# Patient Record
Sex: FEMALE | Race: WHITE | Employment: UNEMPLOYED | RURAL
[De-identification: names, ages, dates, MRNs, and addresses within clinical notes are randomized per-mention and may not be internally consistent; named-entity substitution may affect disease eponyms.]

---

## 2017-03-29 ENCOUNTER — OFFICE VISIT (OUTPATIENT)
Dept: FAMILY MEDICINE CLINIC | Age: 62
End: 2017-03-29

## 2017-03-29 VITALS
HEIGHT: 66 IN | DIASTOLIC BLOOD PRESSURE: 58 MMHG | OXYGEN SATURATION: 98 % | TEMPERATURE: 97.3 F | SYSTOLIC BLOOD PRESSURE: 135 MMHG | RESPIRATION RATE: 18 BRPM | BODY MASS INDEX: 19.77 KG/M2 | WEIGHT: 123 LBS | HEART RATE: 92 BPM

## 2017-03-29 DIAGNOSIS — Z79.4 TYPE 2 DIABETES MELLITUS WITH OTHER CIRCULATORY COMPLICATION, WITH LONG-TERM CURRENT USE OF INSULIN (HCC): Primary | ICD-10-CM

## 2017-03-29 DIAGNOSIS — E78.2 MIXED DYSLIPIDEMIA: ICD-10-CM

## 2017-03-29 DIAGNOSIS — E11.59 TYPE 2 DIABETES MELLITUS WITH OTHER CIRCULATORY COMPLICATION, WITH LONG-TERM CURRENT USE OF INSULIN (HCC): Primary | ICD-10-CM

## 2017-03-29 DIAGNOSIS — I10 ESSENTIAL HYPERTENSION: ICD-10-CM

## 2017-03-29 NOTE — PROGRESS NOTES
Adina Dangelo is a 62 y.o. female who presents with the following:  Chief Complaint   Patient presents with    Diabetes     discuss meds, needs to have labs drawn, discuss defibrillator    Hypertension    Cholesterol Problem       Diabetes   The history is provided by the patient (The patient feels the diabetes is doing well but she has not had her laboratory done in quite some time and she has not been following her own fingersticks). Pertinent negatives include no chest pain, no abdominal pain, no headaches and no shortness of breath. Hypertension    The history is provided by the patient (The patient has been doing well taking her medication for her hypertension without any chest pain nor edema). Pertinent negatives include no chest pain, no orthopnea, no palpitations, no malaise/fatigue, no blurred vision, no headaches, no tinnitus, no dizziness and no shortness of breath. Cholesterol Problem   The history is provided by the patient (The patient has been taking her pravastatin with only minor cramping at night and no weakness). Pertinent negatives include no chest pain, no abdominal pain, no headaches and no shortness of breath. Allergies   Allergen Reactions    Pcn [Penicillins] Anaphylaxis    Sulfa (Sulfonamide Antibiotics) Nausea Only    Valium [Diazepam] Unknown (comments)       Current Outpatient Prescriptions   Medication Sig    carvedilol (COREG) 6.25 mg tablet Take  by mouth two (2) times daily (with meals).  docusate sodium (COLACE) 100 mg capsule Take 100 mg by mouth two (2) times a day.  pravastatin (PRAVACHOL) 20 mg tablet Take 20 mg by mouth nightly.  gabapentin (NEURONTIN) 100 mg capsule Take 1 Cap by mouth three (3) times daily.  cyclobenzaprine (FLEXERIL) 5 mg tablet Take 1 Tab by mouth three (3) times daily (with meals). Indications: MUSCLE SPASM    amiodarone (CORDARONE) 200 mg tablet Take 1 Tab by mouth two (2) times a day.  (Patient taking differently: Take 200 mg by mouth daily.)    insulin glargine (LANTUS) 100 unit/mL injection 13 Units by SubCUTAneous route daily. (Patient taking differently: 24 Units by SubCUTAneous route daily.)    pantoprazole (PROTONIX) 40 mg tablet Take 1 Tab by mouth daily.  rivaroxaban (XARELTO) 20 mg tab tablet Take 1 Tab by mouth daily (with breakfast).  valsartan (DIOVAN) 40 mg tablet Take 0.5 Tabs by mouth daily. (Patient taking differently: Take 40 mg by mouth two (2) times a day.)     No current facility-administered medications for this visit. Past Medical History:   Diagnosis Date    Back pain     Back pain     Hepatitis     History of seasonal allergies     Hypertension     Kidney infection     [facility] infarction) [facility]     MRSA (methicillin resistant Staphylococcus aureus)     Recurrent boils     [facility]     Type II or unspecified type diabetes mellitus without mention of complication, not stated as uncontrolled        Past Surgical History:   Procedure Laterality Date    HX BLADDER REPAIR      HX CHOLECYSTECTOMY      HX HYSTERECTOMY      2000    HX ORTHOPAEDIC      pin in left foot    HX OTHER SURGICAL      defiberilator left corotid    HX OTHER SURGICAL      chris abscess       Family History   Problem Relation Age of Onset    Arthritis-osteo Mother     Diabetes Father     Hypertension Father     Heart Attack Father     Heart Disease Father     Diabetes Paternal Grandmother        Social History     Social History    Marital status:      Spouse name: N/A    Number of children: N/A    Years of education: N/A     Social History Main Topics    Smoking status: Current Some Day Smoker    Smokeless tobacco: Never Used    Alcohol use No    Drug use: None    Sexual activity: Not Asked     Other Topics Concern    None     Social History Narrative       Review of Systems   Constitutional: Negative for chills, fever, malaise/fatigue and weight loss.    HENT: Negative for congestion, hearing loss, sore throat and tinnitus. Eyes: Negative for blurred vision, pain and discharge. Respiratory: Negative for cough, shortness of breath and wheezing. Cardiovascular: Negative for chest pain, palpitations, orthopnea, claudication and leg swelling. Gastrointestinal: Negative for abdominal pain, constipation and heartburn. Genitourinary: Negative for dysuria, frequency and urgency. Musculoskeletal: Negative for falls, joint pain and myalgias. Skin: Negative for itching and rash. Neurological: Negative for dizziness, tingling, tremors and headaches. Endo/Heme/Allergies: Negative for environmental allergies and polydipsia. Psychiatric/Behavioral: Negative for depression and substance abuse. The patient is not nervous/anxious. Visit Vitals    /58 (BP 1 Location: Left arm, BP Patient Position: Sitting)    Pulse 92    Temp 97.3 °F (36.3 °C) (Oral)    Resp 18    Ht 5' 6\" (1.676 m)    Wt 123 lb (55.8 kg)    SpO2 98%    BMI 19.85 kg/m2     Physical Exam   Constitutional: She is oriented to person, place, and time and well-developed, well-nourished, and in no distress. HENT:   Head: Normocephalic and atraumatic. Right Ear: External ear normal.   Left Ear: External ear normal.   Mouth/Throat: Oropharynx is clear and moist.   Eyes: Conjunctivae and EOM are normal. Pupils are equal, round, and reactive to light. Right eye exhibits no discharge. Left eye exhibits no discharge. Neck: Normal range of motion. Neck supple. No tracheal deviation present. No thyromegaly present. Cardiovascular: Normal rate, regular rhythm, normal heart sounds and intact distal pulses. Exam reveals no gallop and no friction rub. No murmur heard. Pulmonary/Chest: Effort normal and breath sounds normal. No respiratory distress. She has no wheezes. She exhibits no tenderness. Abdominal: Soft. Bowel sounds are normal. She exhibits no distension and no mass.  There is no tenderness. There is no rebound and no guarding. Musculoskeletal: She exhibits no edema or tenderness. Lymphadenopathy:     She has no cervical adenopathy. Neurological: She is alert and oriented to person, place, and time. She has normal reflexes. No cranial nerve deficit. She exhibits normal muscle tone. Gait normal. Coordination normal. GCS score is 15. Skin: Skin is warm and dry. No rash noted. No erythema. No pallor. Psychiatric: Mood, memory, affect and judgment normal.         ICD-10-CM ICD-9-CM    1. Type 2 diabetes mellitus with other circulatory complication, with long-term current use of insulin ([facility]) X32.73  METABOLIC PANEL, COMPREHENSIVE    Z79.4  LIPID PANEL      HEMOGLOBIN A1C WITH EAG      MICROALBUMIN, UR, RAND W/ MICROALBUMIN/CREA RATIO   2. Essential hypertension S96 174.3 METABOLIC PANEL, COMPREHENSIVE      LIPID PANEL   3.  Mixed dyslipidemia X65.9 831.7 METABOLIC PANEL, COMPREHENSIVE      LIPID PANEL       Orders Placed This Encounter    METABOLIC PANEL, COMPREHENSIVE     Standing Status:   Future     Standing Expiration Date:   9/29/2017    LIPID PANEL     Standing Status:   Future     Standing Expiration Date:   9/29/2017    HEMOGLOBIN A1C WITH EAG     Standing Status:   Future     Standing Expiration Date:   9/29/2017    MICROALBUMIN, UR, RAND W/ MICROALBUMIN/CREA RATIO     Standing Status:   Future     Standing Expiration Date:   9/29/2017       Follow-up Disposition: Not on Luis Staley MD

## 2017-03-29 NOTE — MR AVS SNAPSHOT
Visit Information Date & Time Provider Department Dept. Phone Encounter #  
 3/29/2017  2:20 PM Ashleigh Way MD [facility] Primary [facility] [phone] [id number] Upcoming Health Maintenance Date Due Hepatitis C Screening 1955 FOOT EXAM Q1 5/13/1966 MICROALBUMIN Q1 5/13/1966 EYE EXAM RETINAL OR DILATED Q1 5/13/1966 Pneumococcal 19-64 Medium Risk (1 of 1 - PPSV23) 5/13/1975 DTaP/Tdap/Td series (1 - Tdap) 5/13/1977 PAP AKA CERVICAL CYTOLOGY 5/13/1977 BREAST CANCER SCRN MAMMOGRAM 5/13/2006 FOBT Q 1 YEAR AGE 50-75 5/13/2006 ZOSTER VACCINE AGE 60> 5/13/2016 INFLUENZA AGE 9 TO ADULT 8/1/2016 HEMOGLOBIN A1C Q6M 9/1/2016 LIPID PANEL Q1 3/1/2017 Allergies as of 3/29/2017  Review Complete On: 3/29/2017 By: Brooke Danielle RN Severity Noted Reaction Type Reactions Pcn [Penicillins] High 09/27/2013    Anaphylaxis Sulfa (Sulfonamide Antibiotics)  09/27/2013    Nausea Only Valium [Diazepam]  12/20/2016    Unknown (comments) Current Immunizations  Never Reviewed No immunizations on file. Not reviewed this visit Vitals BP Pulse Temp Resp Height(growth percentile) Weight(growth percentile) 135/58 (BP 1 Location: Left arm, BP Patient Position: Sitting) 92 97.3 °F (36.3 °C) (Oral) 18 5' 6\" (1.676 m) 123 lb (55.8 kg) SpO2 BMI OB Status Smoking Status 98% 19.85 kg/m2 Hysterectomy Current Some Day Smoker Vitals History BMI and BSA Data Body Mass Index Body Surface Area  
 19.85 kg/m 2 1.61 m 2 Preferred Pharmacy Pharmacy Name Phone [facility]""[place] , [place] - [address] [phone] Your Updated Medication List  
  
   
This list is accurate as of: 3/29/17  3:49 PM.  Always use your most recent med list.  
  
  
  
  
 acetaminophen 325 mg tablet Commonly known as:  TYLENOL  
 Take 2 Tabs by mouth every four (4) hours as needed. Indications: FEVER, PAIN  
  
 amiodarone 200 mg tablet Commonly known as:  CORDARONE Take 1 Tab by mouth two (2) times a day. atorvastatin 20 mg tablet Commonly known as:  LIPITOR Take 1 Tab by mouth nightly. BACTRIM -800 mg per tablet Generic drug:  trimethoprim-sulfamethoxazole Take 1 Tab by mouth two (2) times a day. carvedilol 6.25 mg tablet Commonly known as:  Monda Lovings Take  by mouth two (2) times daily (with meals). cyclobenzaprine 5 mg tablet Commonly known as:  FLEXERIL Take 1 Tab by mouth three (3) times daily (with meals). Indications: MUSCLE SPASM  
  
 docusate sodium 100 mg capsule Commonly known as:  Hildegarde Berth Take 100 mg by mouth two (2) times a day.  
  
 gabapentin 100 mg capsule Commonly known as:  NEURONTIN Take 1 Cap by mouth three (3) times daily. glipiZIDE 10 mg tablet Commonly known as:  Sher Hanly Take 1.5 Tabs by mouth two (2) times a day. insulin glargine 100 unit/mL injection Commonly known as:  LANTUS  
13 Units by SubCUTAneous route daily. MUCINEX D MAXIMUM STRENGTH PO Take  by mouth.  
  
 pantoprazole 40 mg tablet Commonly known as:  PROTONIX Take 1 Tab by mouth daily. PERCOCET 5-325 mg per tablet Generic drug:  oxyCODONE-acetaminophen Take 1 Tab by mouth every four (4) hours as needed. polyethylene glycol 17 gram packet Commonly known as:  Prachi Liv Take 1 Packet by mouth daily as needed. PRAVACHOL 20 mg tablet Generic drug:  pravastatin Take 20 mg by mouth nightly. rivaroxaban 20 mg Tab tablet Commonly known as:  Marcine Rosario Take 1 Tab by mouth daily (with breakfast). SINGULAIR 10 mg tablet Generic drug:  montelukast  
Take 10 mg by mouth daily. spironolactone 25 mg tablet Commonly known as:  ALDACTONE Take 1 Tab by mouth daily. valsartan 40 mg tablet Commonly known as:  DIOVAN  
 Take 0.5 Tabs by mouth daily. Introducing [facility]! Dear Dara Hagan: 
Thank you for requesting a BragBet account. Our records indicate that you already have an active BragBet account. You can access your account anytime at [email or web address] Did you know that you can access your hospital and ER discharge instructions at any time in BragBet? You can also review all of your test results from your hospital stay or ER visit. Additional Information If you have questions, please visit the Frequently Asked Questions section of the BragBet website at [email or web address] Remember, BragBet is NOT to be used for urgent needs. For medical emergencies, dial 911. Now available from your iPhone and Android! Please provide this summary of care documentation to your next provider. Your primary care clinician is listed as Remington Marie. If you have any questions after today's visit, please call [phone].

## 2017-03-31 DIAGNOSIS — G89.29 CHRONIC RIGHT-SIDED LOW BACK PAIN WITHOUT SCIATICA: ICD-10-CM

## 2017-03-31 DIAGNOSIS — M54.50 CHRONIC RIGHT-SIDED LOW BACK PAIN WITHOUT SCIATICA: ICD-10-CM

## 2017-03-31 RX ORDER — INSULIN GLARGINE 100 [IU]/ML
24 INJECTION, SOLUTION SUBCUTANEOUS DAILY
Qty: 3 VIAL | Refills: 2 | Status: SHIPPED | OUTPATIENT
Start: 2017-03-31 | End: 2018-04-06 | Stop reason: SDUPTHER

## 2017-03-31 RX ORDER — CYCLOBENZAPRINE HCL 5 MG
5 TABLET ORAL
Qty: 90 TAB | Refills: 5 | Status: SHIPPED | OUTPATIENT
Start: 2017-03-31 | End: 2017-08-11 | Stop reason: SDUPTHER

## 2017-03-31 RX ORDER — PANTOPRAZOLE SODIUM 40 MG/1
40 TABLET, DELAYED RELEASE ORAL DAILY
Qty: 90 TAB | Refills: 1 | Status: SHIPPED | OUTPATIENT
Start: 2017-03-31 | End: 2017-10-30 | Stop reason: SDUPTHER

## 2017-03-31 RX ORDER — AMIODARONE HYDROCHLORIDE 200 MG/1
200 TABLET ORAL 2 TIMES DAILY
Qty: 180 TAB | Refills: 1 | Status: SHIPPED | OUTPATIENT
Start: 2017-03-31 | End: 2017-11-10 | Stop reason: SDUPTHER

## 2017-03-31 RX ORDER — VALSARTAN 40 MG/1
40 TABLET ORAL 2 TIMES DAILY
Qty: 180 TAB | Refills: 1 | Status: SHIPPED | OUTPATIENT
Start: 2017-03-31 | End: 2017-11-10 | Stop reason: SDUPTHER

## 2017-03-31 RX ORDER — GABAPENTIN 100 MG/1
100 CAPSULE ORAL 3 TIMES DAILY
Qty: 270 CAP | Refills: 1 | Status: SHIPPED | OUTPATIENT
Start: 2017-03-31 | End: 2017-11-10 | Stop reason: SDUPTHER

## 2017-04-05 ENCOUNTER — LAB ONLY (OUTPATIENT)
Dept: FAMILY MEDICINE CLINIC | Age: 62
End: 2017-04-05

## 2017-04-05 DIAGNOSIS — E11.59 TYPE 2 DIABETES MELLITUS WITH OTHER CIRCULATORY COMPLICATION, WITH LONG-TERM CURRENT USE OF INSULIN (HCC): ICD-10-CM

## 2017-04-05 DIAGNOSIS — I10 ESSENTIAL HYPERTENSION: ICD-10-CM

## 2017-04-05 DIAGNOSIS — E78.2 MIXED DYSLIPIDEMIA: ICD-10-CM

## 2017-04-05 DIAGNOSIS — Z79.4 TYPE 2 DIABETES MELLITUS WITH OTHER CIRCULATORY COMPLICATION, WITH LONG-TERM CURRENT USE OF INSULIN (HCC): ICD-10-CM

## 2017-04-07 LAB
ALBUMIN SERPL-MCNC: 4.3 G/DL (ref 3.6–4.8)
ALBUMIN/CREAT UR: 24.3 MG/G CREAT (ref 0–30)
ALBUMIN/GLOB SERPL: 1.2 {RATIO} (ref 1.2–2.2)
ALP SERPL-CCNC: 132 IU/L (ref 39–117)
ALT SERPL-CCNC: 14 IU/L (ref 0–32)
AST SERPL-CCNC: 14 IU/L (ref 0–40)
BILIRUB SERPL-MCNC: 0.3 MG/DL (ref 0–1.2)
BUN SERPL-MCNC: 14 MG/DL (ref 8–27)
BUN/CREAT SERPL: 19 (ref 12–28)
CALCIUM SERPL-MCNC: 9.8 MG/DL (ref 8.7–10.3)
CHLORIDE SERPL-SCNC: 101 MMOL/L (ref 96–106)
CHOLEST SERPL-MCNC: 157 MG/DL (ref 100–199)
CO2 SERPL-SCNC: 24 MMOL/L (ref 18–29)
CREAT SERPL-MCNC: 0.75 MG/DL (ref 0.57–1)
CREAT UR-MCNC: 18.5 MG/DL
EST. AVERAGE GLUCOSE BLD GHB EST-MCNC: 140 MG/DL
GLOBULIN SER CALC-MCNC: 3.5 G/DL (ref 1.5–4.5)
GLUCOSE SERPL-MCNC: 115 MG/DL (ref 65–99)
HBA1C MFR BLD: 6.5 % (ref 4.8–5.6)
HDLC SERPL-MCNC: 52 MG/DL
INTERPRETATION, 910389: NORMAL
LDLC SERPL CALC-MCNC: 78 MG/DL (ref 0–99)
MICROALBUMIN UR-MCNC: 4.5 UG/ML
POTASSIUM SERPL-SCNC: 4.7 MMOL/L (ref 3.5–5.2)
PROT SERPL-MCNC: 7.8 G/DL (ref 6–8.5)
SODIUM SERPL-SCNC: 142 MMOL/L (ref 134–144)
TRIGL SERPL-MCNC: 136 MG/DL (ref 0–149)
VLDLC SERPL CALC-MCNC: 27 MG/DL (ref 5–40)

## 2017-04-11 RX ORDER — CARVEDILOL 6.25 MG/1
25 TABLET ORAL 2 TIMES DAILY WITH MEALS
Qty: 360 TAB | Refills: 1 | Status: SHIPPED | OUTPATIENT
Start: 2017-04-11 | End: 2017-11-10 | Stop reason: SDUPTHER

## 2017-04-11 RX ORDER — PRAVASTATIN SODIUM 20 MG/1
20 TABLET ORAL
Qty: 90 TAB | Refills: 1 | Status: SHIPPED | OUTPATIENT
Start: 2017-04-11 | End: 2017-10-24 | Stop reason: SDUPTHER

## 2017-05-15 ENCOUNTER — TELEPHONE (OUTPATIENT)
Dept: FAMILY MEDICINE CLINIC | Age: 62
End: 2017-05-15

## 2017-05-15 NOTE — TELEPHONE ENCOUNTER
Received info with Xarelto. [address] [place] [address] and they confirmed that they have been able to fill the rx for the patient using a benefits card for xarelto and the patient has a zero $ copay.

## 2017-06-05 ENCOUNTER — TELEPHONE (OUTPATIENT)
Dept: FAMILY MEDICINE CLINIC | Age: 62
End: 2017-06-05

## 2017-06-05 NOTE — TELEPHONE ENCOUNTER
Tried to initiate a PA for xarelto and the info would not upload to covermymeds for the OV notes with stroke as HX. Called ins and had them add info to PA to see if they would cover the xarelto. Spoke with Nani Jose Ramon from [facility] and she is aware of this status. It is under review again but may not be covered due to the prophylactic use looks like it has to be to reduce risk of stroke from dx of afib which patient does not have. Waiting for ins decision at this time.

## 2017-06-06 NOTE — TELEPHONE ENCOUNTER
Xarelto denied for the second time. What would you like for patient to try? The will only cover for stroke prevention if patient has afib.

## 2017-06-06 NOTE — TELEPHONE ENCOUNTER
The patient needs to call her neurologist to her cardiologist to have her started her on Xarelto and find out what they would like her to use.   I have not had any success in using warfarin for this particular condition and the only other thing is aspirin and vasodilator called Aggrenox

## 2017-06-07 NOTE — TELEPHONE ENCOUNTER
Called and spoke with patient. She is going to call Dr. Lalita Lomax and have him try and prescribe something else. She will call back if she has issues getting new rx right away.

## 2017-08-11 DIAGNOSIS — M54.50 CHRONIC RIGHT-SIDED LOW BACK PAIN WITHOUT SCIATICA: ICD-10-CM

## 2017-08-11 DIAGNOSIS — G89.29 CHRONIC RIGHT-SIDED LOW BACK PAIN WITHOUT SCIATICA: ICD-10-CM

## 2017-08-11 RX ORDER — CYCLOBENZAPRINE HCL 5 MG
5 TABLET ORAL
Qty: 90 TAB | Refills: 1 | Status: SHIPPED | OUTPATIENT
Start: 2017-08-11 | End: 2017-10-18 | Stop reason: SDUPTHER

## 2017-10-24 RX ORDER — PRAVASTATIN SODIUM 20 MG/1
20 TABLET ORAL
Qty: 30 TAB | Refills: 0 | Status: SHIPPED | OUTPATIENT
Start: 2017-10-24 | End: 2017-11-10 | Stop reason: SDUPTHER

## 2017-10-24 RX ORDER — PANTOPRAZOLE SODIUM 40 MG/1
40 TABLET, DELAYED RELEASE ORAL DAILY
Qty: 90 TAB | Refills: 1 | OUTPATIENT
Start: 2017-10-24

## 2017-10-24 RX ORDER — PRAVASTATIN SODIUM 20 MG/1
20 TABLET ORAL
Qty: 90 TAB | Refills: 1 | OUTPATIENT
Start: 2017-10-24

## 2017-10-30 RX ORDER — PANTOPRAZOLE SODIUM 40 MG/1
40 TABLET, DELAYED RELEASE ORAL DAILY
Qty: 30 TAB | Refills: 0 | Status: SHIPPED | OUTPATIENT
Start: 2017-10-30 | End: 2017-11-10 | Stop reason: SDUPTHER

## 2017-11-10 ENCOUNTER — OFFICE VISIT (OUTPATIENT)
Dept: FAMILY MEDICINE CLINIC | Age: 62
End: 2017-11-10

## 2017-11-10 VITALS
BODY MASS INDEX: 24.91 KG/M2 | DIASTOLIC BLOOD PRESSURE: 73 MMHG | RESPIRATION RATE: 18 BRPM | OXYGEN SATURATION: 97 % | SYSTOLIC BLOOD PRESSURE: 156 MMHG | WEIGHT: 155 LBS | HEIGHT: 66 IN | HEART RATE: 76 BPM

## 2017-11-10 DIAGNOSIS — I10 ESSENTIAL HYPERTENSION: ICD-10-CM

## 2017-11-10 DIAGNOSIS — M54.50 CHRONIC RIGHT-SIDED LOW BACK PAIN WITHOUT SCIATICA: ICD-10-CM

## 2017-11-10 DIAGNOSIS — E11.59 TYPE 2 DIABETES MELLITUS WITH OTHER CIRCULATORY COMPLICATION, WITH LONG-TERM CURRENT USE OF INSULIN (HCC): Primary | ICD-10-CM

## 2017-11-10 DIAGNOSIS — F17.200 SMOKER: ICD-10-CM

## 2017-11-10 DIAGNOSIS — E78.2 MIXED DYSLIPIDEMIA: ICD-10-CM

## 2017-11-10 DIAGNOSIS — I69.351 HEMIPLEGIA AND HEMIPARESIS FOLLOWING CEREBRAL INFARCTION AFFECTING RIGHT DOMINANT SIDE (HCC): ICD-10-CM

## 2017-11-10 DIAGNOSIS — K21.9 GASTROESOPHAGEAL REFLUX DISEASE WITHOUT ESOPHAGITIS: ICD-10-CM

## 2017-11-10 DIAGNOSIS — Z79.4 TYPE 2 DIABETES MELLITUS WITH OTHER CIRCULATORY COMPLICATION, WITH LONG-TERM CURRENT USE OF INSULIN (HCC): Primary | ICD-10-CM

## 2017-11-10 DIAGNOSIS — I50.9 CHRONIC CONGESTIVE HEART FAILURE, UNSPECIFIED CONGESTIVE HEART FAILURE TYPE: ICD-10-CM

## 2017-11-10 DIAGNOSIS — Z11.59 NEED FOR HEPATITIS C SCREENING TEST: ICD-10-CM

## 2017-11-10 DIAGNOSIS — I63.232 CEREBRAL INFARCTION DUE TO OCCLUSION OF LEFT INTERNAL CAROTID ARTERY (HCC): ICD-10-CM

## 2017-11-10 DIAGNOSIS — G89.29 CHRONIC RIGHT-SIDED LOW BACK PAIN WITHOUT SCIATICA: ICD-10-CM

## 2017-11-10 RX ORDER — PRAVASTATIN SODIUM 20 MG/1
20 TABLET ORAL
Qty: 90 TAB | Refills: 1 | Status: SHIPPED | OUTPATIENT
Start: 2017-11-10 | End: 2018-04-06 | Stop reason: SDUPTHER

## 2017-11-10 RX ORDER — CYCLOBENZAPRINE HCL 5 MG
TABLET ORAL
Qty: 270 TAB | Refills: 1 | Status: SHIPPED | OUTPATIENT
Start: 2017-11-10 | End: 2018-04-06 | Stop reason: SDUPTHER

## 2017-11-10 RX ORDER — AMIODARONE HYDROCHLORIDE 200 MG/1
200 TABLET ORAL 2 TIMES DAILY
Qty: 180 TAB | Refills: 1 | Status: SHIPPED | OUTPATIENT
Start: 2017-11-10 | End: 2018-04-06 | Stop reason: SDUPTHER

## 2017-11-10 RX ORDER — CARVEDILOL 6.25 MG/1
TABLET ORAL
Qty: 180 TAB | Refills: 1 | Status: SHIPPED | OUTPATIENT
Start: 2017-11-10 | End: 2018-04-06 | Stop reason: SDUPTHER

## 2017-11-10 RX ORDER — VALSARTAN 40 MG/1
40 TABLET ORAL 2 TIMES DAILY
Qty: 180 TAB | Refills: 1 | Status: SHIPPED | OUTPATIENT
Start: 2017-11-10 | End: 2018-04-06 | Stop reason: SDUPTHER

## 2017-11-10 RX ORDER — GABAPENTIN 100 MG/1
100 CAPSULE ORAL 3 TIMES DAILY
Qty: 270 CAP | Refills: 1 | Status: SHIPPED | OUTPATIENT
Start: 2017-11-10 | End: 2018-04-06 | Stop reason: SDUPTHER

## 2017-11-10 RX ORDER — PANTOPRAZOLE SODIUM 40 MG/1
40 TABLET, DELAYED RELEASE ORAL DAILY
Qty: 90 TAB | Refills: 1 | Status: SHIPPED | OUTPATIENT
Start: 2017-11-10 | End: 2018-04-06 | Stop reason: SDUPTHER

## 2017-11-10 NOTE — MR AVS SNAPSHOT
Visit Information Date & Time Provider Department Dept. Phone Encounter #  
 11/10/2017 10:00 AM Inder Peter MD [facility] [phone] [id number] Upcoming Health Maintenance Date Due Hepatitis C Screening 1955 FOOT EXAM Q1 5/13/1966 EYE EXAM RETINAL OR DILATED Q1 5/13/1966 DTaP/Tdap/Td series (1 - Tdap) 5/13/1977 PAP AKA CERVICAL CYTOLOGY 5/13/1977 BREAST CANCER SCRN MAMMOGRAM 5/13/2006 FOBT Q 1 YEAR AGE 50-75 5/13/2006 ZOSTER VACCINE AGE 60> 3/13/2016 HEMOGLOBIN A1C Q6M 10/5/2017 MICROALBUMIN Q1 4/5/2018 LIPID PANEL Q1 4/5/2018 Allergies as of 11/10/2017  Review Complete On: 11/10/2017 By: Inder Peter MD  
  
 Severity Noted Reaction Type Reactions Pcn [Penicillins] High 09/27/2013    Anaphylaxis Sulfa (Sulfonamide Antibiotics)  09/27/2013    Nausea Only Valium [Diazepam]  12/20/2016    Unknown (comments) Current Immunizations  Never Reviewed Name Date Influenza Vaccine 12/6/2012 12:00 AM, 12/19/2011 12:00 AM, 10/28/2008 12:00 AM  
 Pneumococcal Polysaccharide (PPSV-23) 10/18/2013 Td 1/1/2002 12:00 AM  
  
 Not reviewed this visit You Were Diagnosed With   
  
 Codes Comments Type 2 diabetes mellitus with other circulatory complication, with long-term current use of insulin (HCC)    -  Primary ICD-10-CM: E11.59, Z79.4 ICD-9-CM: 250.70, V58.67 Cerebral infarction due to occlusion of left internal carotid artery (HCC)     ICD-10-CM: O09.291 ICD-9-CM: 433.11 Chronic congestive heart failure, unspecified congestive heart failure type ([facility]     ICD-10-CM: I50.9 ICD-9-CM: 428.0 Hemiplegia and hemiparesis following cerebral infarction affecting right dominant side (HCC)     ICD-10-CM: B85.718 ICD-9-CM: 438.21 Essential hypertension     ICD-10-CM: I10 
ICD-9-CM: 401.9 Mixed dyslipidemia     ICD-10-CM: E78.2 ICD-9-CM: 272.2 Smoker     ICD-10-CM: V95.937 ICD-9-CM: 305.1 Need for hepatitis C screening test     ICD-10-CM: Z11.59 
ICD-9-CM: V73.89 Chronic right-sided low back pain without sciatica     ICD-10-CM: M54.5, G89.29 ICD-9-CM: 724.2, 338.29 Vitals BP Pulse Resp Height(growth percentile) Weight(growth percentile) SpO2  
 156/73 (BP 1 Location: Right arm, BP Patient Position: Sitting) 76 18 5' 6\" (1.676 m) 155 lb (70.3 kg) 97% BMI OB Status Smoking Status 25.02 kg/m2 Hysterectomy Current Some Day Smoker BMI and BSA Data Body Mass Index Body Surface Area 25.02 kg/m 2 1.81 m 2 Preferred Pharmacy Pharmacy Name [facility] [place], [place] - [address] [phone] Your Updated Medication List  
  
   
This list is accurate as of: 11/10/17 11:10 AM.  Always use your most recent med list.  
  
  
  
  
 amiodarone 200 mg tablet Commonly known as:  CORDARONE Take 1 Tab by mouth two (2) times a day. carvedilol 6.25 mg tablet Commonly known as:  COREG  
1 tab by mouth 2 times a day. cyclobenzaprine 5 mg tablet Commonly known as:  FLEXERIL  
TAKE ONE TABLET BY MOUTH THREE TIMES DAILY (WITH  MEALS)  INDICATIONS: MUSCLE  SPASM  
  
 docusate sodium 100 mg capsule Commonly known as:  Vermell Nones Take 100 mg by mouth two (2) times a day.  
  
 gabapentin 100 mg capsule Commonly known as:  NEURONTIN Take 1 Cap by mouth three (3) times daily. insulin glargine 100 unit/mL injection Commonly known as:  LANTUS  
24 Units by SubCUTAneous route daily. pantoprazole 40 mg tablet Commonly known as:  PROTONIX Take 1 Tab by mouth daily. pravastatin 20 mg tablet Commonly known as:  PRAVACHOL Take 1 Tab by mouth nightly. rivaroxaban 20 mg Tab tablet Commonly known as:  Mckeon Quan Take 1 Tab by mouth daily (with breakfast). valsartan 40 mg tablet Commonly known as:  DIOVAN Take 1 Tab by mouth two (2) times a day. Prescriptions Sent to Pharmacy Refills  
 cyclobenzaprine (FLEXERIL) 5 mg tablet 1 Sig: TAKE ONE TABLET BY MOUTH THREE TIMES DAILY (WITH  MEALS)  INDICATIONS: MUSCLE  SPASM Class: Normal  
 Pharmacy: [address] [place] [address] JANA Greg Burgos Ph #: [phone]  
 valsartan (DIOVAN) 40 mg tablet 1 Sig: Take 1 Tab by mouth two (2) times a day. Class: Normal  
 Pharmacy: [address] [place] [address]. [address] [place] [address] [address] Ph #: [phone] Route: Oral  
 rivaroxaban (XARELTO) 20 mg tab tablet 1 Sig: Take 1 Tab by mouth daily (with breakfast). Class: Normal  
 Pharmacy: [address] [place] [address]. Rd[address] [place] [address] [address] Ph #: [phone] Route: Oral  
 pravastatin (PRAVACHOL) 20 mg tablet 1 Sig: Take 1 Tab by mouth nightly. Class: Normal  
 Pharmacy: [address] [place] [address]. [address] [place] [address] [address] Ph #: [phone] Route: Oral  
 pantoprazole (PROTONIX) 40 mg tablet 1 Sig: Take 1 Tab by mouth daily. Class: Normal  
 Pharmacy: [address] [place] [address]. Rd[address] [place] [address] [address] Ph #: [phone] Route: Oral  
 gabapentin (NEURONTIN) 100 mg capsule 1 Sig: Take 1 Cap by mouth three (3) times daily. Class: Normal  
 Pharmacy: [address] [place] [address]. Rd[address] [place] [address] [address] Ph #: [phone] Route: Oral  
 carvedilol (COREG) 6.25 mg tablet 1 Si tab by mouth 2 times a day. Class: Normal  
 Pharmacy: [address] [place] [address]. Rd[address] [place] [address] [address] Ph #: [phone]  
 amiodarone (CORDARONE) 200 mg tablet 1 Sig: Take 1 Tab by mouth two (2) times a day. Class: Normal  
 Pharmacy: [address] [place] [address]. Rd[address] [place] [address] [address] Ph #: [phone] Route: Oral  
  
We Performed the Following HEMOGLOBIN A1C WITH EAG [13406 CPT(R)] HEPATITIS C AB [34474 CPT(R)] HM DIABETES FOOT EXAM [HM7 Custom] LIPID PANEL [34840 CPT(R)] METABOLIC PANEL, COMPREHENSIVE [20178 CPT(R)] MICROALBUMIN, UR, RAND W/ MICROALBUMIN/CREA RATIO [id number] CPT(R)] GA COLLECTION VENOUS BLOOD,VENIPUNCTURE [id number] CPT(R)] Introducing [facility]! Dear Rosario Daniel: 
Thank you for requesting a [email or web address] account. Our records indicate that you already have an active [email or web address] account. You can access your account anytime at [email or web address] Did you know that you can access your hospital and ER discharge instructions at any time in [email or web address]? You can also review all of your test results from your hospital stay or ER visit. Additional Information If you have questions, please visit the Frequently Asked Questions section of the [email or web address] website at [email or web address] Remember, [email or web address] is NOT to be used for urgent needs. For medical emergencies, dial 911. Now available from your iPhone and Android! Please provide this summary of care documentation to your next provider. Your primary care clinician is listed as Cheri Chang. If you have any questions after today's visit, please call [phone].

## 2017-11-10 NOTE — PROGRESS NOTES
Angel Devlin is a 65 y.o. female who presents with the following:  Chief Complaint   Patient presents with    GERD    Cholesterol Problem    Hypertension    Diabetes       GERD   The history is provided by the patient (Patient's GERD symptoms and heartburn are controlled by her pantoprazole daily). Pertinent negatives include no chest pain, no abdominal pain, no headaches and no shortness of breath. Cholesterol Problem   The history is provided by the patient (Patient's lipids are controlled by pravastatin 20 mg without any muscle pain or weakness). Pertinent negatives include no chest pain, no abdominal pain, no headaches and no shortness of breath. Hypertension    The history is provided by the patient (Patient's hypertension is being controlled by her valsartan and [place] dial without any chest pain or shortness of breath nor edema). Pertinent negatives include no chest pain, no orthopnea, no palpitations, no malaise/fatigue, no blurred vision, no headaches, no tinnitus, no dizziness and no shortness of breath. Diabetes   The history is provided by the patient (Patient's diabetes is being controlled fairly well by her Lantus 26 units daily although she has not been checking her blood sugars). Pertinent negatives include no chest pain, no abdominal pain, no headaches and no shortness of breath. Associated symptoms comments: Patient has had a total occlusion of her left internal carotid artery and they are watching the right carefully as she already has had a stroke with residual affecting her speech and she continues to smoke. .       Allergies   Allergen Reactions    Pcn [Penicillins] Anaphylaxis    Sulfa (Sulfonamide Antibiotics) Nausea Only    Valium [Diazepam] Unknown (comments)       Current Outpatient Prescriptions   Medication Sig    cyclobenzaprine (FLEXERIL) 5 mg tablet TAKE ONE TABLET BY MOUTH THREE TIMES DAILY (WITH  MEALS)  INDICATIONS: MUSCLE  SPASM    valsartan (DIOVAN) 40 mg tablet Take 1 Tab by mouth two (2) times a day.  rivaroxaban (XARELTO) 20 mg tab tablet Take 1 Tab by mouth daily (with breakfast).  pravastatin (PRAVACHOL) 20 mg tablet Take 1 Tab by mouth nightly.  pantoprazole (PROTONIX) 40 mg tablet Take 1 Tab by mouth daily.  gabapentin (NEURONTIN) 100 mg capsule Take 1 Cap by mouth three (3) times daily.  carvedilol (COREG) 6.25 mg tablet 1 tab by mouth 2 times a day.  amiodarone (CORDARONE) 200 mg tablet Take 1 Tab by mouth two (2) times a day.  insulin glargine (LANTUS) 100 unit/mL injection 24 Units by SubCUTAneous route daily.  docusate sodium (COLACE) 100 mg capsule Take 100 mg by mouth two (2) times a day. No current facility-administered medications for this visit.         Past Medical History:   Diagnosis Date    Back pain     Back pain     Hepatitis     History of seasonal allergies     Hypertension     Kidney infection     MI (myocardial infarction)     MRSA (methicillin resistant Staphylococcus aureus)     Recurrent boils     [facility]     Type II or unspecified type diabetes mellitus without mention of complication, not stated as uncontrolled        Past Surgical History:   Procedure Laterality Date    HX BLADDER REPAIR      HX CHOLECYSTECTOMY      HX HYSTERECTOMY      2000    HX ORTHOPAEDIC      pin in left foot    HX OTHER SURGICAL      defiberilator left corotid    HX OTHER SURGICAL      chris abscess       Family History   Problem Relation Age of Onset    Arthritis-osteo Mother     Diabetes Father     Hypertension Father     Heart Attack Father     Heart Disease Father     Diabetes Paternal Grandmother        Social History     Social History    Marital status:      Spouse name: N/A    Number of children: N/A    Years of education: N/A     Social History Main Topics    Smoking status: Current Some Day Smoker    Smokeless tobacco: Never Used    Alcohol use No    Drug use: None    Sexual activity: Not Asked     Other Topics Concern    None     Social History Narrative       Review of Systems   Constitutional: Negative for chills, fever, malaise/fatigue and weight loss. HENT: Negative for congestion, hearing loss, sore throat and tinnitus. Eyes: Negative for blurred vision, pain and discharge. Respiratory: Negative for cough, shortness of breath and wheezing. Cardiovascular: Negative for chest pain, palpitations, orthopnea, claudication and leg swelling. Gastrointestinal: Negative for abdominal pain, constipation and heartburn. Genitourinary: Negative for dysuria, frequency and urgency. Musculoskeletal: Negative for falls, joint pain and myalgias. Skin: Negative for itching and rash. Neurological: Positive for speech change. Negative for dizziness, tingling, tremors, sensory change, focal weakness, seizures, loss of consciousness and headaches. Endo/Heme/Allergies: Negative for environmental allergies and polydipsia. Psychiatric/Behavioral: Negative for depression and substance abuse. The patient is not nervous/anxious. Visit Vitals    /73 (BP 1 Location: Right arm, BP Patient Position: Sitting)    Pulse 76    Resp 18    Ht 5' 6\" (1.676 m)    Wt 155 lb (70.3 kg)    SpO2 97%    BMI 25.02 kg/m2     Physical Exam   Constitutional: She is oriented to person, place, and time and well-developed, well-nourished, and in no distress. HENT:   Head: Normocephalic and atraumatic. Right Ear: External ear normal.   Left Ear: External ear normal.   Mouth/Throat: Oropharynx is clear and moist.   Eyes: Conjunctivae and EOM are normal. Pupils are equal, round, and reactive to light. Right eye exhibits no discharge. Left eye exhibits no discharge. Neck: Normal range of motion. Neck supple. No tracheal deviation present. No thyromegaly present. Cardiovascular: Normal rate, regular rhythm, normal heart sounds and intact distal pulses. Exam reveals no gallop and no friction rub. No murmur heard. Pulmonary/Chest: Effort normal and breath sounds normal. No respiratory distress. She has no wheezes. She exhibits no tenderness. Abdominal: Soft. Bowel sounds are normal. She exhibits no distension and no mass. There is no tenderness. There is no rebound and no guarding. Musculoskeletal: She exhibits no edema or tenderness. Lymphadenopathy:     She has no cervical adenopathy. Neurological: She is alert and oriented to person, place, and time. She has normal reflexes. No cranial nerve deficit. She exhibits normal muscle tone. Gait normal. Coordination abnormal.   The patient is exceedingly careful with her speech as she frequently will have to stop to get her thoughts together on this but she has better than she was 6 months ago. Skin: Skin is warm and dry. No rash noted. No erythema. No pallor. Psychiatric: Mood, memory, affect and judgment normal.         ICD-10-CM ICD-9-CM    1. Type 2 diabetes mellitus with other circulatory complication, with long-term current use of insulin ([facility]) E11.59 250.70 HEMOGLOBIN A1C WITH EAG    Z79.4 V58.67 MICROALBUMIN, UR, RAND W/ MICROALBUMIN/CREA RATIO       DIABETES FOOT EXAM      IN COLLECTION VENOUS BLOOD,VENIPUNCTURE      pravastatin (PRAVACHOL) 20 mg tablet      gabapentin (NEURONTIN) 100 mg capsule   2. Cerebral infarction due to occlusion of left internal carotid artery ([facility]) U38.187 433.11 IN COLLECTION VENOUS BLOOD,VENIPUNCTURE      rivaroxaban (XARELTO) 20 mg tab tablet   3. Chronic congestive heart failure, unspecified congestive heart failure type ([facility]) I50.9 428.0 IN COLLECTION VENOUS BLOOD,VENIPUNCTURE   4. Hemiplegia and hemiparesis following cerebral infarction affecting right dominant side ([facility]) I69.351 438.21 IN COLLECTION VENOUS BLOOD,VENIPUNCTURE   5.  Essential hypertension O24 188.7 METABOLIC PANEL, COMPREHENSIVE      LIPID PANEL      IN COLLECTION VENOUS BLOOD,VENIPUNCTURE      valsartan (DIOVAN) 40 mg tablet      pravastatin (PRAVACHOL) 20 mg tablet      carvedilol (COREG) 6.25 mg tablet      amiodarone (CORDARONE) 200 mg tablet   6. Mixed dyslipidemia W99.6 789.8 METABOLIC PANEL, COMPREHENSIVE      LIPID PANEL      OR COLLECTION VENOUS BLOOD,VENIPUNCTURE      pravastatin (PRAVACHOL) 20 mg tablet   7. Smoker F17.200 305.1 OR COLLECTION VENOUS BLOOD,VENIPUNCTURE   8. Need for hepatitis C screening test Z11.59 V73.89 HEPATITIS C AB      OR COLLECTION VENOUS BLOOD,VENIPUNCTURE   9. Chronic right-sided low back pain without sciatica M54.5 724.2 cyclobenzaprine (FLEXERIL) 5 mg tablet    G89.29 338.29    10. Gastroesophageal reflux disease without esophagitis K21.9 530.81 pantoprazole (PROTONIX) 40 mg tablet       Orders Placed This Encounter    METABOLIC PANEL, COMPREHENSIVE    LIPID PANEL    HEMOGLOBIN A1C WITH EAG    MICROALBUMIN, UR, RAND W/ MICROALBUMIN/CREA RATIO    HEPATITIS C AB    HM DIABETES FOOT EXAM    OR COLLECTION VENOUS BLOOD,VENIPUNCTURE    cyclobenzaprine (FLEXERIL) 5 mg tablet     Sig: TAKE ONE TABLET BY MOUTH THREE TIMES DAILY (WITH  MEALS)  INDICATIONS: MUSCLE  SPASM     Dispense:  270 Tab     Refill:  1     Please consider 90 day supplies to promote better adherence    valsartan (DIOVAN) 40 mg tablet     Sig: Take 1 Tab by mouth two (2) times a day. Dispense:  180 Tab     Refill:  1    rivaroxaban (XARELTO) 20 mg tab tablet     Sig: Take 1 Tab by mouth daily (with breakfast). Dispense:  90 Tab     Refill:  1    pravastatin (PRAVACHOL) 20 mg tablet     Sig: Take 1 Tab by mouth nightly. Dispense:  90 Tab     Refill:  1    pantoprazole (PROTONIX) 40 mg tablet     Sig: Take 1 Tab by mouth daily. Dispense:  90 Tab     Refill:  1    gabapentin (NEURONTIN) 100 mg capsule     Sig: Take 1 Cap by mouth three (3) times daily. Dispense:  270 Cap     Refill:  1    carvedilol (COREG) 6.25 mg tablet     Si tab by mouth 2 times a day.      Dispense:  180 Tab     Refill:  1    amiodarone (CORDARONE) 200 mg tablet     Sig: Take 1 Tab by mouth two (2) times a day.      Dispense:  180 Tab     Refill:  1       Follow-up Disposition: Not on Ludmila Shaffer MD

## 2017-11-12 LAB
ALBUMIN SERPL-MCNC: 4.1 G/DL (ref 3.6–4.8)
ALBUMIN/CREAT UR: 15.2 MG/G CREAT (ref 0–30)
ALBUMIN/GLOB SERPL: 1.2 {RATIO} (ref 1.2–2.2)
ALP SERPL-CCNC: 130 IU/L (ref 39–117)
ALT SERPL-CCNC: 45 IU/L (ref 0–32)
AST SERPL-CCNC: 35 IU/L (ref 0–40)
BILIRUB SERPL-MCNC: 0.3 MG/DL (ref 0–1.2)
BUN SERPL-MCNC: 17 MG/DL (ref 8–27)
BUN/CREAT SERPL: 22 (ref 12–28)
CALCIUM SERPL-MCNC: 9.3 MG/DL (ref 8.7–10.3)
CHLORIDE SERPL-SCNC: 102 MMOL/L (ref 96–106)
CHOLEST SERPL-MCNC: 151 MG/DL (ref 100–199)
CO2 SERPL-SCNC: 23 MMOL/L (ref 18–29)
CREAT SERPL-MCNC: 0.79 MG/DL (ref 0.57–1)
CREAT UR-MCNC: 21 MG/DL
EST. AVERAGE GLUCOSE BLD GHB EST-MCNC: 143 MG/DL
GFR SERPLBLD CREATININE-BSD FMLA CKD-EPI: 81 ML/MIN/1.73
GFR SERPLBLD CREATININE-BSD FMLA CKD-EPI: 93 ML/MIN/1.73
GLOBULIN SER CALC-MCNC: 3.3 G/DL (ref 1.5–4.5)
GLUCOSE SERPL-MCNC: 114 MG/DL (ref 65–99)
HBA1C MFR BLD: 6.6 % (ref 4.8–5.6)
HCV AB S/CO SERPL IA: 0.1 S/CO RATIO (ref 0–0.9)
HDLC SERPL-MCNC: 45 MG/DL
INTERPRETATION, 910389: NORMAL
LDLC SERPL CALC-MCNC: 69 MG/DL (ref 0–99)
MICROALBUMIN UR-MCNC: 3.2 UG/ML
POTASSIUM SERPL-SCNC: 4.4 MMOL/L (ref 3.5–5.2)
PROT SERPL-MCNC: 7.4 G/DL (ref 6–8.5)
SODIUM SERPL-SCNC: 141 MMOL/L (ref 134–144)
TRIGL SERPL-MCNC: 186 MG/DL (ref 0–149)
VLDLC SERPL CALC-MCNC: 37 MG/DL (ref 5–40)

## 2017-11-21 ENCOUNTER — OFFICE VISIT (OUTPATIENT)
Dept: FAMILY MEDICINE CLINIC | Age: 62
End: 2017-11-21

## 2017-11-21 VITALS
HEIGHT: 66 IN | SYSTOLIC BLOOD PRESSURE: 153 MMHG | DIASTOLIC BLOOD PRESSURE: 62 MMHG | WEIGHT: 154 LBS | OXYGEN SATURATION: 98 % | BODY MASS INDEX: 24.75 KG/M2 | RESPIRATION RATE: 18 BRPM | HEART RATE: 78 BPM | TEMPERATURE: 97 F

## 2017-11-21 DIAGNOSIS — R42 DIZZINESS: ICD-10-CM

## 2017-11-21 DIAGNOSIS — I48.20 CHRONIC ATRIAL FIBRILLATION (HCC): ICD-10-CM

## 2017-11-21 DIAGNOSIS — I10 ESSENTIAL HYPERTENSION: ICD-10-CM

## 2017-11-21 DIAGNOSIS — I50.9 CHRONIC CONGESTIVE HEART FAILURE, UNSPECIFIED CONGESTIVE HEART FAILURE TYPE: ICD-10-CM

## 2017-11-21 DIAGNOSIS — I63.232 CEREBRAL INFARCTION DUE TO OCCLUSION OF LEFT INTERNAL CAROTID ARTERY (HCC): Primary | ICD-10-CM

## 2017-11-21 DIAGNOSIS — D69.1 ABNORMAL PLATELETS (HCC): ICD-10-CM

## 2017-11-21 DIAGNOSIS — Z95.810 CARDIAC DEFIBRILLATOR IN PLACE: ICD-10-CM

## 2017-11-21 RX ORDER — MECLIZINE HYDROCHLORIDE 25 MG/1
25 TABLET ORAL
Qty: 30 TAB | Refills: 0 | Status: SHIPPED | OUTPATIENT
Start: 2017-11-21 | End: 2017-12-01

## 2017-11-21 NOTE — PROGRESS NOTES
HISTORY OF PRESENT ILLNESS  Walter Drummond is a 65 y.o. female. HPI  She is here with c/o dizziness. Started about 5 days ago and has been episodic in nature. Will feel like she is off balanced and then have weakness in her right leg. In [facility] earlier today she felt as if her leg would not support her. Had to lean on the cart to stay upright. Nausea with the episodes but no vomiting. No visual changes. She has a history of carotid stenosis, HTN, Previous CVA/TIA, A-fib  Review of Systems   Constitutional: Positive for malaise/fatigue. Negative for chills, fever and weight loss. HENT: Negative for congestion, hearing loss, sore throat and tinnitus. Eyes: Negative for blurred vision, pain and discharge. Respiratory: Negative for cough, shortness of breath and wheezing. Cardiovascular: Negative for chest pain, palpitations, claudication and leg swelling. Gastrointestinal: Positive for nausea. Negative for abdominal pain, constipation and heartburn. Genitourinary: Negative for dysuria, frequency and urgency. Musculoskeletal: Negative for falls, joint pain and myalgias. Skin: Negative for itching and rash. Neurological: Positive for dizziness and speech change. Negative for tingling, tremors, sensory change, focal weakness, seizures, loss of consciousness and headaches. Endo/Heme/Allergies: Negative for environmental allergies and polydipsia. Psychiatric/Behavioral: Negative for depression and substance abuse. The patient is not nervous/anxious. Physical Exam   Constitutional: She is oriented to person, place, and time. She appears well-developed and well-nourished. No distress. HENT:   Head: Normocephalic. Nose: Nose normal.   Mouth/Throat: Oropharynx is clear and moist.   Eyes: EOM are normal. Pupils are equal, round, and reactive to light. Neck: Neck supple. Cardiovascular: Normal rate, regular rhythm and normal heart sounds.     Pulmonary/Chest: Effort normal and breath sounds normal. No respiratory distress. Musculoskeletal: Normal range of motion. Lymphadenopathy:     She has no cervical adenopathy. Neurological: She is alert and oriented to person, place, and time. No cranial nerve deficit. Coordination normal.   Slightly weak in her RUE. Skin: Skin is warm. Psychiatric: She has a normal mood and affect. Vitals reviewed. Results for orders placed or performed in visit on 08/69/95   METABOLIC PANEL, COMPREHENSIVE   Result Value Ref Range    Glucose 114 (H) 65 - 99 mg/dL    BUN 17 8 - 27 mg/dL    Creatinine 0.79 0.57 - 1.00 mg/dL    GFR est non-AA 81 >59 mL/min/1.73    GFR est AA 93 >59 mL/min/1.73    BUN/Creatinine ratio 22 12 - 28    Sodium 141 134 - 144 mmol/L    Potassium 4.4 3.5 - 5.2 mmol/L    Chloride 102 96 - 106 mmol/L    CO2 23 18 - 29 mmol/L    Calcium 9.3 8.7 - 10.3 mg/dL    Protein, total 7.4 6.0 - 8.5 g/dL    Albumin 4.1 3.6 - 4.8 g/dL    GLOBULIN, TOTAL 3.3 1.5 - 4.5 g/dL    A-G Ratio 1.2 1.2 - 2.2    Bilirubin, total 0.3 0.0 - 1.2 mg/dL    Alk.  phosphatase 130 (H) 39 - 117 IU/L    AST (SGOT) 35 0 - 40 IU/L    ALT (SGPT) 45 (H) 0 - 32 IU/L   LIPID PANEL   Result Value Ref Range    Cholesterol, total 151 100 - 199 mg/dL    Triglyceride 186 (H) 0 - 149 mg/dL    HDL Cholesterol 45 >39 mg/dL    VLDL, calculated 37 5 - 40 mg/dL    LDL, calculated 69 0 - 99 mg/dL   HEMOGLOBIN A1C WITH EAG   Result Value Ref Range    Hemoglobin A1c 6.6 (H) 4.8 - 5.6 %    Estimated average glucose 143 mg/dL   MICROALBUMIN, UR, RAND W/ MICROALBUMIN/CREA RATIO   Result Value Ref Range    Creatinine, urine 21.0 Not Estab. mg/dL    Microalbumin, urine 3.2 Not Estab. ug/mL    Microalb/Creat ratio (ug/mg creat.) 15.2 0.0 - 30.0 mg/g creat   HEPATITIS C AB   Result Value Ref Range    Hep C Virus Ab 0.1 0.0 - 0.9 s/co ratio   CVD REPORT   Result Value Ref Range    INTERPRETATION Note        ASSESSMENT and PLAN  Dizziness   Schedule CT of the head, ECHO, and carotid dopplers   Trial of Meclizine 25mg 1/2-1 po TID as needed   If any worsening of symptoms RTO or go to the ER  Hx CVA   Continue present meds  CHF with recent ICD placement   ECHO   Follow up with cardiology as directed  A-fib   Continue Xarelto  HTN    Low sodium diet   Continue meds

## 2017-11-21 NOTE — MR AVS SNAPSHOT
Visit Information Date & Time Provider Department Dept. Phone Encounter #  
 11/21/2017  2:40 PM [facility] [phone] [id number] Your Appointments 11/28/2017  4:00 PM  
ACUTE CARE with MD Yuval Chand [facility] [address] [facility]) Appt Note: leg going numb [address] [facility] [address] [id number] [phone]  
  
   
 [address] [facility] [address] [phone] Upcoming Health Maintenance Date Due  
 EYE EXAM RETINAL OR DILATED Q1 5/13/1966 PAP AKA CERVICAL CYTOLOGY 5/13/1977 DTaP/Tdap/Td series (1 - Tdap) 1/2/2002 BREAST CANCER SCRN MAMMOGRAM 5/13/2006 FOBT Q 1 YEAR AGE 50-75 5/13/2006 ZOSTER VACCINE AGE 60> 3/13/2016 HEMOGLOBIN A1C Q6M 5/10/2018 FOOT EXAM Q1 11/10/2018 MICROALBUMIN Q1 11/10/2018 LIPID PANEL Q1 11/10/2018 Allergies as of 11/21/2017  Review Complete On: 11/21/2017 By: Mayito Mclean MD  
  
 Severity Noted Reaction Type Reactions Pcn [Penicillins] High 09/27/2013    Anaphylaxis Sulfa (Sulfonamide Antibiotics)  09/27/2013    Nausea Only Valium [Diazepam]  12/20/2016    Unknown (comments) Current Immunizations  Never Reviewed Name Date Influenza Vaccine 12/6/2012 12:00 AM, 12/19/2011 12:00 AM, 10/28/2008 12:00 AM  
 Pneumococcal Polysaccharide (PPSV-23) 10/18/2013 Td 1/1/2002 12:00 AM  
  
 Not reviewed this visit You Were Diagnosed With   
  
 Codes Comments Chronic atrial fibrillation (HCC)    -  Primary ICD-10-CM: S35.4 ICD-9-CM: 427.31 Cerebral infarction due to occlusion of left internal carotid artery (HCC)     ICD-10-CM: O58.958 ICD-9-CM: 433.11 Essential hypertension     ICD-10-CM: I10 
ICD-9-CM: 401.9 Dizziness     ICD-10-CM: L54 ICD-9-CM: 780. 4 Chronic congestive heart failure, unspecified congestive heart failure type ([facility]     ICD-10-CM: I50.9 ICD-9-CM: 428.0 Cardiac defibrillator in place     ICD-10-CM: Z95.810 ICD-9-CM: V45.02 Vitals BP Pulse Temp Resp Height(growth percentile) Weight(growth percentile) 153/62 (BP 1 Location: Right arm) 78 97 °F (36.1 °C) (Oral) 18 5' 6\" (1.676 m) 154 lb (69.9 kg) SpO2 BMI OB Status Smoking Status 98% 24.86 kg/m2 Hysterectomy Current Some Day Smoker Vitals History BMI and BSA Data Body Mass Index Body Surface Area  
 24.86 kg/m 2 1.8 m 2 Preferred Pharmacy Pharmacy Name Phone [facility] [address] [facility] [place] - [address] [phone] Your Updated Medication List  
  
   
This list is accurate as of: 11/21/17  3:45 PM.  Always use your most recent med list.  
  
  
  
  
 amiodarone 200 mg tablet Commonly known as:  CORDARONE Take 1 Tab by mouth two (2) times a day. carvedilol 6.25 mg tablet Commonly known as:  COREG  
1 tab by mouth 2 times a day. cyclobenzaprine 5 mg tablet Commonly known as:  FLEXERIL  
TAKE ONE TABLET BY MOUTH THREE TIMES DAILY (WITH  MEALS)  INDICATIONS: MUSCLE  SPASM  
  
 docusate sodium 100 mg capsule Commonly known as:  Gevena Living Take 100 mg by mouth two (2) times a day.  
  
 gabapentin 100 mg capsule Commonly known as:  NEURONTIN Take 1 Cap by mouth three (3) times daily. insulin glargine 100 unit/mL injection Commonly known as:  LANTUS  
24 Units by SubCUTAneous route daily. meclizine 25 mg tablet Commonly known as:  ANTIVERT Take 1 Tab by mouth three (3) times daily as needed for up to 10 days. pantoprazole 40 mg tablet Commonly known as:  PROTONIX Take 1 Tab by mouth daily. pravastatin 20 mg tablet Commonly known as:  PRAVACHOL Take 1 Tab by mouth nightly. rivaroxaban 20 mg Tab tablet Commonly known as:  Annetta Noelle Take 1 Tab by mouth daily (with breakfast). valsartan 40 mg tablet Commonly known as:  DIOVAN  
 Take 1 Tab by mouth two (2) times a day. Prescriptions Sent to Pharmacy Refills  
 meclizine (ANTIVERT) 25 mg tablet 0 Sig: Take 1 Tab by mouth three (3) times daily as needed for up to 10 days. Class: Normal  
 Pharmacy: [address] Ph #: [phone] Route: Oral  
  
We Performed the Following CBC WITH AUTOMATED DIFF [91473 CPT(R)] COLLECTION VENOUS BLOOD,VENIPUNCTURE [id number] CPT(R)] TSH 3RD GENERATION [84231 CPT(R)] To-Do List   
 11/21/2017 ECHO:  2D ECHO COMPLETE ADULT (TTE) W OR WO CONTR   
  
 11/21/2017 Imaging:  CT HEAD WO CONT   
  
 11/21/2017 Imaging:  DUPLEX CAROTID BILATERAL   
  
 11/28/2017 8:00 AM  
  Appointment with [facility] at [facility] [address] ([phone]) GENERAL INSTRUCTIONS - Bring a list of all medications you are currently taking, including over the counter medications. - Only patients will be allowed in the exam room. This includes children. - Children under the age of 15 may not be left unattended. - [address] [place] [address] patients must have an armband and be accompanied by a caregiver or family member. - If you have a hand-written prescription for this exam, you must bring it with you on the day of your exam. - Bring all relevant prior images from any facility outside of [facility] with you on the day of your exam.  Failure to provide images may delay reading by Radiologist.  If you have questions or need to reschedule or cancel your appointment, call [phone].  
  
 11/28/2017 9:00 AM  
  Appointment with [address] at [place] [address] ([phone]) GENERAL INSTRUCTIONS - If you have a hand-written prescription for this exam, you must bring it with you on the day of your exam. - Bring all relevant prior images from facilities outside of [facility].  Failure to provide images may delay reading by Radiologist. - Children under the age of 12 may not be left unattended. - [facility] patients must have an armband and be accompanied by a caregiver or family member. APPOINTMENT CANCELLATION - To reschedule or cancel an appointment, please contact the Scheduling Department at [phone].  
  
 11/28/2017 12:30 PM  
  Appointment with [address] at [facility] ([phone]) GENERAL INSTRUCTIONS - If you have a hand-written prescription for this exam, you must bring it with you on the day of your exam. - Bring all relevant prior images from facilities outside of [facility]. Failure to provide images may delay reading by Radiologist. - Children under the age of 15 may not be left unattended. - [facility] patients must have an armband and be accompanied by a caregiver or family member. APPOINTMENT CANCELLATION - To reschedule or cancel an appointment, please contact the Scheduling Department at [phone]. Introducing [facility] & [facility] SERVICES! Dear Maribel Ask: 
Thank you for requesting a [employer] account. Our records indicate that you already have an active [employer] account. You can access your account anytime at [email or web address] Did you know that you can access your hospital and ER discharge instructions at any time in [employer]? You can also review all of your test results from your hospital stay or ER visit. Additional Information If you have questions, please visit the Frequently Asked Questions section of the [employer] website at [email or web address] Remember, [employer] is NOT to be used for urgent needs. For medical emergencies, dial 911. Now available from your iPhone and Android! Please provide this summary of care documentation to your next provider. Your primary care clinician is listed as Lizette Rashid. If you have any questions after today's visit, please call [phone].

## 2017-11-22 LAB
BASOPHILS # BLD AUTO: 0 X10E3/UL (ref 0–0.2)
BASOPHILS NFR BLD AUTO: 0 %
EOSINOPHIL # BLD AUTO: 0.3 X10E3/UL (ref 0–0.4)
EOSINOPHIL NFR BLD AUTO: 3 %
ERYTHROCYTE [DISTWIDTH] IN BLOOD BY AUTOMATED COUNT: 12.8 % (ref 12.3–15.4)
HCT VFR BLD AUTO: 41.6 % (ref 34–46.6)
HGB BLD-MCNC: 14.1 G/DL (ref 11.1–15.9)
IMM GRANULOCYTES # BLD: 0 X10E3/UL (ref 0–0.1)
IMM GRANULOCYTES NFR BLD: 0 %
LYMPHOCYTES # BLD AUTO: 2.6 X10E3/UL (ref 0.7–3.1)
LYMPHOCYTES NFR BLD AUTO: 24 %
MCH RBC QN AUTO: 32.2 PG (ref 26.6–33)
MCHC RBC AUTO-ENTMCNC: 33.9 G/DL (ref 31.5–35.7)
MCV RBC AUTO: 95 FL (ref 79–97)
MONOCYTES # BLD AUTO: 0.7 X10E3/UL (ref 0.1–0.9)
MONOCYTES NFR BLD AUTO: 6 %
NEUTROPHILS # BLD AUTO: 7.2 X10E3/UL (ref 1.4–7)
NEUTROPHILS NFR BLD AUTO: 67 %
PLATELET # BLD AUTO: 100 X10E3/UL (ref 150–379)
RBC # BLD AUTO: 4.38 X10E6/UL (ref 3.77–5.28)
TSH SERPL DL<=0.005 MIU/L-ACNC: 1.02 UIU/ML (ref 0.45–4.5)
WBC # BLD AUTO: 10.9 X10E3/UL (ref 3.4–10.8)

## 2017-11-27 ENCOUNTER — DOCUMENTATION ONLY (OUTPATIENT)
Dept: FAMILY MEDICINE CLINIC | Age: 62
End: 2017-11-27

## 2017-11-28 ENCOUNTER — LAB ONLY (OUTPATIENT)
Dept: FAMILY MEDICINE CLINIC | Age: 62
End: 2017-11-28

## 2017-11-28 DIAGNOSIS — D69.1 ABNORMAL PLATELETS (HCC): ICD-10-CM

## 2017-11-28 NOTE — PROGRESS NOTES
Platelets are low which is new.  Please have her RTO for a repeat CBC, peripheral smear, sed rate, CRP, LDH, D-dimer, fibrinogen, PT, PTT, CMP, haptoglobin

## 2017-11-29 LAB
ALBUMIN SERPL-MCNC: 4.2 G/DL (ref 3.6–4.8)
ALBUMIN/GLOB SERPL: 1.2 {RATIO} (ref 1.2–2.2)
ALP SERPL-CCNC: 134 IU/L (ref 39–117)
ALT SERPL-CCNC: 54 IU/L (ref 0–32)
APTT PPP: 38 SEC (ref 24–33)
AST SERPL-CCNC: 40 IU/L (ref 0–40)
BASOPHILS # BLD AUTO: 0 X10E3/UL (ref 0–0.2)
BASOPHILS NFR BLD AUTO: 0 %
BILIRUB SERPL-MCNC: 0.2 MG/DL (ref 0–1.2)
BUN SERPL-MCNC: 12 MG/DL (ref 8–27)
BUN/CREAT SERPL: 16 (ref 12–28)
CALCIUM SERPL-MCNC: 9.2 MG/DL (ref 8.7–10.3)
CHLORIDE SERPL-SCNC: 100 MMOL/L (ref 96–106)
CO2 SERPL-SCNC: 27 MMOL/L (ref 18–29)
CREAT SERPL-MCNC: 0.74 MG/DL (ref 0.57–1)
CRP SERPL-MCNC: 5.2 MG/L (ref 0–4.9)
D DIMER PPP FEU-MCNC: 0.56 MG/L FEU (ref 0–0.49)
EOSINOPHIL # BLD AUTO: 0.3 X10E3/UL (ref 0–0.4)
EOSINOPHIL NFR BLD AUTO: 3 %
ERYTHROCYTE [DISTWIDTH] IN BLOOD BY AUTOMATED COUNT: 13.2 % (ref 12.3–15.4)
ERYTHROCYTE [SEDIMENTATION RATE] IN BLOOD BY WESTERGREN METHOD: 12 MM/HR (ref 0–40)
FIBRINOGEN PPP-MCNC: 408 MG/DL (ref 193–507)
GFR SERPLBLD CREATININE-BSD FMLA CKD-EPI: 101 ML/MIN/1.73
GFR SERPLBLD CREATININE-BSD FMLA CKD-EPI: 88 ML/MIN/1.73
GLOBULIN SER CALC-MCNC: 3.4 G/DL (ref 1.5–4.5)
GLUCOSE SERPL-MCNC: 161 MG/DL (ref 65–99)
HAPTOGLOB SERPL-MCNC: 224 MG/DL (ref 34–200)
HCT VFR BLD AUTO: 42.9 % (ref 34–46.6)
HGB BLD-MCNC: 14.4 G/DL (ref 11.1–15.9)
IMM GRANULOCYTES # BLD: 0 X10E3/UL (ref 0–0.1)
IMM GRANULOCYTES NFR BLD: 0 %
INR PPP: 1.1 (ref 0.8–1.2)
LDH SERPL-CCNC: 219 IU/L (ref 119–226)
LYMPHOCYTES # BLD AUTO: 2.2 X10E3/UL (ref 0.7–3.1)
LYMPHOCYTES NFR BLD AUTO: 19 %
MCH RBC QN AUTO: 31.6 PG (ref 26.6–33)
MCHC RBC AUTO-ENTMCNC: 33.6 G/DL (ref 31.5–35.7)
MCV RBC AUTO: 94 FL (ref 79–97)
MONOCYTES # BLD AUTO: 0.7 X10E3/UL (ref 0.1–0.9)
MONOCYTES NFR BLD AUTO: 6 %
NEUTROPHILS # BLD AUTO: 8.3 X10E3/UL (ref 1.4–7)
NEUTROPHILS NFR BLD AUTO: 72 %
PLATELET # BLD AUTO: 141 X10E3/UL (ref 150–379)
POTASSIUM SERPL-SCNC: 4.4 MMOL/L (ref 3.5–5.2)
PROT SERPL-MCNC: 7.6 G/DL (ref 6–8.5)
PROTHROMBIN TIME: 12.1 SEC (ref 9.1–12)
RBC # BLD AUTO: 4.56 X10E6/UL (ref 3.77–5.28)
SODIUM SERPL-SCNC: 141 MMOL/L (ref 134–144)
WBC # BLD AUTO: 11.5 X10E3/UL (ref 3.4–10.8)

## 2017-12-05 LAB
BASOPHILS # BLD AUTO: 0 X10E3/UL (ref 0–0.2)
BASOPHILS NFR BLD AUTO: 0 %
EOSINOPHIL # BLD AUTO: 0.3 X10E3/UL (ref 0–0.4)
EOSINOPHIL NFR BLD AUTO: 2 %
ERYTHROCYTE [DISTWIDTH] IN BLOOD BY AUTOMATED COUNT: 13.1 % (ref 12.3–15.4)
HCT VFR BLD AUTO: 44.4 % (ref 34–46.6)
HGB BLD-MCNC: 14.6 G/DL (ref 11.1–15.9)
IMM GRANULOCYTES # BLD: 0 X10E3/UL (ref 0–0.1)
IMM GRANULOCYTES NFR BLD: 0 %
LYMPHOCYTES # BLD AUTO: 2.5 X10E3/UL (ref 0.7–3.1)
LYMPHOCYTES NFR BLD AUTO: 21 %
MCH RBC QN AUTO: 31.9 PG (ref 26.6–33)
MCHC RBC AUTO-ENTMCNC: 32.9 G/DL (ref 31.5–35.7)
MCV RBC AUTO: 97 FL (ref 79–97)
MONOCYTES # BLD AUTO: 0.6 X10E3/UL (ref 0.1–0.9)
MONOCYTES NFR BLD AUTO: 5 %
NEUTROPHILS # BLD AUTO: 8.3 X10E3/UL (ref 1.4–7)
NEUTROPHILS NFR BLD AUTO: 72 %
PATH REV BLD -IMP: ABNORMAL
PATH REV BLD -IMP: NORMAL
PATH REV BLD -IMP: NORMAL
PATHOLOGIST NAME: ABNORMAL
PLATELET # BLD AUTO: 183 X10E3/UL (ref 150–379)
RBC # BLD AUTO: 4.58 X10E6/UL (ref 3.77–5.28)
WBC # BLD AUTO: 11.7 X10E3/UL (ref 3.4–10.8)

## 2017-12-18 NOTE — PROGRESS NOTES
Patient aware of results and states that her right leg weakness is worse and feels that the whole right side gets weak and is happening more frequently. Patient scheduled an appt for tomorrow am at [facility] 12/19/17.

## 2017-12-19 ENCOUNTER — OFFICE VISIT (OUTPATIENT)
Dept: FAMILY MEDICINE CLINIC | Age: 62
End: 2017-12-19

## 2017-12-19 VITALS
HEART RATE: 76 BPM | WEIGHT: 155.6 LBS | BODY MASS INDEX: 25.01 KG/M2 | DIASTOLIC BLOOD PRESSURE: 62 MMHG | SYSTOLIC BLOOD PRESSURE: 153 MMHG | RESPIRATION RATE: 18 BRPM | HEIGHT: 66 IN | OXYGEN SATURATION: 95 %

## 2017-12-19 DIAGNOSIS — I10 ESSENTIAL HYPERTENSION: ICD-10-CM

## 2017-12-19 DIAGNOSIS — Z95.810 CARDIAC DEFIBRILLATOR IN PLACE: ICD-10-CM

## 2017-12-19 DIAGNOSIS — M70.61 GREATER TROCHANTERIC BURSITIS, RIGHT: Primary | ICD-10-CM

## 2017-12-19 DIAGNOSIS — I48.20 CHRONIC ATRIAL FIBRILLATION (HCC): ICD-10-CM

## 2017-12-19 DIAGNOSIS — E78.2 MIXED DYSLIPIDEMIA: ICD-10-CM

## 2017-12-19 DIAGNOSIS — Z79.4 TYPE 2 DIABETES MELLITUS WITH OTHER CIRCULATORY COMPLICATION, WITH LONG-TERM CURRENT USE OF INSULIN (HCC): ICD-10-CM

## 2017-12-19 DIAGNOSIS — E11.59 TYPE 2 DIABETES MELLITUS WITH OTHER CIRCULATORY COMPLICATION, WITH LONG-TERM CURRENT USE OF INSULIN (HCC): ICD-10-CM

## 2017-12-19 PROBLEM — I50.9 CHRONIC CONGESTIVE HEART FAILURE (HCC): Status: RESOLVED | Noted: 2017-11-10 | Resolved: 2017-12-19

## 2017-12-19 NOTE — MR AVS SNAPSHOT
Visit Information Date & Time Provider Department Dept. Phone Encounter #  
 12/19/2017  7:20 AM Bert Lucero MD [facility] [phone] [id number] Upcoming Health Maintenance Date Due  
 EYE EXAM RETINAL OR DILATED Q1 5/13/1966 PAP AKA CERVICAL CYTOLOGY 5/13/1977 FOBT Q 1 YEAR AGE 50-75 5/13/2006 HEMOGLOBIN A1C Q6M 5/10/2018 FOOT EXAM Q1 11/10/2018 MICROALBUMIN Q1 11/10/2018 LIPID PANEL Q1 11/10/2018 DTaP/Tdap/Td series (2 - Td) 12/19/2027 Allergies as of 12/19/2017  Review Complete On: 12/19/2017 By: Bert Lucero MD  
  
 Severity Noted Reaction Type Reactions Pcn [Penicillins] High 09/27/2013    Anaphylaxis Sulfa (Sulfonamide Antibiotics)  09/27/2013    Nausea Only Valium [Diazepam]  12/20/2016    Unknown (comments) Current Immunizations  Never Reviewed Name Date Influenza Vaccine 12/6/2012 12:00 AM, 12/19/2011 12:00 AM, 10/28/2008 12:00 AM  
 Pneumococcal Polysaccharide (PPSV-23) 10/18/2013 Td 1/1/2002 12:00 AM  
  
 Not reviewed this visit Vitals BP Pulse Resp Height(growth percentile) Weight(growth percentile) SpO2  
 153/62 (BP 1 Location: Right arm, BP Patient Position: Sitting) 76 18 5' 6\" (1.676 m) 155 lb 9.6 oz (70.6 kg) 95% BMI OB Status Smoking Status 25.11 kg/m2 Hysterectomy Current Some Day Smoker Vitals History BMI and BSA Data Body Mass Index Body Surface Area  
 25.11 kg/m 2 1.81 m 2 Preferred Pharmacy Pharmacy Name Phone [facility], [place] [address] [phone] Your Updated Medication List  
  
   
This list is accurate as of: 12/19/17  8:11 AM.  Always use your most recent med list.  
  
  
  
  
 amiodarone 200 mg tablet Commonly known as:  CORDARONE Take 1 Tab by mouth two (2) times a day. carvedilol 6.25 mg tablet Commonly known as:  COREG  
1 tab by mouth 2 times a day. cyclobenzaprine 5 mg tablet Commonly known as:  FLEXERIL  
TAKE ONE TABLET BY MOUTH THREE TIMES DAILY (WITH  MEALS)  INDICATIONS: MUSCLE  SPASM  
  
 docusate sodium 100 mg capsule Commonly known as:  Annice Tali Take 100 mg by mouth two (2) times a day.  
  
 gabapentin 100 mg capsule Commonly known as:  NEURONTIN Take 1 Cap by mouth three (3) times daily. insulin glargine 100 unit/mL injection Commonly known as:  LANTUS  
24 Units by SubCUTAneous route daily. pantoprazole 40 mg tablet Commonly known as:  PROTONIX Take 1 Tab by mouth daily. pravastatin 20 mg tablet Commonly known as:  PRAVACHOL Take 1 Tab by mouth nightly. rivaroxaban 20 mg Tab tablet Commonly known as:  Domenic Capes Take 1 Tab by mouth daily (with breakfast). valsartan 40 mg tablet Commonly known as:  DIOVAN Take 1 Tab by mouth two (2) times a day. Introducing [facility] & HEALTH SERVICES! Dear Stevan Flores: 
Thank you for requesting a [employer] account. Our records indicate that you already have an active [employer] account. You can access your account anytime at [email or web address] Did you know that you can access your hospital and ER discharge instructions at any time in [employer]? You can also review all of your test results from your hospital stay or ER visit. Additional Information If you have questions, please visit the Frequently Asked Questions section of the [employer] website at [email or web address] Remember, [employer] is NOT to be used for urgent needs. For medical emergencies, dial 911. Now available from your iPhone and Android! Please provide this summary of care documentation to your next provider. Your primary care clinician is listed as Jenna Devine. If you have any questions after today's visit, please call [phone].

## 2017-12-19 NOTE — PROGRESS NOTES
Emmett Jeter is a 65 y.o. female who presents with the following:  Chief Complaint   Patient presents with    Side Pain       Side Pain   The history is provided by the patient (Patient who has had a left CVA with speech problems is recovering nicely other than developing pain in the right greater trochanteric bursa. ). Pertinent negatives include no chest pain, no abdominal pain, no headaches and no shortness of breath. Associated symptoms comments: The patient has been having a fair number of falls to her right side when her right leg suddenly gives out but she is having no pain in the knees and generally no pain in the hip area until I pointed this out that she was having a great deal of tenderness in the trochanteric bursa. This is causing her leg to suddenly give out without her necessarily feeling any pain. .       Allergies   Allergen Reactions    Pcn [Penicillins] Anaphylaxis    Sulfa (Sulfonamide Antibiotics) Nausea Only    Valium [Diazepam] Unknown (comments)       Current Outpatient Prescriptions   Medication Sig    cyclobenzaprine (FLEXERIL) 5 mg tablet TAKE ONE TABLET BY MOUTH THREE TIMES DAILY (WITH  MEALS)  INDICATIONS: MUSCLE  SPASM    valsartan (DIOVAN) 40 mg tablet Take 1 Tab by mouth two (2) times a day.  rivaroxaban (XARELTO) 20 mg tab tablet Take 1 Tab by mouth daily (with breakfast).  pravastatin (PRAVACHOL) 20 mg tablet Take 1 Tab by mouth nightly.  pantoprazole (PROTONIX) 40 mg tablet Take 1 Tab by mouth daily.  gabapentin (NEURONTIN) 100 mg capsule Take 1 Cap by mouth three (3) times daily.  carvedilol (COREG) 6.25 mg tablet 1 tab by mouth 2 times a day.  amiodarone (CORDARONE) 200 mg tablet Take 1 Tab by mouth two (2) times a day.  insulin glargine (LANTUS) 100 unit/mL injection 24 Units by SubCUTAneous route daily.  docusate sodium (COLACE) 100 mg capsule Take 100 mg by mouth two (2) times a day.      No current facility-administered medications for this visit. Past Medical History:   Diagnosis Date    [facility]     Back pain     Hepatitis     History of seasonal allergies     Hypertension     Kidney infection     MI (myocardial infarction)     MRSA (methicillin resistant Staphylococcus aureus)     Recurrent boils     Stroke ([facility]     Type II or unspecified type diabetes mellitus without mention of complication, not stated as uncontrolled        Past Surgical History:   Procedure Laterality Date    HX BLADDER REPAIR      HX CHOLECYSTECTOMY      HX HYSTERECTOMY      2000    HX ORTHOPAEDIC      pin in left foot    HX OTHER SURGICAL      defiberilator left corotid    HX OTHER SURGICAL      chris abscess       Family History   Problem Relation Age of Onset    Arthritis-osteo Mother     Diabetes Father     Hypertension Father     Heart Attack Father     Heart Disease Father     Diabetes Paternal Grandmother        Social History     Social History    Marital status: SINGLE     Spouse name: N/A    Number of children: N/A    Years of education: N/A     Social History Main Topics    Smoking status: Current Some Day Smoker    Smokeless tobacco: Never Used    Alcohol use No    Drug use: None    Sexual activity: Not Asked     Other Topics Concern    None     Social History Narrative       Review of Systems   Constitutional: Negative for chills, fever, malaise/fatigue and weight loss. HENT: Negative for congestion, hearing loss, sore throat and tinnitus. Eyes: Negative for blurred vision, pain and discharge. Respiratory: Negative for cough, shortness of breath and wheezing. Cardiovascular: Negative for chest pain, palpitations, orthopnea, claudication and leg swelling. Gastrointestinal: Negative for abdominal pain, constipation and heartburn. Genitourinary: Negative for dysuria, frequency and urgency. Musculoskeletal: Negative for falls, joint pain and myalgias.         Patient is having pain in the greater trochanteric bursa and up into the right gluteal muscle especially where it attaches in the area of the right SIJ. Skin: Negative for itching and rash. Neurological: Negative for dizziness, tingling, tremors and headaches. Endo/Heme/Allergies: Negative for environmental allergies and polydipsia. Psychiatric/Behavioral: Negative for depression and substance abuse. The patient is not nervous/anxious. The patient still has a great deal of anger issues associated with the family and that she was institutionalized because she had a speech problem from her stroke and it was assumed that she was having emotional issues. Visit Vitals    /62 (BP 1 Location: Right arm, BP Patient Position: Sitting)    Pulse 76    Resp 18    Ht 5' 6\" (1.676 m)    Wt 155 lb 9.6 oz (70.6 kg)    SpO2 95%    BMI 25.11 kg/m2     Physical Exam   Constitutional: She is oriented to person, place, and time and well-developed, well-nourished, and in no distress. HENT:   Head: Normocephalic and atraumatic. Right Ear: External ear normal.   Left Ear: External ear normal.   Mouth/Throat: Oropharynx is clear and moist.   Eyes: Conjunctivae and EOM are normal. Pupils are equal, round, and reactive to light. Right eye exhibits no discharge. Left eye exhibits no discharge. Neck: Normal range of motion. Neck supple. No tracheal deviation present. No thyromegaly present. Cardiovascular: Normal rate, regular rhythm, normal heart sounds and intact distal pulses. Exam reveals no gallop and no friction rub. No murmur heard. Pulmonary/Chest: Effort normal and breath sounds normal. No respiratory distress. She has no wheezes. She exhibits no tenderness. Abdominal: Soft. Bowel sounds are normal. She exhibits no distension and no mass. There is no tenderness. There is no rebound and no guarding.    Musculoskeletal: She exhibits tenderness (Patient is very tender over the greater trochanteric bursa and at the right SIJ with normal range of motion noted. ). She exhibits no edema. Lymphadenopathy:     She has no cervical adenopathy. Neurological: She is alert and oriented to person, place, and time. She has normal reflexes. No cranial nerve deficit. She exhibits normal muscle tone. Gait normal. Coordination normal.   Skin: Skin is warm and dry. No rash noted. No erythema. No pallor. Psychiatric: Mood, memory, affect and judgment normal.   We discussed the need for 20-30 pounds of weight loss to help with her blood pressure and her diabetes therapy. Her cardiac rhythm and rate appear to be doing quite well on her amiodarone at this time. The patient continues the Xarelto as a precaution. ICD-10-CM ICD-9-CM    1. Greater trochanteric bursitis, right M70.61 726.5    2. Type 2 diabetes mellitus with other circulatory complication, with long-term current use of insulin (HCC) E11.59 250.70     Z79.4 V58.67    3. Chronic atrial fibrillation (HCC) I48.2 427.31    4. Essential hypertension I10 401.9    5. Mixed dyslipidemia E78.2 272.2    6. Cardiac defibrillator in place Z95.810 V45.02        No orders of the defined types were placed in this encounter.       Follow-up Disposition: Not on Luis Staley MD

## 2018-03-06 ENCOUNTER — OFFICE VISIT (OUTPATIENT)
Dept: FAMILY MEDICINE CLINIC | Age: 63
End: 2018-03-06

## 2018-03-06 VITALS
DIASTOLIC BLOOD PRESSURE: 72 MMHG | WEIGHT: 159.2 LBS | TEMPERATURE: 98.2 F | BODY MASS INDEX: 25.58 KG/M2 | HEART RATE: 75 BPM | OXYGEN SATURATION: 98 % | HEIGHT: 66 IN | RESPIRATION RATE: 18 BRPM | SYSTOLIC BLOOD PRESSURE: 139 MMHG

## 2018-03-06 DIAGNOSIS — S93.431A SPRAIN OF TIBIOFIBULAR LIGAMENT OF RIGHT ANKLE, INITIAL ENCOUNTER: Primary | ICD-10-CM

## 2018-03-06 NOTE — PROGRESS NOTES
Maximilian Smith is a 65 y.o. female who presents with the following:  Chief Complaint   Patient presents with    Ankle swelling     right ankle x5 days       Ankle swelling    The history is provided by the patient (Patient stepped out of bed onto her right foot noticing immediate pain about the ankle without rolling it about 6 days ago with ankle persisting and swelling and tenderness). Pertinent negatives include no tingling and no itching. Allergies   Allergen Reactions    Pcn [Penicillins] Anaphylaxis    Sulfa (Sulfonamide Antibiotics) Nausea Only    Valium [Diazepam] Unknown (comments)       Current Outpatient Prescriptions   Medication Sig    vit B Cmplx 3-FA-Vit C-Biotin (NEPHRO CHUCKIE RX) 1- mg-mg-mcg tablet Take 1 Tab by mouth daily.  cyclobenzaprine (FLEXERIL) 5 mg tablet TAKE ONE TABLET BY MOUTH THREE TIMES DAILY (WITH  MEALS)  INDICATIONS: MUSCLE  SPASM    valsartan (DIOVAN) 40 mg tablet Take 1 Tab by mouth two (2) times a day.  rivaroxaban (XARELTO) 20 mg tab tablet Take 1 Tab by mouth daily (with breakfast).  pravastatin (PRAVACHOL) 20 mg tablet Take 1 Tab by mouth nightly.  pantoprazole (PROTONIX) 40 mg tablet Take 1 Tab by mouth daily.  gabapentin (NEURONTIN) 100 mg capsule Take 1 Cap by mouth three (3) times daily.  carvedilol (COREG) 6.25 mg tablet 1 tab by mouth 2 times a day.  amiodarone (CORDARONE) 200 mg tablet Take 1 Tab by mouth two (2) times a day.  insulin glargine (LANTUS) 100 unit/mL injection 24 Units by SubCUTAneous route daily.  FLUCELVAX QUAD 8733-3318, PF, syrg injection TO BE ADMINISTERED BY PHARMACIST FOR IMMUNIZATION    docusate sodium (COLACE) 100 mg capsule Take 100 mg by mouth two (2) times a day. No current facility-administered medications for this visit.         Past Medical History:   Diagnosis Date    A[facility]fib [facility])     Back pain     Hepatitis     History of seasonal allergies     Hypertension     Kidney infection     MI (myocardial infarction)     MRSA (methicillin resistant Staphylococcus aureus)     Recurrent boils     [facility]     Type II or unspecified type diabetes mellitus without mention of complication, not stated as uncontrolled        Past Surgical History:   Procedure Laterality Date    HX BLADDER REPAIR      HX CHOLECYSTECTOMY      HX HYSTERECTOMY      2000    HX ORTHOPAEDIC      pin in left foot    HX OTHER SURGICAL      defiberilator left corotid    HX OTHER SURGICAL      chris abscess       Family History   Problem Relation Age of Onset    Arthritis-osteo Mother     Diabetes Father     Hypertension Father     Heart Attack Father     Heart Disease Father     Diabetes Paternal Grandmother        Social History     Social History    Marital status: SINGLE     Spouse name: N/A    Number of children: N/A    Years of education: N/A     Social History Main Topics    Smoking status: Current Some Day Smoker    Smokeless tobacco: Never Used    Alcohol use No    Drug use: None    Sexual activity: Not Asked     Other Topics Concern    None     Social History Narrative       Review of Systems   Constitutional: Negative for chills, fever, malaise/fatigue and weight loss. HENT: Negative for congestion, hearing loss, sore throat and tinnitus. Eyes: Negative for blurred vision, pain and discharge. Respiratory: Negative for cough, shortness of breath and wheezing. Cardiovascular: Negative for chest pain, palpitations, orthopnea, claudication and leg swelling. Gastrointestinal: Negative for abdominal pain, constipation and heartburn. Genitourinary: Negative for dysuria, frequency and urgency. Musculoskeletal: Positive for joint pain (Right ankle with swelling and edema). Negative for falls and myalgias. Skin: Negative for itching and rash. Neurological: Negative for dizziness, tingling, tremors and headaches. Endo/Heme/Allergies: Negative for environmental allergies and polydipsia. Psychiatric/Behavioral: Negative for depression and substance abuse. The patient is not nervous/anxious. Visit Vitals    /72 (BP 1 Location: Left arm, BP Patient Position: Sitting)    Pulse 75    Temp 98.2 °F (36.8 °C) (Oral)    Resp 18    Ht 5' 6\" (1.676 m)    Wt 159 lb 3.2 oz (72.2 kg)    SpO2 98%    BMI 25.7 kg/m2     Physical Exam   Constitutional: She is oriented to person, place, and time and well-developed, well-nourished, and in no distress. HENT:   Head: Normocephalic and atraumatic. Right Ear: External ear normal.   Left Ear: External ear normal.   Mouth/Throat: Oropharynx is clear and moist.   Eyes: Conjunctivae and EOM are normal. Pupils are equal, round, and reactive to light. Right eye exhibits no discharge. Left eye exhibits no discharge. Neck: Normal range of motion. Neck supple. No tracheal deviation present. No thyromegaly present. Cardiovascular: Normal rate, regular rhythm, normal heart sounds and intact distal pulses. Exam reveals no gallop and no friction rub. No murmur heard. Pulmonary/Chest: Effort normal and breath sounds normal. No respiratory distress. She has no wheezes. She exhibits no tenderness. Abdominal: Soft. Bowel sounds are normal. She exhibits no distension and no mass. There is no tenderness. There is no rebound and no guarding. Musculoskeletal: She exhibits edema and tenderness. She exhibits no deformity. All in the right ankle with decreased range of motion secondary to pain   Lymphadenopathy:     She has no cervical adenopathy. Neurological: She is alert and oriented to person, place, and time. She has normal reflexes. No cranial nerve deficit. She exhibits normal muscle tone. Gait normal. Coordination normal.   Skin: Skin is warm and dry. No rash noted. No erythema. No pallor. Psychiatric: Mood, memory, affect and judgment normal.         ICD-10-CM ICD-9-CM    1.  Sprain of tibiofibular ligament of right ankle, initial encounter S93.431A 845.03 XR ANKLE RT MIN 3 V       Orders Placed This Encounter    XR ANKLE RT MIN 3 V     Standing Status:   Future     Standing Expiration Date:   4/6/2019     Order Specific Question:   Reason for Exam     Answer:   Pain in and about the right ankle with swelling and tenderness anteriorly and posteriorly toward the medial side    FLUCELVAX QUAD [id number], PF, syrg injection     Sig: TO BE ADMINISTERED BY PHARMACIST FOR IMMUNIZATION     Refill:  0    vit B Cmplx 3-FA-Vit C-Biotin (NEPHRO CHUCKIE RX) 1- mg-mg-mcg tablet     Sig: Take 1 Tab by mouth daily.    Follow-up will be pending the results of the x-ray in the meantime would have her continue her usual medication and use Tylenol for the pain    Follow-up Disposition: Not on Zach Poe MD

## 2018-03-06 NOTE — MR AVS SNAPSHOT
[address] [phone] Patient: Oneal Hutchins MRN: [id number] PVC:[id number]/[id number] Visit Information Date & Time Provider Department Dept. Phone Encounter #  
 3/6/2018  9:20 AM Shara Espino MD [address] [place] [address] [id number] Upcoming Health Maintenance Date Due  
 EYE EXAM RETINAL OR DILATED Q1 5/13/1966 PAP AKA CERVICAL CYTOLOGY 5/13/1977 BREAST CANCER SCRN MAMMOGRAM 5/13/2006 FOBT Q 1 YEAR AGE 50-75 5/13/2006 HEMOGLOBIN A1C Q6M 5/10/2018 FOOT EXAM Q1 11/10/2018 MICROALBUMIN Q1 11/10/2018 LIPID PANEL Q1 11/10/2018 DTaP/Tdap/Td series (2 - Td) 12/19/2027 Allergies as of 3/6/2018  Review Complete On: 3/6/2018 By: Shara Espino MD  
  
 Severity Noted Reaction Type Reactions Pcn [Penicillins] High 09/27/2013    Anaphylaxis Sulfa (Sulfonamide Antibiotics)  09/27/2013    Nausea Only Valium [Diazepam]  12/20/2016    Unknown (comments) Current Immunizations  Never Reviewed Name Date Influenza Vaccine 12/6/2012 12:00 AM, 12/19/2011 12:00 AM, 10/28/2008 12:00 AM  
 Pneumococcal Polysaccharide (PPSV-23) 10/18/2013 Td 1/1/2002 12:00 AM  
  
 Not reviewed this visit You Were Diagnosed With   
  
 Codes Comments Sprain of tibiofibular ligament of right ankle, initial encounter    -  Primary ICD-10-CM: M77.423A ICD-9-CM: 845.03 Vitals BP Pulse Temp Resp Height(growth percentile) Weight(growth percentile) 139/72 (BP 1 Location: Left arm, BP Patient Position: Sitting) 75 98.2 °F (36.8 °C) (Oral) 18 5' 6\" (1.676 m) 159 lb 3.2 oz (72.2 kg) SpO2 BMI OB Status Smoking Status 98% 25.7 kg/m2 Hysterectomy Current Some Day Smoker Vitals History BMI and BSA Data Body Mass Index Body Surface Area 25.7 kg/m 2 1.83 m 2 Preferred Pharmacy Pharmacy Name Phone [address] [facility] [address] [phone] Your Updated Medication List  
  
   
This list is accurate as of 3/6/18  9:35 AM.  Always use your most recent med list.  
  
  
  
  
 amiodarone 200 mg tablet Commonly known as:  CORDARONE Take 1 Tab by mouth two (2) times a day. carvedilol 6.25 mg tablet Commonly known as:  COREG  
1 tab by mouth 2 times a day. cyclobenzaprine 5 mg tablet Commonly known as:  FLEXERIL  
TAKE ONE TABLET BY MOUTH THREE TIMES DAILY (WITH  MEALS)  INDICATIONS: MUSCLE  SPASM  
  
 docusate sodium 100 mg capsule Commonly known as:  Carletha Benne Take 100 mg by mouth two (2) times a day. FLUCELVAX QUAD 2046-1804 (PF) Syrg injection Generic drug:  influenza vaccine 2017-18 (4 yrs+)(PF)  
TO BE ADMINISTERED BY PHARMACIST FOR IMMUNIZATION  
  
 gabapentin 100 mg capsule Commonly known as:  NEURONTIN Take 1 Cap by mouth three (3) times daily. insulin glargine 100 unit/mL injection Commonly known as:  LANTUS  
24 Units by SubCUTAneous route daily. pantoprazole 40 mg tablet Commonly known as:  PROTONIX Take 1 Tab by mouth daily. pravastatin 20 mg tablet Commonly known as:  PRAVACHOL Take 1 Tab by mouth nightly. rivaroxaban 20 mg Tab tablet Commonly known as:  Chiquita Kanner Take 1 Tab by mouth daily (with breakfast). valsartan 40 mg tablet Commonly known as:  DIOVAN Take 1 Tab by mouth two (2) times a day. vit B Cmplx 3-FA-Vit C-Biotin 1- mg-mg-mcg tablet Commonly known as:  NEPHRO CHUCKIE RX Take 1 Tab by mouth daily. To-Do List   
 04/06/2018 Imaging:  XR ANKLE RT MIN 3 V Introducing [facility] & HEALTH SERVICES! Dear Hugo Aus: 
Thank you for requesting a [employer]k account. Our records indicate that you already have an active Perk account. You can access your account anytime at [email or web address] Did you know that you can access your hospital and ER discharge instructions at any time in [employer]? You can also review all of your test results from your hospital stay or ER visit. Additional Information If you have questions, please visit the Frequently Asked Questions section of the [employer] website at [email or web address] Remember, [employer] is NOT to be used for urgent needs. For medical emergencies, dial 911. Now available from your iPhone and Android! Please provide this summary of care documentation to your next provider. Your primary care clinician is listed as Bonifacio Fong. If you have any questions after today's visit, please call [phone].

## 2018-03-21 ENCOUNTER — OFFICE VISIT (OUTPATIENT)
Dept: FAMILY MEDICINE CLINIC | Age: 63
End: 2018-03-21

## 2018-03-21 VITALS
OXYGEN SATURATION: 95 % | RESPIRATION RATE: 18 BRPM | BODY MASS INDEX: 25.71 KG/M2 | SYSTOLIC BLOOD PRESSURE: 139 MMHG | WEIGHT: 160 LBS | HEART RATE: 72 BPM | TEMPERATURE: 96.6 F | DIASTOLIC BLOOD PRESSURE: 70 MMHG | HEIGHT: 66 IN

## 2018-03-21 DIAGNOSIS — R60.0 LEG EDEMA, RIGHT: ICD-10-CM

## 2018-03-21 DIAGNOSIS — M79.609 POPLITEAL PAIN: Primary | ICD-10-CM

## 2018-03-21 NOTE — PROGRESS NOTES
Charley Lopez is a 65 y.o. female who presents with the following:  Chief Complaint   Patient presents with    Foot Swelling     patient states pain in left side of foot       Foot Swelling    The history is provided by the patient (Patient has developed edema up to the knee including the foot on the right lower extremity and is  in her heel and medial ankle. ). Pertinent negatives include no tingling and no itching. Allergies   Allergen Reactions    Pcn [Penicillins] Anaphylaxis    Sulfa (Sulfonamide Antibiotics) Nausea Only    Valium [Diazepam] Unknown (comments)       Current Outpatient Prescriptions   Medication Sig    vit B Cmplx 3-FA-Vit C-Biotin (NEPHRO CHUCKIE RX) 1- mg-mg-mcg tablet Take 1 Tab by mouth daily.  cyclobenzaprine (FLEXERIL) 5 mg tablet TAKE ONE TABLET BY MOUTH THREE TIMES DAILY (WITH  MEALS)  INDICATIONS: MUSCLE  SPASM    valsartan (DIOVAN) 40 mg tablet Take 1 Tab by mouth two (2) times a day.  rivaroxaban (XARELTO) 20 mg tab tablet Take 1 Tab by mouth daily (with breakfast).  pravastatin (PRAVACHOL) 20 mg tablet Take 1 Tab by mouth nightly.  pantoprazole (PROTONIX) 40 mg tablet Take 1 Tab by mouth daily.  gabapentin (NEURONTIN) 100 mg capsule Take 1 Cap by mouth three (3) times daily.  carvedilol (COREG) 6.25 mg tablet 1 tab by mouth 2 times a day.  amiodarone (CORDARONE) 200 mg tablet Take 1 Tab by mouth two (2) times a day.  insulin glargine (LANTUS) 100 unit/mL injection 24 Units by SubCUTAneous route daily.  FLUCELVAX QUAD 3942-6191, PF, syrg injection TO BE ADMINISTERED BY PHARMACIST FOR IMMUNIZATION    docusate sodium (COLACE) 100 mg capsule Take 100 mg by mouth two (2) times a day. No current facility-administered medications for this visit.         Past Medical History:   Diagnosis Date    A-fib [facility])     Back pain     Hepatitis     History of seasonal allergies     Hypertension     Kidney infection     MI (myocardial infarction)     MRSA (methicillin resistant Staphylococcus aureus)     Recurrent boils     Stroke ([facility] [address]     Type II or unspecified type diabetes mellitus without mention of complication, not stated as uncontrolled        Past Surgical History:   Procedure Laterality Date    HX BLADDER REPAIR      HX CHOLECYSTECTOMY      HX HYSTERECTOMY      2000    HX ORTHOPAEDIC      pin in left foot    HX OTHER SURGICAL      defiberilator left corotid    HX OTHER SURGICAL      chris abscess       Family History   Problem Relation Age of Onset    Arthritis-osteo Mother     Diabetes Father     Hypertension Father     Heart Attack Father     Heart Disease Father     Diabetes Paternal Grandmother        Social History     Social History    Marital status: SINGLE     Spouse name: N/A    Number of children: N/A    Years of education: N/A     Social History Main Topics    Smoking status: Current Some Day Smoker    Smokeless tobacco: Never Used    Alcohol use No    Drug use: Not on file    Sexual activity: Not on file     Other Topics Concern    Not on file     Social History Narrative       Review of Systems   Constitutional: Negative for chills, fever, malaise/fatigue and weight loss. HENT: Negative for congestion, hearing loss, sore throat and tinnitus. Eyes: Negative for blurred vision, pain and discharge. Respiratory: Negative for cough, shortness of breath and wheezing. Cardiovascular: Positive for leg swelling. Negative for chest pain, palpitations, orthopnea and claudication. Gastrointestinal: Negative for abdominal pain, constipation and heartburn. Genitourinary: Negative for dysuria, frequency and urgency. Musculoskeletal: Negative for falls, joint pain and myalgias. Skin: Negative for itching and rash. Neurological: Negative for dizziness, tingling, tremors and headaches. Endo/Heme/Allergies: Negative for environmental allergies and polydipsia.    Psychiatric/Behavioral: Negative for depression and substance abuse. The patient is not nervous/anxious. Visit Vitals    /70    Pulse 72    Temp 96.6 °F (35.9 °C)    Resp 18    Ht 5' 6\" (1.676 m)    Wt 160 lb (72.6 kg)    SpO2 95%    BMI 25.82 kg/m2     Physical Exam   Constitutional: She is oriented to person, place, and time and well-developed, well-nourished, and in no distress. HENT:   Head: Normocephalic and atraumatic. Right Ear: External ear normal.   Left Ear: External ear normal.   Mouth/Throat: Oropharynx is clear and moist.   Eyes: Conjunctivae and EOM are normal. Pupils are equal, round, and reactive to light. Right eye exhibits no discharge. Left eye exhibits no discharge. Neck: Normal range of motion. Neck supple. No tracheal deviation present. No thyromegaly present. Cardiovascular: Normal rate, regular rhythm, normal heart sounds and intact distal pulses. Exam reveals no gallop and no friction rub. No murmur heard. Pulmonary/Chest: Effort normal and breath sounds normal. No respiratory distress. She has no wheezes. She exhibits no tenderness. Abdominal: Soft. Bowel sounds are normal. She exhibits no distension and no mass. There is no tenderness. There is no rebound and no guarding. Musculoskeletal: She exhibits edema (Right lower extremity) and tenderness (Tender area and the right popliteal with questionable cord). Lymphadenopathy:     She has no cervical adenopathy. Neurological: She is alert and oriented to person, place, and time. She has normal reflexes. No cranial nerve deficit. She exhibits normal muscle tone. Gait normal. Coordination normal.   Skin: Skin is warm and dry. No rash noted. No erythema. No pallor. Psychiatric: Mood, memory, affect and judgment normal.         ICD-10-CM ICD-9-CM    1.  Popliteal pain M79.609 729.5 CANCELED: DUPLEX LOWER EXT VEIN MAPPING RIGHT   2. Leg edema, right R60.0 782.3 DUPLEX LOWER EXT VENOUS BILAT      CANCELED: DUPLEX LOWER EXT VEIN MAPPING RIGHT       Orders Placed This Encounter    DUPLEX LOWER EXT VENOUS BILAT     Standing Status:   Future     Standing Expiration Date:   4/21/2019       Follow-up Disposition: Not on Malissa Smith MD

## 2018-04-06 ENCOUNTER — OFFICE VISIT (OUTPATIENT)
Dept: FAMILY MEDICINE CLINIC | Age: 63
End: 2018-04-06

## 2018-04-06 VITALS
OXYGEN SATURATION: 97 % | HEIGHT: 66 IN | WEIGHT: 160 LBS | BODY MASS INDEX: 25.71 KG/M2 | HEART RATE: 73 BPM | DIASTOLIC BLOOD PRESSURE: 70 MMHG | RESPIRATION RATE: 18 BRPM | TEMPERATURE: 97.4 F | SYSTOLIC BLOOD PRESSURE: 139 MMHG

## 2018-04-06 DIAGNOSIS — Z12.11 COLON CANCER SCREENING: ICD-10-CM

## 2018-04-06 DIAGNOSIS — I10 ESSENTIAL HYPERTENSION: ICD-10-CM

## 2018-04-06 DIAGNOSIS — S41.102A OPEN WOUND OF LEFT UPPER ARM, INITIAL ENCOUNTER: ICD-10-CM

## 2018-04-06 DIAGNOSIS — Z79.4 TYPE 2 DIABETES MELLITUS WITH OTHER CIRCULATORY COMPLICATION, WITH LONG-TERM CURRENT USE OF INSULIN (HCC): ICD-10-CM

## 2018-04-06 DIAGNOSIS — E11.59 TYPE 2 DIABETES MELLITUS WITH OTHER CIRCULATORY COMPLICATION, WITH LONG-TERM CURRENT USE OF INSULIN (HCC): ICD-10-CM

## 2018-04-06 DIAGNOSIS — G89.29 CHRONIC RIGHT-SIDED LOW BACK PAIN WITHOUT SCIATICA: ICD-10-CM

## 2018-04-06 DIAGNOSIS — I48.20 CHRONIC ATRIAL FIBRILLATION (HCC): ICD-10-CM

## 2018-04-06 DIAGNOSIS — M54.50 CHRONIC RIGHT-SIDED LOW BACK PAIN WITHOUT SCIATICA: ICD-10-CM

## 2018-04-06 DIAGNOSIS — K21.9 GASTROESOPHAGEAL REFLUX DISEASE WITHOUT ESOPHAGITIS: ICD-10-CM

## 2018-04-06 DIAGNOSIS — I63.232 CEREBRAL INFARCTION DUE TO OCCLUSION OF LEFT INTERNAL CAROTID ARTERY (HCC): ICD-10-CM

## 2018-04-06 DIAGNOSIS — Z23 ENCOUNTER FOR IMMUNIZATION: ICD-10-CM

## 2018-04-06 DIAGNOSIS — R60.0 LEG EDEMA, RIGHT: Primary | ICD-10-CM

## 2018-04-06 DIAGNOSIS — E78.2 MIXED DYSLIPIDEMIA: ICD-10-CM

## 2018-04-06 RX ORDER — INSULIN GLARGINE 100 [IU]/ML
24 INJECTION, SOLUTION SUBCUTANEOUS DAILY
Qty: 3 VIAL | Refills: 2 | Status: SHIPPED | OUTPATIENT
Start: 2018-04-06 | End: 2018-09-12 | Stop reason: SDUPTHER

## 2018-04-06 RX ORDER — AMIODARONE HYDROCHLORIDE 200 MG/1
200 TABLET ORAL 2 TIMES DAILY
Qty: 180 TAB | Refills: 1 | Status: SHIPPED | OUTPATIENT
Start: 2018-04-06 | End: 2018-09-12 | Stop reason: SDUPTHER

## 2018-04-06 RX ORDER — PANTOPRAZOLE SODIUM 40 MG/1
40 TABLET, DELAYED RELEASE ORAL DAILY
Qty: 90 TAB | Refills: 1 | Status: SHIPPED | OUTPATIENT
Start: 2018-04-06 | End: 2018-09-12 | Stop reason: SDUPTHER

## 2018-04-06 RX ORDER — CYCLOBENZAPRINE HCL 5 MG
TABLET ORAL
Qty: 270 TAB | Refills: 1 | Status: SHIPPED | OUTPATIENT
Start: 2018-04-06 | End: 2018-09-12 | Stop reason: SDUPTHER

## 2018-04-06 RX ORDER — VALSARTAN 40 MG/1
40 TABLET ORAL 2 TIMES DAILY
Qty: 180 TAB | Refills: 1 | Status: SHIPPED | OUTPATIENT
Start: 2018-04-06 | End: 2018-09-12 | Stop reason: SDUPTHER

## 2018-04-06 RX ORDER — CARVEDILOL 6.25 MG/1
TABLET ORAL
Qty: 180 TAB | Refills: 1 | Status: SHIPPED | OUTPATIENT
Start: 2018-04-06 | End: 2018-09-12 | Stop reason: SDUPTHER

## 2018-04-06 RX ORDER — GABAPENTIN 100 MG/1
100 CAPSULE ORAL 3 TIMES DAILY
Qty: 270 CAP | Refills: 1 | Status: SHIPPED | OUTPATIENT
Start: 2018-04-06 | End: 2018-09-12 | Stop reason: SDUPTHER

## 2018-04-06 RX ORDER — PRAVASTATIN SODIUM 20 MG/1
20 TABLET ORAL
Qty: 90 TAB | Refills: 1 | Status: SHIPPED | OUTPATIENT
Start: 2018-04-06 | End: 2018-09-12 | Stop reason: SDUPTHER

## 2018-04-06 NOTE — PROGRESS NOTES
Loree Rodriguez is a 65 y.o. female who presents with the following:  Chief Complaint   Patient presents with    Leg Swelling     left    Foot Swelling     left    Laceration     on left arm, Tetanus shot? Leg Swelling   The history is provided by the patient (The swelling in the patient's leg persist although her ankle is feeling considerably better. ). Pertinent negatives include no chest pain, no abdominal pain, no headaches and no shortness of breath. Foot Swelling    The history is provided by the patient (Actually the foot and ankle are not as swollen as the area about the calf but this was negative on Doppler. ). Pertinent negatives include no tingling and no itching. Laceration   The history is provided by the patient (Patient lacerated her left forearm and is concerned as she has not had a tetanus shot in quite some time. The wound to stop bleeding and is relatively shallow. ). This is a new (She did this earlier in the week but is now concerned that she could get tetanus.) problem. Pertinent negatives include no chest pain, no abdominal pain, no headaches and no shortness of breath. Hypertension    The history is provided by the patient (Patient's blood pressures settled down nicely on her current medications without any chest pain or shortness of breath nor edema other than the right leg.). Pertinent negatives include no chest pain, no orthopnea, no palpitations, no malaise/fatigue, no blurred vision, no headaches, no tinnitus, no dizziness and no shortness of breath. Cholesterol Problem   The history is provided by the patient (Patient is having no muscle pain or weakness on her pravastatin). Pertinent negatives include no chest pain, no abdominal pain, no headaches and no shortness of breath. Heart Problem    The history is provided by the patient (Patient's atrial fibrillation seems to be doing fairly well on her amiodarone without any palpitations or lightheadedness. ).  Associated symptoms include lower extremity edema. Pertinent negatives include no fever, no malaise/fatigue, no chest pain, no claudication, no orthopnea, no abdominal pain, no headaches, no dizziness, no cough and no shortness of breath. Her past medical history is significant for hypertension. Diabetes   The history is provided by the patient (Patient continues to smoke but has not had any difficulties with her vision or peripheral neuropathy. ). Pertinent negatives include no chest pain, no abdominal pain, no headaches and no shortness of breath. Neurologic Problem   The history is provided by the patient (Patient's a aphasia from her is doing better at this time. ). Pertinent negatives include no shortness of breath, no chest pain and no headaches. Allergies   Allergen Reactions    Pcn [Penicillins] Anaphylaxis    Sulfa (Sulfonamide Antibiotics) Nausea Only    Valium [Diazepam] Unknown (comments)       Current Outpatient Prescriptions   Medication Sig    valsartan (DIOVAN) 40 mg tablet Take 1 Tab by mouth two (2) times a day.  rivaroxaban (XARELTO) 20 mg tab tablet Take 1 Tab by mouth daily (with breakfast).  pravastatin (PRAVACHOL) 20 mg tablet Take 1 Tab by mouth nightly.  pantoprazole (PROTONIX) 40 mg tablet Take 1 Tab by mouth daily.  insulin glargine (LANTUS) 100 unit/mL injection 24 Units by SubCUTAneous route daily.  gabapentin (NEURONTIN) 100 mg capsule Take 1 Cap by mouth three (3) times daily.  carvedilol (COREG) 6.25 mg tablet 1 tab by mouth 2 times a day.  amiodarone (CORDARONE) 200 mg tablet Take 1 Tab by mouth two (2) times a day.  cyclobenzaprine (FLEXERIL) 5 mg tablet TAKE ONE TABLET BY MOUTH THREE TIMES DAILY (WITH  MEALS)  INDICATIONS: MUSCLE  SPASM    FLUCELVAX QUAD [id number], PF, syrg injection TO BE ADMINISTERED BY PHARMACIST FOR IMMUNIZATION    vit B Cmplx 3-FA-Vit C-Biotin (NEPHRO CHUCKIE RX) 1- mg-mg-mcg tablet Take 1 Tab by mouth daily.     docusate sodium (COLACE) 100 mg capsule Take 100 mg by mouth two (2) times a day. No current facility-administered medications for this visit. Past Medical History:   Diagnosis Date    [facility]     Back pain     Hepatitis     History of seasonal allergies     Hypertension     Kidney infection     [facility]     MRSA (methicillin resistant Staphylococcus aureus)     Recurrent boils     Stroke ([facility]     Type II or unspecified type diabetes mellitus without mention of complication, not stated as uncontrolled        Past Surgical History:   Procedure Laterality Date    HX BLADDER REPAIR      HX CHOLECYSTECTOMY      HX HYSTERECTOMY      2000    HX ORTHOPAEDIC      pin in left foot    HX OTHER SURGICAL      defiberilator left corotid    HX OTHER SURGICAL      chris abscess       Family History   Problem Relation Age of Onset    Arthritis-osteo Mother     Diabetes Father     Hypertension Father     Heart Attack Father     Heart Disease Father     Diabetes Paternal Grandmother        Social History     Social History    Marital status: SINGLE     Spouse name: N/A    Number of children: N/A    Years of education: N/A     Social History Main Topics    Smoking status: Current Some Day Smoker    Smokeless tobacco: Never Used    Alcohol use No    Drug use: None    Sexual activity: Not Asked     Other Topics Concern    None     Social History Narrative       Review of Systems   Constitutional: Negative for chills, fever, malaise/fatigue and weight loss. HENT: Negative for congestion, hearing loss, sore throat and tinnitus. Eyes: Negative for blurred vision, pain and discharge. Respiratory: Negative for cough, shortness of breath and wheezing. Cardiovascular: Positive for leg swelling (Only the right leg is swelling in the left leg is not. ). Negative for chest pain, palpitations, orthopnea and claudication.    Gastrointestinal: Negative for abdominal pain, constipation and heartburn. Genitourinary: Negative for dysuria, frequency and urgency. Musculoskeletal: Negative for falls, joint pain and myalgias. Skin: Negative for itching and rash. Patient has a shallow slice type laceration parallel to the skin line such that its is shallow wound with a large flap of skin that seems to have healed or be healing nicely. Neurological: Negative for dizziness, tingling, tremors and headaches. Endo/Heme/Allergies: Negative for environmental allergies and polydipsia. Psychiatric/Behavioral: Negative for depression and substance abuse. The patient is not nervous/anxious. Visit Vitals    /70    Pulse 73    Temp 97.4 °F (36.3 °C) (Oral)    Resp 18    Ht 5' 6\" (1.676 m)    Wt 160 lb (72.6 kg)    SpO2 97%    BMI 25.82 kg/m2     Physical Exam   Constitutional: She is oriented to person, place, and time and well-developed, well-nourished, and in no distress. HENT:   Head: Normocephalic and atraumatic. Right Ear: External ear normal.   Left Ear: External ear normal.   Mouth/Throat: Oropharynx is clear and moist.   Eyes: Conjunctivae and EOM are normal. Pupils are equal, round, and reactive to light. Right eye exhibits no discharge. Left eye exhibits no discharge. Neck: Normal range of motion. Neck supple. No tracheal deviation present. No thyromegaly present. Cardiovascular: Normal rate, regular rhythm, normal heart sounds and intact distal pulses. Exam reveals no gallop and no friction rub. No murmur heard. Pulmonary/Chest: Effort normal and breath sounds normal. No respiratory distress. She has no wheezes. She exhibits no tenderness. Abdominal: Soft. Bowel sounds are normal. She exhibits no distension and no mass. There is no tenderness. There is no rebound and no guarding. Musculoskeletal: She exhibits edema (Right leg only). She exhibits no tenderness or deformity.    Patient's right ankle is no longer tender   Lymphadenopathy:     She has no cervical adenopathy. Neurological: She is alert and oriented to person, place, and time. She has normal reflexes. No cranial nerve deficit. She exhibits normal muscle tone. Gait normal. Coordination normal.   Skin: Skin is warm and dry. No rash noted. No erythema. No pallor. Shallow flap type laceration of the forearm appears to be healing nicely but the patient is concerned about her tetanus status. We will recommend a Tdap however informed the patient insurance companies are not covering it. Psychiatric: Mood, memory, affect and judgment normal.         ICD-10-CM ICD-9-CM    1. Leg edema, right R60.0 782.3 CT ABD PELV W WO CONT   2. Essential hypertension I10 401.9 valsartan (DIOVAN) 40 mg tablet      pravastatin (PRAVACHOL) 20 mg tablet      carvedilol (COREG) 6.25 mg tablet      amiodarone (CORDARONE) 200 mg tablet   3. Cerebral infarction due to occlusion of left internal carotid artery (HCC) L01.705 433.11 rivaroxaban (XARELTO) 20 mg tab tablet   4. Type 2 diabetes mellitus with other circulatory complication, with long-term current use of insulin (HCC) E11.59 250.70 pravastatin (PRAVACHOL) 20 mg tablet    Z79.4 V58.67 gabapentin (NEURONTIN) 100 mg capsule   5. Mixed dyslipidemia E78.2 272.2 pravastatin (PRAVACHOL) 20 mg tablet   6. Gastroesophageal reflux disease without esophagitis K21.9 530.81 pantoprazole (PROTONIX) 40 mg tablet   7. Chronic right-sided low back pain without sciatica M54.5 724.2 cyclobenzaprine (FLEXERIL) 5 mg tablet    G89.29 338.29    8. Colon cancer screening Z12.11 V76.51 OCCULT BLOOD, IMMUNOASSAY (FIT)   9. Chronic atrial fibrillation (HCC) I48.2 427.31        Orders Placed This Encounter    CT ABD PELV W WO CONT     Standing Status:   Future     Standing Expiration Date:   5/6/2019     Order Specific Question:   Is Patient Allergic to Contrast Dye?      Answer:   No     Order Specific Question:   STAT Creatinine as indicated     Answer:   Yes     Order Specific Question: This order utilizes IV contrast.  What additional contrast is needed? Answer:   Per Radiologist Protocol    OCCULT BLOOD, IMMUNOASSAY (FIT)    valsartan (DIOVAN) 40 mg tablet     Sig: Take 1 Tab by mouth two (2) times a day. Dispense:  180 Tab     Refill:  1    rivaroxaban (XARELTO) 20 mg tab tablet     Sig: Take 1 Tab by mouth daily (with breakfast). Dispense:  90 Tab     Refill:  1    pravastatin (PRAVACHOL) 20 mg tablet     Sig: Take 1 Tab by mouth nightly. Dispense:  90 Tab     Refill:  1    pantoprazole (PROTONIX) 40 mg tablet     Sig: Take 1 Tab by mouth daily. Dispense:  90 Tab     Refill:  1    insulin glargine (LANTUS) 100 unit/mL injection     Si Units by SubCUTAneous route daily. Dispense:  3 Vial     Refill:  2    gabapentin (NEURONTIN) 100 mg capsule     Sig: Take 1 Cap by mouth three (3) times daily. Dispense:  270 Cap     Refill:  1    carvedilol (COREG) 6.25 mg tablet     Si tab by mouth 2 times a day. Dispense:  180 Tab     Refill:  1    amiodarone (CORDARONE) 200 mg tablet     Sig: Take 1 Tab by mouth two (2) times a day. Dispense:  180 Tab     Refill:  1    cyclobenzaprine (FLEXERIL) 5 mg tablet     Sig: TAKE ONE TABLET BY MOUTH THREE TIMES DAILY (WITH  MEALS)  INDICATIONS: MUSCLE  SPASM     Dispense:  270 Tab     Refill:  1     Please consider 90 day supplies to promote better adherence   I am concerned there could be a mass in her pelvis or lower abdomen that is compressing the iliac vein and causing the unilateral swelling in the right leg.     Follow-up Disposition: Not on Gareth Alanis MD

## 2018-04-06 NOTE — MR AVS SNAPSHOT
[address] [place] [address] [phone] Patient: Adelina Higginbotham MRN: [id number] TYC:5/18/5187 Visit Information Date & Time Provider Department Dept. Phone Encounter #  
 4/6/2018  2:00 PM Jas Álvarez  N [address] [place] [address] [phone] [id number] Upcoming Health Maintenance Date Due  
 EYE EXAM RETINAL OR DILATED Q1 5/13/1966 PAP AKA CERVICAL CYTOLOGY 5/13/1977 BREAST CANCER SCRN MAMMOGRAM 5/13/2006 FOBT Q 1 YEAR AGE 50-75 5/13/2006 HEMOGLOBIN A1C Q6M 5/10/2018 FOOT EXAM Q1 11/10/2018 MICROALBUMIN Q1 11/10/2018 LIPID PANEL Q1 11/10/2018 DTaP/Tdap/Td series (2 - Td) 12/19/2027 Allergies as of 4/6/2018  Review Complete On: 4/6/2018 By: Jas Álvarez MD  
  
 Severity Noted Reaction Type Reactions Pcn [Penicillins] High 09/27/2013    Anaphylaxis Sulfa (Sulfonamide Antibiotics)  09/27/2013    Nausea Only Valium [Diazepam]  12/20/2016    Unknown (comments) Current Immunizations  Never Reviewed Name Date Influenza Vaccine 12/6/2012 12:00 AM, 12/19/2011 12:00 AM, 10/28/2008 12:00 AM  
 Pneumococcal Polysaccharide (PPSV-23) 10/18/2013 Td 1/1/2002 12:00 AM  
  
 Not reviewed this visit You Were Diagnosed With   
  
 Codes Comments Leg edema, right    -  Primary ICD-10-CM: R60.0 ICD-9-CM: 782.3 Essential hypertension     ICD-10-CM: I10 
ICD-9-CM: 401.9 Cerebral infarction due to occlusion of left internal carotid artery (HCC)     ICD-10-CM: I99.706 ICD-9-CM: 433.11 Type 2 diabetes mellitus with other circulatory complication, with long-term current use of insulin (HCC)     ICD-10-CM: E11.59, Z79.4 ICD-9-CM: 250.70, V58.67 Mixed dyslipidemia     ICD-10-CM: E78.2 ICD-9-CM: 272.2 Gastroesophageal reflux disease without esophagitis     ICD-10-CM: K21.9 ICD-9-CM: 530.81   
 Chronic right-sided low back pain without sciatica     ICD-10-CM: M54.5, G89.29 ICD-9-CM: 724.2, 338.29 Colon cancer screening     ICD-10-CM: Z12.11 ICD-9-CM: V76.51 Chronic atrial fibrillation (HCC)     ICD-10-CM: S25.9 ICD-9-CM: 427.31 Vitals BP Pulse Temp Resp Height(growth percentile) Weight(growth percentile) 139/70 73 97.4 °F (36.3 °C) (Oral) 18 5' 6\" (1.676 m) 160 lb (72.6 kg) SpO2 BMI OB Status Smoking Status 97% 25.82 kg/m2 Hysterectomy Current Some Day Smoker Vitals History BMI and BSA Data Body Mass Index Body Surface Area  
 25.82 kg/m 2 1.84 m 2 Preferred Pharmacy Pharmacy Name Phone [address] Pineda, [facility] [phone] Your Updated Medication List  
  
   
This list is accurate as of 4/6/18  2:33 PM.  Always use your most recent med list.  
  
  
  
  
 amiodarone 200 mg tablet Commonly known as:  CORDARONE Take 1 Tab by mouth two (2) times a day. carvedilol 6.25 mg tablet Commonly known as:  COREG  
1 tab by mouth 2 times a day. cyclobenzaprine 5 mg tablet Commonly known as:  FLEXERIL  
TAKE ONE TABLET BY MOUTH THREE TIMES DAILY (WITH  MEALS)  INDICATIONS: MUSCLE  SPASM  
  
 docusate sodium 100 mg capsule Commonly known as:  Idella Razor Take 100 mg by mouth two (2) times a day. FLUCELVAX QUAD 7773-9619 (PF) Syrg injection Generic drug:  influenza vaccine 2017-18 (4 yrs+)(PF)  
TO BE ADMINISTERED BY PHARMACIST FOR IMMUNIZATION  
  
 gabapentin 100 mg capsule Commonly known as:  NEURONTIN Take 1 Cap by mouth three (3) times daily. insulin glargine 100 unit/mL injection Commonly known as:  LANTUS  
24 Units by SubCUTAneous route daily. pantoprazole 40 mg tablet Commonly known as:  PROTONIX Take 1 Tab by mouth daily. pravastatin 20 mg tablet Commonly known as:  PRAVACHOL Take 1 Tab by mouth nightly. rivaroxaban 20 mg Tab tablet Commonly known as:  Lonzell Lust Take 1 Tab by mouth daily (with breakfast). valsartan 40 mg tablet Commonly known as:  DIOVAN Take 1 Tab by mouth two (2) times a day. vit B Cmplx 3-FA-Vit C-Biotin 1- mg-mg-mcg tablet Commonly known as:  NEPHRO CHUCKIE RX Take 1 Tab by mouth daily. Prescriptions Sent to Pharmacy Refills  
 valsartan (DIOVAN) 40 mg tablet 1 Sig: Take 1 Tab by mouth two (2) times a day. Class: Normal  
 Pharmacy: [address], [address] Ph #: [phone] Route: Oral  
 rivaroxaban (XARELTO) 20 mg tab tablet 1 Sig: Take 1 Tab by mouth daily (with breakfast). Class: Normal  
 Pharmacy: [address], [address] Ph #: [phone] Route: Oral  
 pravastatin (PRAVACHOL) 20 mg tablet 1 Sig: Take 1 Tab by mouth nightly. Class: Normal  
 Pharmacy: [address], [address] Ph #: [phone] Route: Oral  
 pantoprazole (PROTONIX) 40 mg tablet 1 Sig: Take 1 Tab by mouth daily. Class: Normal  
 Pharmacy: [address], [address] Ph #: [phone] Route: Oral  
 insulin glargine (LANTUS) 100 unit/mL injection 2 Si Units by SubCUTAneous route daily. Class: Normal  
 Pharmacy: [address], [address] Ph #: [phone] Route: SubCUTAneous  
 gabapentin (NEURONTIN) 100 mg capsule 1 Sig: Take 1 Cap by mouth three (3) times daily. Class: Normal  
 Pharmacy: [address], [address] Ph #: [phone] Route: Oral  
 carvedilol (COREG) 6.25 mg tablet 1 Si tab by mouth 2 times a day. Class: Normal  
 Pharmacy: [address], [address] Ph #: [phone] amiodarone (CORDARONE) 200 mg tablet 1 Sig: Take 1 Tab by mouth two (2) times a day. Class: Normal  
 Pharmacy: [address], [address] Ph #: [phone] Route: Oral  
 cyclobenzaprine (FLEXERIL) 5 mg tablet 1 Sig: TAKE ONE TABLET BY MOUTH THREE TIMES DAILY (WITH  MEALS)  INDICATIONS: MUSCLE  SPASM Class: Normal  
 Pharmacy: [address], [address] [place] [address] Ph #: [phone] We Performed the Following OCCULT BLOOD, IMMUNOASSAY (FIT) [id number] CPT(R)] To-Do List   
 06/06/2018 Imaging:  CT ABD PELV W WO CONT Introducing [facility]! Dear Valarie Velazquez: 
Thank you for requesting a Citizen[employer] account. Our records indicate that you already have an active Citizen.VC account. You can access your account anytime at [email or web address] Did you know that you can access your hospital and ER discharge instructions at any time in Citizen.VC? You can also review all of your test results from your hospital stay or ER visit. Additional Information If you have questions, please visit the Frequently Asked Questions section of the [email or web address] website at [email or web address] Remember, Citizen.VC is NOT to be used for urgent needs. For medical emergencies, dial 911. Now available from your iPhone and Android! Please provide this summary of care documentation to your next provider. Your primary care clinician is listed as Traci Leon. If you have any questions after today's visit, please call [phone].

## 2018-04-10 ENCOUNTER — DOCUMENTATION ONLY (OUTPATIENT)
Dept: FAMILY MEDICINE CLINIC | Age: 63
End: 2018-04-10

## 2018-04-10 NOTE — PROGRESS NOTES
Spoke with Iesha Sandhu at [facility] and she approved CT effective 4/9/18-5/8/18 auth # ([id number])

## 2018-05-04 ENCOUNTER — OFFICE VISIT (OUTPATIENT)
Dept: FAMILY MEDICINE CLINIC | Age: 63
End: 2018-05-04

## 2018-05-04 VITALS
OXYGEN SATURATION: 97 % | HEART RATE: 77 BPM | DIASTOLIC BLOOD PRESSURE: 74 MMHG | HEIGHT: 66 IN | RESPIRATION RATE: 18 BRPM | TEMPERATURE: 96.5 F | BODY MASS INDEX: 25.68 KG/M2 | SYSTOLIC BLOOD PRESSURE: 124 MMHG | WEIGHT: 159.8 LBS

## 2018-05-04 DIAGNOSIS — I69.90 LATE EFFECTS OF CVA (CEREBROVASCULAR ACCIDENT): ICD-10-CM

## 2018-05-04 DIAGNOSIS — R60.0 LOCALIZED EDEMA: Primary | ICD-10-CM

## 2018-05-04 PROBLEM — E11.40 TYPE 2 DIABETES MELLITUS WITH DIABETIC NEUROPATHY (HCC): Status: ACTIVE | Noted: 2018-05-04

## 2018-05-04 NOTE — PROGRESS NOTES
Margie Sandoval is a 65 y.o. female who presents with the following:  Chief Complaint   Patient presents with    Foot Pain       Foot Pain   The history is provided by the patient (Patient with persisting pain in the right foot medially but also edema from the foot up to mid calf which is the side most affected by her left CVA). Pertinent negatives include no chest pain, no abdominal pain, no headaches and no shortness of breath. Allergies   Allergen Reactions    Pcn [Penicillins] Anaphylaxis    Sulfa (Sulfonamide Antibiotics) Nausea Only    Valium [Diazepam] Unknown (comments)       Current Outpatient Prescriptions   Medication Sig    valsartan (DIOVAN) 40 mg tablet Take 1 Tab by mouth two (2) times a day.  rivaroxaban (XARELTO) 20 mg tab tablet Take 1 Tab by mouth daily (with breakfast).  pravastatin (PRAVACHOL) 20 mg tablet Take 1 Tab by mouth nightly.  pantoprazole (PROTONIX) 40 mg tablet Take 1 Tab by mouth daily.  insulin glargine (LANTUS) 100 unit/mL injection 24 Units by SubCUTAneous route daily.  gabapentin (NEURONTIN) 100 mg capsule Take 1 Cap by mouth three (3) times daily.  carvedilol (COREG) 6.25 mg tablet 1 tab by mouth 2 times a day.  amiodarone (CORDARONE) 200 mg tablet Take 1 Tab by mouth two (2) times a day.  cyclobenzaprine (FLEXERIL) 5 mg tablet TAKE ONE TABLET BY MOUTH THREE TIMES DAILY (WITH  MEALS)  INDICATIONS: MUSCLE  SPASM    FLUCELVAX QUAD 9235-6220, PF, syrg injection TO BE ADMINISTERED BY PHARMACIST FOR IMMUNIZATION    vit B Cmplx 3-FA-Vit C-Biotin (NEPHRO CHUCKIE RX) 1- mg-mg-mcg tablet Take 1 Tab by mouth daily.  docusate sodium (COLACE) 100 mg capsule Take 100 mg by mouth two (2) times a day. No current facility-administered medications for this visit.         Past Medical History:   Diagnosis Date    A-fib [facility])     Back pain     Hepatitis     History of seasonal allergies     Hypertension     Kidney infection     MI [facility]ocardial infarction) ([facility]     MRSA (methicillin resistant Staphylococcus aureus)     Recurrent boils     Stroke ([facility]     Type II or unspecified type diabetes mellitus without mention of complication, not stated as uncontrolled        Past Surgical History:   Procedure Laterality Date    HX BLADDER REPAIR      HX CHOLECYSTECTOMY      HX HYSTERECTOMY      2000    HX ORTHOPAEDIC      pin in left foot    HX OTHER SURGICAL      defiberilator left corotid    HX OTHER SURGICAL      chris abscess       Family History   Problem Relation Age of Onset    Arthritis-osteo Mother     Diabetes Father     Hypertension Father     Heart Attack Father     Heart Disease Father     Diabetes Paternal Grandmother        Social History     Social History    Marital status: SINGLE     Spouse name: N/A    Number of children: N/A    Years of education: N/A     Social History Main Topics    Smoking status: Current Some Day Smoker    Smokeless tobacco: Never Used    Alcohol use No    Drug use: Not on file    Sexual activity: Not on file     Other Topics Concern    Not on file     Social History Narrative       Review of Systems   Respiratory: Negative for shortness of breath. Cardiovascular: Negative for chest pain. Gastrointestinal: Negative for abdominal pain. Neurological: Negative for headaches. Visit Vitals    /74    Pulse 77    Temp 96.5 °F (35.8 °C)    Resp 18    Ht 5' 6\" (1.676 m)    Wt 159 lb 12.8 oz (72.5 kg)    SpO2 97%    BMI 25.79 kg/m2     Physical Exam   Constitutional: She is oriented to person, place, and time and well-developed, well-nourished, and in no distress. HENT:   Head: Normocephalic and atraumatic. Right Ear: External ear normal.   Left Ear: External ear normal.   Mouth/Throat: Oropharynx is clear and moist.   Eyes: Conjunctivae and EOM are normal. Pupils are equal, round, and reactive to light. Right eye exhibits no discharge. Left eye exhibits no discharge.    Neck: Normal range of motion. Neck supple. No tracheal deviation present. No thyromegaly present. Cardiovascular: Normal rate, regular rhythm, normal heart sounds and intact distal pulses. Exam reveals no gallop and no friction rub. No murmur heard. Pulmonary/Chest: Effort normal and breath sounds normal. No respiratory distress. She has no wheezes. She exhibits no tenderness. Abdominal: Soft. Bowel sounds are normal. She exhibits no distension and no mass. There is no tenderness. There is no rebound and no guarding. Musculoskeletal: She exhibits no edema or tenderness. Lymphadenopathy:     She has no cervical adenopathy. Neurological: She is alert and oriented to person, place, and time. She has normal reflexes. No cranial nerve deficit. She exhibits normal muscle tone. Coordination normal.   Patient is somewhat weak in the right lower extremity and her gait is thrown off by this and she does not want to use a walker which would probably help. I believe she has a tendinitis caused by this abnormal gait and the stresses on the right foot. Also, the patient cannot do as much walking as she would like for exercise that would help pump the edema fluid out of her leg. Skin: Skin is warm and dry. No rash noted. No erythema. No pallor. Psychiatric: Mood, memory, affect and judgment normal.         ICD-10-CM ICD-9-CM    1. Localized edema R60.0 782.3    2. Late effects of CVA (cerebrovascular accident) I69.90 438.9      I suggested the patient may wish to get a treadmill that she could walk one in all weather and hold on to the bars on either side to give herself support and prevent falls as well as hookup to safety device so that if she did fall it would stop the machine. The patient does not like the compression hose although did admit it did keep the edema down. No orders of the defined types were placed in this encounter.       Follow-up Disposition: Not on Prudy MD Kassandra

## 2018-05-04 NOTE — MR AVS SNAPSHOT
[address] [phone] Patient: Loree Barrera MRN: [id number] ODM:4/37/6054 Visit Information Date & Time Provider Department Dept. Phone Encounter #  
 5/4/2018  2:40 PM Josselin Stephens  [address]  PRIMARY CARE MAIN [address] [place] [address] [id number] Upcoming Health Maintenance Date Due  
 EYE EXAM RETINAL OR DILATED Q1 5/13/1966 PAP AKA CERVICAL CYTOLOGY 5/13/1977 BREAST CANCER SCRN MAMMOGRAM 5/13/2006 FOBT Q 1 YEAR AGE 50-75 5/13/2006 HEMOGLOBIN A1C Q6M 5/10/2018 Influenza Age 5 to Adult 8/1/2018 FOOT EXAM Q1 11/10/2018 MICROALBUMIN Q1 11/10/2018 LIPID PANEL Q1 11/10/2018 DTaP/Tdap/Td series (2 - Td) 4/6/2028 Allergies as of 5/4/2018  Review Complete On: 5/4/2018 By: Josselin Stephens MD  
  
 Severity Noted Reaction Type Reactions Pcn [Penicillins] High 09/27/2013    Anaphylaxis Sulfa (Sulfonamide Antibiotics)  09/27/2013    Nausea Only Valium [Diazepam]  12/20/2016    Unknown (comments) Current Immunizations  Reviewed on 4/6/2018 Name Date Influenza Vaccine 12/6/2012 12:00 AM, 12/19/2011 12:00 AM, 10/28/2008 12:00 AM  
 Pneumococcal Polysaccharide (PPSV-23) 10/18/2013 Td 1/1/2002 12:00 AM  
 Td, Adsorbed 4/6/2018  2:49 PM  
  
 Not reviewed this visit Vitals BP Pulse Temp Resp Height(growth percentile) Weight(growth percentile) 124/74 77 96.5 °F (35.8 °C) 18 5' 6\" (1.676 m) 159 lb 12.8 oz (72.5 kg) SpO2 BMI OB Status Smoking Status 97% 25.79 kg/m2 Hysterectomy Current Some Day Smoker Vitals History BMI and BSA Data Body Mass Index Body Surface Area 25.79 kg/m 2 1.84 m 2 Preferred Pharmacy Pharmacy Name Phone Tracy Harris, [facility] [phone] Your Updated Medication List  
  
   
 This list is accurate as of 5/4/18  4:35 PM.  Always use your most recent med list.  
  
  
  
  
 amiodarone 200 mg tablet Commonly known as:  CORDARONE Take 1 Tab by mouth two (2) times a day. carvedilol 6.25 mg tablet Commonly known as:  COREG  
1 tab by mouth 2 times a day. cyclobenzaprine 5 mg tablet Commonly known as:  FLEXERIL  
TAKE ONE TABLET BY MOUTH THREE TIMES DAILY (WITH  MEALS)  INDICATIONS: MUSCLE  SPASM  
  
 docusate sodium 100 mg capsule Commonly known as:  Grey Guppy Take 100 mg by mouth two (2) times a day. FLUCELVAX QUAD 8705-6472 (PF) Syrg injection Generic drug:  influenza vaccine 2017-18 (4 yrs+)(PF)  
TO BE ADMINISTERED BY PHARMACIST FOR IMMUNIZATION  
  
 gabapentin 100 mg capsule Commonly known as:  NEURONTIN Take 1 Cap by mouth three (3) times daily. insulin glargine 100 unit/mL injection Commonly known as:  LANTUS  
24 Units by SubCUTAneous route daily. pantoprazole 40 mg tablet Commonly known as:  PROTONIX Take 1 Tab by mouth daily. pravastatin 20 mg tablet Commonly known as:  PRAVACHOL Take 1 Tab by mouth nightly. rivaroxaban 20 mg Tab tablet Commonly known as:  Genevive Eagle Take 1 Tab by mouth daily (with breakfast). valsartan 40 mg tablet Commonly known as:  DIOVAN Take 1 Tab by mouth two (2) times a day. vit B Cmplx 3-FA-Vit C-Biotin 1- mg-mg-mcg tablet Commonly known as:  NEPHRO CHUCKIE RX Take 1 Tab by mouth daily. Introducing [facility]! Dear Annita Knowles: 
Thank you for requesting a [employer] account. Our records indicate that you already have an active [employer] account. You can access your account anytime at [email or web address] Did you know that you can access your hospital and ER discharge instructions at any time in D[employer]more [employer]? You can also review all of your test results from your hospital stay or ER visit. Additional Information If you have questions, please visit the Frequently Asked Questions section of the [employer] website at [email or web address] for the rest of ust. [employer]. [email or web address] Remember, GoPro is NOT to be used for urgent needs. For medical emergencies, dial 911. Now available from your iPhone and Android! Please provide this summary of care documentation to your next provider. Your primary care clinician is listed as Corrina Cazares. If you have any questions after today's visit, please call [phone].

## 2018-07-12 NOTE — ADDENDUM NOTE
Addended by: Andrea Bingham on: 11/27/2017 09:43 AM     Modules accepted: Orders 1-2 cups/cans per day

## 2018-09-17 DIAGNOSIS — I10 ESSENTIAL HYPERTENSION: ICD-10-CM

## 2018-09-18 RX ORDER — CARVEDILOL 6.25 MG/1
TABLET ORAL
Qty: 180 TAB | Refills: 1 | Status: SHIPPED | OUTPATIENT
Start: 2018-09-18 | End: 2018-12-24 | Stop reason: SDUPTHER

## 2018-10-15 DIAGNOSIS — E11.59 TYPE 2 DIABETES MELLITUS WITH OTHER CIRCULATORY COMPLICATION, WITH LONG-TERM CURRENT USE OF INSULIN (HCC): ICD-10-CM

## 2018-10-15 DIAGNOSIS — G89.29 CHRONIC RIGHT-SIDED LOW BACK PAIN WITHOUT SCIATICA: ICD-10-CM

## 2018-10-15 DIAGNOSIS — E78.2 MIXED DYSLIPIDEMIA: ICD-10-CM

## 2018-10-15 DIAGNOSIS — K21.9 GASTROESOPHAGEAL REFLUX DISEASE WITHOUT ESOPHAGITIS: ICD-10-CM

## 2018-10-15 DIAGNOSIS — M54.50 CHRONIC RIGHT-SIDED LOW BACK PAIN WITHOUT SCIATICA: ICD-10-CM

## 2018-10-15 DIAGNOSIS — I10 ESSENTIAL HYPERTENSION: ICD-10-CM

## 2018-10-15 DIAGNOSIS — Z79.4 TYPE 2 DIABETES MELLITUS WITH OTHER CIRCULATORY COMPLICATION, WITH LONG-TERM CURRENT USE OF INSULIN (HCC): ICD-10-CM

## 2018-10-16 PROBLEM — I69.90 LATE EFFECTS OF CVA (CEREBROVASCULAR ACCIDENT): Status: ACTIVE | Noted: 2018-10-16

## 2018-10-16 RX ORDER — AMIODARONE HYDROCHLORIDE 200 MG/1
TABLET ORAL
Qty: 180 TAB | Refills: 1 | Status: SHIPPED | OUTPATIENT
Start: 2018-10-16 | End: 2019-01-29 | Stop reason: ALTCHOICE

## 2018-10-16 RX ORDER — CYCLOBENZAPRINE HCL 5 MG
TABLET ORAL
Qty: 270 TAB | Refills: 1 | Status: SHIPPED | OUTPATIENT
Start: 2018-10-16 | End: 2019-05-20 | Stop reason: SDUPTHER

## 2018-10-16 RX ORDER — VALSARTAN 40 MG/1
TABLET ORAL
Qty: 180 TAB | Refills: 1 | Status: SHIPPED | OUTPATIENT
Start: 2018-10-16 | End: 2019-04-22 | Stop reason: SDUPTHER

## 2018-10-16 RX ORDER — PANTOPRAZOLE SODIUM 40 MG/1
TABLET, DELAYED RELEASE ORAL
Qty: 90 TAB | Refills: 1 | Status: SHIPPED | OUTPATIENT
Start: 2018-10-16 | End: 2019-04-22 | Stop reason: SDUPTHER

## 2018-10-16 RX ORDER — GABAPENTIN 100 MG/1
CAPSULE ORAL
Qty: 270 CAP | Refills: 1 | Status: SHIPPED | OUTPATIENT
Start: 2018-10-16 | End: 2019-05-20 | Stop reason: SDUPTHER

## 2018-10-16 RX ORDER — PRAVASTATIN SODIUM 20 MG/1
TABLET ORAL
Qty: 90 TAB | Refills: 1 | Status: SHIPPED | OUTPATIENT
Start: 2018-10-16 | End: 2019-05-20 | Stop reason: SDUPTHER

## 2018-11-26 RX ORDER — INSULIN GLARGINE 100 [IU]/ML
INJECTION, SOLUTION SUBCUTANEOUS
Qty: 30 ML | Refills: 2 | Status: SHIPPED | OUTPATIENT
Start: 2018-11-26 | End: 2018-12-24 | Stop reason: SDUPTHER

## 2019-03-17 DIAGNOSIS — I10 ESSENTIAL HYPERTENSION: ICD-10-CM

## 2019-03-20 RX ORDER — CARVEDILOL 6.25 MG/1
TABLET ORAL
Qty: 180 TAB | Refills: 1 | OUTPATIENT
Start: 2019-03-20

## 2019-04-19 DIAGNOSIS — K21.9 GASTROESOPHAGEAL REFLUX DISEASE WITHOUT ESOPHAGITIS: ICD-10-CM

## 2019-04-19 DIAGNOSIS — E11.59 TYPE 2 DIABETES MELLITUS WITH OTHER CIRCULATORY COMPLICATION, WITH LONG-TERM CURRENT USE OF INSULIN (HCC): ICD-10-CM

## 2019-04-19 DIAGNOSIS — E78.2 MIXED DYSLIPIDEMIA: ICD-10-CM

## 2019-04-19 DIAGNOSIS — Z79.4 TYPE 2 DIABETES MELLITUS WITH OTHER CIRCULATORY COMPLICATION, WITH LONG-TERM CURRENT USE OF INSULIN (HCC): ICD-10-CM

## 2019-04-19 DIAGNOSIS — I10 ESSENTIAL HYPERTENSION: ICD-10-CM

## 2019-04-19 RX ORDER — PRAVASTATIN SODIUM 20 MG/1
TABLET ORAL
Qty: 90 TAB | Refills: 1 | Status: CANCELLED | OUTPATIENT
Start: 2019-04-19

## 2019-04-19 RX ORDER — VALSARTAN 40 MG/1
TABLET ORAL
Qty: 180 TAB | Refills: 1 | Status: CANCELLED | OUTPATIENT
Start: 2019-04-19

## 2019-04-19 RX ORDER — PANTOPRAZOLE SODIUM 40 MG/1
TABLET, DELAYED RELEASE ORAL
Qty: 90 TAB | Refills: 1 | Status: CANCELLED | OUTPATIENT
Start: 2019-04-19

## 2019-04-21 DIAGNOSIS — K21.9 GASTROESOPHAGEAL REFLUX DISEASE WITHOUT ESOPHAGITIS: ICD-10-CM

## 2019-04-21 DIAGNOSIS — I10 ESSENTIAL HYPERTENSION: ICD-10-CM

## 2019-04-21 RX ORDER — PANTOPRAZOLE SODIUM 40 MG/1
TABLET, DELAYED RELEASE ORAL
Qty: 90 TAB | Refills: 1 | Status: CANCELLED | OUTPATIENT
Start: 2019-04-21

## 2019-04-21 RX ORDER — VALSARTAN 40 MG/1
TABLET ORAL
Qty: 180 TAB | Refills: 1 | Status: CANCELLED | OUTPATIENT
Start: 2019-04-21

## 2019-04-29 PROBLEM — K76.0 FATTY LIVER: Chronic | Status: ACTIVE | Noted: 2019-04-29

## 2019-04-29 PROBLEM — M51.36 DDD (DEGENERATIVE DISC DISEASE), LUMBAR: Status: ACTIVE | Noted: 2019-04-29

## 2019-10-02 ENCOUNTER — HOSPITAL ENCOUNTER (INPATIENT)
Age: 64
Setting detail: SURGERY ADMIT
End: 2019-10-02
Attending: SURGERY | Admitting: SURGERY

## 2019-10-21 ENCOUNTER — PATIENT MESSAGE (OUTPATIENT)
Dept: FAMILY MEDICINE CLINIC | Age: 64
End: 2019-10-21

## 2019-10-21 DIAGNOSIS — Z79.4 TYPE 2 DIABETES MELLITUS WITH OTHER CIRCULATORY COMPLICATION, WITH LONG-TERM CURRENT USE OF INSULIN (HCC): ICD-10-CM

## 2019-10-21 DIAGNOSIS — E11.59 TYPE 2 DIABETES MELLITUS WITH OTHER CIRCULATORY COMPLICATION, WITH LONG-TERM CURRENT USE OF INSULIN (HCC): ICD-10-CM

## 2019-10-21 RX ORDER — FLASH GLUCOSE SENSOR
KIT MISCELLANEOUS
Refills: 99 | COMMUNITY
Start: 2019-08-29 | End: 2019-10-21 | Stop reason: SDUPTHER

## 2019-10-22 RX ORDER — FLASH GLUCOSE SENSOR
KIT MISCELLANEOUS
Qty: 1 KIT | Refills: 99 | Status: SHIPPED | OUTPATIENT
Start: 2019-10-22 | End: 2020-08-07 | Stop reason: SDUPTHER

## 2019-10-22 RX ORDER — GLIPIZIDE 5 MG/1
5 TABLET ORAL 2 TIMES DAILY
Qty: 180 TAB | Refills: 0 | Status: SHIPPED | OUTPATIENT
Start: 2019-10-22 | End: 2020-02-04 | Stop reason: SDUPTHER

## 2019-10-29 PROBLEM — I65.23 BILATERAL CAROTID ARTERY STENOSIS: Chronic | Status: ACTIVE | Noted: 2019-10-29

## 2020-02-04 ENCOUNTER — PATIENT MESSAGE (OUTPATIENT)
Dept: FAMILY MEDICINE CLINIC | Age: 65
End: 2020-02-04

## 2020-02-04 DIAGNOSIS — Z79.4 TYPE 2 DIABETES MELLITUS WITH OTHER CIRCULATORY COMPLICATION, WITH LONG-TERM CURRENT USE OF INSULIN (HCC): ICD-10-CM

## 2020-02-04 DIAGNOSIS — G89.29 CHRONIC RIGHT-SIDED LOW BACK PAIN WITHOUT SCIATICA: ICD-10-CM

## 2020-02-04 DIAGNOSIS — E11.59 TYPE 2 DIABETES MELLITUS WITH OTHER CIRCULATORY COMPLICATION, WITH LONG-TERM CURRENT USE OF INSULIN (HCC): ICD-10-CM

## 2020-02-04 DIAGNOSIS — I10 ESSENTIAL HYPERTENSION: ICD-10-CM

## 2020-02-04 DIAGNOSIS — E78.2 MIXED DYSLIPIDEMIA: ICD-10-CM

## 2020-02-04 DIAGNOSIS — K21.9 GASTROESOPHAGEAL REFLUX DISEASE WITHOUT ESOPHAGITIS: ICD-10-CM

## 2020-02-04 DIAGNOSIS — M54.50 CHRONIC RIGHT-SIDED LOW BACK PAIN WITHOUT SCIATICA: ICD-10-CM

## 2020-02-04 RX ORDER — INSULIN GLARGINE 100 [IU]/ML
30 INJECTION, SOLUTION SUBCUTANEOUS DAILY
Qty: 30 ML | Refills: 1 | Status: SHIPPED | OUTPATIENT
Start: 2020-02-04 | End: 2020-02-10 | Stop reason: SDUPTHER

## 2020-02-04 RX ORDER — PRAVASTATIN SODIUM 20 MG/1
TABLET ORAL
Qty: 90 TAB | Refills: 1 | Status: SHIPPED | OUTPATIENT
Start: 2020-02-04 | End: 2020-02-10 | Stop reason: SDUPTHER

## 2020-02-04 RX ORDER — GABAPENTIN 100 MG/1
CAPSULE ORAL
Qty: 270 CAP | Refills: 0 | Status: SHIPPED | OUTPATIENT
Start: 2020-02-04 | End: 2020-02-10 | Stop reason: SDUPTHER

## 2020-02-04 RX ORDER — GLIPIZIDE 5 MG/1
5 TABLET ORAL 2 TIMES DAILY
Qty: 180 TAB | Refills: 0 | Status: SHIPPED | OUTPATIENT
Start: 2020-02-04 | End: 2020-02-10 | Stop reason: SDUPTHER

## 2020-02-04 RX ORDER — CARVEDILOL 6.25 MG/1
TABLET ORAL
Qty: 180 TAB | Refills: 1 | Status: SHIPPED | OUTPATIENT
Start: 2020-02-04 | End: 2020-02-10 | Stop reason: SDUPTHER

## 2020-02-04 RX ORDER — PANTOPRAZOLE SODIUM 40 MG/1
TABLET, DELAYED RELEASE ORAL
Qty: 90 TAB | Refills: 1 | Status: SHIPPED | OUTPATIENT
Start: 2020-02-04 | End: 2020-02-10 | Stop reason: SDUPTHER

## 2020-02-04 RX ORDER — VALSARTAN 40 MG/1
TABLET ORAL
Qty: 180 TAB | Refills: 1 | Status: SHIPPED | OUTPATIENT
Start: 2020-02-04 | End: 2020-02-10 | Stop reason: SDUPTHER

## 2020-02-04 RX ORDER — CYCLOBENZAPRINE HCL 5 MG
TABLET ORAL
Qty: 270 TAB | Refills: 0 | Status: SHIPPED | OUTPATIENT
Start: 2020-02-04 | End: 2020-02-10 | Stop reason: SDUPTHER

## 2020-02-04 NOTE — TELEPHONE ENCOUNTER
From: Mik Lara  To: Kena Castro MD  Sent: 2/4/2020 9:19 AM EST  Subject: Prescription Question    I am unable to refill my scripts. I would like to use [facility] and they are filled for 90 days. It takes about ten days to fill and send. They are as follows: Carvedilol 625 mg 2x/am & pm;   Pantoprazole 40mg 1x/am;    Pravastatin 20mg 1x @ bedtime;   Valsartan 40mg 2x/am & pm;   Cyclobenapr 5mg 3x/am,noon,pm;   Gabapentin 100mg 3x/am,noon,pm;   Glipizide 5mg 2x am & pm  The Cyclobenaor was written for 180 rather than 270 last time.   Thank you,  Jessica Gimenez

## 2020-02-04 NOTE — TELEPHONE ENCOUNTER
From: Aamir Found  To: Dario Hoskins MD  Sent: 2/4/2020 9:21 AM EST  Subject: Prescription Question    Forgot that I also need 90 days of Insulin too.

## 2021-03-09 ENCOUNTER — TELEPHONE (OUTPATIENT)
Dept: FAMILY MEDICINE CLINIC | Age: 66
End: 2021-03-09

## 2021-03-09 DIAGNOSIS — Z79.4 TYPE 2 DIABETES MELLITUS WITH OTHER CIRCULATORY COMPLICATION, WITH LONG-TERM CURRENT USE OF INSULIN (HCC): ICD-10-CM

## 2021-03-09 DIAGNOSIS — E11.59 TYPE 2 DIABETES MELLITUS WITH OTHER CIRCULATORY COMPLICATION, WITH LONG-TERM CURRENT USE OF INSULIN (HCC): ICD-10-CM

## 2021-03-09 RX ORDER — GABAPENTIN 300 MG/1
CAPSULE ORAL
Qty: 90 CAP | Refills: 1 | Status: SHIPPED | OUTPATIENT
Start: 2021-03-09 | End: 2021-04-08

## 2021-03-09 NOTE — TELEPHONE ENCOUNTER
Medication sent to [facility]

## 2021-04-02 ENCOUNTER — HOSPITAL ENCOUNTER (OUTPATIENT)
Age: 66
Setting detail: OBSERVATION
Discharge: HOME | End: 2021-04-03
Attending: FAMILY MEDICINE | Admitting: FAMILY MEDICINE
Payer: COMMERCIAL

## 2021-04-02 ENCOUNTER — APPOINTMENT (OUTPATIENT)
Dept: CT IMAGING | Age: 66
End: 2021-04-02
Attending: FAMILY MEDICINE
Payer: COMMERCIAL

## 2021-04-02 ENCOUNTER — APPOINTMENT (OUTPATIENT)
Dept: GENERAL RADIOLOGY | Age: 66
End: 2021-04-02
Attending: FAMILY MEDICINE
Payer: COMMERCIAL

## 2021-04-02 DIAGNOSIS — A09 INFECTIOUS COLITIS: Primary | ICD-10-CM

## 2021-04-02 PROBLEM — K52.9 COLITIS: Status: ACTIVE | Noted: 2021-04-02

## 2021-04-02 LAB
ABO + RH BLD: NORMAL
ALBUMIN SERPL-MCNC: 3.6 G/DL (ref 3.5–5)
ALBUMIN/GLOB SERPL: 0.7 {RATIO} (ref 1.1–2.2)
ALP SERPL-CCNC: 141 U/L (ref 45–117)
ALT SERPL-CCNC: 32 U/L (ref 12–78)
ANION GAP SERPL CALC-SCNC: 12 MMOL/L (ref 5–15)
AST SERPL-CCNC: 29 U/L (ref 15–37)
BASOPHILS # BLD: 0.1 K/UL (ref 0–0.1)
BASOPHILS NFR BLD: 0 % (ref 0–1)
BILIRUB SERPL-MCNC: 0.5 MG/DL (ref 0.2–1)
BLOOD GROUP ANTIBODIES SERPL: NORMAL
BUN SERPL-MCNC: 11 MG/DL (ref 6–20)
BUN/CREAT SERPL: 11 (ref 12–20)
C DIFF GDH STL QL: NEGATIVE
C DIFF TOX A+B STL QL IA: NEGATIVE
CALCIUM SERPL-MCNC: 9 MG/DL (ref 8.5–10.1)
CHLORIDE SERPL-SCNC: 102 MMOL/L (ref 97–108)
CO2 SERPL-SCNC: 26 MMOL/L (ref 21–32)
CREAT SERPL-MCNC: 0.96 MG/DL (ref 0.55–1.02)
DIFFERENTIAL METHOD BLD: ABNORMAL
EOSINOPHIL # BLD: 0.4 K/UL (ref 0–0.4)
EOSINOPHIL NFR BLD: 2 % (ref 0–7)
ERYTHROCYTE [DISTWIDTH] IN BLOOD BY AUTOMATED COUNT: 12.7 % (ref 11.5–14.5)
GLOBULIN SER CALC-MCNC: 5 G/DL (ref 2–4)
GLUCOSE BLD STRIP.AUTO-MCNC: 113 MG/DL (ref 65–100)
GLUCOSE BLD STRIP.AUTO-MCNC: 147 MG/DL (ref 65–100)
GLUCOSE BLD STRIP.AUTO-MCNC: 96 MG/DL (ref 65–100)
GLUCOSE SERPL-MCNC: 173 MG/DL (ref 65–100)
HCT VFR BLD AUTO: 45.3 % (ref 35–47)
HEMOCCULT STL QL: POSITIVE
HGB BLD-MCNC: 15.2 G/DL (ref 11.5–16)
IMM GRANULOCYTES # BLD AUTO: 0.1 K/UL (ref 0–0.04)
IMM GRANULOCYTES NFR BLD AUTO: 1 % (ref 0–0.5)
INTERPRETATION: NORMAL
LACTATE SERPL-SCNC: 1.5 MMOL/L (ref 0.4–2)
LYMPHOCYTES # BLD: 2.4 K/UL (ref 0.8–3.5)
LYMPHOCYTES NFR BLD: 11 % (ref 12–49)
MCH RBC QN AUTO: 31.8 PG (ref 26–34)
MCHC RBC AUTO-ENTMCNC: 33.6 G/DL (ref 30–36.5)
MCV RBC AUTO: 94.8 FL (ref 80–99)
MONOCYTES # BLD: 1.1 K/UL (ref 0–1)
MONOCYTES NFR BLD: 5 % (ref 5–13)
NEUTS SEG # BLD: 17 K/UL (ref 1.8–8)
NEUTS SEG NFR BLD: 81 % (ref 32–75)
NRBC # BLD: 0 K/UL (ref 0–0.01)
NRBC BLD-RTO: 0 PER 100 WBC
PLATELET # BLD AUTO: 163 K/UL (ref 150–400)
PMV BLD AUTO: 12.6 FL (ref 8.9–12.9)
POTASSIUM SERPL-SCNC: 4 MMOL/L (ref 3.5–5.1)
PROT SERPL-MCNC: 8.6 G/DL (ref 6.4–8.2)
RBC # BLD AUTO: 4.78 M/UL (ref 3.8–5.2)
SERVICE CMNT-IMP: ABNORMAL
SERVICE CMNT-IMP: ABNORMAL
SERVICE CMNT-IMP: NORMAL
SODIUM SERPL-SCNC: 140 MMOL/L (ref 136–145)
SPECIMEN EXP DATE BLD: NORMAL
WBC # BLD AUTO: 21 K/UL (ref 3.6–11)

## 2021-04-02 PROCEDURE — 74011250637 HC RX REV CODE- 250/637: Performed by: INTERNAL MEDICINE

## 2021-04-02 PROCEDURE — 74011250636 HC RX REV CODE- 250/636: Performed by: INTERNAL MEDICINE

## 2021-04-02 PROCEDURE — 80053 COMPREHEN METABOLIC PANEL: CPT

## 2021-04-02 PROCEDURE — 71045 X-RAY EXAM CHEST 1 VIEW: CPT

## 2021-04-02 PROCEDURE — 82272 OCCULT BLD FECES 1-3 TESTS: CPT

## 2021-04-02 PROCEDURE — 96361 HYDRATE IV INFUSION ADD-ON: CPT

## 2021-04-02 PROCEDURE — 96375 TX/PRO/DX INJ NEW DRUG ADDON: CPT

## 2021-04-02 PROCEDURE — 93005 ELECTROCARDIOGRAM TRACING: CPT

## 2021-04-02 PROCEDURE — 96376 TX/PRO/DX INJ SAME DRUG ADON: CPT

## 2021-04-02 PROCEDURE — 36415 COLL VENOUS BLD VENIPUNCTURE: CPT

## 2021-04-02 PROCEDURE — 74011000636 HC RX REV CODE- 636: Performed by: FAMILY MEDICINE

## 2021-04-02 PROCEDURE — 96365 THER/PROPH/DIAG IV INF INIT: CPT

## 2021-04-02 PROCEDURE — 87324 CLOSTRIDIUM AG IA: CPT

## 2021-04-02 PROCEDURE — 86901 BLOOD TYPING SEROLOGIC RH(D): CPT

## 2021-04-02 PROCEDURE — 87506 IADNA-DNA/RNA PROBE TQ 6-11: CPT

## 2021-04-02 PROCEDURE — 74011250636 HC RX REV CODE- 250/636: Performed by: FAMILY MEDICINE

## 2021-04-02 PROCEDURE — 99218 HC RM OBSERVATION: CPT

## 2021-04-02 PROCEDURE — 85025 COMPLETE CBC W/AUTO DIFF WBC: CPT

## 2021-04-02 PROCEDURE — 74177 CT ABD & PELVIS W/CONTRAST: CPT

## 2021-04-02 PROCEDURE — 74011636637 HC RX REV CODE- 636/637: Performed by: INTERNAL MEDICINE

## 2021-04-02 PROCEDURE — 83605 ASSAY OF LACTIC ACID: CPT

## 2021-04-02 PROCEDURE — 99285 EMERGENCY DEPT VISIT HI MDM: CPT

## 2021-04-02 PROCEDURE — 82962 GLUCOSE BLOOD TEST: CPT

## 2021-04-02 RX ORDER — SODIUM CHLORIDE 0.9 % (FLUSH) 0.9 %
5-40 SYRINGE (ML) INJECTION AS NEEDED
Status: DISCONTINUED | OUTPATIENT
Start: 2021-04-02 | End: 2021-04-03 | Stop reason: HOSPADM

## 2021-04-02 RX ORDER — METRONIDAZOLE 500 MG/100ML
500 INJECTION, SOLUTION INTRAVENOUS EVERY 8 HOURS
Status: DISCONTINUED | OUTPATIENT
Start: 2021-04-02 | End: 2021-04-03 | Stop reason: DRUGHIGH

## 2021-04-02 RX ORDER — POLYETHYLENE GLYCOL 3350 17 G/17G
17 POWDER, FOR SOLUTION ORAL DAILY PRN
Status: DISCONTINUED | OUTPATIENT
Start: 2021-04-02 | End: 2021-04-03 | Stop reason: HOSPADM

## 2021-04-02 RX ORDER — SODIUM CHLORIDE 9 MG/ML
100 INJECTION, SOLUTION INTRAVENOUS ONCE
Status: COMPLETED | OUTPATIENT
Start: 2021-04-02 | End: 2021-04-02

## 2021-04-02 RX ORDER — ACETAMINOPHEN 325 MG/1
650 TABLET ORAL
Status: DISCONTINUED | OUTPATIENT
Start: 2021-04-02 | End: 2021-04-03 | Stop reason: HOSPADM

## 2021-04-02 RX ORDER — LEVOFLOXACIN 5 MG/ML
500 INJECTION, SOLUTION INTRAVENOUS EVERY 24 HOURS
Status: DISCONTINUED | OUTPATIENT
Start: 2021-04-03 | End: 2021-04-03 | Stop reason: HOSPADM

## 2021-04-02 RX ORDER — PANTOPRAZOLE SODIUM 40 MG/1
40 TABLET, DELAYED RELEASE ORAL
Status: DISCONTINUED | OUTPATIENT
Start: 2021-04-02 | End: 2021-04-03 | Stop reason: HOSPADM

## 2021-04-02 RX ORDER — SODIUM CHLORIDE 0.9 % (FLUSH) 0.9 %
5-40 SYRINGE (ML) INJECTION EVERY 8 HOURS
Status: DISCONTINUED | OUTPATIENT
Start: 2021-04-02 | End: 2021-04-03 | Stop reason: HOSPADM

## 2021-04-02 RX ORDER — INSULIN GLARGINE 100 [IU]/ML
15 INJECTION, SOLUTION SUBCUTANEOUS DAILY
Status: DISCONTINUED | OUTPATIENT
Start: 2021-04-02 | End: 2021-04-03 | Stop reason: HOSPADM

## 2021-04-02 RX ORDER — LEVOFLOXACIN 5 MG/ML
750 INJECTION, SOLUTION INTRAVENOUS
Status: DISCONTINUED | OUTPATIENT
Start: 2021-04-02 | End: 2021-04-02

## 2021-04-02 RX ORDER — LOSARTAN POTASSIUM 25 MG/1
25 TABLET ORAL DAILY
Status: DISCONTINUED | OUTPATIENT
Start: 2021-04-02 | End: 2021-04-03 | Stop reason: HOSPADM

## 2021-04-02 RX ORDER — SODIUM CHLORIDE 9 MG/ML
125 INJECTION, SOLUTION INTRAVENOUS CONTINUOUS
Status: DISCONTINUED | OUTPATIENT
Start: 2021-04-02 | End: 2021-04-03 | Stop reason: HOSPADM

## 2021-04-02 RX ORDER — LEVOFLOXACIN 5 MG/ML
750 INJECTION, SOLUTION INTRAVENOUS
Status: COMPLETED | OUTPATIENT
Start: 2021-04-02 | End: 2021-04-02

## 2021-04-02 RX ORDER — CARVEDILOL 6.25 MG/1
6.25 TABLET ORAL 2 TIMES DAILY WITH MEALS
Status: DISCONTINUED | OUTPATIENT
Start: 2021-04-02 | End: 2021-04-03 | Stop reason: HOSPADM

## 2021-04-02 RX ORDER — METRONIDAZOLE 500 MG/100ML
500 INJECTION, SOLUTION INTRAVENOUS
Status: COMPLETED | OUTPATIENT
Start: 2021-04-02 | End: 2021-04-02

## 2021-04-02 RX ORDER — GABAPENTIN 300 MG/1
300 CAPSULE ORAL 3 TIMES DAILY
Status: DISCONTINUED | OUTPATIENT
Start: 2021-04-02 | End: 2021-04-03 | Stop reason: HOSPADM

## 2021-04-02 RX ORDER — ACETAMINOPHEN 650 MG/1
650 SUPPOSITORY RECTAL
Status: DISCONTINUED | OUTPATIENT
Start: 2021-04-02 | End: 2021-04-03 | Stop reason: HOSPADM

## 2021-04-02 RX ADMIN — INSULIN GLARGINE 15 UNITS: 100 INJECTION, SOLUTION SUBCUTANEOUS at 11:10

## 2021-04-02 RX ADMIN — SODIUM CHLORIDE 125 ML/HR: 9 INJECTION, SOLUTION INTRAVENOUS at 21:44

## 2021-04-02 RX ADMIN — METRONIDAZOLE 500 MG: 500 INJECTION, SOLUTION INTRAVENOUS at 05:56

## 2021-04-02 RX ADMIN — LEVOFLOXACIN 750 MG: 5 INJECTION, SOLUTION INTRAVENOUS at 06:55

## 2021-04-02 RX ADMIN — SODIUM CHLORIDE 1000 ML: 9 INJECTION, SOLUTION INTRAVENOUS at 04:21

## 2021-04-02 RX ADMIN — IOPAMIDOL 100 ML: 612 INJECTION, SOLUTION INTRAVENOUS at 05:44

## 2021-04-02 RX ADMIN — GABAPENTIN 300 MG: 300 CAPSULE ORAL at 21:44

## 2021-04-02 RX ADMIN — METRONIDAZOLE 500 MG: 500 INJECTION, SOLUTION INTRAVENOUS at 13:25

## 2021-04-02 RX ADMIN — GABAPENTIN 300 MG: 300 CAPSULE ORAL at 16:15

## 2021-04-02 RX ADMIN — GABAPENTIN 300 MG: 300 CAPSULE ORAL at 11:10

## 2021-04-02 RX ADMIN — PANTOPRAZOLE SODIUM 40 MG: 40 TABLET, DELAYED RELEASE ORAL at 11:10

## 2021-04-02 RX ADMIN — SODIUM CHLORIDE 500 ML: 9 INJECTION, SOLUTION INTRAVENOUS at 06:07

## 2021-04-02 RX ADMIN — SODIUM CHLORIDE 100 ML/HR: 9 INJECTION, SOLUTION INTRAVENOUS at 06:59

## 2021-04-02 RX ADMIN — RIVAROXABAN 20 MG: 20 TABLET, FILM COATED ORAL at 21:44

## 2021-04-02 RX ADMIN — METRONIDAZOLE 500 MG: 500 INJECTION, SOLUTION INTRAVENOUS at 21:44

## 2021-04-02 RX ADMIN — SODIUM CHLORIDE 125 ML/HR: 9 INJECTION, SOLUTION INTRAVENOUS at 15:47

## 2021-04-02 RX ADMIN — CARVEDILOL 6.25 MG: 6.25 TABLET, FILM COATED ORAL at 16:15

## 2021-04-02 RX ADMIN — LOSARTAN POTASSIUM 25 MG: 25 TABLET ORAL at 11:09

## 2021-04-02 NOTE — ED NOTES
TRANSFER - OUT REPORT:    Verbal report given to [facility] ANDERSON on Rashel Morton  being transferred to room 116  for routine progression of care       Report consisted of patients Situation, Background, Assessment and   Recommendations(SBAR). Information from the following report(s) SBAR, Kardex, ED Summary, Intake/Output, MAR, Recent Results and Med Rec Status was reviewed with the receiving nurse. Lines:   Peripheral IV 04/02/21 Right Antecubital (Active)   Site Assessment Clean, dry, & intact 04/02/21 0430   Phlebitis Assessment 0 04/02/21 0430   Infiltration Assessment 0 04/02/21 0430   Dressing Status Clean, dry, & intact 04/02/21 0430   Dressing Type Transparent 04/02/21 0430   Hub Color/Line Status Pink; Infusing 04/02/21 0430   Action Taken Blood drawn 04/02/21 0430   Alcohol Cap Used Yes 04/02/21 0430        Opportunity for questions and clarification was provided.       Patient transported with:   Registered Nurse

## 2021-04-02 NOTE — PROGRESS NOTES
Care Management Interventions  PCP Verified by CM: Yes(Sonny Taveras MD )  Last Visit to PCP: 02/16/21  Palliative Care Criteria Met (RRAT>21 & CHF Dx)?: No(No MD order)  Mode of Transport at Discharge: Other (see comment)(Boyfriend POV)  Transition of Care Consult (CM Consult): Discharge Planning  Physical Therapy Consult: No  Occupational Therapy Consult: No  Speech Therapy Consult: No  Current Support Network: Lives with Spouse  Confirm Follow Up Transport: Friends(Boyfriend)  The Plan for Transition of Care is Related to the Following Treatment Goals : Treat s/s of Colitis   Discharge Location  Discharge Placement: Home    Patient lives at home with her boyfriend Patsy Gil who is her primary medical decision maker along with her daughter Aicha Barrera. ACP documents on file. Patient states she has had a stroke and a heart attack in the past. States she had to change her ACP document after she had stroke because she didn't agree what her mother decided to do with her medical care. She states current ACP document on file is correct. She hopes to be discharged this weekend if possible. Patient is in observation status. State notification letter explained. Patient stated understanding and signed. Signed letter placed in chart and copy given to patient. Care management information given to patient instructed to call if she had any questions or concerns even after she is discharged.           Reason for Admission:  Colitis                      RUR Score:          N/A RRAT:19 Moderate            Plan for utilizing home health:          PCP: First and Last name:  Mayo Cantu MD     Name of Practice: [facility]    Are you a current patient: Yes/No: Yes   Approximate date of last visit: 2/16/21   Can you participate in a virtual visit with your PCP: Yes                     Current Advanced Directive/Advance Care Plan: Prior      Healthcare Decision Maker:   Click here to complete HealthCare Decision Makers including selection of the Healthcare Decision Maker Relationship (ie \"Primary\")                             Transition of Care Plan:      Home when medically stable.

## 2021-04-02 NOTE — INTERDISCIPLINARY ROUNDS
IDR Team; MD, Nursing, Care Manager, Physical therapy, ST and OT met to review patient's plan of care. Discussed goals, interventions, barriers and progress. Team will continue to monitor progress and report any concerns to the physician and care management as indicated. Transition of Care Plan: Discussed patient's plan of care. Patient arrived today for colitis. WBC's are 21.0. She is receiving iv antibiotics. She is on enteric precautions to rule out cdiff. No PT/OT required at this time. Patient hopes to be discharged by Sunday.

## 2021-04-02 NOTE — H&P
[facility]   Admission History & Physical        4/2/2021 9:59 AM  Patient: Yaa Orta 1955  PCP: Mabel Traylor MD    HISTORY  Chief Complaint:   Chief Complaint   Patient presents with    Rectal Bleeding    Abdominal Pain       HPI: 60 y.o. female presenting for admission to [facility] for further evaluation and treatment for Colitis. She  has a past medical history of A-fib ([address] Aphasia due to recent cerebrovascular accident (3/1/2016), Back pain, Hepatitis, History of seasonal allergies, Hypertension, Kidney infection, MI (myocardial infarction) ([address] MRSA (methicillin resistant Staphylococcus aureus), Recurrent boils, Stroke ([facility] and Type II or unspecified type diabetes mellitus without mention of complication, not stated as uncontrolled. . She presents with symptoms of gastroenteritis for over 36 hrs w/o improvement. She has no known ill exposures. She has eaten some foods brought into her home by her Scientologist family. Onset of syptoms on Wednesday. Loose stools throught the day Thursday. Presented to the ED this AM due to persistant symptoms  No fever or chill. ED evaluation noted for elevation of WBC 21,000 and CT noting Inflammatory/infectious colitis of the sigmoid colon and rectum, and fecal stasis within distal small bowel. She was tx with IV NS 1000 cc bolus and IV Unasyn and admitted to [facility] for additional treatment. No h/o diverticular disease. Has not had prior Barium Enema or Colonoscopy screening. No h/o GI bleed. Others in the home did not have symptoms. No nausea or vomiting. Lower and Left abdomen crampy discomfort. No  problems.     Past Medical History:  Past Medical History:   Diagnosis Date    A-fib [facility])     Aphasia due to recent cerebrovascular accident 3/1/2016    Back pain     Hepatitis     History of seasonal allergies     Hypertension     Kidney infection     MI (myocardial infarction) ([facility]     MRSA (methicillin resistant Staphylococcus aureus)     Recurrent boils     Stroke ([facility]     Type II or unspecified type diabetes mellitus without mention of complication, not stated as uncontrolled        Past Surgical History:  Past Surgical History:   Procedure Laterality Date    HX BLADDER REPAIR      HX CHOLECYSTECTOMY      HX HYSTERECTOMY      2000    HX ORTHOPAEDIC      pin in left foot    HX OTHER SURGICAL      defiberilator left corotid    HX OTHER SURGICAL      chris abscess    HX OTHER SURGICAL Left 11/2019    Carotid artery ligation- Dr Amrit Gaviria       Medication:  Prior to Admission medications    Medication Sig Start Date End Date Taking? Authorizing Provider   pravastatin (PRAVACHOL) 20 mg tablet Take 1 tablet by mouth nightly 3/22/21  Yes Nathaniel Taveras., MD   gabapentin (NEURONTIN) 300 mg capsule TAKE 1 CAPSULE BY MOUTH THREE TIMES DAILY 3/9/21  Yes Nathaniel Taveras., MD   cyclobenzaprine (FLEXERIL) 5 mg tablet Take 1 tablet by mouth three times daily as needed for muscle spasm 2/23/21  Yes Nathaniel Taveras., MD   valsartan (DIOVAN) 40 mg tablet Take 1 tablet by mouth twice daily 2/16/21  Yes Steven Oneill., MD   glipiZIDE (GLUCOTROL) 5 mg tablet TAKE 1 TABLET BY MOUTH TWICE DAILY FOR  TYPE  2  DIABETES  MELLITUS 1/28/21  Yes Nathaniel Taveras., MD   carvediloL (COREG) 6.25 mg tablet Take 1 tablet by mouth twice daily 1/25/21  Yes Steven Oneill., MD   pantoprazole (PROTONIX) 40 mg tablet Take 1 tablet by mouth once daily 1/25/21  Yes Nathnaiel Taveras., MD   rivaroxaban (XARELTO) 20 mg tab tablet Take 1 Tab by mouth daily (with breakfast). 11/25/20  Yes Nathaniel Taveras., MD   pioglitazone (ACTOS) 15 mg tablet Take 1 daily 8/11/20  Yes Nathaniel Taveras., MD   vit B Cmplx 3-FA-Vit C-Biotin (NEPHRO CHUCKIE RX) 1- mg-mg-mcg tablet Take 1 Tab by mouth daily.    Yes Provider, Historical   Lantus U-100 Insulin 100 unit/mL injection INJECT 30 UNITS SUBCUTANEOUSLY ONCE DAILY AS  INDICATED  FOR TYPE  2  DIABETES  MELLITUS 2/23/21   Mu Taveras., MD   FreeStyle Kyaw 14 Day Sensor kit USE AS DIRECTED TO TEST BLOOD SUGAR 10/14/20   Sherman Elliott NP       Allergies:   Allergies   Allergen Reactions    Pcn [Penicillins] Anaphylaxis    Sulfa (Sulfonamide Antibiotics) Nausea Only    Valium [Diazepam] Unknown (comments)       Social History:  Social History     Tobacco Use    Smoking status: Current Some Day Smoker    Smokeless tobacco: Never Used   Substance Use Topics    Alcohol use: No    Drug use: Not on file       Family History:  Family History   Problem Relation Age of Onset    Arthritis-osteo Mother     Diabetes Father     Hypertension Father     Heart Attack Father     Heart Disease Father     Diabetes Paternal Grandmother     MS Brother        ROS:  Total of 12 systems reviewed as follows:  POSITIVE= bolded text  Negative = text not bolded       General:  fever, chills, sweats, generalized weakness, weight loss/gain, loss of appetite   Eyes:    blurred vision, eye pain, loss of vision, double vision  ENT:    rhinorrhea, pharyngitis   Respiratory:  cough, sputum production, SOB, SCHMIDT, wheezing, pleuritic pain   Cardiology:   chest pain, palpitations, orthopnea, PND, edema, syncope   Gastrointestinal:  abdominal pain , N/V, diarrhea, dysphagia, constipation, bleeding   Genitourinary:  frequency, urgency, dysuria, hematuria, incontinence, prostatism   Muskuloskeletal: arthralgia, myalgia, back pain  Hematology:   easy bruising, nose or gum bleeding, lymphadenopathy   Dermatological: rash, ulceration, pruritis, color change / jaundice  Endocrine:   hot flashes or polydipsia   Neurological:  headache, dizziness, confusion, focal weakness, paresthesia, speech difficulties, memory loss, gait difficulty  Psychological: feelings of anxiety, depression, agitation      PHYSICAL EXAM:  Patient Vitals for the past 24 hrs:   Temp Pulse Resp BP SpO2   04/02/21 0912 97.9 °F (36.6 °C) (!) 101 18 (!) 121/55 98 %   04/02/21 0808 98.2 °F (36.8 °C) 89 18 130/65 98 %   04/02/21 0730     98 %   04/02/21 0700  94 18 138/68 98 %   04/02/21 0555  98 20 (!) 117/50    04/02/21 0401  95 20 131/63    04/02/21 0350 98 °F (36.7 °C) 97 20 (!) 116/57 98 %       General:    Alert, cooperative, no distress, appears stated age. HEENT: Atraumatic, anicteric sclerae, pink conjunctivae     No oral ulcers, mucosa moist, throat clear, dentition fair  Neck:  Supple, symmetrical;   thyroid non tender  Lungs:   Clear to auscultation bilaterally. No wheezing or rhonchi. No rales. Chest wall:  No tenderness. No accessory muscle use. Heart:   Regular rhythm. No  murmur. No edema  Abdomen:   Soft, minimally tender. Not distended. Bowel sounds normal  Extremities: No cyanosis. No clubbing      Capillary refill normal,  Radial pulse 2+,  DP 1+  Skin:     Not pale. Not jaundiced. No rashes   Psych:  Not depressed. Not anxious or agitated. Neurologic: EOMs intact. No facial asymmetry. No aphasia or slurred speech. Symmetrical strength, Sensation grossly intact. Alert and oriented X 4. Lab Data Reviewed:    Recent Results (from the past 24 hour(s))   CBC WITH AUTOMATED DIFF    Collection Time: 04/02/21  4:15 AM   Result Value Ref Range    WBC 21.0 (H) 3.6 - 11.0 K/uL    RBC 4.78 3.80 - 5.20 M/uL    HGB 15.2 11.5 - 16.0 g/dL    HCT 45.3 35.0 - 47.0 %    MCV 94.8 80.0 - 99.0 FL    MCH 31.8 26.0 - 34.0 PG    MCHC 33.6 30.0 - 36.5 g/dL    RDW 12.7 11.5 - 14.5 %    PLATELET 071 832 - 237 K/uL    MPV 12.6 8.9 - 12.9 FL    NRBC 0.0 0  WBC    ABSOLUTE NRBC 0.00 0.00 - 0.01 K/uL    NEUTROPHILS 81 (H) 32 - 75 %    LYMPHOCYTES 11 (L) 12 - 49 %    MONOCYTES 5 5 - 13 %    EOSINOPHILS 2 0 - 7 %    BASOPHILS 0 0 - 1 %    IMMATURE GRANULOCYTES 1 (H) 0.0 - 0.5 %    ABS. NEUTROPHILS 17.0 (H) 1.8 - 8.0 K/UL    ABS. LYMPHOCYTES 2.4 0.8 - 3.5 K/UL    ABS. MONOCYTES 1.1 (H) 0.0 - 1.0 K/UL    ABS.  EOSINOPHILS 0.4 0.0 - 0.4 K/UL ABS. BASOPHILS 0.1 0.0 - 0.1 K/UL    ABS. IMM. GRANS. 0.1 (H) 0.00 - 0.04 K/UL    DF AUTOMATED     METABOLIC PANEL, COMPREHENSIVE    Collection Time: 04/02/21  4:15 AM   Result Value Ref Range    Sodium 140 136 - 145 mmol/L    Potassium 4.0 3.5 - 5.1 mmol/L    Chloride 102 97 - 108 mmol/L    CO2 26 21 - 32 mmol/L    Anion gap 12 5 - 15 mmol/L    Glucose 173 (H) 65 - 100 mg/dL    BUN 11 6 - 20 MG/DL    Creatinine 0.96 0.55 - 1.02 MG/DL    BUN/Creatinine ratio 11 (L) 12 - 20      GFR est AA >60 >60 ml/min/1.73m2    GFR est non-AA 59 (L) >60 ml/min/1.73m2    Calcium 9.0 8.5 - 10.1 MG/DL    Bilirubin, total 0.5 0.2 - 1.0 MG/DL    ALT (SGPT) 32 12 - 78 U/L    AST (SGOT) 29 15 - 37 U/L    Alk. phosphatase 141 (H) 45 - 117 U/L    Protein, total 8.6 (H) 6.4 - 8.2 g/dL    Albumin 3.6 3.5 - 5.0 g/dL    Globulin 5.0 (H) 2.0 - 4.0 g/dL    A-G Ratio 0.7 (L) 1.1 - 2.2     TYPE & SCREEN    Collection Time: 04/02/21  4:15 AM   Result Value Ref Range    Crossmatch Expiration 04/05/2021,2359     ABO/Rh(D) O POSITIVE     Antibody screen NEG    OCCULT BLOOD, STOOL    Collection Time: 04/02/21  4:37 AM   Result Value Ref Range    Occult blood, stool Positive (A) NEG     LACTIC ACID    Collection Time: 04/02/21  4:50 AM   Result Value Ref Range    Lactic acid 1.5 0.4 - 2.0 MMOL/L   C. DIFFICILE AG & TOXIN A/B    Collection Time: 04/02/21  4:55 AM   Result Value Ref Range    GDH ANTIGEN Negative NEG      C. difficile toxin Negative NEG      INTERPRETATION NEGATIVE FOR TOXIGENIC C. DIFFICILE NTXCD         EKG:  NSR  99 bpm, Normal    Radiology:  CT ABD PELV W CONT   Final Result   Inflammatory/infectious colitis of the sigmoid colon and rectum. Fecal stasis within distal small bowel. XR CHEST PORT   Final Result   No evidence of acute cardiopulmonary process.              Care Plan discussed with:   Patient x    Family     RN x     x    Consultant      Expected  Disposition:   Home with Family x   HH/PT/OT/RN SNF/LTC    BUD      TOTAL TIME:  60 Minutes      Comments    x Reviewed previous records   >50% of visit spent in counseling and coordination of care x Discussion with patient and/or family and questions answered       _______________________________________________________  Given the patient's current clinical presentation, I have a high level of concern for decompensation if discharged from the emergency department. Complex decision making was performed, which includes reviewing the patient's available past medical records, laboratory results, and x-ray films. My assessment of this patient's clinical condition and my plan of care is as follows. ASSESSMENT / PLAN    Principal Problem:    Colitis (4/2/2021)  Acute illness x 36 hrs  No clear cause, has been eating food brought in by friends,  Appeared fresh / good. Cont tx Levaquin / Flagyl, IVF  F/u testing for Enteric pathogens  C. Diff neg  Enteric Isolation    Active Problems:    Type 2 diabetes mellitus with diabetic neuropathy (HCC) (5/4/2018)  BS qid ad/hs  Reduce Lantus to 15 units daily  Hold Glucotrol, Actos      Late effects of CVA (cerebrovascular accident) (10/16/2018)  Cont tx Xarelto      Essential hypertension (12/20/2016)  Cont tx Diovan / Coreg      Mixed dyslipidemia (12/20/2016)  Resume Pravachol on d/c,  nonformulary      Cardiac defibrillator in place (11/21/2017)  No recent c/o        SAFETY:   Code Status:Full  DVT prophylaxis:Xarelto  Stress Ulcer prophylaxis: Protonix po (chronic med)  Bladder catheter:no  Family Contact Info:  Primary Emergency Contact: [address] [place] [address] Dr, Home Phone: [phone]  Bedded: [facility] Room 116/01  Disposition: TBD, likely home when stable  Admission status:  Observation    -Tentative plan of care discussed with patient / family, who demonstrated understanding and is in agreement to the above  -Case was reviewed with the ED Provider, MD Jordi Esquivel MD  [facility]  [phone]

## 2021-04-02 NOTE — PROGRESS NOTES
Problem: Falls - Risk of  Goal: *Absence of Falls  Description: Document Tori Gutierrez Fall Risk and appropriate interventions in the flowsheet.   Outcome: Progressing Towards Goal  Note: Fall Risk Interventions:            Medication Interventions: Assess postural VS orthostatic hypotension, Teach patient to arise slowly, Evaluate medications/consider consulting pharmacy

## 2021-04-02 NOTE — ED NOTES
Returned from [address] [place] [address] Ave states that patient had 5 episodes of loose stool while at CT

## 2021-04-02 NOTE — ED PROVIDER NOTES
HPI  Pt is a 61 yo woman who comes to the ED by EMS because of bloody diarrhea that she has had for the last 12 hours. She reports that yesterday afternoon around 3 or 4:00 she began to have bloody diarrhea, every 20 minutes, and it continued until she called EMS. She has also had LLQ pain that radiates across her lower abdomen. Now the pain, achy in character, is at around a 1 or 2. No fevers, chills, or vomiting.       Past Medical History:   Diagnosis Date    A-fib [facility])     Aphasia due to recent cerebrovascular accident 3/1/2016    Back pain     Hepatitis     History of seasonal allergies     Hypertension     Kidney infection     [facility] ([facility] infarction) ([facility]     MRSA (methicillin resistant Staphylococcus aureus)     Recurrent boils     [facility] ([facility]     Type II or unspecified type diabetes mellitus without mention of complication, not stated as uncontrolled        Past Surgical History:   Procedure Laterality Date    HX BLADDER REPAIR      HX CHOLECYSTECTOMY      HX HYSTERECTOMY      2000    HX ORTHOPAEDIC      pin in left foot    HX OTHER SURGICAL      defiberilator left corotid    HX OTHER SURGICAL      chris abscess    HX OTHER SURGICAL Left 11/2019    Carotid artery ligation- Dr Felicitas Montes         Family History:   Problem Relation Age of Onset    Arthritis-osteo Mother     Diabetes Father     Hypertension Father     Heart Attack Father     Heart Disease Father     Diabetes Paternal Grandmother     MS Brother        Social History     Socioeconomic History    Marital status: SINGLE     Spouse name: Not on file    Number of children: Not on file    Years of education: Not on file    Highest education level: Not on file   Occupational History    Not on file   Social Needs    Financial resource strain: Not on file    Food insecurity     Worry: Not on file     Inability: Not on file    Transportation needs     Medical: Not on file     Non-medical: Not on file   Tobacco Use    Smoking status: Current Some Day Smoker    Smokeless tobacco: Never Used   Substance and Sexual Activity    Alcohol use: No    Drug use: Not on file    Sexual activity: Not on file   Lifestyle    Physical activity     Days per week: Not on file     Minutes per session: Not on file    Stress: Not on file   Relationships    Social connections     Talks on phone: Not on file     Gets together: Not on file     Attends [employer] service: Not on file     Active member of club or organization: Not on file     Attends meetings of clubs or organizations: Not on file     Relationship status: Not on file    Intimate partner violence     Fear of current or ex partner: Not on file     Emotionally abused: Not on file     Physically abused: Not on file     Forced sexual activity: Not on file   Other Topics Concern    Not on file   Social History Narrative    Not on file         ALLERGIES: Pcn [penicillins], Sulfa (sulfonamide antibiotics), and Valium [diazepam]    Review of Systems   Constitutional: Positive for appetite change. Negative for chills and fever. HENT: Negative. Negative for sore throat. Eyes: Negative for pain and itching. Respiratory: Negative for cough, chest tightness and shortness of breath. Cardiovascular: Negative for chest pain and leg swelling. Gastrointestinal: Positive for abdominal pain, blood in stool and diarrhea. Negative for constipation. Endocrine: Negative for cold intolerance and heat intolerance. Genitourinary: Negative for dysuria and hematuria. Musculoskeletal: Negative for back pain, joint swelling and neck pain. Skin: Negative for pallor and rash. Allergic/Immunologic: Negative for food allergies and immunocompromised state. Neurological: Negative for dizziness, weakness, numbness and headaches. Hematological: Does not bruise/bleed easily.        Vitals:    04/02/21 0350   BP: (!) 116/57   Pulse: 97   Resp: 20   Temp: 98 °F (36.7 °C)   SpO2: 98%   Weight: 72.6 kg (160 lb)   Height: 5' 6\" (1.676 m)            Physical Exam  Constitutional:       General: She is not in acute distress. Appearance: She is well-developed. She is not ill-appearing. HENT:      Head: Normocephalic and atraumatic. Right Ear: External ear normal.      Left Ear: External ear normal.      Nose: Nose normal.      Mouth/Throat:      Mouth: Mucous membranes are dry. Eyes:      Extraocular Movements: Extraocular movements intact. Pupils: Pupils are equal, round, and reactive to light. Neck:      Musculoskeletal: Normal range of motion and neck supple. No neck rigidity or muscular tenderness. Cardiovascular:      Rate and Rhythm: Normal rate and regular rhythm. Pulses: Normal pulses. Heart sounds: No murmur. Pulmonary:      Effort: Pulmonary effort is normal.      Breath sounds: Normal breath sounds. No wheezing or rales. Abdominal:      General: Abdomen is flat. There is no distension. Tenderness: There is abdominal tenderness. There is no guarding or rebound. Hernia: No hernia is present. Comments: Diminished BS's. Mild LLQ tenderness. Genitourinary:     Rectum: Normal. Guaiac result positive. Comments: Stool orange, with a lot of mucus  Musculoskeletal: Normal range of motion. General: No swelling, tenderness, deformity or signs of injury. Right lower leg: No edema. Left lower leg: No edema. Skin:     General: Skin is warm and dry. Findings: No bruising. Neurological:      General: No focal deficit present. Mental Status: She is alert and oriented to person, place, and time. MDM  Number of Diagnoses or Management Options  Diagnosis management comments: GI bleed with LLQ pain: unusual to have diverticulitis and diverticular bleed. Will hydrate gently (pt reports that recent echo showed CHF) and obtain labs. Type & screen pending hemoglobin. Pain does not seem to be much of a problem now.  Will send stool for cx, c.diff. Amount and/or Complexity of Data Reviewed  Clinical lab tests: ordered and reviewed  Tests in the radiology section of CPT®: ordered and reviewed  Independent visualization of images, tracings, or specimens: yes (ECG: NSR, HR 87, No ST changes.)    Risk of Complications, Morbidity, and/or Mortality  Presenting problems: moderate  Management options: moderate             Recent Results (from the past 12 hour(s))   CBC WITH AUTOMATED DIFF    Collection Time: 04/02/21  4:15 AM   Result Value Ref Range    WBC 21.0 (H) 3.6 - 11.0 K/uL    RBC 4.78 3.80 - 5.20 M/uL    HGB 15.2 11.5 - 16.0 g/dL    HCT 45.3 35.0 - 47.0 %    MCV 94.8 80.0 - 99.0 FL    MCH 31.8 26.0 - 34.0 PG    MCHC 33.6 30.0 - 36.5 g/dL    RDW 12.7 11.5 - 14.5 %    PLATELET 991 701 - 879 K/uL    MPV 12.6 8.9 - 12.9 FL    NRBC 0.0 0  WBC    ABSOLUTE NRBC 0.00 0.00 - 0.01 K/uL    NEUTROPHILS 81 (H) 32 - 75 %    LYMPHOCYTES 11 (L) 12 - 49 %    MONOCYTES 5 5 - 13 %    EOSINOPHILS 2 0 - 7 %    BASOPHILS 0 0 - 1 %    IMMATURE GRANULOCYTES 1 (H) 0.0 - 0.5 %    ABS. NEUTROPHILS 17.0 (H) 1.8 - 8.0 K/UL    ABS. LYMPHOCYTES 2.4 0.8 - 3.5 K/UL    ABS. MONOCYTES 1.1 (H) 0.0 - 1.0 K/UL    ABS. EOSINOPHILS 0.4 0.0 - 0.4 K/UL    ABS. BASOPHILS 0.1 0.0 - 0.1 K/UL    ABS. IMM. GRANS. 0.1 (H) 0.00 - 0.04 K/UL    DF AUTOMATED     METABOLIC PANEL, COMPREHENSIVE    Collection Time: 04/02/21  4:15 AM   Result Value Ref Range    Sodium 140 136 - 145 mmol/L    Potassium 4.0 3.5 - 5.1 mmol/L    Chloride 102 97 - 108 mmol/L    CO2 26 21 - 32 mmol/L    Anion gap 12 5 - 15 mmol/L    Glucose 173 (H) 65 - 100 mg/dL    BUN 11 6 - 20 MG/DL    Creatinine 0.96 0.55 - 1.02 MG/DL    BUN/Creatinine ratio 11 (L) 12 - 20      GFR est AA >60 >60 ml/min/1.73m2    GFR est non-AA 59 (L) >60 ml/min/1.73m2    Calcium 9.0 8.5 - 10.1 MG/DL    Bilirubin, total 0.5 0.2 - 1.0 MG/DL    ALT (SGPT) 32 12 - 78 U/L    AST (SGOT) 29 15 - 37 U/L    Alk.  phosphatase 141 (H) 45 - 117 U/L    Protein, total 8.6 (H) 6.4 - 8.2 g/dL    Albumin 3.6 3.5 - 5.0 g/dL    Globulin 5.0 (H) 2.0 - 4.0 g/dL    A-G Ratio 0.7 (L) 1.1 - 2.2     TYPE & SCREEN    Collection Time: 04/02/21  4:15 AM   Result Value Ref Range    Crossmatch Expiration 04/05/2021,2359     ABO/Rh(D) O POSITIVE     Antibody screen PENDING    OCCULT BLOOD, STOOL    Collection Time: 04/02/21  4:37 AM   Result Value Ref Range    Occult blood, stool Positive (A) NEG         Ct Abd Pelv W Cont    Result Date: 4/2/2021  Inflammatory/infectious colitis of the sigmoid colon and rectum. Fecal stasis within distal small bowel. Xr Chest Port    Result Date: 4/2/2021  No evidence of acute cardiopulmonary process. Imp: Colitis     ED course: Given 500 ml NS IV, then 100 ml/hr. After CXR was normal, another 500 ml NS bolus was ordered. She was given Levaquin 750 mg IV and metronidazole 500 mg IV because of her anaphylaxis reaction to PCN. She declined any medication for pain.     Plan: Admit medical floor           Case discussed with Dr. Grace Horan MD

## 2021-04-02 NOTE — PROGRESS NOTES
Problem: Falls - Risk of  Goal: *Absence of Falls  Description: Document Rashard Royalston Fall Risk and appropriate interventions in the flowsheet. 4/2/2021 0847 by Lio ESPINOSA  Outcome: Progressing Towards Goal  Note: Fall Risk Interventions:            Medication Interventions: Assess postural VS orthostatic hypotension, Teach patient to arise slowly, Evaluate medications/consider consulting pharmacy                4/2/2021 8371 by Lio ESPINOSA  Outcome: Progressing Towards Goal  Note: Fall Risk Interventions:            Medication Interventions: Assess postural VS orthostatic hypotension, Teach patient to arise slowly, Evaluate medications/consider consulting pharmacy                   Problem: Patient Education: Go to Patient Education Activity  Goal: Patient/Family Education  Outcome: Progressing Towards Goal     Problem: Risk for Spread of Infection  Goal: Prevent transmission of infectious organism to others  Description: Prevent the transmission of infectious organisms to other patients, staff members, and visitors.   Outcome: Progressing Towards Goal

## 2021-04-02 NOTE — ED NOTES
Assisted Dr Mercedes Salcido with rectal exam-noted tan stool and red streaks- specimen sent for Guiac

## 2021-04-02 NOTE — Clinical Note
Patient Class[de-identified] OBSERVATION [956]   Type of Bed: Medical [8]   Reason for Observation: colitis   Admitting Diagnosis: Colitis [699607]   Admitting Physician: Anita Au   Attending Physician: Matthieu Ortiz [[id number]]

## 2021-04-03 VITALS
BODY MASS INDEX: 25.71 KG/M2 | RESPIRATION RATE: 18 BRPM | TEMPERATURE: 96.8 F | WEIGHT: 160 LBS | HEIGHT: 66 IN | HEART RATE: 87 BPM | SYSTOLIC BLOOD PRESSURE: 142 MMHG | DIASTOLIC BLOOD PRESSURE: 75 MMHG | OXYGEN SATURATION: 96 %

## 2021-04-03 LAB
ALBUMIN SERPL-MCNC: 2.7 G/DL (ref 3.5–5)
ALBUMIN/GLOB SERPL: 0.8 {RATIO} (ref 1.1–2.2)
ALP SERPL-CCNC: 99 U/L (ref 45–117)
ALT SERPL-CCNC: 28 U/L (ref 12–78)
ANION GAP SERPL CALC-SCNC: 13 MMOL/L (ref 5–15)
AST SERPL-CCNC: 20 U/L (ref 15–37)
BASOPHILS # BLD: 0 K/UL (ref 0–0.1)
BASOPHILS NFR BLD: 0 % (ref 0–1)
BILIRUB SERPL-MCNC: 0.3 MG/DL (ref 0.2–1)
BUN SERPL-MCNC: 4 MG/DL (ref 6–20)
BUN/CREAT SERPL: 6 (ref 12–20)
CALCIUM SERPL-MCNC: 8.1 MG/DL (ref 8.5–10.1)
CAMPYLOBACTER SPECIES, DNA: NEGATIVE
CHLORIDE SERPL-SCNC: 109 MMOL/L (ref 97–108)
CO2 SERPL-SCNC: 22 MMOL/L (ref 21–32)
CREAT SERPL-MCNC: 0.69 MG/DL (ref 0.55–1.02)
DIFFERENTIAL METHOD BLD: ABNORMAL
ENTEROTOXIGEN E COLI, DNA: NEGATIVE
EOSINOPHIL # BLD: 0.2 K/UL (ref 0–0.4)
EOSINOPHIL NFR BLD: 3 % (ref 0–7)
ERYTHROCYTE [DISTWIDTH] IN BLOOD BY AUTOMATED COUNT: 12.7 % (ref 11.5–14.5)
GLOBULIN SER CALC-MCNC: 3.6 G/DL (ref 2–4)
GLUCOSE BLD STRIP.AUTO-MCNC: 157 MG/DL (ref 65–100)
GLUCOSE BLD STRIP.AUTO-MCNC: 85 MG/DL (ref 65–100)
GLUCOSE SERPL-MCNC: 92 MG/DL (ref 65–100)
HCT VFR BLD AUTO: 35.6 % (ref 35–47)
HGB BLD-MCNC: 11.7 G/DL (ref 11.5–16)
IMM GRANULOCYTES # BLD AUTO: 0 K/UL (ref 0–0.04)
IMM GRANULOCYTES NFR BLD AUTO: 0 % (ref 0–0.5)
LYMPHOCYTES # BLD: 2.1 K/UL (ref 0.8–3.5)
LYMPHOCYTES NFR BLD: 30 % (ref 12–49)
MAGNESIUM SERPL-MCNC: 1.7 MG/DL (ref 1.6–2.4)
MCH RBC QN AUTO: 31.1 PG (ref 26–34)
MCHC RBC AUTO-ENTMCNC: 32.9 G/DL (ref 30–36.5)
MCV RBC AUTO: 94.7 FL (ref 80–99)
MONOCYTES # BLD: 0.4 K/UL (ref 0–1)
MONOCYTES NFR BLD: 5 % (ref 5–13)
NEUTS SEG # BLD: 4.3 K/UL (ref 1.8–8)
NEUTS SEG NFR BLD: 62 % (ref 32–75)
NRBC # BLD: 0 K/UL (ref 0–0.01)
NRBC BLD-RTO: 0 PER 100 WBC
P SHIGELLOIDES DNA STL QL NAA+PROBE: NEGATIVE
PLATELET # BLD AUTO: 145 K/UL (ref 150–400)
PMV BLD AUTO: 11.8 FL (ref 8.9–12.9)
POTASSIUM SERPL-SCNC: 3.5 MMOL/L (ref 3.5–5.1)
PROT SERPL-MCNC: 6.3 G/DL (ref 6.4–8.2)
RBC # BLD AUTO: 3.76 M/UL (ref 3.8–5.2)
SALMONELLA SPECIES, DNA: NEGATIVE
SERVICE CMNT-IMP: ABNORMAL
SERVICE CMNT-IMP: NORMAL
SHIGA TOXIN PRODUCING, DNA: NEGATIVE
SHIGELLA SP+EIEC IPAH STL QL NAA+PROBE: NEGATIVE
SODIUM SERPL-SCNC: 144 MMOL/L (ref 136–145)
VIBRIO SPECIES, DNA: NEGATIVE
WBC # BLD AUTO: 6.9 K/UL (ref 3.6–11)
Y. ENTEROCOLITICA, DNA: NEGATIVE

## 2021-04-03 PROCEDURE — 99218 HC RM OBSERVATION: CPT

## 2021-04-03 PROCEDURE — 74011250636 HC RX REV CODE- 250/636: Performed by: INTERNAL MEDICINE

## 2021-04-03 PROCEDURE — 82962 GLUCOSE BLOOD TEST: CPT

## 2021-04-03 PROCEDURE — 80053 COMPREHEN METABOLIC PANEL: CPT

## 2021-04-03 PROCEDURE — 85025 COMPLETE CBC W/AUTO DIFF WBC: CPT

## 2021-04-03 PROCEDURE — 96376 TX/PRO/DX INJ SAME DRUG ADON: CPT

## 2021-04-03 PROCEDURE — 83735 ASSAY OF MAGNESIUM: CPT

## 2021-04-03 PROCEDURE — 74011636637 HC RX REV CODE- 636/637: Performed by: INTERNAL MEDICINE

## 2021-04-03 PROCEDURE — 36415 COLL VENOUS BLD VENIPUNCTURE: CPT

## 2021-04-03 PROCEDURE — 74011250637 HC RX REV CODE- 250/637: Performed by: INTERNAL MEDICINE

## 2021-04-03 RX ORDER — METRONIDAZOLE 500 MG/1
500 TABLET ORAL 2 TIMES DAILY
Qty: 10 TAB | Refills: 0 | Status: SHIPPED | OUTPATIENT
Start: 2021-04-03 | End: 2021-04-08 | Stop reason: ALTCHOICE

## 2021-04-03 RX ORDER — LEVOFLOXACIN 500 MG/1
500 TABLET, FILM COATED ORAL DAILY
Qty: 5 TAB | Refills: 0 | Status: SHIPPED | OUTPATIENT
Start: 2021-04-03 | End: 2021-04-08 | Stop reason: ALTCHOICE

## 2021-04-03 RX ORDER — METRONIDAZOLE 500 MG/100ML
500 INJECTION, SOLUTION INTRAVENOUS EVERY 12 HOURS
Status: DISCONTINUED | OUTPATIENT
Start: 2021-04-03 | End: 2021-04-03 | Stop reason: HOSPADM

## 2021-04-03 RX ADMIN — CARVEDILOL 6.25 MG: 6.25 TABLET, FILM COATED ORAL at 09:00

## 2021-04-03 RX ADMIN — METRONIDAZOLE 500 MG: 500 INJECTION, SOLUTION INTRAVENOUS at 05:40

## 2021-04-03 RX ADMIN — SODIUM CHLORIDE 125 ML/HR: 9 INJECTION, SOLUTION INTRAVENOUS at 05:40

## 2021-04-03 RX ADMIN — LEVOFLOXACIN 500 MG: 5 INJECTION, SOLUTION INTRAVENOUS at 06:49

## 2021-04-03 RX ADMIN — LOSARTAN POTASSIUM 25 MG: 25 TABLET ORAL at 09:00

## 2021-04-03 RX ADMIN — GABAPENTIN 300 MG: 300 CAPSULE ORAL at 09:00

## 2021-04-03 RX ADMIN — PANTOPRAZOLE SODIUM 40 MG: 40 TABLET, DELAYED RELEASE ORAL at 06:49

## 2021-04-03 RX ADMIN — INSULIN GLARGINE 15 UNITS: 100 INJECTION, SOLUTION SUBCUTANEOUS at 09:00

## 2021-04-03 NOTE — ROUTINE PROCESS
Discharge instructions reviewed with patient Personal belongings returned: all belongings packed by patient Home meds returned: na To front entrance via wheelchair Discharged home with  family @ [phone]

## 2021-04-03 NOTE — PROGRESS NOTES
Pharmacy Antimicrobial Dosing  / In ED with bloody diarrhea x 12 hours, LLQ pain. Indication for Antimicrobials: infectious diarrhea  Current Regimen of Each Antimicrobial (Start Day & Day of Therapy): Day 2  Metronidazole 500 mg IV changed to every 12 hours per P & T protocol  Levaquin 750 x 1 followed by 500 mg IV every 24 hours      Significant Cultures:  C. Difficile negative     Paralysis, amputations:   Recent Labs     21  0539 21  0415   CREA 0.69 0.96   BUN 4* 11   WBC 6.9 21.0*     Temp (24hrs), Av.2 °F (36.2 °C), Min:96.3 °F (35.7 °C), Max:98.7 °F (37.1 °C)    Creatinine Clearance (Creatinine Clearance (ml/min)): > 60       Impression/Plan: 61 y/o 72 kg CrCl 82 ml/min  Patient being treated for infectious diarrhea - C diff negative  - change metronidazole to q12  hour dosing  per P & T protocol/ continue Levaquin 500 mg IV daily  - patient is afebrile and WBC 6.9 today       Pharmacy will follow daily and adjust medications as appropriate for renal function and/or serum levels.     Thank you,  Imelda Nova, PHARMD     Renal Dosing Tables on [employer]

## 2021-04-03 NOTE — ROUTINE PROCESS
Bedside and Verbal shift change report given to ADONIS Richmond RN (oncoming nurse) by Ermias Orozco LPN (offgoing nurse). Report included the following information SBAR and Kardex.

## 2021-04-03 NOTE — PROGRESS NOTES
Bedside shift change report given to SABRINA Caba RN (oncoming nurse) by Kira Richmond RN (offgoing nurse). Report included the following information Kardex.

## 2021-04-03 NOTE — PROGRESS NOTES
Problem: Falls - Risk of  Goal: *Absence of Falls  Description: Document Jackson Canavan Fall Risk and appropriate interventions in the flowsheet.   Outcome: Progressing Towards Goal  Note: Fall Risk Interventions:            Medication Interventions: Teach patient to arise slowly                   Problem: Patient Education: Go to Patient Education Activity  Goal: Patient/Family Education  Outcome: Progressing Towards Goal     Problem: Patient Education:  Go to Education Activity  Goal: Patient/Family Education  Outcome: Progressing Towards Goal

## 2021-04-03 NOTE — DISCHARGE INSTRUCTIONS
Patient Education     Colitis: Care Instructions  Your Care Instructions  Colitis is the medical term for swelling (inflammation) of the intestine. It can be caused by different things, such as an infection or loss of blood flow in the intestine. Other causes are problems like Crohn's disease or ulcerative colitis. Symptoms may include fever, diarrhea that may be bloody, or belly pain. Sometimes symptoms go away without treatment. But you may need treatment or more tests, such as blood tests or a stool test. Or you may need imaging tests like a CT scan or a colonoscopy. In some cases, the doctor may want to test a sample of tissue from the intestine. This test is called a biopsy. The doctor has checked you carefully, but problems can develop later. If you notice any problems or new symptoms, get medical treatment right away. Follow-up care is a key part of your treatment and safety. Be sure to make and go to all appointments, and call your doctor if you are having problems. It's also a good idea to know your test results and keep a list of the medicines you take. How can you care for yourself at home? · Rest until you feel better. · Your doctor may recommend that you eat bland foods. These include rice, dry toast or crackers, bananas, and applesauce. · To prevent dehydration, drink plenty of fluids. Choose water and other caffeine-free clear liquids until you feel better. If you have kidney, heart, or liver disease and have to limit fluids, talk with your doctor before you increase the amount of fluids you drink. · Be safe with medicines. Take your medicines exactly as prescribed. Call your doctor if you think you are having a problem with your medicine. You will get more details on the specific medicines your doctor prescribes. When should you call for help? Call 911 anytime you think you may need emergency care. For example, call if:  · You passed out (lost consciousness).   · You vomit blood or what looks like coffee grounds. · Your stools are maroon or very bloody. Call your doctor now or seek immediate medical care if:  · You have new or worse pain. · You have a new or higher fever. · You have new or worse symptoms. · You cannot keep fluids or medicines down. Watch closely for changes in your health, and be sure to contact your doctor if:  · You do not get better as expected. Where can you learn more? Go to [employer]  Enter [id number] in the search box to learn more about \"Colitis: Care Instructions. \"   © 3160-2950 [employer], [employer]. Care instructions adapted under license by [address] [place] [address] (which disclaims liability or warranty for this information). This care instruction is for use with your licensed healthcare professional. If you have questions about a medical condition or this instruction, always ask your healthcare professional. [place] [address] any warranty or liability for your use of this information. Content Version: 92.2.968370; Current as of: November 20, 2015           DISCHARGE SUMMARY from Nurse    PATIENT INSTRUCTIONS:    After general anesthesia or intravenous sedation, for 24 hours or while taking prescription Narcotics:  · Limit your activities  · Do not drive and operate hazardous machinery  · Do not make important personal or business decisions  · Do  not drink alcoholic beverages  · If you have not urinated within 8 hours after discharge, please contact your surgeon on call.     Report the following to your surgeon:  · Excessive pain, swelling, redness or odor of or around the surgical area  · Temperature over 100.5  · Nausea and vomiting lasting longer than 4 hours or if unable to take medications  · Any signs of decreased circulation or nerve impairment to extremity: change in color, persistent  numbness, tingling, coldness or increase pain  · Any questions    What to do at Home:  Recommended activity: Activity as tolerated        * Please give a list of your current medications to your Primary Care Provider. *  Please update this list whenever your medications are discontinued, doses are      changed, or new medications (including over-the-counter products) are added. *  Please carry medication information at all times in case of emergency situations. These are general instructions for a healthy lifestyle:    No smoking/ No tobacco products/ Avoid exposure to second hand smoke  Surgeon General's Warning:  Quitting smoking now greatly reduces serious risk to your health. Obesity, smoking, and sedentary lifestyle greatly increases your risk for illness    A healthy diet, regular physical exercise & weight monitoring are important for maintaining a healthy lifestyle    You may be retaining fluid if you have a history of heart failure or if you experience any of the following symptoms:  Weight gain of 3 pounds or more overnight or 5 pounds in a week, increased swelling in our hands or feet or shortness of breath while lying flat in bed. Please call your doctor as soon as you notice any of these symptoms; do not wait until your next office visit. The discharge information has been reviewed with the patient. The patient verbalized understanding. Discharge medications reviewed with the patient and appropriate educational materials and side effects teaching were provided.   ___________________________________________________________________________________________________________________________________

## 2021-04-03 NOTE — PROGRESS NOTES
Transition of Care (CACHORRO) Plan:  Patient discharged home. No needs identified at this time. Will contact the patient and f/u and confirm no needs and to f/u with PCP appointment    RUR:  NA patient in Observation status    CACH[facility] Transportation:       How is patient being transported at discharge? Family/POV     When?  4/3/21     Is transport scheduled? NA    Follow-up appointment and transportation:     PCP? Destiney James MD     Specialist?     Time/Date? Who is transporting to the follow-up appointment? Family      Is transport for follow up appointment scheduled? NA    Communication plan (with patient/family): Who is being called? Patient      What number(s) is to be used? [phone]      What service provider is calling for [facility] services? PCP Office      When are they calling? Probably 24 - 48 hours prior to appointment. Click here to complete [place] including selection of the Healthcare Decision Maker Relationship (ie \"Primary\")  @[email or web address]    Documented Breanne Gibson as Primary healthcare decision maker. Care Management Interventions  PCP Verified by CM: Yes(Sonny Taveras MD )  Last Visit to PCP: 02/16/21  Palliative Care Criteria Met (RRAT>21 & CHF Dx)?: No(No MD order)  Mode of Transport at Discharge:  Other (see comment)(Boyfriend POV)  Transition of Care Consult (CM Consult): Discharge Planning  Physical Therapy Consult: No  Occupational Therapy Consult: No  Speech Therapy Consult: No  Current Support Network: Lives with Spouse  Confirm Follow Up Transport: Friends(Boyfriend)  The Plan for Transition of Care is Related to the Following Treatment Goals : Treat s/s of Colitis   Discharge Location  Discharge Placement: Home

## 2021-04-03 NOTE — DISCHARGE SUMMARY
[facility]  Hospitalist Discharge Summary    Patient ID:  Sav Crook  [id number]  89 y.o.  1955    PCP on record: Gallito Delgado MD    Admit date: 4/2/2021  Discharge date and time: 4/3/2021     DISCHARGE DIAGNOSIS:    Principal Problem:    Colitis (4/2/2021)    Active Problems:    Type 2 diabetes mellitus with diabetic neuropathy ([facility] (5/4/2018)    Late effects of CVA (cerebrovascular accident) (10/16/2018)    Essential hypertension (12/20/2016)    Mixed dyslipidemia (12/20/2016)    Cardiac defibrillator in place (11/21/2017)    CONSULTATIONS:  None    Excerpted HPI from H&P Aletha Saunders MD:  60 y.o. female presenting for admission to [facility] for further evaluation and treatment for Colitis. She  has a past medical history of A-fib ([facility] Aphasia due to recent cerebrovascular accident (3/1/2016), Back pain, Hepatitis, History of seasonal allergies, Hypertension, Kidney infection, MI (myocardial infarction) ([address] MRSA (methicillin resistant Staphylococcus aureus), Recurrent boils, Stroke ([facility] and Type II or unspecified type diabetes mellitus without mention of complication, not stated as uncontrolled. . She presents with symptoms of gastroenteritis for over 36 hrs w/o improvement. She has no known ill exposures. She has eaten some foods brought into her home by her [employer] family. Onset of syptoms on Wednesday. Loose stools throught the day Thursday. Presented to the ED this AM due to persistant symptoms  No fever or chill. ED evaluation noted for elevation of WBC 21,000 and CT noting Inflammatory/infectious colitis of the sigmoid colon and rectum, and fecal stasis within distal small bowel. She was tx with IV NS 1000 cc bolus and IV Unasyn and admitted to M/S for additional treatment. No h/o diverticular disease. Has not had prior Barium Enema or Colonoscopy screening. No h/o GI bleed. Others in the home did not have symptoms. No nausea or vomiting. Lower and Left abdomen crampy discomfort. No  problems. ______________________________________________________________________  DISCHARGE SUMMARY/HOSPITAL COURSE:  for full details see H&P, daily progress notes, labs, consult notes. Principal Problem:    Colitis (4/2/2021)  Acute illness x 36 hrs  Improving quickly following admission  No clear cause, has been eating food brought in by friends,  Appeared fresh / good. tx IV during admission with Levaquin / Flagyl and  IVF  Testing for Enteric pathogens negative  C. Diff toxin neg. No recent use of antibiotics  Enteric Isolation during the admission  CLD advanced to GI Soft / Low Residue this AM  Tolerated well w/o exacerbation of symptoms  Hg stable w/o significant blood loss     Active Problems:    Type 2 diabetes mellitus with diabetic neuropathy (HCC) (5/4/2018)  BS qid ad/hs  Reduced Lantus to 15 units daily, resume PTA tx  30u daily on d/c  Hold Glucotrol, Actos during admission, resume when eating normal following discharge       Late effects of CVA (cerebrovascular accident) (10/16/2018)  Cont tx Xarelto       Essential hypertension (12/20/2016)  Cont tx Diovan / Coreg       Mixed dyslipidemia (12/20/2016)  Resume Pravachol on d/c,  nonformulary       Cardiac defibrillator in place (11/21/2017)  No recent c/o    NEEDS f/u plans to check with COLONOSCOPY and f/u of PULM NODULES noted on CT       _______________________________________________________________________  Patient seen and examined by me on discharge day. Pertinent Findings:  Visit Vitals  BP (!) 142/75 (BP 1 Location: Left upper arm, BP Patient Position: At rest)   Pulse 87   Temp 96.8 °F (36 °C)   Resp 18   Ht 5' 6\" (1.676 m)   Wt 72.6 kg (160 lb)   SpO2 96%   BMI 25.82 kg/m²     Gen:    Not in distress  Chest: Nonlabored respiration, Clear lungs  CVS:   Regular rhythm.   No edema  Abd:  Soft, minimal supra-pubic tenderness, not distended  Neuro:  Alert, nonfocal, weak    LABS:  Results for Carla Cross (MRN [id number]) as of 4/3/2021 15:21   4/2/2021 11:15 4/2/2021 16:08 4/2/2021 21:52 4/3/2021 06:52 4/3/2021 11:06   GLU -  (H) 113 (H) 96 85 157 (H)     Results for Carla Cross (MRN [id number]) as of 4/3/2021 15:21   4/2/2021 04:15 4/3/2021 05:39   WBC 21.0 (H) 6.9   NRBC 0.0 0.0   RBC 4.78 3.76 (L)   HGB 15.2 11.7   HCT 45.3 35.6   MCV 94.8 94.7   MCH 31.8 31.1   MCHC 33.6 32.9   RDW 12.7 12.7   PLATELET 582 880 (L)   MPV 12.6 11.8   NEUTROPHILS 81 (H) 62   LYMPHOCYTE 11 (L) 30   MONOCYTES 5 5   EOSINOPHILS 2 3     Results for Carla Cross (MRN [id number]) as of 4/3/2021 15:21   4/2/2021 04:15 4/2/2021 04:50 4/3/2021 05:39   Sodium 140  144   Potassium 4.0  3.5   Chloride 102  109 (H)   CO2 26  22   Anion gap 12  13   Glucose 173 (H)  92   BUN 11  4 (L)   Creatinine 0.96  0.69   BUN/Cr ratio 11 (L)  6 (L)   Calcium 9.0  8.1 (L)   Magnesium   1.7   GFR  non-AA 59 (L)  >60   Bilirubin, total 0.5  0.3   Protein, total 8.6 (H)  6.3 (L)   Albumin 3.6  2.7 (L)   Globulin 5.0 (H)  3.6   A-G Ratio 0.7 (L)  0.8 (L)   ALT 32  28   AST 29  20   Alk. phos 141 (H)  99   Lactic acid  1.5      CULTURES:  C. Diff Toxin 4/2:  Negative  Enteric Bacteria Panel 4/2:  Negative x 8 organisms    RADIOLOGY  CT Abd/Pelvis 4/2:  LOWER THORAX: 5 mm left lower lobe pulmonary nodule is not significantly  changed. Left-sided pacer device with intracardiac pacer leads. LIVER: Diffuse fatty infiltration of the liver. BILIARY TREE: Status post cholecystectomy. CBD is not dilated. SPLEEN: Calcified splenic granulomas  PANCREAS: No mass or ductal dilatation. ADRENALS: Left adrenal nodule is unchanged and requires no follow-up. KIDNEYS: No mass, calculus, or hydronephrosis. STOMACH: Unremarkable. SMALL BOWEL: Fecal stasis within small bowel  COLON: Bowel wall thickening of the sigmoid colon and rectum, with scattered  diverticula.   APPENDIX: Normal  PERITONEUM: No ascites or pneumoperitoneum. RETROPERITONEUM: No lymphadenopathy or aortic aneurysm. REPRODUCTIVE ORGANS: Status post hysterectomy  URINARY BLADDER: No mass or calculus. BONES: No destructive bone lesion. ABDOMINAL WALL: No mass or hernia. IMPRESSION  Inflammatory/infectious colitis of the sigmoid colon and rectum. Fecal stasis within distal small bowel. pCXR 4/2:  Single AP portable view of the chest obtained at 511 demonstrates a  stable cardiomediastinal silhouette. The lungs are hypoinspiratory but clear  bilaterally. There is a left-sided pacer device. No osseous abnormalities are seen.   IMPRESSION:  No evidence of acute cardiopulmonary process. EKG 4/2:  NSR 99 bpm, Normal  _______________________________________________________________________  DISCHARGE MEDICATIONS:   Current Discharge Medication List      START taking these medications    Details   levoFLOXacin (Levaquin) 500 mg tablet Take 1 Tab by mouth daily for 5 days. Qty: 5 Tab, Refills: 0      metroNIDAZOLE (FlagyL) 500 mg tablet Take 1 Tab by mouth two (2) times a day for 5 days. Qty: 10 Tab, Refills: 0         CONTINUE these medications which have NOT CHANGED    Details   pravastatin (PRAVACHOL) 20 mg tablet Take 1 tablet by mouth nightly  Qty: 90 Tab, Refills: 0    Associated Diagnoses: Type 2 diabetes mellitus with other circulatory complication, with long-term current use of insulin ([address]  Essential hypertension; Mixed dyslipidemia      gabapentin (NEURONTIN) 300 mg capsule TAKE 1 CAPSULE BY MOUTH THREE TIMES DAILY  Qty: 90 Cap, Refills: 1    Associated Diagnoses: Type 2 diabetes mellitus with other circulatory complication, with long-term current use of insulin (HCC)      cyclobenzaprine (FLEXERIL) 5 mg tablet Take 1 tablet by mouth three times daily as needed for muscle spasm  Qty: 270 Tab, Refills: 0    Associated Diagnoses: Chronic right-sided low back pain without sciatica      valsartan (DIOVAN) 40 mg tablet Take 1 tablet by mouth twice daily  Qty: 180 Tab, Refills: 1    Associated Diagnoses: Essential hypertension      glipiZIDE (GLUCOTROL) 5 mg tablet TAKE 1 TABLET BY MOUTH TWICE DAILY FOR  TYPE  2  DIABETES  MELLITUS  Qty: 180 Tab, Refills: 0    Associated Diagnoses: Type 2 diabetes mellitus with other circulatory complication, with long-term current use of insulin ([facility])      carvediloL (COREG) 6.25 mg tablet Take 1 tablet by mouth twice daily  Qty: 180 Tab, Refills: 0    Associated Diagnoses: Essential hypertension      pantoprazole (PROTONIX) 40 mg tablet Take 1 tablet by mouth once daily  Qty: 90 Tab, Refills: 0    Associated Diagnoses: Gastroesophageal reflux disease without esophagitis      rivaroxaban (XARELTO) 20 mg tab tablet Take 1 Tab by mouth daily (with breakfast). Qty: 90 Tab, Refills: 1    Associated Diagnoses: Cerebral infarction due to occlusion of left internal carotid artery ([facility])      pioglitazone (ACTOS) 15 mg tablet Take 1 daily  Qty: 90 Tab, Refills: 1    Associated Diagnoses: Controlled type 2 diabetes mellitus with other circulatory complication, without long-term current use of insulin ([facility])      vit B Cmplx 3-FA-Vit C-Biotin (NEPHRO CHUCKIE RX) 1- mg-mg-mcg tablet Take 1 Tab by mouth daily.       Lantus U-100 Insulin 100 unit/mL injection INJECT 30 UNITS SUBCUTANEOUSLY ONCE DAILY AS  INDICATED  FOR  TYPE  2  DIABETES  MELLITUS  Qty: 30 mL, Refills: 0    Associated Diagnoses: Type 2 diabetes mellitus with other circulatory complication, with long-term current use of insulin ([facility])      FreeStyle Kyaw 14 Day Sensor kit USE AS DIRECTED TO TEST BLOOD SUGAR  Qty: 2 Kit, Refills: 0    Associated Diagnoses: Controlled type 2 diabetes mellitus with other circulatory complication, without long-term current use of insulin ([facility]             My Recommended  Diet: Low residue / GI Soft Diet x 3 days  Activity: Ad Emily  Wound Care: none  Follow-up labs: routine    ______________________________________________________________________  DISPOSITION:    Home with Family: x   Home with HH/PT/OT/RN:    SNF/LTC:    BUD:    OTHER:        Condition at Discharge:  Stable  _____________________________________________________________________  Follow up with:   PCP : Maribel Taveras MD  Follow-up Information     Follow up With Specialties Details Why Contact Info    Maribel Taveras MD Family Medicine In 10 days follow progress  F/u with Colonoscopy  F/u pulm nodules [address] [place] [address]  [phone]          CT Concerning for Pulm Nodules - stable from previous results.   Needs f/u in 3-6 months  Pt will need f/u with Colonoscopy sceening in 1-2 months due to presenting c/o and no prior h/o screening    Total time in minutes spent coordinating this discharge (includes going over instructions, follow-up, prescriptions, and preparing report for sign off to her PCP) :35 minutes    Signed:  Eugenio Cao MD  [facility] Hospitalist  [phone]

## 2021-04-05 LAB
ATRIAL RATE: 97 BPM
ATRIAL RATE: 99 BPM
CALCULATED P AXIS, ECG09: 47 DEGREES
CALCULATED P AXIS, ECG09: 58 DEGREES
CALCULATED R AXIS, ECG10: 58 DEGREES
CALCULATED R AXIS, ECG10: 67 DEGREES
CALCULATED T AXIS, ECG11: 64 DEGREES
CALCULATED T AXIS, ECG11: 69 DEGREES
DIAGNOSIS, 93000: NORMAL
DIAGNOSIS, 93000: NORMAL
P-R INTERVAL, ECG05: 176 MS
P-R INTERVAL, ECG05: 182 MS
Q-T INTERVAL, ECG07: 370 MS
Q-T INTERVAL, ECG07: 390 MS
QRS DURATION, ECG06: 90 MS
QRS DURATION, ECG06: 92 MS
QTC CALCULATION (BEZET), ECG08: 469 MS
QTC CALCULATION (BEZET), ECG08: 500 MS
VENTRICULAR RATE, ECG03: 97 BPM
VENTRICULAR RATE, ECG03: 99 BPM

## 2021-04-05 NOTE — PROGRESS NOTES
TELEPHONE CALL:  4/5/21    1230PM:  Attempted to call the patient but unable to reach her at any of the number below:  [phone] Voice mailbox full and not accepting msgs. [phone]: Male answered saying she was in [place] and gave me the following number:  [phone]: rang until went busy. Not able to leave any kind of messages. 1240pmCalling back from the [phone] number[de-identified] able to talk to the patient. States she is doing better and feeling better. Declined having me make the f/u appointment with Dr. Kevin Griffin for her. She stated that she was going to do it in the morning (Tuesday, 4/6/21) No needs identified at this time.

## 2021-04-08 ENCOUNTER — OFFICE VISIT (OUTPATIENT)
Dept: FAMILY MEDICINE CLINIC | Age: 66
End: 2021-04-08
Payer: COMMERCIAL

## 2021-04-08 VITALS
SYSTOLIC BLOOD PRESSURE: 139 MMHG | HEIGHT: 66 IN | RESPIRATION RATE: 18 BRPM | TEMPERATURE: 95.7 F | OXYGEN SATURATION: 100 % | DIASTOLIC BLOOD PRESSURE: 69 MMHG | HEART RATE: 87 BPM | WEIGHT: 154.5 LBS | BODY MASS INDEX: 24.83 KG/M2

## 2021-04-08 DIAGNOSIS — K52.9 COLITIS: Primary | ICD-10-CM

## 2021-04-08 DIAGNOSIS — M15.9 PRIMARY OSTEOARTHRITIS INVOLVING MULTIPLE JOINTS: ICD-10-CM

## 2021-04-08 PROCEDURE — 99213 OFFICE O/P EST LOW 20 MIN: CPT | Performed by: FAMILY MEDICINE

## 2021-04-08 RX ORDER — GABAPENTIN 600 MG/1
600 TABLET ORAL
Qty: 90 TAB | Refills: 0 | Status: SHIPPED | OUTPATIENT
Start: 2021-04-08 | End: 2021-05-18 | Stop reason: SDUPTHER

## 2021-04-08 NOTE — PROGRESS NOTES
1. Have you been to the ER, urgent care clinic since your last visit? Hospitalized since your last visit? Yes ER, Colitis    2. Have you seen or consulted any other health care providers outside of the [address] [place] [address] since your last visit? Include any pap smears or colon screening.  No     Chief Complaint   Patient presents with    Diarrhea     colitis, ER F/U     Visit Vitals  /69 (BP 1 Location: Left arm, BP Patient Position: Sitting)   Pulse 87   Temp (!) 95.7 °F (35.4 °C) (Temporal)   Resp 18   Ht 5' 6\" (1.676 m)   Wt 154 lb 8 oz (70.1 kg)   SpO2 100%   BMI 24.94 kg/m²     `

## 2021-04-08 NOTE — PROGRESS NOTES
Luke Peterson is a 60 y.o. female who presents with the following:  Chief Complaint   Patient presents with    Diarrhea     colitis, ER F/U       The patient states the diarrhea from her colitis is definitely improving but her stools are still a bit unformed but they are not having any blood in them. The patient states she feels like she needs less gabapentin during the day but a little more at night to help her rest      Allergies   Allergen Reactions    Pcn [Penicillins] Anaphylaxis    Sulfa (Sulfonamide Antibiotics) Nausea Only    Valium [Diazepam] Unknown (comments)       Current Outpatient Medications   Medication Sig    gabapentin (NEURONTIN) 600 mg tablet Take 1 Tab by mouth nightly as needed for Pain. Max Daily Amount: 600 mg.  pravastatin (PRAVACHOL) 20 mg tablet Take 1 tablet by mouth nightly    Lantus U-100 Insulin 100 unit/mL injection INJECT 30 UNITS SUBCUTANEOUSLY ONCE DAILY AS  INDICATED  FOR  TYPE  2  DIABETES  MELLITUS    cyclobenzaprine (FLEXERIL) 5 mg tablet Take 1 tablet by mouth three times daily as needed for muscle spasm    valsartan (DIOVAN) 40 mg tablet Take 1 tablet by mouth twice daily    glipiZIDE (GLUCOTROL) 5 mg tablet TAKE 1 TABLET BY MOUTH TWICE DAILY FOR  TYPE  2  DIABETES  MELLITUS    carvediloL (COREG) 6.25 mg tablet Take 1 tablet by mouth twice daily    pantoprazole (PROTONIX) 40 mg tablet Take 1 tablet by mouth once daily    rivaroxaban (XARELTO) 20 mg tab tablet Take 1 Tab by mouth daily (with breakfast).  FreeStyle Kyaw 14 Day Sensor kit USE AS DIRECTED TO TEST BLOOD SUGAR    pioglitazone (ACTOS) 15 mg tablet Take 1 daily    vit B Cmplx 3-FA-Vit C-Biotin (NEPHRO CHUCKIE RX) 1- mg-mg-mcg tablet Take 1 Tab by mouth daily. No current facility-administered medications for this visit.         Past Medical History:   Diagnosis Date    A-fib [facility])     Aphasia due to recent cerebrovascular accident 3/1/2016    Back pain     Hepatitis     History of seasonal allergies     Hypertension     Kidney infection     [facility] infarction) [facility] [address]     MRSA (methicillin resistant Staphylococcus aureus)     Recurrent boils     Stroke ([facility]     Type II or unspecified type diabetes mellitus without mention of complication, not stated as uncontrolled        Past Surgical History:   Procedure Laterality Date    HX BLADDER REPAIR      HX CHOLECYSTECTOMY      HX HYSTERECTOMY      2000    HX ORTHOPAEDIC      pin in left foot    HX OTHER SURGICAL      defiberilator left corotid    HX OTHER SURGICAL      chris abscess    HX OTHER SURGICAL Left 11/2019    Carotid artery ligation- Dr Elvin Haile       Family History   Problem Relation Age of Onset    Arthritis-osteo Mother     Diabetes Father     Hypertension Father     Heart Attack Father     Heart Disease Father     Diabetes Paternal Grandmother     MS Brother        Social History     Socioeconomic History    Marital status: SINGLE     Spouse name: Not on file    Number of children: Not on file    Years of education: Not on file    Highest education level: Not on file   Tobacco Use    Smoking status: Current Some Day Smoker    Smokeless tobacco: Never Used   Substance and Sexual Activity    Alcohol use: No       Review of Systems   Constitutional: Negative for chills, fever, malaise/fatigue and weight loss. HENT: Negative for congestion, hearing loss, sore throat and tinnitus. Eyes: Negative for blurred vision, pain and discharge. Respiratory: Negative for cough, shortness of breath and wheezing. Cardiovascular: Negative for chest pain, palpitations, orthopnea, claudication and leg swelling. Gastrointestinal: Negative for abdominal pain, constipation, diarrhea (Stool still or not properly formed but the frequency is down to once a day.) and heartburn. Genitourinary: Negative for dysuria, frequency and urgency. Musculoskeletal: Negative for falls, joint pain and myalgias.    Skin: Negative for itching and rash. Neurological: Negative for dizziness, tingling, tremors and headaches. Endo/Heme/Allergies: Negative for environmental allergies and polydipsia. Psychiatric/Behavioral: Negative for depression and substance abuse. The patient is not nervous/anxious. Visit Vitals  /69 (BP 1 Location: Left arm, BP Patient Position: Sitting)   Pulse 87   Temp (!) 95.7 °F (35.4 °C) (Temporal)   Resp 18   Ht 5' 6\" (1.676 m)   Wt 154 lb 8 oz (70.1 kg)   SpO2 100%   BMI 24.94 kg/m²     Physical Exam  Constitutional:       Appearance: Normal appearance. HENT:      Head: Normocephalic and atraumatic. Right Ear: Tympanic membrane, ear canal and external ear normal.      Left Ear: Tympanic membrane, ear canal and external ear normal.      Nose: Nose normal. No congestion or rhinorrhea. Mouth/Throat:      Mouth: Mucous membranes are moist.      Pharynx: No posterior oropharyngeal erythema. Eyes:      General:         Right eye: No discharge. Left eye: No discharge. Extraocular Movements: Extraocular movements intact. Conjunctiva/sclera: Conjunctivae normal.      Pupils: Pupils are equal, round, and reactive to light. Comments: Cornea anterior chamber and iris are normal.   Neck:      Musculoskeletal: Normal range of motion and neck supple. Trachea: No tracheal deviation. Cardiovascular:      Rate and Rhythm: Normal rate and regular rhythm. Pulses: Normal pulses. Heart sounds: Normal heart sounds. No murmur. No friction rub. No gallop. Pulmonary:      Effort: Pulmonary effort is normal. No respiratory distress. Breath sounds: Normal breath sounds. No wheezing or rhonchi. Chest:      Chest wall: No tenderness. Abdominal:      General: Bowel sounds are normal. There is no distension. Palpations: Abdomen is soft. There is no mass. Tenderness: There is no abdominal tenderness. There is no guarding or rebound.    Musculoskeletal: General: No tenderness or deformity. Right lower leg: No edema. Left lower leg: No edema. Lymphadenopathy:      Cervical: No cervical adenopathy. Skin:     General: Skin is warm and dry. Coloration: Skin is not pale. Findings: No erythema or rash. Neurological:      General: No focal deficit present. Mental Status: She is alert and oriented to person, place, and time. Cranial Nerves: No cranial nerve deficit. Motor: No abnormal muscle tone. Deep Tendon Reflexes: Reflexes are normal and symmetric. Reflexes normal.      Comments: Cranial nerves II through XII are intact sensory and motor. Biceps triceps knee and ankle DTRs are normal and symmetrical.   Psychiatric:         Mood and Affect: Mood normal.         Behavior: Behavior normal.           ICD-10-CM ICD-9-CM    1. Colitis  K52.9 558.9    2. Primary osteoarthritis involving multiple joints  M89.49 715.98 gabapentin (NEURONTIN) 600 mg tablet       Orders Placed This Encounter    gabapentin (NEURONTIN) 600 mg tablet     Sig: Take 1 Tab by mouth nightly as needed for Pain. Max Daily Amount: 600 mg. Dispense:  90 Tab     Refill:  0       Follow-up and Dispositions    · Return in about 3 months (around 7/8/2021) for Arthritis and refill of gabapentin.          Мария Virgen MD

## 2021-05-18 ENCOUNTER — OFFICE VISIT (OUTPATIENT)
Dept: FAMILY MEDICINE CLINIC | Age: 66
End: 2021-05-18
Payer: MEDICARE

## 2021-05-18 VITALS
RESPIRATION RATE: 18 BRPM | DIASTOLIC BLOOD PRESSURE: 74 MMHG | TEMPERATURE: 97.5 F | SYSTOLIC BLOOD PRESSURE: 130 MMHG | HEART RATE: 88 BPM | BODY MASS INDEX: 24.47 KG/M2 | WEIGHT: 152.25 LBS | HEIGHT: 66 IN | OXYGEN SATURATION: 100 %

## 2021-05-18 DIAGNOSIS — M15.9 PRIMARY OSTEOARTHRITIS INVOLVING MULTIPLE JOINTS: ICD-10-CM

## 2021-05-18 PROCEDURE — G8420 CALC BMI NORM PARAMETERS: HCPCS | Performed by: FAMILY MEDICINE

## 2021-05-18 PROCEDURE — 1090F PRES/ABSN URINE INCON ASSESS: CPT | Performed by: FAMILY MEDICINE

## 2021-05-18 PROCEDURE — G8510 SCR DEP NEG, NO PLAN REQD: HCPCS | Performed by: FAMILY MEDICINE

## 2021-05-18 PROCEDURE — G8754 DIAS BP LESS 90: HCPCS | Performed by: FAMILY MEDICINE

## 2021-05-18 PROCEDURE — 1101F PT FALLS ASSESS-DOCD LE1/YR: CPT | Performed by: FAMILY MEDICINE

## 2021-05-18 PROCEDURE — 99214 OFFICE O/P EST MOD 30 MIN: CPT | Performed by: FAMILY MEDICINE

## 2021-05-18 PROCEDURE — G8536 NO DOC ELDER MAL SCRN: HCPCS | Performed by: FAMILY MEDICINE

## 2021-05-18 PROCEDURE — G9899 SCRN MAM PERF RSLTS DOC: HCPCS | Performed by: FAMILY MEDICINE

## 2021-05-18 PROCEDURE — G8400 PT W/DXA NO RESULTS DOC: HCPCS | Performed by: FAMILY MEDICINE

## 2021-05-18 PROCEDURE — G8427 DOCREV CUR MEDS BY ELIG CLIN: HCPCS | Performed by: FAMILY MEDICINE

## 2021-05-18 PROCEDURE — 3017F COLORECTAL CA SCREEN DOC REV: CPT | Performed by: FAMILY MEDICINE

## 2021-05-18 PROCEDURE — G8752 SYS BP LESS 140: HCPCS | Performed by: FAMILY MEDICINE

## 2021-05-18 RX ORDER — GABAPENTIN 600 MG/1
600 TABLET ORAL
Qty: 90 TAB | Refills: 0
Start: 2021-05-18 | End: 2021-09-17 | Stop reason: SDUPTHER

## 2021-05-18 RX ORDER — GABAPENTIN 100 MG/1
100 CAPSULE ORAL 2 TIMES DAILY
Qty: 180 CAP | Refills: 0
Start: 2021-05-18 | End: 2021-08-02 | Stop reason: SDUPTHER

## 2021-05-18 NOTE — PROGRESS NOTES
1. Have you been to the ER, urgent care clinic since your last visit? Hospitalized since your last visit? No    2. Have you seen or consulted any other health care providers outside of the [address] [place] [address] since your last visit? Include any pap smears or colon screening.  No

## 2021-05-18 NOTE — PROGRESS NOTES
Beatrice Garcia is a 73 y.o. female who presents with the following:  Chief Complaint   Patient presents with    Diabetes    Hypertension    Cholesterol Problem    Arthritis       Patient has been keeping busy taking care of sick family members and friends and she has been watching her blood sugars which have been doing reasonably well using her freestyle jose 14-day sensor kit with a refill. The patient has had no polyuria no abnormal weight loss no numbness or tingling or visual difficulties. Patient's blood pressures been doing well on current medication without chest pain shortness of breath nor edema. The patient's cholesterol problem is doing well on her pravastatin without muscle pain or weakness. The patient has osteoarthritis which gives her a great deal of pain and she is found the best way to handle this is 100 mg of gabapentin in the morning and then again in the afternoon and then she is a 600 mg at night which allows her to get a nights rest.      Allergies   Allergen Reactions    Pcn [Penicillins] Anaphylaxis    Sulfa (Sulfonamide Antibiotics) Nausea Only    Valium [Diazepam] Unknown (comments)       Current Outpatient Medications   Medication Sig    gabapentin (NEURONTIN) 100 mg capsule Take 1 Cap by mouth two (2) times a day. Max Daily Amount: 200 mg. Indications: neuropathic pain    gabapentin (NEURONTIN) 600 mg tablet Take 1 Tab by mouth nightly as needed for Pain. Max Daily Amount: 600 mg.     carvediloL (COREG) 6.25 mg tablet Take 1 tablet by mouth twice daily    pantoprazole (PROTONIX) 40 mg tablet Take 1 tablet by mouth once daily    pioglitazone (ACTOS) 15 mg tablet Take 1 tablet by mouth once daily    pravastatin (PRAVACHOL) 20 mg tablet Take 1 tablet by mouth nightly    Lantus U-100 Insulin 100 unit/mL injection INJECT 30 UNITS SUBCUTANEOUSLY ONCE DAILY AS  INDICATED  FOR  TYPE  2  DIABETES  MELLITUS    cyclobenzaprine (FLEXERIL) 5 mg tablet Take 1 tablet by mouth three times daily as needed for muscle spasm    valsartan (DIOVAN) 40 mg tablet Take 1 tablet by mouth twice daily    glipiZIDE (GLUCOTROL) 5 mg tablet TAKE 1 TABLET BY MOUTH TWICE DAILY FOR  TYPE  2  DIABETES  MELLITUS    rivaroxaban (XARELTO) 20 mg tab tablet Take 1 Tab by mouth daily (with breakfast).  FreeStyle Kyaw 14 Day Sensor kit USE AS DIRECTED TO TEST BLOOD SUGAR    vit B Cmplx 3-FA-Vit C-Biotin (NEPHRO CHUCKIE RX) 1- mg-mg-mcg tablet Take 1 Tab by mouth daily. No current facility-administered medications for this visit.         Past Medical History:   Diagnosis Date    A-fib [facility])     Aphasia due to recent cerebrovascular accident 3/1/2016    Back pain     Hepatitis     History of seasonal allergies     Hypertension     Kidney infection     [facility] [address]     MRSA (methicillin resistant Staphylococcus aureus)     Recurrent boils     St[facility] ([facility] [address]     Type II or unspecified type diabetes mellitus without mention of complication, not stated as uncontrolled        Past Surgical History:   Procedure Laterality Date    HX BLADDER REPAIR      HX CHOLECYSTECTOMY      HX HYSTERECTOMY      2000    HX ORTHOPAEDIC      pin in left foot    HX OTHER SURGICAL      defiberilator left corotid    HX OTHER SURGICAL      chris abscess    HX OTHER SURGICAL Left 11/2019    Carotid artery ligation- Dr Annie Diaz       Family History   Problem Relation Age of Onset    Arthritis-osteo Mother     Diabetes Father     Hypertension Father     Heart Attack Father     Heart Disease Father     Diabetes Paternal Grandmother     MS Brother        Social History     Socioeconomic History    Marital status: SINGLE     Spouse name: Not on file    Number of children: Not on file    Years of education: Not on file    Highest education level: Not on file   Tobacco Use    Smoking status: Current Some Day Smoker    Smokeless tobacco: Never Used   Substance and Sexual Activity    Alcohol use: No       Review of Systems   Constitutional: Negative for chills, fever, malaise/fatigue and weight loss. HENT: Negative for congestion, hearing loss, sore throat and tinnitus. Eyes: Negative for blurred vision, pain and discharge. Respiratory: Negative for cough, shortness of breath and wheezing. Cardiovascular: Negative for chest pain, palpitations, orthopnea, claudication and leg swelling. Gastrointestinal: Negative for abdominal pain, constipation and heartburn. Genitourinary: Negative for dysuria, frequency and urgency. Musculoskeletal: Positive for back pain and joint pain. Negative for falls and myalgias. Skin: Negative for itching and rash. Neurological: Negative for dizziness, tingling, tremors and headaches. Endo/Heme/Allergies: Negative for environmental allergies and polydipsia. Psychiatric/Behavioral: Negative for depression and substance abuse. The patient is not nervous/anxious. Visit Vitals  /74 (BP 1 Location: Left upper arm)   Pulse 88   Temp 97.5 °F (36.4 °C) (Temporal)   Resp 18   Ht 5' 6\" (1.676 m)   Wt 152 lb 4 oz (69.1 kg)   SpO2 100%   BMI 24.57 kg/m²     Physical Exam  Vitals signs reviewed. Constitutional:       General: She is not in acute distress. Appearance: Normal appearance. She is not ill-appearing. HENT:      Head: Normocephalic and atraumatic. Right Ear: Tympanic membrane, ear canal and external ear normal.      Left Ear: Tympanic membrane, ear canal and external ear normal.      Nose: Nose normal. No congestion or rhinorrhea. Mouth/Throat:      Mouth: Mucous membranes are moist.      Pharynx: No posterior oropharyngeal erythema. Eyes:      General:         Right eye: No discharge. Left eye: No discharge. Extraocular Movements: Extraocular movements intact. Conjunctiva/sclera: Conjunctivae normal.      Pupils: Pupils are equal, round, and reactive to light.       Comments: Cornea anterior chamber and iris are normal.   Neck:      Musculoskeletal: Normal range of motion and neck supple. Trachea: No tracheal deviation. Cardiovascular:      Rate and Rhythm: Normal rate and regular rhythm. Pulses: Normal pulses. Heart sounds: Normal heart sounds. No murmur. No friction rub. No gallop. Pulmonary:      Effort: Pulmonary effort is normal. No respiratory distress. Breath sounds: Normal breath sounds. No wheezing or rhonchi. Chest:      Chest wall: No tenderness. Abdominal:      General: Bowel sounds are normal. There is no distension. Palpations: Abdomen is soft. There is no mass. Tenderness: There is no abdominal tenderness. There is no guarding or rebound. Musculoskeletal:         General: No tenderness or deformity. Right lower leg: No edema. Left lower leg: No edema. Lymphadenopathy:      Cervical: No cervical adenopathy. Skin:     General: Skin is warm and dry. Coloration: Skin is not pale. Findings: No erythema or rash. Neurological:      General: No focal deficit present. Mental Status: She is alert and oriented to person, place, and time. Cranial Nerves: No cranial nerve deficit. Motor: No abnormal muscle tone. Deep Tendon Reflexes: Reflexes are normal and symmetric. Reflexes normal.      Comments: Cranial nerves II through XII are intact sensory and motor. Biceps triceps knee and ankle DTRs are normal and symmetrical.   Psychiatric:         Mood and Affect: Mood normal.         Behavior: Behavior normal.         Thought Content: Thought content normal.         Judgment: Judgment normal.           ICD-10-CM ICD-9-CM    1. Primary osteoarthritis involving multiple joints  M89.49 715.98 gabapentin (NEURONTIN) 100 mg capsule      gabapentin (NEURONTIN) 600 mg tablet       Orders Placed This Encounter    gabapentin (NEURONTIN) 100 mg capsule     Sig: Take 1 Cap by mouth two (2) times a day. Max Daily Amount: 200 mg.  Indications: neuropathic pain     Dispense:  180 Cap     Refill:  0    gabapentin (NEURONTIN) 600 mg tablet     Sig: Take 1 Tab by mouth nightly as needed for Pain. Max Daily Amount: 600 mg. Dispense:  90 Tab     Refill:  0   Patient is to continue her other medicines as previously written. Follow-up and Dispositions    · Return in about 3 months (around 8/18/2021) for She will be due laboratory at that time. Darius Gan MD

## 2021-07-15 ENCOUNTER — VIRTUAL VISIT (OUTPATIENT)
Dept: FAMILY MEDICINE CLINIC | Age: 66
End: 2021-07-15
Payer: MEDICARE

## 2021-07-15 DIAGNOSIS — F43.0 ANXIETY AS ACUTE REACTION TO EXCEPTIONAL STRESS: Primary | ICD-10-CM

## 2021-07-15 DIAGNOSIS — F41.1 ANXIETY AS ACUTE REACTION TO EXCEPTIONAL STRESS: Primary | ICD-10-CM

## 2021-07-15 PROCEDURE — 99442 PR PHYS/QHP TELEPHONE EVALUATION 11-20 MIN: CPT | Performed by: FAMILY MEDICINE

## 2021-07-15 RX ORDER — CITALOPRAM 10 MG/1
10 TABLET ORAL DAILY
Qty: 30 TABLET | Refills: 5 | Status: SHIPPED | OUTPATIENT
Start: 2021-07-15 | End: 2022-02-22
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
## 2021-08-02 DIAGNOSIS — M15.9 PRIMARY OSTEOARTHRITIS INVOLVING MULTIPLE JOINTS: ICD-10-CM

## 2021-08-03 RX ORDER — GABAPENTIN 100 MG/1
100 CAPSULE ORAL 2 TIMES DAILY
Qty: 180 CAPSULE | Refills: 0 | Status: SHIPPED | OUTPATIENT
Start: 2021-08-03 | End: 2021-11-09 | Stop reason: SDUPTHER

## 2021-08-18 ENCOUNTER — OFFICE VISIT (OUTPATIENT)
Dept: FAMILY MEDICINE CLINIC | Age: 66
End: 2021-08-18
Payer: MEDICARE

## 2021-08-18 VITALS
DIASTOLIC BLOOD PRESSURE: 70 MMHG | RESPIRATION RATE: 18 BRPM | WEIGHT: 156.5 LBS | HEIGHT: 66 IN | OXYGEN SATURATION: 99 % | HEART RATE: 98 BPM | BODY MASS INDEX: 25.15 KG/M2 | TEMPERATURE: 97.5 F | SYSTOLIC BLOOD PRESSURE: 132 MMHG

## 2021-08-18 DIAGNOSIS — Z12.31 ENCOUNTER FOR SCREENING MAMMOGRAM FOR BREAST CANCER: ICD-10-CM

## 2021-08-18 DIAGNOSIS — Z79.4 TYPE 2 DIABETES MELLITUS WITH DIABETIC NEUROPATHY, WITH LONG-TERM CURRENT USE OF INSULIN (HCC): ICD-10-CM

## 2021-08-18 DIAGNOSIS — Z79.4 TYPE 2 DIABETES MELLITUS WITH OTHER CIRCULATORY COMPLICATION, WITH LONG-TERM CURRENT USE OF INSULIN (HCC): ICD-10-CM

## 2021-08-18 DIAGNOSIS — Z95.810 CARDIAC DEFIBRILLATOR IN PLACE: ICD-10-CM

## 2021-08-18 DIAGNOSIS — K21.9 GASTROESOPHAGEAL REFLUX DISEASE WITHOUT ESOPHAGITIS: ICD-10-CM

## 2021-08-18 DIAGNOSIS — I65.23 BILATERAL CAROTID ARTERY STENOSIS: Chronic | ICD-10-CM

## 2021-08-18 DIAGNOSIS — E78.2 MIXED DYSLIPIDEMIA: ICD-10-CM

## 2021-08-18 DIAGNOSIS — I10 ESSENTIAL HYPERTENSION: Primary | ICD-10-CM

## 2021-08-18 DIAGNOSIS — E11.40 TYPE 2 DIABETES MELLITUS WITH DIABETIC NEUROPATHY, WITH LONG-TERM CURRENT USE OF INSULIN (HCC): ICD-10-CM

## 2021-08-18 DIAGNOSIS — I48.91 ATRIAL FIBRILLATION, UNSPECIFIED TYPE (HCC): ICD-10-CM

## 2021-08-18 DIAGNOSIS — E11.59 TYPE 2 DIABETES MELLITUS WITH OTHER CIRCULATORY COMPLICATION, WITH LONG-TERM CURRENT USE OF INSULIN (HCC): ICD-10-CM

## 2021-08-18 PROCEDURE — G8427 DOCREV CUR MEDS BY ELIG CLIN: HCPCS | Performed by: FAMILY MEDICINE

## 2021-08-18 PROCEDURE — G8536 NO DOC ELDER MAL SCRN: HCPCS | Performed by: FAMILY MEDICINE

## 2021-08-18 PROCEDURE — 3017F COLORECTAL CA SCREEN DOC REV: CPT | Performed by: FAMILY MEDICINE

## 2021-08-18 PROCEDURE — G9899 SCRN MAM PERF RSLTS DOC: HCPCS | Performed by: FAMILY MEDICINE

## 2021-08-18 PROCEDURE — G8400 PT W/DXA NO RESULTS DOC: HCPCS | Performed by: FAMILY MEDICINE

## 2021-08-18 PROCEDURE — 1090F PRES/ABSN URINE INCON ASSESS: CPT | Performed by: FAMILY MEDICINE

## 2021-08-18 PROCEDURE — 1101F PT FALLS ASSESS-DOCD LE1/YR: CPT | Performed by: FAMILY MEDICINE

## 2021-08-18 PROCEDURE — G8754 DIAS BP LESS 90: HCPCS | Performed by: FAMILY MEDICINE

## 2021-08-18 PROCEDURE — 3051F HG A1C>EQUAL 7.0%<8.0%: CPT | Performed by: FAMILY MEDICINE

## 2021-08-18 PROCEDURE — G8419 CALC BMI OUT NRM PARAM NOF/U: HCPCS | Performed by: FAMILY MEDICINE

## 2021-08-18 PROCEDURE — 2022F DILAT RTA XM EVC RTNOPTHY: CPT | Performed by: FAMILY MEDICINE

## 2021-08-18 PROCEDURE — 99214 OFFICE O/P EST MOD 30 MIN: CPT | Performed by: FAMILY MEDICINE

## 2021-08-18 PROCEDURE — G8752 SYS BP LESS 140: HCPCS | Performed by: FAMILY MEDICINE

## 2021-08-18 PROCEDURE — G8510 SCR DEP NEG, NO PLAN REQD: HCPCS | Performed by: FAMILY MEDICINE

## 2021-08-18 NOTE — PROGRESS NOTES
1. Have you been to the ER, urgent care clinic since your last visit? Hospitalized since your last visit? No    2. Have you seen or consulted any other health care providers outside of the [address] [place] [address] since your last visit? Include any pap smears or colon screening.  No

## 2021-08-18 NOTE — PROGRESS NOTES
Marguerite Alonso is a 73 y.o. female who presents with the following:  Chief Complaint   Patient presents with    Hypertension    Heart Problem    Diabetes    Cholesterol Problem    GERD    Depression       Patient's hypertension is doing well on current medication without chest pain shortness of breath nor edema. Patient does have atrial fibrillation and does have a pacemaker/cardio defibrillator in place and is doing well without any palpitations. Patient does have bilateral carotid stenosis but no bruits at this time and she does actually have a pulse on the left which she says she did not have 1. Patient has type 2 diabetes mellitus with circulatory disorder as well as neuropathy with a long-term current use of insulin without any polyuria no abnormal weight loss nor any numbness tingling or visual problems. Patient does have gastroesophageal reflux disease but seems to be controlled with her current diet without any heartburn. Patient does have mixed dyslipidemia which is doing well on her current pravastatin without any muscle pain or weakness. Visit note the patient is on Xarelto for her atrial fibrillation without any excessive bruising or bleeding. The patient states that her grief reaction is much better at this time and she is no longer feeling depressed and really did not like the citalopram as it made her feel dizzy.       Allergies   Allergen Reactions    Pcn [Penicillins] Anaphylaxis    Sulfa (Sulfonamide Antibiotics) Nausea Only    Valium [Diazepam] Unknown (comments)       Current Outpatient Medications   Medication Sig    carvediloL (COREG) 6.25 mg tablet 1 twice daily    pantoprazole (PROTONIX) 40 mg tablet 1 daily    insulin glargine (Lantus U-100 Insulin) 100 unit/mL injection 30 units daily    pioglitazone (ACTOS) 15 mg tablet Take 1 tablet by mouth once daily    cyclobenzaprine (FLEXERIL) 5 mg tablet 1 3 times daily for back muscle spasm    gabapentin (NEURONTIN) 100 mg capsule Take 1 Capsule by mouth two (2) times a day. Max Daily Amount: 200 mg. Indications: neuropathic pain    rivaroxaban (XARELTO) 20 mg tab tablet Take 1 Tablet by mouth daily (with breakfast).  glipiZIDE (GLUCOTROL) 5 mg tablet Take 1 Tablet by mouth two (2) times a day.  gabapentin (NEURONTIN) 600 mg tablet Take 1 Tab by mouth nightly as needed for Pain. Max Daily Amount: 600 mg.  pravastatin (PRAVACHOL) 20 mg tablet Take 1 tablet by mouth nightly    valsartan (DIOVAN) 40 mg tablet Take 1 tablet by mouth twice daily    FreeStyle Kyaw 14 Day Sensor kit USE AS DIRECTED TO TEST BLOOD SUGAR    vit B Cmplx 3-FA-Vit C-Biotin (NEPHRO CHUCKIE RX) 1- mg-mg-mcg tablet Take 1 Tab by mouth daily.  citalopram (CELEXA) 10 mg tablet Take 1 Tablet by mouth daily. (Patient not taking: Reported on 8/18/2021)     No current facility-administered medications for this visit.        Past Medical History:   Diagnosis Date    A-fib [facility])     Aphasia due to recent cerebrovascular accident 3/1/2016    Back pain     Grief reaction with prolonged bereavement 12/20/2016    Hepatitis     History of seasonal allergies     Hypertension     Kidney infection     [facility]     MRSA (methicillin resistant Staphylococcus aureus)     Recurrent boils     Stroke ([facility]     Type II or unspecified type diabetes mellitus without mention of complication, not stated as uncontrolled        Past Surgical History:   Procedure Laterality Date    HX BLADDER REPAIR      HX CHOLECYSTECTOMY      HX HYSTERECTOMY      2000    HX ORTHOPAEDIC      pin in left foot    HX OTHER SURGICAL      defiberilator left corotid    HX OTHER SURGICAL      chris abscess    HX OTHER SURGICAL Left 11/2019    Carotid artery ligation- Dr Marnie Quintanilla       Family History   Problem Relation Age of Onset    Arthritis-osteo Mother     Diabetes Father     Hypertension Father     Heart Attack Father     Heart Disease Father    [facility] Diabetes Paternal Grandmother     MS Brother        Social History     Socioeconomic History    Marital status: SINGLE     Spouse name: Not on file    Number of children: Not on file    Years of education: Not on file    Highest education level: Not on file   Tobacco Use    Smoking status: Current Some Day Smoker    Smokeless tobacco: Never Used   Substance and Sexual Activity    Alcohol use: No     Social Determinants of Health     Financial Resource Strain:     Difficulty of Paying Living Expenses:    Food Insecurity:     Worried About Running Out of Food in the Last Year:     [address] in the Last Year:    Transportation Needs:     Lack of Transportation (Medical):  Lack of Transportation (Non-Medical):    Physical Activity:     Days of Exercise per Week:     Minutes of Exercise per Session:    Stress:     Feeling of Stress :    Social Connections:     Frequency of Communication with Friends and Family:     Frequency of Social Gatherings with Friends and Family:     Attends [employer] Services:     Active Member of Clubs or Organizations:     Attends Club or Organization Meetings:     Marital Status:        Review of Systems   Constitutional: Negative for chills, fever, malaise/fatigue and weight loss. HENT: Negative for congestion, hearing loss, sore throat and tinnitus. Eyes: Negative for blurred vision, pain and discharge. Respiratory: Negative for cough, shortness of breath and wheezing. Cardiovascular: Negative for chest pain, palpitations, orthopnea, claudication and leg swelling. Gastrointestinal: Negative for abdominal pain, constipation and heartburn. Genitourinary: Negative for dysuria, frequency and urgency. Musculoskeletal: Negative for falls, joint pain and myalgias. Skin: Negative for itching and rash. Neurological: Negative for dizziness, tingling, tremors and headaches. Endo/Heme/Allergies: Negative for environmental allergies and polydipsia. Psychiatric/Behavioral: Negative for depression and substance abuse. The patient is not nervous/anxious. Visit Vitals  /70   Pulse 98   Temp 97.5 °F (36.4 °C) (Temporal)   Resp 18   Ht 5' 6\" (1.676 m)   Wt 156 lb 8 oz (71 kg)   SpO2 99%   BMI 25.26 kg/m²     Physical Exam  Vitals reviewed. Constitutional:       General: She is not in acute distress. Appearance: Normal appearance. She is not ill-appearing. HENT:      Head: Normocephalic and atraumatic. Right Ear: Tympanic membrane, ear canal and external ear normal.      Left Ear: Tympanic membrane, ear canal and external ear normal.      Nose: Nose normal. No congestion or rhinorrhea. Mouth/Throat:      Mouth: Mucous membranes are moist.      Pharynx: No posterior oropharyngeal erythema. Eyes:      General:         Right eye: No discharge. Left eye: No discharge. Extraocular Movements: Extraocular movements intact. Conjunctiva/sclera: Conjunctivae normal.      Pupils: Pupils are equal, round, and reactive to light. Comments: Cornea anterior chamber and iris are normal.   Neck:      Vascular: No carotid bruit. Trachea: No tracheal deviation. Cardiovascular:      Rate and Rhythm: Normal rate and regular rhythm. Pulses: Normal pulses. Heart sounds: Normal heart sounds. No murmur heard. No friction rub. No gallop. Pulmonary:      Effort: Pulmonary effort is normal. No respiratory distress. Breath sounds: Normal breath sounds. No wheezing or rhonchi. Chest:      Chest wall: No tenderness. Abdominal:      General: Bowel sounds are normal. There is no distension. Palpations: Abdomen is soft. There is no mass. Tenderness: There is no abdominal tenderness. There is no guarding or rebound. Musculoskeletal:         General: No tenderness or deformity. Cervical back: Normal range of motion and neck supple. Right lower leg: No edema. Left lower leg: No edema. Lymphadenopathy:      Cervical: No cervical adenopathy. Skin:     General: Skin is warm and dry. Coloration: Skin is not pale. Findings: No erythema or rash. Neurological:      General: No focal deficit present. Mental Status: She is alert and oriented to person, place, and time. Cranial Nerves: No cranial nerve deficit. Motor: No abnormal muscle tone. Deep Tendon Reflexes: Reflexes are normal and symmetric. Reflexes normal.      Comments: Cranial nerves II through XII are intact sensory and motor. Biceps triceps knee and ankle DTRs are normal and symmetrical.   Psychiatric:         Mood and Affect: Mood normal.         Behavior: Behavior normal.         Thought Content: Thought content normal.         Judgment: Judgment normal.           ICD-10-CM ICD-9-CM    1. Essential hypertension  W15 486.9 METABOLIC PANEL, COMPREHENSIVE      METABOLIC PANEL, COMPREHENSIVE   2. Type 2 diabetes mellitus with other circulatory complication, with long-term current use of insulin ([facility])  E11.59 250.70 MICROALBUMIN, UR, RAND W/ MICROALB/CREAT RATIO    Z79.4 V58.67 CBC WITH AUTOMATED DIFF      HEMOGLOBIN A1C WITH EAG      MICROALBUMIN, UR, RAND W/ MICROALB/CREAT RATIO      CBC WITH AUTOMATED DIFF      HEMOGLOBIN A1C WITH EAG   3. Mixed dyslipidemia  E78.2 272.2 COLLECTION VENOUS BLOOD,VENIPUNCTURE      LIPID PANEL      METABOLIC PANEL, COMPREHENSIVE      LIPID PANEL      METABOLIC PANEL, COMPREHENSIVE   4. Atrial fibrillation, unspecified type ([facility]  I48.91 427.31    5. Gastroesophageal reflux disease without esophagitis  K21.9 530.81    6. Type 2 diabetes mellitus with diabetic neuropathy, with long-term current use of insulin ([facility])  E11.40 250.60     Z79.4 357.2      V58.67    7. Cardiac defibrillator in place  Z95.810 V45.02    8. Bilateral carotid artery stenosis  I65.23 433.10      433.30    9.  Encounter for screening mammogram for breast cancer  Z12.31 V76.12 RICHI 3D RELL W MAMMO BI SCREENING INCL CAD       Orders Placed This Encounter    COLLECTION VENOUS [place] [employer] RICHI 3D RELL W MAMMO BI SCREENING INCL CAD     Standing Status:   Future     Standing Expiration Date:   2/18/2022    MICROALBUMIN, UR, RAND W/ MICROALB/CREAT RATIO     Standing Status:   Future     Number of Occurrences:   1     Standing Expiration Date:   8/18/2022    CBC WITH AUTOMATED DIFF     Standing Status:   Future     Number of Occurrences:   1     Standing Expiration Date:   8/18/2022    LIPID PANEL     Standing Status:   Future     Number of Occurrences:   1     Standing Expiration Date:   1/23/7328    METABOLIC PANEL, COMPREHENSIVE     Standing Status:   Future     Number of Occurrences:   1     Standing Expiration Date:   8/18/2022    HEMOGLOBIN A1C WITH EAG     Standing Status:   Future     Number of Occurrences:   1     Standing Expiration Date:   8/18/2022       Follow-up and Dispositions    · Return in about 6 months (around 2/18/2022) for Welcome to Medicare at another time. Mamadou Mason MD

## 2021-08-20 LAB
ALBUMIN SERPL-MCNC: 4.1 G/DL (ref 3.8–4.8)
ALBUMIN/GLOB SERPL: 1.1 {RATIO} (ref 1.2–2.2)
ALP SERPL-CCNC: 123 IU/L (ref 48–121)
ALT SERPL-CCNC: 17 IU/L (ref 0–32)
AST SERPL-CCNC: 14 IU/L (ref 0–40)
BASOPHILS # BLD AUTO: 0.1 X10E3/UL (ref 0–0.2)
BASOPHILS NFR BLD AUTO: 1 %
BILIRUB SERPL-MCNC: 0.3 MG/DL (ref 0–1.2)
BUN SERPL-MCNC: 14 MG/DL (ref 8–27)
BUN/CREAT SERPL: 22 (ref 12–28)
CALCIUM SERPL-MCNC: 9.2 MG/DL (ref 8.7–10.3)
CHLORIDE SERPL-SCNC: 102 MMOL/L (ref 96–106)
CHOLEST SERPL-MCNC: 152 MG/DL (ref 100–199)
CO2 SERPL-SCNC: 22 MMOL/L (ref 20–29)
CREAT SERPL-MCNC: 0.64 MG/DL (ref 0.57–1)
EOSINOPHIL # BLD AUTO: 0.3 X10E3/UL (ref 0–0.4)
EOSINOPHIL NFR BLD AUTO: 3 %
ERYTHROCYTE [DISTWIDTH] IN BLOOD BY AUTOMATED COUNT: 11.9 % (ref 11.7–15.4)
EST. AVERAGE GLUCOSE BLD GHB EST-MCNC: 169 MG/DL
GLOBULIN SER CALC-MCNC: 3.6 G/DL (ref 1.5–4.5)
GLUCOSE SERPL-MCNC: 191 MG/DL (ref 65–99)
HBA1C MFR BLD: 7.5 % (ref 4.8–5.6)
HCT VFR BLD AUTO: 45.1 % (ref 34–46.6)
HDLC SERPL-MCNC: 38 MG/DL
HGB BLD-MCNC: 15 G/DL (ref 11.1–15.9)
IMM GRANULOCYTES # BLD AUTO: 0 X10E3/UL (ref 0–0.1)
IMM GRANULOCYTES NFR BLD AUTO: 0 %
IMP & REVIEW OF LAB RESULTS: NORMAL
LDLC SERPL CALC-MCNC: 78 MG/DL (ref 0–99)
LYMPHOCYTES # BLD AUTO: 2.5 X10E3/UL (ref 0.7–3.1)
LYMPHOCYTES NFR BLD AUTO: 20 %
MCH RBC QN AUTO: 32.1 PG (ref 26.6–33)
MCHC RBC AUTO-ENTMCNC: 33.3 G/DL (ref 31.5–35.7)
MCV RBC AUTO: 97 FL (ref 79–97)
MONOCYTES # BLD AUTO: 0.6 X10E3/UL (ref 0.1–0.9)
MONOCYTES NFR BLD AUTO: 5 %
MORPHOLOGY BLD-IMP: ABNORMAL
NEUTROPHILS # BLD AUTO: 9 X10E3/UL (ref 1.4–7)
NEUTROPHILS NFR BLD AUTO: 71 %
PLATELET # BLD AUTO: 118 X10E3/UL (ref 150–450)
POTASSIUM SERPL-SCNC: 4.3 MMOL/L (ref 3.5–5.2)
PROT SERPL-MCNC: 7.7 G/DL (ref 6–8.5)
RBC # BLD AUTO: 4.67 X10E6/UL (ref 3.77–5.28)
SODIUM SERPL-SCNC: 140 MMOL/L (ref 134–144)
TRIGL SERPL-MCNC: 214 MG/DL (ref 0–149)
VLDLC SERPL CALC-MCNC: 36 MG/DL (ref 5–40)
WBC # BLD AUTO: 12.5 X10E3/UL (ref 3.4–10.8)

## 2021-09-10 ENCOUNTER — LAB ONLY (OUTPATIENT)
Dept: FAMILY MEDICINE CLINIC | Age: 66
End: 2021-09-10

## 2021-09-11 LAB
ALBUMIN/CREAT UR: 65 MG/G CREAT (ref 0–29)
CREAT UR-MCNC: 89.7 MG/DL
MICROALBUMIN UR-MCNC: 58.1 UG/ML

## 2021-09-24 DIAGNOSIS — M15.9 PRIMARY OSTEOARTHRITIS INVOLVING MULTIPLE JOINTS: ICD-10-CM

## 2021-09-24 RX ORDER — GABAPENTIN 600 MG/1
600 TABLET ORAL
Qty: 90 TABLET | Refills: 0 | Status: SHIPPED | OUTPATIENT
Start: 2021-09-24 | End: 2021-11-23 | Stop reason: SDUPTHER

## 2021-09-28 ENCOUNTER — TRANSCRIBE ORDER (OUTPATIENT)
Dept: SCHEDULING | Age: 66
End: 2021-09-28

## 2021-09-28 DIAGNOSIS — I65.22 OCCLUSION OF LEFT CAROTID ARTERY: Primary | ICD-10-CM

## 2021-09-30 DIAGNOSIS — I63.232 CEREBRAL INFARCTION DUE TO OCCLUSION OF LEFT INTERNAL CAROTID ARTERY (HCC): ICD-10-CM

## 2021-09-30 NOTE — TELEPHONE ENCOUNTER
Patient requesting refill on     Requested Prescriptions     Pending Prescriptions Disp Refills    rivaroxaban (XARELTO) 20 mg tab tablet 90 Tablet 0     Sig: Take 1 Tablet by mouth daily (with breakfast).          Last OV 8/18/2021

## 2021-10-14 ENCOUNTER — HOSPITAL ENCOUNTER (EMERGENCY)
Age: 66
Discharge: HOME | End: 2021-10-14
Attending: EMERGENCY MEDICINE | Admitting: EMERGENCY MEDICINE
Payer: MEDICARE

## 2021-10-14 ENCOUNTER — NURSE TRIAGE (OUTPATIENT)
Dept: OTHER | Facility: CLINIC | Age: 66
End: 2021-10-14

## 2021-10-14 ENCOUNTER — APPOINTMENT (OUTPATIENT)
Dept: GENERAL RADIOLOGY | Age: 66
End: 2021-10-14
Attending: EMERGENCY MEDICINE
Payer: MEDICARE

## 2021-10-14 VITALS
OXYGEN SATURATION: 98 % | WEIGHT: 156 LBS | BODY MASS INDEX: 25.07 KG/M2 | HEART RATE: 100 BPM | TEMPERATURE: 98 F | RESPIRATION RATE: 18 BRPM | DIASTOLIC BLOOD PRESSURE: 66 MMHG | HEIGHT: 66 IN | SYSTOLIC BLOOD PRESSURE: 156 MMHG

## 2021-10-14 DIAGNOSIS — S92.355A NONDISPLACED FRACTURE OF FIFTH METATARSAL BONE, LEFT FOOT, INITIAL ENCOUNTER FOR CLOSED FRACTURE: Primary | ICD-10-CM

## 2021-10-14 PROCEDURE — 73630 X-RAY EXAM OF FOOT: CPT

## 2021-10-14 PROCEDURE — 77030008368 HC SPLNT ORTHGLS BSNM -B

## 2021-10-14 PROCEDURE — 75810000053 HC SPLINT APPLICATION

## 2021-10-14 PROCEDURE — 99283 EMERGENCY DEPT VISIT LOW MDM: CPT

## 2021-10-14 NOTE — TELEPHONE ENCOUNTER
Received call from [place] at [facility] with Red Flag Complaint. Brief description of triage: See below    Triage indicates for patient to go to [address] now (Or to office with PCP approval). This writer called [facility] to speak with a staff member for 2nd level triage. This writer spoke with Becka Martinez in the office who reports the RN/ will return call to patient. Prior to disconnecting the call, patient receiving a call from the office. Care advice provided, patient verbalizes understanding; denies any other questions or concerns; instructed to call back for any new or worsening symptoms. Attention Provider: Thank you for allowing me to participate in the care of your patient. The patient was connected to triage in response to information provided to the ECC. Please do not respond through this encounter as the response is not directed to a shared pool. Reason for Disposition   Numbness (new loss of sensation) of toe(s)    Answer Assessment - Initial Assessment Questions  1. MECHANISM: \"How did the injury happen? \" (e.g., twisting injury, direct blow)       Patient reports stumbling on uneven ground. 2. ONSET: \"When did the injury happen? \" (Minutes or hours ago)       Yesterday    3. LOCATION: \"Where is the injury located? \"       L foot- 5th metatarsal    4. APPEARANCE of INJURY: \"What does the injury look like? \"       Swelling and bruising    5. WEIGHT-BEARING: \"Can you put weight on that foot? \" \"Can you walk (four steps or more)? \"        Yes; Yes    6. SIZE: For cuts, bruises, or swelling, ask: \"How large is it? \" (e.g., inches or centimeters;  entire joint)       Swelling and bruising about 1 inch    7. PAIN: \"Is there pain? \" If so, ask: \"How bad is the pain? \"    (e.g., Scale 1-10; or mild, moderate, severe)      Yes, 4/10    8. TETANUS: For any breaks in the skin, ask: \"When was the last tetanus booster? \"      NA    9. OTHER SYMPTOMS: \"Do you have any other symptoms? \" \"It doesn't flex, so I shuffle. \", New-onset numbness to the toes.     Protocols used: ANKLE AND FOOT INJURY-ADULT-OH

## 2021-10-14 NOTE — ED PROVIDER NOTES
EMERGENCY DEPARTMENT HISTORY AND PHYSICAL EXAM      Date: 10/14/2021  Patient Name: Lucía Funez    History of Presenting Illness     No chief complaint on file. History Provided By: Patient    HPI: Lucía Funez, 73 y.o. female with PMHx significant for DM 2, hypertension, A. fib, presents private vehicle to the ED with cc of foot swelling. Patient reports she \"stumbled\" yesterday afternoon and has had some pain and swelling over her fifth metatarsal area. She has been able to bear weight although painful. She reports she shattered her fifth metatarsal in 2006 and required surgery. Planes of an aching pain in the area that is worse with weightbearing and movement. No other injuries. She denies falling. She is on Xarelto for A. fib. PMHx: Significant for M2, hypertension, A. fib, CAD, CVA  PSHx: Significant for hysterectomy, cholecystectomy,  Social Hx: No alcohol use. There are no other complaints, changes, or physical findings at this time. PCP: Leydi Mckinney., MD    No current facility-administered medications on file prior to encounter. Current Outpatient Medications on File Prior to Encounter   Medication Sig Dispense Refill    rivaroxaban (XARELTO) 20 mg tab tablet Take 1 Tablet by mouth daily (with breakfast). 90 Tablet 0    glipiZIDE (GLUCOTROL) 5 mg tablet Take 1 Tablet by mouth two (2) times a day. 180 Tablet 0    pravastatin (PRAVACHOL) 20 mg tablet Take 1 Tablet by mouth nightly. 90 Tablet 0    gabapentin (NEURONTIN) 600 mg tablet Take 1 Tablet by mouth nightly as needed for Pain.  Max Daily Amount: 600 mg. 90 Tablet 0    carvediloL (COREG) 6.25 mg tablet 1 twice daily 180 Tablet 0    pantoprazole (PROTONIX) 40 mg tablet 1 daily 90 Tablet 0    insulin glargine (Lantus U-100 Insulin) 100 unit/mL injection 30 units daily 30 mL 0    pioglitazone (ACTOS) 15 mg tablet Take 1 tablet by mouth once daily 90 Tablet 0    cyclobenzaprine (FLEXERIL) 5 mg tablet 1 3 times daily for back muscle spasm 270 Tablet 1    gabapentin (NEURONTIN) 100 mg capsule Take 1 Capsule by mouth two (2) times a day. Max Daily Amount: 200 mg. Indications: neuropathic pain 180 Capsule 0    citalopram (CELEXA) 10 mg tablet Take 1 Tablet by mouth daily. (Patient not taking: Reported on 8/18/2021) 30 Tablet 5    valsartan (DIOVAN) 40 mg tablet Take 1 tablet by mouth twice daily 180 Tab 1    FreeStyle Kyaw 14 Day Sensor kit USE AS DIRECTED TO TEST BLOOD SUGAR 2 Kit 0    vit B Cmplx 3-FA-Vit C-Biotin (NEPHRO CHUCKIE RX) 1- mg-mg-mcg tablet Take 1 Tab by mouth daily.          Past History     Past Medical History:  Past Medical History:   Diagnosis Date    A-fib [facility])     Aphasia due to recent cerebrovascular accident 3/1/2016    Back pain     Grief reaction with prolonged bereavement 12/20/2016    Hepatitis     History of seasonal allergies     Hypertension     Kidney infection     [facility]     MRSA (methicillin resistant Staphylococcus aureus)     Recurrent boils     Stroke ([facility]     Type II or unspecified type diabetes mellitus without mention of complication, not stated as uncontrolled        Past Surgical History:  Past Surgical History:   Procedure Laterality Date    HX BLADDER REPAIR      HX CHOLECYSTECTOMY      HX HYSTERECTOMY      2000    HX ORTHOPAEDIC      pin in left foot    HX OTHER SURGICAL      defiberilator left corotid    HX OTHER SURGICAL      chris abscess    HX OTHER SURGICAL Left 11/2019    Carotid artery ligation- Dr Luis Alberto Cordon       Family History:  Family History   Problem Relation Age of Onset    Arthritis-osteo Mother     Diabetes Father     Hypertension Father     Heart Attack Father     Heart Disease Father     Diabetes Paternal Grandmother     MS Brother        Social History:  Social History     Tobacco Use    Smoking status: Current Some Day Smoker    Smokeless tobacco: Never Used   Vaping Use    Vaping Use: Never used Substance Use Topics    Alcohol use: No    Drug use: Never       Allergies: Allergies   Allergen Reactions    Pcn [Penicillins] Anaphylaxis    Sulfa (Sulfonamide Antibiotics) Nausea Only    Valium [Diazepam] Unknown (comments)         Review of Systems   Review of Systems   Constitutional: Negative for activity change, chills and fever. HENT: Negative for congestion and sore throat. Eyes: Negative for pain and redness. Respiratory: Negative for cough, chest tightness and shortness of breath. Cardiovascular: Negative for chest pain and palpitations. Gastrointestinal: Negative for abdominal pain, diarrhea, nausea and vomiting. Genitourinary: Negative for dysuria, frequency and urgency. Musculoskeletal: Negative for back pain and neck pain. Skin: Negative for rash. Neurological: Negative for syncope, light-headedness and headaches. Psychiatric/Behavioral: Negative for confusion. All other systems reviewed and are negative. Physical Exam   Physical Exam  Constitutional:       General: She is not in acute distress. Appearance: She is well-developed. She is not diaphoretic. HENT:      Head: Normocephalic and atraumatic. Eyes:      General: No scleral icterus. Conjunctiva/sclera: Conjunctivae normal.      Pupils: Pupils are equal, round, and reactive to light. Pulmonary:      Effort: Pulmonary effort is normal.   Musculoskeletal:         General: Normal range of motion. Comments: Moderate swelling and tenderness over the proximal fifth metatarsal.  No ankle tenderness. No toe tenderness. No other forefoot tenderness. Skin intact. Old well-healed surgical scar over the fifth metatarsal.  2+ DP and PT pulses. Brisk CR. Toes up/down. Skin:     General: Skin is warm and dry. Findings: No rash. Neurological:      Mental Status: She is alert and oriented to person, place, and time.    Psychiatric:         Behavior: Behavior normal.             Diagnostic Study Results     Labs -   No results found for this or any previous visit (from the past 12 hour(s)). Radiologic Studies -   XR FOOT LT MIN 3 V   Final Result   1. Evidence of traumatic injury involving the fifth toe as described above. 2. Evidence of degenerative change involving the foot. CT Results  (Last 48 hours)    None        CXR Results  (Last 48 hours)    None            Medical Decision Making   I am the first provider for this patient. I reviewed the vital signs, available nursing notes, past medical history, past surgical history, family history and social history. Vital Signs-Reviewed the patient's vital signs. Patient Vitals for the past 12 hrs:   Temp Pulse Resp BP SpO2   10/14/21 1513 98 °F (36.7 °C) 100 18 (!) 156/66 98 %           Records Reviewed: Nursing notes reviewed    Provider Notes (Medical Decision Making):   DDX: Fracture, contusion. ED Course:   Initial assessment performed. The patients presenting problems have been discussed, and they are in agreement with the care plan formulated and outlined with them. I have encouraged them to ask questions as they arise throughout their visit. PROGRESS NOTE    Pt reevaluated. Plain films with possible acute fracture at the base of the fifth metatarsal.  Read as subacute Pineda fracture. Difficult to tell whether this is old from prior injury. Will place in nonweightbearing posterior splint. Gave orthopedic follow-up with Dr. Steve Costa. Written by Ruth Ferris MD     Progress note:    Pt noted to be feeling better and ready for discharge. Updated pt and/or family on all final lab and/or  imaging findings. Will follow up as instructed. All questions have been answered, pt voiced understanding and agreement with plan. Specific return precautions provided as well as instructions to return to the ED should sx worsen at any time. Vital signs stable for discharge.      I have also put together some discharge instructions for them that include: 1) educational information regarding their diagnosis, 2) how to care for their diagnosis at home, as well a 3) list of reasons why they would want to return to the ED prior to their follow-up appointment, should their condition change. Written by Anya Ledbetter MD        Critical Care Time:   0    Disposition:  Discharge    PLAN:  1. Current Discharge Medication List        2. Follow-up Information     Follow up With Specialties Details Why Contact Info    Denys Rordiguez MD Orthopedic Surgery Schedule an appointment as soon as possible for a visit in 1 day  [address] [facility] [address] [facility] [address] Emergency Medicine Go in 1 day If symptoms worsen [address] [facility] [id number] [phone]        Return to ED if worse     Diagnosis     Clinical Impression:   1. Nondisplaced fracture of fifth metatarsal bone, left foot, initial encounter for closed fracture              Please note that this dictation was completed with [employer]ion, the computer voice recognition software. Quite often unanticipated grammatical, syntax, homophones, and other interpretive errors are inadvertently transcribed by the computer software. Please disregard these errors. Please excuse any errors that have escaped final proofreading.

## 2021-10-14 NOTE — ED TRIAGE NOTES
Pt arrived by POV with complaint of left foot pain. Pt reports yesterday she stumbled re injuring her left foot, pt had previous injury in 2006.   Pt reports she took her daily gabapentin and flexeril at 230 pm.  Pt drove herself and is awake alert and oriented x 4, pt educated on ER flow

## 2021-11-09 DIAGNOSIS — I10 ESSENTIAL HYPERTENSION: ICD-10-CM

## 2021-11-09 RX ORDER — VALSARTAN 40 MG/1
TABLET ORAL
Qty: 180 TABLET | Refills: 0 | Status: SHIPPED | OUTPATIENT
Start: 2021-11-09

## 2021-11-23 ENCOUNTER — OFFICE VISIT (OUTPATIENT)
Dept: FAMILY MEDICINE CLINIC | Age: 66
End: 2021-11-23
Payer: MEDICARE

## 2021-11-23 VITALS
WEIGHT: 160 LBS | OXYGEN SATURATION: 98 % | RESPIRATION RATE: 18 BRPM | SYSTOLIC BLOOD PRESSURE: 122 MMHG | BODY MASS INDEX: 25.71 KG/M2 | HEIGHT: 66 IN | HEART RATE: 99 BPM | DIASTOLIC BLOOD PRESSURE: 78 MMHG | TEMPERATURE: 98.1 F

## 2021-11-23 DIAGNOSIS — E78.2 MIXED DYSLIPIDEMIA: ICD-10-CM

## 2021-11-23 DIAGNOSIS — E11.59 TYPE 2 DIABETES MELLITUS WITH OTHER CIRCULATORY COMPLICATION, WITH LONG-TERM CURRENT USE OF INSULIN (HCC): ICD-10-CM

## 2021-11-23 DIAGNOSIS — Z13.820 ENCOUNTER FOR OSTEOPOROSIS SCREENING IN ASYMPTOMATIC POSTMENOPAUSAL PATIENT: Primary | ICD-10-CM

## 2021-11-23 DIAGNOSIS — I10 ESSENTIAL HYPERTENSION: ICD-10-CM

## 2021-11-23 DIAGNOSIS — K21.9 GASTROESOPHAGEAL REFLUX DISEASE WITHOUT ESOPHAGITIS: ICD-10-CM

## 2021-11-23 DIAGNOSIS — M15.9 PRIMARY OSTEOARTHRITIS INVOLVING MULTIPLE JOINTS: ICD-10-CM

## 2021-11-23 DIAGNOSIS — F17.200 SMOKER: ICD-10-CM

## 2021-11-23 DIAGNOSIS — Z95.810 CARDIAC DEFIBRILLATOR IN PLACE: ICD-10-CM

## 2021-11-23 DIAGNOSIS — I48.91 ATRIAL FIBRILLATION, UNSPECIFIED TYPE (HCC): ICD-10-CM

## 2021-11-23 DIAGNOSIS — I69.90 LATE EFFECTS OF CVA (CEREBROVASCULAR ACCIDENT): ICD-10-CM

## 2021-11-23 DIAGNOSIS — Z78.0 ENCOUNTER FOR OSTEOPOROSIS SCREENING IN ASYMPTOMATIC POSTMENOPAUSAL PATIENT: Primary | ICD-10-CM

## 2021-11-23 DIAGNOSIS — Z79.4 TYPE 2 DIABETES MELLITUS WITH DIABETIC NEUROPATHY, WITH LONG-TERM CURRENT USE OF INSULIN (HCC): ICD-10-CM

## 2021-11-23 DIAGNOSIS — E11.40 TYPE 2 DIABETES MELLITUS WITH DIABETIC NEUROPATHY, WITH LONG-TERM CURRENT USE OF INSULIN (HCC): ICD-10-CM

## 2021-11-23 DIAGNOSIS — I65.23 BILATERAL CAROTID ARTERY STENOSIS: Chronic | ICD-10-CM

## 2021-11-23 DIAGNOSIS — Z79.4 TYPE 2 DIABETES MELLITUS WITH OTHER CIRCULATORY COMPLICATION, WITH LONG-TERM CURRENT USE OF INSULIN (HCC): ICD-10-CM

## 2021-11-23 PROCEDURE — G8419 CALC BMI OUT NRM PARAM NOF/U: HCPCS | Performed by: FAMILY MEDICINE

## 2021-11-23 PROCEDURE — G8754 DIAS BP LESS 90: HCPCS | Performed by: FAMILY MEDICINE

## 2021-11-23 PROCEDURE — G8427 DOCREV CUR MEDS BY ELIG CLIN: HCPCS | Performed by: FAMILY MEDICINE

## 2021-11-23 PROCEDURE — G9899 SCRN MAM PERF RSLTS DOC: HCPCS | Performed by: FAMILY MEDICINE

## 2021-11-23 PROCEDURE — 1101F PT FALLS ASSESS-DOCD LE1/YR: CPT | Performed by: FAMILY MEDICINE

## 2021-11-23 PROCEDURE — 3017F COLORECTAL CA SCREEN DOC REV: CPT | Performed by: FAMILY MEDICINE

## 2021-11-23 PROCEDURE — 99214 OFFICE O/P EST MOD 30 MIN: CPT | Performed by: FAMILY MEDICINE

## 2021-11-23 PROCEDURE — 2022F DILAT RTA XM EVC RTNOPTHY: CPT | Performed by: FAMILY MEDICINE

## 2021-11-23 PROCEDURE — G8752 SYS BP LESS 140: HCPCS | Performed by: FAMILY MEDICINE

## 2021-11-23 PROCEDURE — G8400 PT W/DXA NO RESULTS DOC: HCPCS | Performed by: FAMILY MEDICINE

## 2021-11-23 PROCEDURE — G8510 SCR DEP NEG, NO PLAN REQD: HCPCS | Performed by: FAMILY MEDICINE

## 2021-11-23 PROCEDURE — 1090F PRES/ABSN URINE INCON ASSESS: CPT | Performed by: FAMILY MEDICINE

## 2021-11-23 PROCEDURE — G8536 NO DOC ELDER MAL SCRN: HCPCS | Performed by: FAMILY MEDICINE

## 2021-11-23 PROCEDURE — 3051F HG A1C>EQUAL 7.0%<8.0%: CPT | Performed by: FAMILY MEDICINE

## 2021-11-23 RX ORDER — GABAPENTIN 100 MG/1
100 CAPSULE ORAL 2 TIMES DAILY
Qty: 180 CAPSULE | Refills: 0 | Status: SHIPPED | OUTPATIENT
Start: 2021-11-23 | End: 2022-02-22

## 2021-11-23 RX ORDER — GABAPENTIN 600 MG/1
600 TABLET ORAL
Qty: 90 TABLET | Refills: 0 | Status: SHIPPED | OUTPATIENT
Start: 2021-11-23 | End: 2021-12-30 | Stop reason: SDUPTHER

## 2021-11-23 RX ORDER — BRIMONIDINE TARTRATE, TIMOLOL MALEATE 2; 5 MG/ML; MG/ML
SOLUTION/ DROPS OPHTHALMIC
COMMUNITY
Start: 2021-11-05 | End: 2022-02-22

## 2021-11-23 RX ORDER — ATROPINE SULFATE 10 MG/ML
SOLUTION/ DROPS OPHTHALMIC
COMMUNITY
Start: 2021-10-31 | End: 2022-02-22

## 2021-11-23 NOTE — PROGRESS NOTES
1. Have you been to the ER, urgent care clinic since your last visit? Hospitalized since your last visit? yes    2. Have you seen or consulted any other health care providers outside of the [address] [place] [address] since your last visit? Include any pap smears or colon screening.  yes

## 2021-11-23 NOTE — PROGRESS NOTES
Sarah Mcmahon is a 73 y.o. female who presents with the following:  Chief Complaint   Patient presents with    Cholesterol Problem     With bilateral carotid artery stenosis    Diabetes     With circulatory disorder and diabetic neuropathy    Hypertension     With atrial fibrillation    Arthritis    GERD       Patient's cholesterol is being treated with pravastatin without any muscle pain or weakness. Patient's diabetes is being treated with multiple drugs including pioglitazone and Lantus insulin and glipizide tablets which are doing a pretty job together and keeping her blood sugars down. The patient is still smoking which with her mixed dyslipidemia and her hypertension and diabetes is messing with her vascular system and she is already had to have carotid surgery on one side which is cost her the vision of her right eye. Patient does have a cardiac defibrillator in place and still suffer some minor late effects of a CVA but in general she is recovered nicely from. Patient does have degenerative disc disease in the lumbar area as well as diffuse osteoarthritis in multiple joints which is primary and not traumatic and it does cause her to have some achy pain and stiffness. Her GERD is managed with pantoprazole so that she has no heartburn or epigastric pain. Allergies   Allergen Reactions    Pcn [Penicillins] Anaphylaxis    Sulfa (Sulfonamide Antibiotics) Nausea Only    Valium [Diazepam] Unknown (comments)       Current Outpatient Medications   Medication Sig    gabapentin (NEURONTIN) 100 mg capsule Take 1 Capsule by mouth two (2) times a day. Max Daily Amount: 200 mg. Indications: neuropathic pain    gabapentin (NEURONTIN) 600 mg tablet Take 1 Tablet by mouth nightly as needed for Pain. Max Daily Amount: 600 mg.     pioglitazone (ACTOS) 15 mg tablet Take 1 tablet by mouth once daily    valsartan (DIOVAN) 40 mg tablet Take 1 tablet by mouth twice daily    carvediloL (COREG) 6.25 mg tablet 1 twice daily    pantoprazole (PROTONIX) 40 mg tablet 1 daily    FreeStyle Kyaw 14 Day Sensor kit USE AS DIRECTED TO TEST BLOOD SUGAR    insulin glargine (Lantus U-100 Insulin) 100 unit/mL injection INJECT 30 UNITS SUBCUTANEOUSLY ONCE DAILY    rivaroxaban (XARELTO) 20 mg tab tablet Take 1 Tablet by mouth daily (with breakfast).  glipiZIDE (GLUCOTROL) 5 mg tablet Take 1 Tablet by mouth two (2) times a day.  pravastatin (PRAVACHOL) 20 mg tablet Take 1 Tablet by mouth nightly.  cyclobenzaprine (FLEXERIL) 5 mg tablet 1 3 times daily for back muscle spasm    citalopram (CELEXA) 10 mg tablet Take 1 Tablet by mouth daily.  vit B Cmplx 3-FA-Vit C-Biotin (NEPHRO CHUCKIE RX) 1- mg-mg-mcg tablet Take 1 Tab by mouth daily.  atropine 1 % ophthalmic solution     Combigan 0.2-0.5 % drop ophthalmic solution      No current facility-administered medications for this visit.        Past Medical History:   Diagnosis Date    A-fib [facility])     Aphasia due to recent cerebrovascular accident 3/1/2016    Back pain     Grief reaction with prolonged bereavement 12/20/2016    Hepatitis     History of seasonal allergies     Hypertension     Kidney infection     [facility]) [facility] [address]     MRSA (methicillin resistant Staphylococcus aureus)     Recurrent boils     Stroke ([facility] [address]     Type II or unspecified type diabetes mellitus without mention of complication, not stated as uncontrolled        Past Surgical History:   Procedure Laterality Date    HX BLADDER REPAIR      HX CHOLECYSTECTOMY      HX HYSTERECTOMY      2000    HX ORTHOPAEDIC      pin in left foot    HX OTHER SURGICAL      defiberilator left corotid    HX OTHER SURGICAL      chris abscess    HX OTHER SURGICAL Left 11/2019    Carotid artery ligation- Dr Suleiman Condon       Family History   Problem Relation Age of Onset    Arthritis-osteo Mother     Diabetes Father     Hypertension Father     Heart Attack Father     Heart Disease Father    [facility] Diabetes Paternal Grandmother     Mult Sclerosis Brother        Social History     Socioeconomic History    Marital status: SINGLE   Tobacco Use    Smoking status: Current Every Day Smoker    Smokeless tobacco: Never Used   Vaping Use    Vaping Use: Never used   Substance and Sexual Activity    Alcohol use: No    Drug use: Never    Sexual activity: Never       Review of Systems   Constitutional: Negative for chills, fever, malaise/fatigue and weight loss. HENT: Negative for congestion, hearing loss, sore throat and tinnitus. Eyes: Positive for blurred vision (In the right eye). Negative for pain and discharge. Respiratory: Negative for cough, shortness of breath and wheezing. Cardiovascular: Negative for chest pain, palpitations, orthopnea, claudication and leg swelling. Gastrointestinal: Negative for abdominal pain, constipation and heartburn. Genitourinary: Negative for dysuria, frequency and urgency. Musculoskeletal: Positive for back pain and joint pain. Negative for falls and myalgias. Skin: Negative for itching and rash. Neurological: Negative for dizziness, tingling, tremors and headaches. Endo/Heme/Allergies: Negative for environmental allergies and polydipsia. Psychiatric/Behavioral: Negative for depression and substance abuse. The patient is not nervous/anxious. Visit Vitals  /78   Pulse 99   Temp 98.1 °F (36.7 °C)   Resp 18   Ht 5' 6\" (1.676 m)   Wt 160 lb (72.6 kg)   SpO2 98%   BMI 25.82 kg/m²     Physical Exam  Vitals reviewed. Constitutional:       General: She is not in acute distress. Appearance: Normal appearance. She is not ill-appearing. HENT:      Head: Normocephalic and atraumatic. Right Ear: Tympanic membrane, ear canal and external ear normal.      Left Ear: Tympanic membrane, ear canal and external ear normal.      Nose: Nose normal. No congestion or rhinorrhea.       Mouth/Throat:      Mouth: Mucous membranes are moist.      Pharynx: No posterior oropharyngeal erythema. Eyes:      General:         Right eye: No discharge. Left eye: No discharge. Extraocular Movements: Extraocular movements intact. Conjunctiva/sclera: Conjunctivae normal.      Pupils: Pupils are equal, round, and reactive to light. Comments: Cornea anterior chamber and iris are normal.   Neck:      Trachea: No tracheal deviation. Cardiovascular:      Rate and Rhythm: Normal rate and regular rhythm. Pulses: Normal pulses. Heart sounds: Normal heart sounds. No murmur heard. No friction rub. No gallop. Pulmonary:      Effort: Pulmonary effort is normal. No respiratory distress. Breath sounds: Normal breath sounds. No wheezing or rhonchi. Chest:      Chest wall: No tenderness. Abdominal:      General: Bowel sounds are normal. There is no distension. Palpations: Abdomen is soft. There is no mass. Tenderness: There is no abdominal tenderness. There is no guarding or rebound. Musculoskeletal:         General: No tenderness or deformity. Normal range of motion. Cervical back: Normal range of motion and neck supple. Right lower leg: No edema. Left lower leg: No edema. Lymphadenopathy:      Cervical: No cervical adenopathy. Skin:     General: Skin is warm and dry. Coloration: Skin is not pale. Findings: No erythema or rash. Neurological:      General: No focal deficit present. Mental Status: She is alert and oriented to person, place, and time. Cranial Nerves: No cranial nerve deficit. Motor: No abnormal muscle tone. Deep Tendon Reflexes: Reflexes are normal and symmetric. Reflexes normal.      Comments: Cranial nerves II through XII are intact sensory and motor. Biceps triceps knee and ankle DTRs are normal and symmetrical.   Psychiatric:         Mood and Affect: Mood normal.         Behavior: Behavior normal.         Thought Content:  Thought content normal.         Judgment: Judgment normal.           ICD-10-CM ICD-9-CM    1. Encounter for osteoporosis screening in asymptomatic postmenopausal patient  Z13.820 V82.81 DEXA BONE DENSITY STUDY AXIAL    Z78.0 V49.81    2. Bilateral carotid artery stenosis  I65.23 433.10      433.30    3. Essential hypertension  I10 401.9    4. Atrial fibrillation, unspecified type ([facility] [address]  I48.91 427.31    5. Type 2 diabetes mellitus with other circulatory complication, with long-term current use of insulin (HCC)  E11.59 250.70     Z79.4 V58.67    6. Gastroesophageal reflux disease without esophagitis  K21.9 530.81    7. Type 2 diabetes mellitus with diabetic neuropathy, with long-term current use of insulin ([facility])  E11.40 250.60     Z79.4 357.2      V58.67    8. Smoker  F17.200 305.1    9. Mixed dyslipidemia  E78.2 272.2    10. Cardiac defibrillator in place  Z95.810 V45.02    11. Late effects of CVA (cerebrovascular accident)  I69.90 438.9    12. Primary osteoarthritis involving multiple joints  M89.49 715.98 gabapentin (NEURONTIN) 100 mg capsule      gabapentin (NEURONTIN) 600 mg tablet   The patient does have a history of osteopenia and we will line up a DEXA bone density study axial.    Orders Placed This Encounter    DEXA BONE DENSITY STUDY AXIAL     Standing Status:   Future     Standing Expiration Date:   2/23/2022    atropine 1 % ophthalmic solution    Combigan 0.2-0.5 % drop ophthalmic solution    gabapentin (NEURONTIN) 100 mg capsule     Sig: Take 1 Capsule by mouth two (2) times a day. Max Daily Amount: 200 mg. Indications: neuropathic pain     Dispense:  180 Capsule     Refill:  0    gabapentin (NEURONTIN) 600 mg tablet     Sig: Take 1 Tablet by mouth nightly as needed for Pain. Max Daily Amount: 600 mg. Dispense:  90 Tablet     Refill:  0       Follow-up and Dispositions    · Return in about 6 months (around 5/23/2022).          Augustus Fong MD

## 2021-11-29 ENCOUNTER — HOSPITAL ENCOUNTER (OUTPATIENT)
Dept: ULTRASOUND IMAGING | Age: 66
Discharge: HOME | End: 2021-11-29
Attending: SURGERY
Payer: MEDICARE

## 2021-11-29 DIAGNOSIS — I65.22 OCCLUSION OF LEFT CAROTID ARTERY: ICD-10-CM

## 2021-11-29 LAB
LEFT CCA DIST DIAS: 15.2 CENTIMETER/SECOND
LEFT CCA DIST SYS: 77.8 CENTIMETER/SECOND
LEFT CCA PROX DIAS: 7.9 CENTIMETER/SECOND
LEFT CCA PROX SYS: 43.8 CENTIMETER/SECOND
LEFT ECA DIAS: 23.95 CENTIMETER/SECOND
LEFT ECA SYS: 156.3 CENTIMETER/SECOND
LEFT VERTEBRAL DIAS: 11.73 CENTIMETER/SECOND
LEFT VERTEBRAL SYS: 48.2 CENTIMETER/SECOND
RIGHT CCA DIST DIAS: 19.8 CENTIMETER/SECOND
RIGHT CCA DIST SYS: 80.4 CENTIMETER/SECOND
RIGHT CCA PROX DIAS: 18 CM/S
RIGHT CCA PROX SYS: 96 CM/S
RIGHT ECA DIAS: 15.39 CENTIMETER/SECOND
RIGHT ECA SYS: 144.1 CENTIMETER/SECOND
RIGHT ICA DIST DIAS: 33.4 CENTIMETER/SECOND
RIGHT ICA DIST SYS: 93.7 CENTIMETER/SECOND
RIGHT ICA MID DIAS: 21.5 CENTIMETER/SECOND
RIGHT ICA MID SYS: 68.7 CENTIMETER/SECOND
RIGHT ICA PROX DIAS: 20 CM/S
RIGHT ICA PROX SYS: 68 CM/S
RIGHT ICA/CCA SYS: 1
RIGHT VERTEBRAL DIAS: 8.39 CENTIMETER/SECOND
RIGHT VERTEBRAL SYS: 48.1 CENTIMETER/SECOND

## 2021-11-29 PROCEDURE — 93880 EXTRACRANIAL BILAT STUDY: CPT

## 2022-02-09 DIAGNOSIS — E11.59 TYPE 2 DIABETES MELLITUS WITH OTHER CIRCULATORY COMPLICATION, WITH LONG-TERM CURRENT USE OF INSULIN (HCC): ICD-10-CM

## 2022-02-09 DIAGNOSIS — Z79.4 TYPE 2 DIABETES MELLITUS WITH OTHER CIRCULATORY COMPLICATION, WITH LONG-TERM CURRENT USE OF INSULIN (HCC): ICD-10-CM

## 2022-02-22 ENCOUNTER — OFFICE VISIT (OUTPATIENT)
Dept: FAMILY MEDICINE CLINIC | Age: 67
End: 2022-02-22
Payer: MEDICARE

## 2022-02-22 VITALS
BODY MASS INDEX: 25.55 KG/M2 | SYSTOLIC BLOOD PRESSURE: 118 MMHG | DIASTOLIC BLOOD PRESSURE: 67 MMHG | OXYGEN SATURATION: 99 % | RESPIRATION RATE: 20 BRPM | WEIGHT: 159 LBS | HEART RATE: 97 BPM | TEMPERATURE: 98.1 F | HEIGHT: 66 IN

## 2022-02-22 DIAGNOSIS — E11.40 TYPE 2 DIABETES MELLITUS WITH DIABETIC NEUROPATHY, WITH LONG-TERM CURRENT USE OF INSULIN (HCC): ICD-10-CM

## 2022-02-22 DIAGNOSIS — Z12.31 ENCOUNTER FOR SCREENING MAMMOGRAM FOR BREAST CANCER: ICD-10-CM

## 2022-02-22 DIAGNOSIS — E11.59 TYPE 2 DIABETES MELLITUS WITH OTHER CIRCULATORY COMPLICATION, WITH LONG-TERM CURRENT USE OF INSULIN (HCC): ICD-10-CM

## 2022-02-22 DIAGNOSIS — F17.200 SMOKER: ICD-10-CM

## 2022-02-22 DIAGNOSIS — M54.50 CHRONIC RIGHT-SIDED LOW BACK PAIN WITHOUT SCIATICA: ICD-10-CM

## 2022-02-22 DIAGNOSIS — K21.9 GASTROESOPHAGEAL REFLUX DISEASE WITHOUT ESOPHAGITIS: ICD-10-CM

## 2022-02-22 DIAGNOSIS — E78.2 MIXED DYSLIPIDEMIA: ICD-10-CM

## 2022-02-22 DIAGNOSIS — I65.23 BILATERAL CAROTID ARTERY STENOSIS: Chronic | ICD-10-CM

## 2022-02-22 DIAGNOSIS — Z79.4 TYPE 2 DIABETES MELLITUS WITH DIABETIC NEUROPATHY, WITH LONG-TERM CURRENT USE OF INSULIN (HCC): ICD-10-CM

## 2022-02-22 DIAGNOSIS — G89.29 CHRONIC RIGHT-SIDED LOW BACK PAIN WITHOUT SCIATICA: ICD-10-CM

## 2022-02-22 DIAGNOSIS — M51.36 DDD (DEGENERATIVE DISC DISEASE), LUMBAR: ICD-10-CM

## 2022-02-22 DIAGNOSIS — I69.90 LATE EFFECTS OF CVA (CEREBROVASCULAR ACCIDENT): ICD-10-CM

## 2022-02-22 DIAGNOSIS — Z79.4 TYPE 2 DIABETES MELLITUS WITH OTHER CIRCULATORY COMPLICATION, WITH LONG-TERM CURRENT USE OF INSULIN (HCC): ICD-10-CM

## 2022-02-22 DIAGNOSIS — I48.91 ATRIAL FIBRILLATION, UNSPECIFIED TYPE (HCC): ICD-10-CM

## 2022-02-22 DIAGNOSIS — Z95.810 CARDIAC DEFIBRILLATOR IN PLACE: ICD-10-CM

## 2022-02-22 DIAGNOSIS — Z12.11 COLON CANCER SCREENING: ICD-10-CM

## 2022-02-22 DIAGNOSIS — M15.9 PRIMARY OSTEOARTHRITIS INVOLVING MULTIPLE JOINTS: ICD-10-CM

## 2022-02-22 DIAGNOSIS — I10 ESSENTIAL HYPERTENSION: Primary | ICD-10-CM

## 2022-02-22 PROCEDURE — G8400 PT W/DXA NO RESULTS DOC: HCPCS | Performed by: FAMILY MEDICINE

## 2022-02-22 PROCEDURE — G8752 SYS BP LESS 140: HCPCS | Performed by: FAMILY MEDICINE

## 2022-02-22 PROCEDURE — 3017F COLORECTAL CA SCREEN DOC REV: CPT | Performed by: FAMILY MEDICINE

## 2022-02-22 PROCEDURE — G8510 SCR DEP NEG, NO PLAN REQD: HCPCS | Performed by: FAMILY MEDICINE

## 2022-02-22 PROCEDURE — 2022F DILAT RTA XM EVC RTNOPTHY: CPT | Performed by: FAMILY MEDICINE

## 2022-02-22 PROCEDURE — G8536 NO DOC ELDER MAL SCRN: HCPCS | Performed by: FAMILY MEDICINE

## 2022-02-22 PROCEDURE — 1090F PRES/ABSN URINE INCON ASSESS: CPT | Performed by: FAMILY MEDICINE

## 2022-02-22 PROCEDURE — G9899 SCRN MAM PERF RSLTS DOC: HCPCS | Performed by: FAMILY MEDICINE

## 2022-02-22 PROCEDURE — 3046F HEMOGLOBIN A1C LEVEL >9.0%: CPT | Performed by: FAMILY MEDICINE

## 2022-02-22 PROCEDURE — 99214 OFFICE O/P EST MOD 30 MIN: CPT | Performed by: FAMILY MEDICINE

## 2022-02-22 PROCEDURE — G8427 DOCREV CUR MEDS BY ELIG CLIN: HCPCS | Performed by: FAMILY MEDICINE

## 2022-02-22 PROCEDURE — 1101F PT FALLS ASSESS-DOCD LE1/YR: CPT | Performed by: FAMILY MEDICINE

## 2022-02-22 PROCEDURE — G8419 CALC BMI OUT NRM PARAM NOF/U: HCPCS | Performed by: FAMILY MEDICINE

## 2022-02-22 PROCEDURE — G8754 DIAS BP LESS 90: HCPCS | Performed by: FAMILY MEDICINE

## 2022-02-22 RX ORDER — CYCLOBENZAPRINE HCL 5 MG
TABLET ORAL
Qty: 270 TABLET | Refills: 1 | Status: SHIPPED | OUTPATIENT
Start: 2022-02-22 | End: 2022-05-20 | Stop reason: SDUPTHER

## 2022-02-22 RX ORDER — GABAPENTIN 600 MG/1
TABLET ORAL
Qty: 180 TABLET | Refills: 0 | Status: SHIPPED | OUTPATIENT
Start: 2022-02-22 | End: 2022-05-20 | Stop reason: SDUPTHER

## 2022-02-22 RX ORDER — GLIPIZIDE 5 MG/1
5 TABLET ORAL 2 TIMES DAILY
Qty: 180 TABLET | Refills: 1 | Status: SHIPPED | OUTPATIENT
Start: 2022-02-22 | End: 2022-06-21 | Stop reason: SDUPTHER

## 2022-02-22 NOTE — PROGRESS NOTES
1. Have you been to the ER, urgent care clinic since your last visit? Hospitalized since your last visit? yes    2. Have you seen or consulted any other health care providers outside of the [address] [place] [address] since your last visit? Include any pap smears or colon screening.  yes

## 2022-02-22 NOTE — PROGRESS NOTES
Dayanna Quinones is a 73 y.o. female who presents with the following:  Chief Complaint   Patient presents with    Diabetes    Hypertension    Cholesterol Problem    Heart Problem     Atrial fibrillation       Patient's diabetes is doing better on her current medication without any polyuria nor abnormal weight loss but she does have cerebrovascular disease associated with the diabetes. Patient also has hypertension which is primary but she is having no chest pain shortness of breath nor edema from this. Patient does have bilateral carotid artery stenosis and does have atrial fibrillation. Because of this in the past treatment she has a cardiac defibrillator in place. Patient does have type 2 diabetes mellitus with diabetic neuropathy and has degenerative disc disease in the lumbar area. The patient is on pravastatin for her hypercholesterolemia and she is doing well on this without muscle pain or weakness. Patient realizes that she is due a mammogram and she is also due to have a colonoscopy and she is willing to go see the surgeon about having this completed. Because of her back problems she does use gabapentin Jauca 1 at bedtime but would like to try to increase it up to 300 mg in the morning and afternoon as well as 600 mg at night and still use the cyclobenzaprine to relieve her muscle spasms. Allergies   Allergen Reactions    Pcn [Penicillins] Anaphylaxis    Sulfa (Sulfonamide Antibiotics) Nausea Only    Valium [Diazepam] Unknown (comments)       Current Outpatient Medications   Medication Sig    glipiZIDE (GLUCOTROL) 5 mg tablet Take 1 Tablet by mouth two (2) times a day.     gabapentin (NEURONTIN) 600 mg tablet Half in the morning half in the afternoon and 1 at bedtime    cyclobenzaprine (FLEXERIL) 5 mg tablet 1 3 times daily for back muscle spasm    insulin glargine (Lantus U-100 Insulin) 100 unit/mL injection INJECT 30 UNITS SUBCUTANEOUSLY ONCE DAILY    pioglitazone (ACTOS) 15 mg tablet Take 1 Tablet by mouth daily.  pantoprazole (PROTONIX) 40 mg tablet 1 daily    pravastatin (PRAVACHOL) 20 mg tablet Take 1 tablet by mouth nightly    valsartan (DIOVAN) 40 mg tablet Take 1 tablet by mouth twice daily    carvediloL (COREG) 6.25 mg tablet 1 twice daily    FreeStyle Kyaw 14 Day Sensor kit USE AS DIRECTED TO TEST BLOOD SUGAR    rivaroxaban (XARELTO) 20 mg tab tablet Take 1 Tablet by mouth daily (with breakfast).  vit B Cmplx 3-FA-Vit C-Biotin (NEPHRO CHUCKIE RX) 1- mg-mg-mcg tablet Take 1 Tab by mouth daily. No current facility-administered medications for this visit.        Past Medical History:   Diagnosis Date    A-fib [facility])     Aphasia due to recent cerebrovascular accident 3/1/2016    Back pain     Grief reaction with prolonged bereavement 12/20/2016    Hepatitis     History of seasonal allergies     Hypertension     Kidney infection     [facility]my[facility] infarction) [facility]     MRSA (methicillin resistant Staphylococcus aureus)     Recurrent boils     Stroke ([facility]     Type II or unspecified type diabetes mellitus without mention of complication, not stated as uncontrolled        Past Surgical History:   Procedure Laterality Date    HX BLADDER REPAIR      HX CHOLECYSTECTOMY      HX HYSTERECTOMY      2000    HX ORTHOPAEDIC      pin in left foot    HX OTHER SURGICAL      defiberilator left corotid    HX OTHER SURGICAL      chris abscess    HX OTHER SURGICAL Left 11/2019    Carotid artery ligation- Dr Carolyne Stock       Family History   Problem Relation Age of Onset    OSTEOARTHRITIS Mother     Diabetes Father     Hypertension Father     Heart Attack Father     Heart Disease Father     Diabetes Paternal Grandmother     Mult Sclerosis Brother        Social History     Socioeconomic History    Marital status: SINGLE   Tobacco Use    Smoking status: Current Every Day Smoker    Smokeless tobacco: Never Used   Vaping Use    Vaping Use: Never used   Substance and Sexual Activity    Alcohol use: No    Drug use: Never    Sexual activity: Never       Review of Systems   Constitutional: Negative for chills, fever, malaise/fatigue and weight loss. HENT: Negative for congestion, hearing loss, sore throat and tinnitus. Eyes: Negative for blurred vision, pain and discharge. Respiratory: Negative for cough, shortness of breath and wheezing. Cardiovascular: Negative for chest pain, palpitations, orthopnea, claudication and leg swelling. Gastrointestinal: Negative for abdominal pain, constipation and heartburn. Genitourinary: Negative for dysuria, frequency and urgency. Musculoskeletal: Positive for back pain and myalgias. Negative for falls and joint pain. Skin: Negative for itching and rash. Neurological: Negative for dizziness, tingling, tremors and headaches. Endo/Heme/Allergies: Negative for environmental allergies and polydipsia. Psychiatric/Behavioral: Negative for depression and substance abuse. The patient is not nervous/anxious. Visit Vitals  /67 (BP 1 Location: Left arm)   Pulse 97   Temp 98.1 °F (36.7 °C)   Resp 20   Ht 5' 6\" (1.676 m)   Wt 159 lb (72.1 kg)   SpO2 99%   BMI 25.66 kg/m²     Physical Exam  Vitals reviewed. Constitutional:       General: She is not in acute distress. Appearance: Normal appearance. She is not ill-appearing. HENT:      Head: Normocephalic and atraumatic. Right Ear: Tympanic membrane, ear canal and external ear normal.      Left Ear: Tympanic membrane, ear canal and external ear normal.      Nose: Nose normal. No congestion or rhinorrhea. Mouth/Throat:      Mouth: Mucous membranes are moist.      Pharynx: No posterior oropharyngeal erythema. Eyes:      General:         Right eye: No discharge. Left eye: No discharge. Extraocular Movements: Extraocular movements intact. Conjunctiva/sclera: Conjunctivae normal.      Pupils: Pupils are equal, round, and reactive to light. Comments: Cornea anterior chamber and iris are normal.   Neck:      Trachea: No tracheal deviation. Cardiovascular:      Rate and Rhythm: Normal rate and regular rhythm. Pulses: Normal pulses. Heart sounds: Normal heart sounds. No murmur heard. No friction rub. No gallop. Pulmonary:      Effort: Pulmonary effort is normal. No respiratory distress. Breath sounds: Normal breath sounds. No wheezing or rhonchi. Chest:      Chest wall: No tenderness. Abdominal:      General: Bowel sounds are normal. There is no distension. Palpations: Abdomen is soft. There is no mass. Tenderness: There is no abdominal tenderness. There is no guarding or rebound. Musculoskeletal:         General: No tenderness or deformity. Cervical back: Normal range of motion and neck supple. Left lower leg: No edema. Lymphadenopathy:      Cervical: No cervical adenopathy. Skin:     General: Skin is warm and dry. Coloration: Skin is not pale. Findings: No erythema or rash. Neurological:      General: No focal deficit present. Mental Status: She is alert and oriented to person, place, and time. Cranial Nerves: No cranial nerve deficit. Motor: No abnormal muscle tone. Deep Tendon Reflexes: Reflexes are normal and symmetric. Reflexes normal.      Comments: Cranial nerves II through XII are intact sensory and motor. Biceps triceps knee and ankle DTRs are normal and symmetrical.   Psychiatric:         Mood and Affect: Mood normal.         Behavior: Behavior normal.         Thought Content: Thought content normal.         Judgment: Judgment normal.           ICD-10-CM ICD-9-CM    1. Essential hypertension  I10 401.9    2. Type 2 diabetes mellitus with other circulatory complication, with long-term current use of insulin ([facility])  E11.59 250.70 glipiZIDE (GLUCOTROL) 5 mg tablet    Z79.4 V58.67    3. Atrial fibrillation, unspecified type ([facility]  I48.91 427.31    4. Gastroesophageal reflux disease without esophagitis  K21.9 530.81    5. Type 2 diabetes mellitus with diabetic neuropathy, with long-term current use of insulin ([facility])  E11.40 250.60 LIPID PANEL    I51.6 505.9 METABOLIC PANEL, COMPREHENSIVE     V58.67 HEMOGLOBIN A1C WITH EAG      MICROALBUMIN, UR, RAND W/ MICROALB/CREAT RATIO      COLLECTION VENOUS BLOOD,VENIPUNCTURE       DIABETES FOOT EXAM      MICROALBUMIN, UR, RAND W/ MICROALB/CREAT RATIO      HEMOGLOBIN A1C WITH EAG      METABOLIC PANEL, COMPREHENSIVE      LIPID PANEL   6. Mixed dyslipidemia  E78.2 272.2    7. Cardiac defibrillator in place  Z95.810 V45.02    8. Late effects of CVA (cerebrovascular accident)  I69.90 438.9    9. Encounter for screening mammogram for breast cancer  Z12.31 V76.12 [facility] 3D RELL W MAMMO BI SCREENING INCL CAD   8. Colon cancer screening  Z12.11 V76.51 REFERRAL TO SURGERY   11. Primary osteoarthritis involving multiple joints  M89.49 715.98 gabapentin (NEURONTIN) 600 mg tablet   12. Chronic right-sided low back pain without sciatica  M54.50 724.2 cyclobenzaprine (FLEXERIL) 5 mg tablet    G89.29 338.29    13. Bilateral carotid artery stenosis  I65.23 433.10      433.30    14. DDD (degenerative disc disease), lumbar  M51.36 722.52    15.  Smoker  F17.200 305.1        Orders Placed This Encounter    [facility] 3D RELL W MAMMO BI SCREENING INCL CAD     Standing Status:   Future     Standing Expiration Date:   2/22/2023    LIPID PANEL     Standing Status:   Future     Number of Occurrences:   1     Standing Expiration Date:   3/08/4247    METABOLIC PANEL, COMPREHENSIVE     Standing Status:   Future     Number of Occurrences:   1     Standing Expiration Date:   3/22/2022    HEMOGLOBIN A1C WITH EAG     Standing Status:   Future     Number of Occurrences:   1     Standing Expiration Date:   3/22/2022    MICROALBUMIN, UR, RAND W/ MICROALB/CREAT RATIO     Standing Status:   Future     Number of Occurrences:   1     Standing Expiration Date: 3/22/2022    REFERRAL TO SURGERY     Referral Priority:   Routine     Referral Type:   Consultation     Referral Reason:   Specialty Services Required     Referred to Provider:   Rosalind Jasmine MD     Number of Visits Requested:   1     DIABETES FOOT EXAM    COLLECTION VENOUS BLOOD,VENIPUNCTURE    glipiZIDE (GLUCOTROL) 5 mg tablet     Sig: Take 1 Tablet by mouth two (2) times a day. Dispense:  180 Tablet     Refill:  1    gabapentin (NEURONTIN) 600 mg tablet     Sig: Half in the morning half in the afternoon and 1 at bedtime     Dispense:  180 Tablet     Refill:  0    cyclobenzaprine (FLEXERIL) 5 mg tablet     Si 3 times daily for back muscle spasm     Dispense:  270 Tablet     Refill:  1       Follow-up and Dispositions    · Return in about 6 months (around 2022), or if symptoms worsen or fail to improve.          Ronan Jo MD

## 2022-02-25 LAB
ALBUMIN SERPL-MCNC: 4.4 G/DL (ref 3.8–4.8)
ALBUMIN/CREAT UR: 51 MG/G CREAT (ref 0–29)
ALBUMIN/GLOB SERPL: 1.3 {RATIO} (ref 1.2–2.2)
ALP SERPL-CCNC: 109 IU/L (ref 44–121)
ALT SERPL-CCNC: 33 IU/L (ref 0–32)
AST SERPL-CCNC: 32 IU/L (ref 0–40)
BILIRUB SERPL-MCNC: 0.5 MG/DL (ref 0–1.2)
BUN SERPL-MCNC: 13 MG/DL (ref 8–27)
BUN/CREAT SERPL: 16 (ref 12–28)
CALCIUM SERPL-MCNC: 9.8 MG/DL (ref 8.7–10.3)
CHLORIDE SERPL-SCNC: 99 MMOL/L (ref 96–106)
CHOLEST SERPL-MCNC: 148 MG/DL (ref 100–199)
CO2 SERPL-SCNC: 21 MMOL/L (ref 20–29)
CREAT SERPL-MCNC: 0.8 MG/DL (ref 0.57–1)
CREAT UR-MCNC: 53 MG/DL
EST. AVERAGE GLUCOSE BLD GHB EST-MCNC: 189 MG/DL
GLOBULIN SER CALC-MCNC: 3.3 G/DL (ref 1.5–4.5)
GLUCOSE SERPL-MCNC: 184 MG/DL (ref 65–99)
HBA1C MFR BLD: 8.2 % (ref 4.8–5.6)
HDLC SERPL-MCNC: 32 MG/DL
IMP & REVIEW OF LAB RESULTS: NORMAL
LDLC SERPL CALC-MCNC: 79 MG/DL (ref 0–99)
MICROALBUMIN UR-MCNC: 26.8 UG/ML
POTASSIUM SERPL-SCNC: 4.5 MMOL/L (ref 3.5–5.2)
PROT SERPL-MCNC: 7.7 G/DL (ref 6–8.5)
SODIUM SERPL-SCNC: 139 MMOL/L (ref 134–144)
TRIGL SERPL-MCNC: 224 MG/DL (ref 0–149)
VLDLC SERPL CALC-MCNC: 37 MG/DL (ref 5–40)

## 2022-03-02 ENCOUNTER — TELEPHONE (OUTPATIENT)
Dept: FAMILY MEDICINE CLINIC | Age: 67
End: 2022-03-02

## 2022-03-02 NOTE — TELEPHONE ENCOUNTER
PA completed on CMM and approved. [employer] notified via Detailed VM.   PA Case: [id number], Status: Approved, Coverage Starts on: 12/1/2021 12:00:00 AM, Coverage Ends on: 3/2/2023 12:00:00 AM.

## 2022-03-02 NOTE — TELEPHONE ENCOUNTER
Prior Elizabet Du is needed for Cyclobenzaprine HCI 5 MG Tabs. Go to go.[facility]HEM. RRsat    Hattie Page  Last Name:  Saurabh Lazaro  :  1955

## 2022-03-19 PROBLEM — E11.40 TYPE 2 DIABETES MELLITUS WITH DIABETIC NEUROPATHY (HCC): Status: ACTIVE | Noted: 2018-05-04

## 2022-03-19 PROBLEM — K21.9 GASTROESOPHAGEAL REFLUX DISEASE WITHOUT ESOPHAGITIS: Status: ACTIVE | Noted: 2017-11-10

## 2022-03-19 PROBLEM — Z95.810 CARDIAC DEFIBRILLATOR IN PLACE: Status: ACTIVE | Noted: 2017-11-21

## 2022-03-19 PROBLEM — I69.90 LATE EFFECTS OF CVA (CEREBROVASCULAR ACCIDENT): Status: ACTIVE | Noted: 2018-10-16

## 2022-03-19 PROBLEM — M51.36 DDD (DEGENERATIVE DISC DISEASE), LUMBAR: Status: ACTIVE | Noted: 2019-04-29

## 2022-03-19 PROBLEM — M51.369 DDD (DEGENERATIVE DISC DISEASE), LUMBAR: Status: ACTIVE | Noted: 2019-04-29

## 2022-03-19 PROBLEM — K52.9 COLITIS: Status: ACTIVE | Noted: 2021-04-02

## 2022-03-20 PROBLEM — I65.23 BILATERAL CAROTID ARTERY STENOSIS: Status: ACTIVE | Noted: 2019-10-29

## 2022-03-20 PROBLEM — K76.0 FATTY LIVER: Status: ACTIVE | Noted: 2019-04-29

## 2022-05-20 ENCOUNTER — OFFICE VISIT (OUTPATIENT)
Dept: FAMILY MEDICINE CLINIC | Age: 67
End: 2022-05-20
Payer: MEDICARE

## 2022-05-20 VITALS
WEIGHT: 157.8 LBS | TEMPERATURE: 98.1 F | DIASTOLIC BLOOD PRESSURE: 76 MMHG | BODY MASS INDEX: 25.36 KG/M2 | RESPIRATION RATE: 20 BRPM | HEART RATE: 95 BPM | HEIGHT: 66 IN | SYSTOLIC BLOOD PRESSURE: 134 MMHG | OXYGEN SATURATION: 99 %

## 2022-05-20 DIAGNOSIS — G89.29 CHRONIC RIGHT-SIDED LOW BACK PAIN WITHOUT SCIATICA: ICD-10-CM

## 2022-05-20 DIAGNOSIS — M62.838 MUSCLE SPASM: ICD-10-CM

## 2022-05-20 DIAGNOSIS — Z79.4 TYPE 2 DIABETES MELLITUS WITH OTHER CIRCULATORY COMPLICATION, WITH LONG-TERM CURRENT USE OF INSULIN (HCC): ICD-10-CM

## 2022-05-20 DIAGNOSIS — M54.50 CHRONIC RIGHT-SIDED LOW BACK PAIN WITHOUT SCIATICA: ICD-10-CM

## 2022-05-20 DIAGNOSIS — E11.59 TYPE 2 DIABETES MELLITUS WITH OTHER CIRCULATORY COMPLICATION, WITH LONG-TERM CURRENT USE OF INSULIN (HCC): ICD-10-CM

## 2022-05-20 DIAGNOSIS — E11.40 TYPE 2 DIABETES MELLITUS WITH DIABETIC NEUROPATHY, WITH LONG-TERM CURRENT USE OF INSULIN (HCC): ICD-10-CM

## 2022-05-20 DIAGNOSIS — Z12.11 COLON CANCER SCREENING: Primary | ICD-10-CM

## 2022-05-20 DIAGNOSIS — Z79.4 TYPE 2 DIABETES MELLITUS WITH DIABETIC NEUROPATHY, WITH LONG-TERM CURRENT USE OF INSULIN (HCC): ICD-10-CM

## 2022-05-20 DIAGNOSIS — M15.9 PRIMARY OSTEOARTHRITIS INVOLVING MULTIPLE JOINTS: ICD-10-CM

## 2022-05-20 PROCEDURE — 3052F HG A1C>EQUAL 8.0%<EQUAL 9.0%: CPT | Performed by: FAMILY MEDICINE

## 2022-05-20 PROCEDURE — 3017F COLORECTAL CA SCREEN DOC REV: CPT | Performed by: FAMILY MEDICINE

## 2022-05-20 PROCEDURE — G8536 NO DOC ELDER MAL SCRN: HCPCS | Performed by: FAMILY MEDICINE

## 2022-05-20 PROCEDURE — 1090F PRES/ABSN URINE INCON ASSESS: CPT | Performed by: FAMILY MEDICINE

## 2022-05-20 PROCEDURE — G8752 SYS BP LESS 140: HCPCS | Performed by: FAMILY MEDICINE

## 2022-05-20 PROCEDURE — G8754 DIAS BP LESS 90: HCPCS | Performed by: FAMILY MEDICINE

## 2022-05-20 PROCEDURE — G8419 CALC BMI OUT NRM PARAM NOF/U: HCPCS | Performed by: FAMILY MEDICINE

## 2022-05-20 PROCEDURE — 99213 OFFICE O/P EST LOW 20 MIN: CPT | Performed by: FAMILY MEDICINE

## 2022-05-20 PROCEDURE — 2022F DILAT RTA XM EVC RTNOPTHY: CPT | Performed by: FAMILY MEDICINE

## 2022-05-20 PROCEDURE — G9899 SCRN MAM PERF RSLTS DOC: HCPCS | Performed by: FAMILY MEDICINE

## 2022-05-20 PROCEDURE — G8510 SCR DEP NEG, NO PLAN REQD: HCPCS | Performed by: FAMILY MEDICINE

## 2022-05-20 PROCEDURE — G8400 PT W/DXA NO RESULTS DOC: HCPCS | Performed by: FAMILY MEDICINE

## 2022-05-20 PROCEDURE — 1101F PT FALLS ASSESS-DOCD LE1/YR: CPT | Performed by: FAMILY MEDICINE

## 2022-05-20 PROCEDURE — G8427 DOCREV CUR MEDS BY ELIG CLIN: HCPCS | Performed by: FAMILY MEDICINE

## 2022-05-20 RX ORDER — CYCLOBENZAPRINE HCL 5 MG
TABLET ORAL
Qty: 270 TABLET | Refills: 1 | Status: SHIPPED | OUTPATIENT
Start: 2022-05-20 | End: 2022-06-21 | Stop reason: SDUPTHER

## 2022-05-20 RX ORDER — GABAPENTIN 600 MG/1
TABLET ORAL
Qty: 180 TABLET | Refills: 0 | Status: SHIPPED | OUTPATIENT
Start: 2022-05-20 | End: 2022-06-21 | Stop reason: SDUPTHER

## 2022-05-20 RX ORDER — BLOOD-GLUCOSE,RECEIVER,CONT
1 EACH MISCELLANEOUS
Qty: 1 EACH | Refills: 5 | Status: SHIPPED | OUTPATIENT
Start: 2022-05-20

## 2022-05-20 RX ORDER — BLOOD-GLUCOSE SENSOR
1 EACH MISCELLANEOUS
Qty: 4 EACH | Refills: 5 | Status: SHIPPED | OUTPATIENT
Start: 2022-05-20

## 2022-05-20 NOTE — PROGRESS NOTES
Rupert Kay is a 78 y.o. female who presents with the following:  Chief Complaint   Patient presents with    Foot Pain     Right first MPJ    Diabetes     Neuropathy and would prefer to use Dexcom       Patient complains of pain in the right first MPJ of the foot and denies any trauma. Patient does have diabetes and does have neuropathy but she definitely can feel pain in that right foot first MPJ. Patient states that she is tried to use the freestyle jose 14 but it keeps being knocked off of her arm as she is fairly active and she would like to try the Dexcom as it can be used on the abdomen and she feels that it would have less of a chance of being knocked loose. The patient also needs a refill of her gabapentin because of her neuropathy and neuropathic pain and cyclobenzaprine for muscle spasms. Allergies   Allergen Reactions    Pcn [Penicillins] Anaphylaxis    Sulfa (Sulfonamide Antibiotics) Nausea Only    Valium [Diazepam] Unknown (comments)       Current Outpatient Medications   Medication Sig    Blood-Glucose Sensor (Dexcom G6 Sensor) adam 1 Each by Does Not Apply route six (6) times daily.  Blood-Glucose Meter,Continuous (Dexcom ) misc 1 Each by Does Not Apply route six (6) times daily.  gabapentin (NEURONTIN) 600 mg tablet Half in the morning half in the afternoon and 1 at bedtime    cyclobenzaprine (FLEXERIL) 5 mg tablet 1 3 times daily for back muscle spasm    insulin glargine (Lantus U-100 Insulin) 100 unit/mL injection INJECT 30 UNITS SUBCUTANEOUSLY ONCE DAILY (Patient taking differently: 34 Units. Patient does 34 units)    pioglitazone (ACTOS) 15 mg tablet Take 1 Tablet by mouth daily.  pantoprazole (PROTONIX) 40 mg tablet 1 daily    Xarelto 20 mg tab tablet Take 1 tablet by mouth once daily with breakfast    glipiZIDE (GLUCOTROL) 5 mg tablet Take 1 Tablet by mouth two (2) times a day.     pravastatin (PRAVACHOL) 20 mg tablet Take 1 tablet by mouth nightly    valsartan (DIOVAN) 40 mg tablet Take 1 tablet by mouth twice daily    carvediloL (COREG) 6.25 mg tablet 1 twice daily    vit B Cmplx 3-FA-Vit C-Biotin (NEPHRO CHUCKIE RX) 1- mg-mg-mcg tablet Take 1 Tab by mouth daily. No current facility-administered medications for this visit. Past Medical History:   Diagnosis Date    [facility]fib [facility])     Aphasia due to recent cerebrovascular accident 3/1/2016    Back pain     Grief reaction with prolonged bereavement 12/20/2016    Hepatitis     History of seasonal allergies     Hypertension     Kidney infection     [facility]) [facility] [address]     MRSA (methicillin resistant Staphylococcus aureus)     Recurrent boils     [facility]     Type II or unspecified type diabetes mellitus without mention of complication, not stated as uncontrolled        Past Surgical History:   Procedure Laterality Date    HX BLADDER REPAIR      HX CHOLECYSTECTOMY      HX HYSTERECTOMY      2000    HX ORTHOPAEDIC      pin in left foot    HX OTHER SURGICAL      defiberilator left corotid    HX OTHER SURGICAL      chris abscess    HX OTHER SURGICAL Left 11/2019    Carotid artery ligation- Dr Soler Sessions       Family History   Problem Relation Age of Onset    OSTEOARTHRITIS Mother     Diabetes Father     Hypertension Father     Heart Attack Father     Heart Disease Father     Diabetes Paternal Grandmother     Mult Sclerosis Brother        Social History     Socioeconomic History    Marital status: SINGLE   Tobacco Use    Smoking status: Current Every Day Smoker    Smokeless tobacco: Never Used   Vaping Use    Vaping Use: Never used   Substance and Sexual Activity    Alcohol use: No    Drug use: Never    Sexual activity: Never       Review of Systems   Constitutional: Negative for chills, fever, malaise/fatigue and weight loss. HENT: Negative for congestion, hearing loss, sore throat and tinnitus. Eyes: Negative for blurred vision, pain and discharge. Respiratory: Negative for cough, shortness of breath and wheezing. Cardiovascular: Negative for chest pain, palpitations, orthopnea, claudication and leg swelling. Gastrointestinal: Negative for abdominal pain, constipation and heartburn. Genitourinary: Negative for dysuria, frequency and urgency. Musculoskeletal: Positive for joint pain (Right foot first MPJ). Negative for falls and myalgias. Skin: Negative for itching and rash. Neurological: Positive for tingling and sensory change. Negative for dizziness, tremors and headaches. Endo/Heme/Allergies: Negative for environmental allergies and polydipsia. Patient states that her blood sugars have been running high lately and she increased her Lantus to 34 units daily and I have encouraged her to keep a diary so she could bring it in and we can check it out but also to be aware that she could be coming down with an infection and that would make her blood sugars go up. Psychiatric/Behavioral: Negative for depression and substance abuse. The patient is not nervous/anxious. Visit Vitals  /76 (BP 1 Location: Left arm)   Pulse 95   Temp 98.1 °F (36.7 °C)   Resp 20   Ht 5' 6\" (1.676 m)   Wt 157 lb 12.8 oz (71.6 kg)   SpO2 99%   BMI 25.47 kg/m²     Physical Exam  Vitals reviewed. Constitutional:       General: She is not in acute distress. Appearance: Normal appearance. She is not ill-appearing. HENT:      Head: Normocephalic and atraumatic. Right Ear: Tympanic membrane, ear canal and external ear normal.      Left Ear: Tympanic membrane, ear canal and external ear normal.      Nose: Nose normal. No congestion or rhinorrhea. Mouth/Throat:      Mouth: Mucous membranes are moist.      Pharynx: No posterior oropharyngeal erythema. Eyes:      General:         Right eye: No discharge. Left eye: No discharge. Extraocular Movements: Extraocular movements intact.       Conjunctiva/sclera: Conjunctivae normal. Pupils: Pupils are equal, round, and reactive to light. Comments: Cornea anterior chamber and iris are normal.   Neck:      Trachea: No tracheal deviation. Cardiovascular:      Rate and Rhythm: Normal rate and regular rhythm. Pulses: Normal pulses. Heart sounds: Normal heart sounds. No murmur heard. No friction rub. No gallop. Pulmonary:      Effort: Pulmonary effort is normal. No respiratory distress. Breath sounds: Normal breath sounds. No wheezing or rhonchi. Chest:      Chest wall: No tenderness. Abdominal:      General: Bowel sounds are normal. There is no distension. Palpations: Abdomen is soft. There is no mass. Tenderness: There is no abdominal tenderness. There is no guarding or rebound. Musculoskeletal:         General: Tenderness (Right foot first MPJ with some redness) and deformity (Bunion right foot first MPJ) present. Cervical back: Normal range of motion and neck supple. Lymphadenopathy:      Cervical: No cervical adenopathy. Skin:     General: Skin is warm and dry. Coloration: Skin is not pale. Findings: No erythema or rash. Neurological:      General: No focal deficit present. Mental Status: She is alert and oriented to person, place, and time. Cranial Nerves: No cranial nerve deficit. Motor: No abnormal muscle tone. Deep Tendon Reflexes: Reflexes are normal and symmetric. Reflexes normal.      Comments: Cranial nerves II through XII are intact sensory and motor. Biceps triceps knee and ankle DTRs are normal and symmetrical.   Psychiatric:         Mood and Affect: Mood normal.         Behavior: Behavior normal.         Thought Content: Thought content normal.         Judgment: Judgment normal.           ICD-10-CM ICD-9-CM    1. Colon cancer screening  Z12.11 V76.51 OCCULT BLOOD IMMUNOASSAY,DIAGNOSTIC      OCCULT BLOOD IMMUNOASSAY,DIAGNOSTIC   2.  Type 2 diabetes mellitus with other circulatory complication, with long-term current use of insulin ([facility])  E11.59 250.70 Blood-Glucose Sensor (Dexcom G6 Sensor) adam    Z79.4 V58.67 Blood-Glucose Meter,Continuous (Dexcom ) misc   3. Type 2 diabetes mellitus with diabetic neuropathy, with long-term current use of insulin ([facility])  E11.40 250.60 Blood-Glucose Sensor (Dexcom G6 Sensor) adam    Z79.4 357.2 Blood-Glucose Meter,Continuous (Dexcom ) misc     V58.67    4. Muscle spasm  M62.838 728.85    5. Primary osteoarthritis involving multiple joints  M15.9 715.98 gabapentin (NEURONTIN) 600 mg tablet   6. Chronic right-sided low back pain without sciatica  M54.50 724.2 cyclobenzaprine (FLEXERIL) 5 mg tablet    G89.29 338.29        Orders Placed This Encounter    OCCULT BLOOD IMMUNOASSAY,DIAGNOSTIC     Standing Status:   Future     Number of Occurrences:   1     Standing Expiration Date:   2023     Order Specific Question:   QUEST SOURCE     Answer:   Stool [1161]    Blood-Glucose Sensor (Dexcom G6 Sensor) adam     Si Each by Does Not Apply route six (6) times daily. Dispense:  4 Each     Refill:  5    Blood-Glucose Meter,Continuous (Dexcom ) misc     Si Each by Does Not Apply route six (6) times daily. Dispense:  1 Each     Refill:  5    gabapentin (NEURONTIN) 600 mg tablet     Sig: Half in the morning half in the afternoon and 1 at bedtime     Dispense:  180 Tablet     Refill:  0    cyclobenzaprine (FLEXERIL) 5 mg tablet     Si 3 times daily for back muscle spasm     Dispense:  270 Tablet     Refill:  1   Was told she could try Tylenol for pain relief and she did use ice after covering the skin with a cloth to protect it. Follow-up and Dispositions    · Return if symptoms worsen or fail to improve.          Daksha Garcia MD

## 2022-05-20 NOTE — PROGRESS NOTES
1. Have you been to the ER, urgent care clinic since your last visit? Hospitalized since your last visit?no    2. Have you seen or consulted any other health care providers outside of the [address] [place] [address] since your last visit? Include any pap smears or colon screening.  no

## 2022-05-23 ENCOUNTER — TELEPHONE (OUTPATIENT)
Dept: FAMILY MEDICINE CLINIC | Age: 67
End: 2022-05-23

## 2022-05-23 NOTE — TELEPHONE ENCOUNTER
Prior Eloisa Hurley is needed for [facility] G5  Kit Device. Go to go[employer]    Key:  [id number]  Last Name:  Kerri Humphrey  :  1955

## 2022-05-26 NOTE — TELEPHONE ENCOUNTER
Received fax from [employer]. [employer] G6 Sensor has been approved from 02/24/22 to 05/26/2023. Faxed to [facility].

## 2022-06-06 ENCOUNTER — OFFICE VISIT (OUTPATIENT)
Dept: FAMILY MEDICINE CLINIC | Age: 67
End: 2022-06-06
Payer: MEDICARE

## 2022-06-06 ENCOUNTER — HOSPITAL ENCOUNTER (OUTPATIENT)
Dept: GENERAL RADIOLOGY | Age: 67
Discharge: HOME | End: 2022-06-06
Payer: MEDICARE

## 2022-06-06 ENCOUNTER — TELEPHONE (OUTPATIENT)
Dept: FAMILY MEDICINE CLINIC | Age: 67
End: 2022-06-06

## 2022-06-06 VITALS
SYSTOLIC BLOOD PRESSURE: 139 MMHG | OXYGEN SATURATION: 98 % | BODY MASS INDEX: 25.07 KG/M2 | TEMPERATURE: 97.9 F | HEART RATE: 98 BPM | RESPIRATION RATE: 16 BRPM | HEIGHT: 66 IN | DIASTOLIC BLOOD PRESSURE: 69 MMHG | WEIGHT: 156 LBS

## 2022-06-06 DIAGNOSIS — E08.621 DIABETIC ULCER OF TOE OF RIGHT FOOT ASSOCIATED WITH DIABETES MELLITUS DUE TO UNDERLYING CONDITION, UNSPECIFIED ULCER STAGE (HCC): ICD-10-CM

## 2022-06-06 DIAGNOSIS — L97.519 DIABETIC ULCER OF TOE OF RIGHT FOOT ASSOCIATED WITH DIABETES MELLITUS DUE TO UNDERLYING CONDITION, UNSPECIFIED ULCER STAGE (HCC): ICD-10-CM

## 2022-06-06 DIAGNOSIS — L97.519 DIABETIC ULCER OF TOE OF RIGHT FOOT ASSOCIATED WITH DIABETES MELLITUS DUE TO UNDERLYING CONDITION, UNSPECIFIED ULCER STAGE (HCC): Primary | ICD-10-CM

## 2022-06-06 DIAGNOSIS — E08.621 DIABETIC ULCER OF TOE OF RIGHT FOOT ASSOCIATED WITH DIABETES MELLITUS DUE TO UNDERLYING CONDITION, UNSPECIFIED ULCER STAGE (HCC): Primary | ICD-10-CM

## 2022-06-06 PROCEDURE — 1090F PRES/ABSN URINE INCON ASSESS: CPT | Performed by: NURSE PRACTITIONER

## 2022-06-06 PROCEDURE — G8432 DEP SCR NOT DOC, RNG: HCPCS | Performed by: NURSE PRACTITIONER

## 2022-06-06 PROCEDURE — G9899 SCRN MAM PERF RSLTS DOC: HCPCS | Performed by: NURSE PRACTITIONER

## 2022-06-06 PROCEDURE — 73630 X-RAY EXAM OF FOOT: CPT

## 2022-06-06 PROCEDURE — 2022F DILAT RTA XM EVC RTNOPTHY: CPT | Performed by: NURSE PRACTITIONER

## 2022-06-06 PROCEDURE — G8400 PT W/DXA NO RESULTS DOC: HCPCS | Performed by: NURSE PRACTITIONER

## 2022-06-06 PROCEDURE — 99213 OFFICE O/P EST LOW 20 MIN: CPT | Performed by: NURSE PRACTITIONER

## 2022-06-06 PROCEDURE — G8417 CALC BMI ABV UP PARAM F/U: HCPCS | Performed by: NURSE PRACTITIONER

## 2022-06-06 PROCEDURE — 3052F HG A1C>EQUAL 8.0%<EQUAL 9.0%: CPT | Performed by: NURSE PRACTITIONER

## 2022-06-06 PROCEDURE — 1101F PT FALLS ASSESS-DOCD LE1/YR: CPT | Performed by: NURSE PRACTITIONER

## 2022-06-06 PROCEDURE — 3017F COLORECTAL CA SCREEN DOC REV: CPT | Performed by: NURSE PRACTITIONER

## 2022-06-06 PROCEDURE — G8427 DOCREV CUR MEDS BY ELIG CLIN: HCPCS | Performed by: NURSE PRACTITIONER

## 2022-06-06 PROCEDURE — G8536 NO DOC ELDER MAL SCRN: HCPCS | Performed by: NURSE PRACTITIONER

## 2022-06-06 PROCEDURE — G8754 DIAS BP LESS 90: HCPCS | Performed by: NURSE PRACTITIONER

## 2022-06-06 PROCEDURE — 1123F ACP DISCUSS/DSCN MKR DOCD: CPT | Performed by: NURSE PRACTITIONER

## 2022-06-06 PROCEDURE — G8752 SYS BP LESS 140: HCPCS | Performed by: NURSE PRACTITIONER

## 2022-06-06 RX ORDER — CLINDAMYCIN HYDROCHLORIDE 300 MG/1
300 CAPSULE ORAL 3 TIMES DAILY
Qty: 30 CAPSULE | Refills: 0 | Status: SHIPPED | OUTPATIENT
Start: 2022-06-06 | End: 2022-06-16

## 2022-06-06 NOTE — PROGRESS NOTES
Subjective:     Chief Complaint   Patient presents with    Wound Check     right foot - Patient Diabetic Step on rock two months ago. Wound is 2cm x 2cm       Chetan Clarke is a 78 y.o. female who is a patient of Dr Evelia Fitzgerald, who presents today with c/o a \"foot ulcer. \" States 6 weeks ago she stepped on a rock and she now has a large thick callus on the ball of her foot. It is discolored and has been oozing serosanguinous drainage. Her sock is stained. The ball of her foot is a little swollen but she denies any pain. She is taking Gabapentin for pain from her arthritis of multiple joints and is wondering if the Gabapentin is masking the pain. She does have type 2 diabetes.      Lab Results   Component Value Date/Time    Hemoglobin A1c 8.2 (H) 02/23/2022 12:00 AM         Patient Active Problem List   Diagnosis Code    Smoker F17.200    Essential hypertension I10    Mixed dyslipidemia E78.2    Type 2 diabetes mellitus with circulatory disorder, with long-term current use of insulin ([facility]) E11.59, Z79.4    History of MRSA infection Z86.14    Gastroesophageal reflux disease without esophagitis K21.9    A-fib ([address] I48.91    Cardiac defibrillator in place Z95.810    Type 2 diabetes mellitus with diabetic neuropathy ([address] E11.40    Late effects of CVA (cerebrovascular accident) I69.90    Fatty liver K76.0    DDD (degenerative disc disease), lumbar M51.36    Bilateral carotid artery stenosis I65.23    Colitis K52.9    Muscle spasm M62.838       Past Medical History:   Diagnosis Date    A-fib [facility])     Aphasia due to recent cerebrovascular accident 3/1/2016    Back pain     Grief reaction with prolonged bereavement 12/20/2016    Hepatitis     History of seasonal allergies     Hypertension     Kidney infection     MI (myocardial infarction) ([address]     MRSA (methicillin resistant Staphylococcus aureus)     Recurrent boils     Stroke ([address]     Type II or unspecified type diabetes mellitus without mention of complication, not stated as uncontrolled          Current Outpatient Medications:     Blood-Glucose Sensor (Dexcom G6 Sensor) adam, 1 Each by Does Not Apply route six (6) times daily. , Disp: 4 Each, Rfl: 5    Blood-Glucose Meter,Continuous (Dexcom ) misc, 1 Each by Does Not Apply route six (6) times daily. , Disp: 1 Each, Rfl: 5    gabapentin (NEURONTIN) 600 mg tablet, Half in the morning half in the afternoon and 1 at bedtime, Disp: 180 Tablet, Rfl: 0    cyclobenzaprine (FLEXERIL) 5 mg tablet, 1 3 times daily for back muscle spasm, Disp: 270 Tablet, Rfl: 1    insulin glargine (Lantus U-100 Insulin) 100 unit/mL injection, INJECT 30 UNITS SUBCUTANEOUSLY ONCE DAILY (Patient taking differently: 34 Units. Patient does 34 units), Disp: 30 mL, Rfl: 0    pioglitazone (ACTOS) 15 mg tablet, Take 1 Tablet by mouth daily. , Disp: 90 Tablet, Rfl: 0    pantoprazole (PROTONIX) 40 mg tablet, 1 daily, Disp: 90 Tablet, Rfl: 0    Xarelto 20 mg tab tablet, Take 1 tablet by mouth once daily with breakfast, Disp: 90 Tablet, Rfl: 0    glipiZIDE (GLUCOTROL) 5 mg tablet, Take 1 Tablet by mouth two (2) times a day., Disp: 180 Tablet, Rfl: 1    pravastatin (PRAVACHOL) 20 mg tablet, Take 1 tablet by mouth nightly, Disp: 90 Tablet, Rfl: 0    valsartan (DIOVAN) 40 mg tablet, Take 1 tablet by mouth twice daily, Disp: 180 Tablet, Rfl: 0    carvediloL (COREG) 6.25 mg tablet, 1 twice daily, Disp: 180 Tablet, Rfl: 0    vit B Cmplx 3-FA-Vit C-Biotin (NEPHRO CHUCKIE RX) 1- mg-mg-mcg tablet, Take 1 Tab by mouth daily. , Disp: , Rfl:     Allergies   Allergen Reactions    Pcn [Penicillins] Anaphylaxis    Sulfa (Sulfonamide Antibiotics) Nausea Only    Valium [Diazepam] Unknown (comments)       Past Surgical History:   Procedure Laterality Date    HX BLADDER REPAIR      HX CHOLECYSTECTOMY      HX HYSTERECTOMY      2000    HX ORTHOPAEDIC      pin in left foot    HX OTHER SURGICAL      defiberilator left corotid    HX OTHER SURGICAL      chris abscess    HX OTHER SURGICAL Left 11/2019    Carotid artery ligation- Dr Satya Canas History     Tobacco Use   Smoking Status Current Every Day Smoker   Smokeless Tobacco Never Used       Social History     Socioeconomic History    Marital status: SINGLE   Tobacco Use    Smoking status: Current Every Day Smoker    Smokeless tobacco: Never Used   Vaping Use    Vaping Use: Never used   Substance and Sexual Activity    Alcohol use: No    Drug use: Never    Sexual activity: Never       Family History   Problem Relation Age of Onset    OSTEOARTHRITIS Mother     Diabetes Father     Hypertension Father     Heart Attack Father     Heart Disease Father     Diabetes Paternal Grandmother     Mult Sclerosis Brother        ROS:  Gen: denies fever, chills, or fatigue  Resp: denies dyspnea, cough, or wheezing  CV: denies chest pain, pressure, or palpitations  Extremeties: denies edema, pallor, or cyanosis  GI[de-identified] denies nausea/vomiting  Musculoskeletal: denies any joint pain or swelling in the right foot  Neuro: denies numbness/tingling in the feet  Skin: +ulcer to right foot    Objective:     Visit Vitals  /69 (BP 1 Location: Right arm)   Pulse 98   Temp 97.9 °F (36.6 °C) (Oral)   Resp 16   Ht 5' 6\" (1.676 m)   Wt 156 lb (70.8 kg)   SpO2 98%   BMI 25.18 kg/m²     Body mass index is 25.18 kg/m². General: Alert and oriented. No acute distress. Well nourished. HEENT :  Eyes: Sclera white, conjunctiva clear. PERRLA. Extra ocular movements intact. Neck: Supple with FROM. Lungs: Breathing even and unlabored. All lobes clear to auscultation bilaterally   Heart :RRR, S1 and S2 normal intensity, no extra heart sounds  Extremities: Non-edematous, no pallor or cyanosis. Feet are warm with Bilat. pedal pulses 2+  Musculoskeletal: Left foot is without joint pain, heat, erythema, or swelling.    Neuro: Cranial nerves grossly normal.  Sensory: Sensation intact to both feet with monofilament testing. Psych: Mood and thought content appropriate for situation. Dressed appropriately and with good hygiene. Skin: Warm and dry, there is a thick callus 2cm in diameter with a tiny central break in the skin. No drainage was able to be expressed but there is dried sero-sanguinous drainage on her sock. There is blood present underneath the skin with surrounding edema and mild erythema. No TTP      Assessment/ Plan:     Diabetic foot ulcer, unspecified stage  There is concern for a subcutaneous abscess or ulceration so will get foot xray   In the meantime with start Clindamycin 300mg TID x 10 days- side effects reviewed  Advised to take OTC probiotic daily to prevent C diff  Advised to stay off the foot as much as possible to avoid putting pressure on the ulcer  Call for any problems  Referred to podiatry- we will try to get her an appt this week and call her with the appt details  Pt verbalized understanding. Verbal and written instructions (see AVS) provided.  Patient expresses understanding of diagnosis and treatment plan. Health Maintenance Due   Topic Date Due    Eye Exam Retinal or Dilated  Never done    Breast Cancer Screen Mammogram  Never done    Pneumococcal 65+ years (2 - PCV) 10/18/2014    COVID-19 Vaccine (2 - Moderna 3-dose series) 05/25/2021    Colorectal Cancer Screening Combo  04/02/2022               Edna Orantes NP

## 2022-06-06 NOTE — TELEPHONE ENCOUNTER
Called patient and left VM. We need to give her the appointment info for podiatry. She will be seeig Dr Ganesh Mcneil on Friday 6-10-22 at 11:30 but she needs to arrive at 11:15am. Please fax over her records to the [place] location. Thanks!

## 2022-06-06 NOTE — PROGRESS NOTES
Chief Complaint   Patient presents with    Wound Check     right foot - Patient Diabetic Step on rock two months ago. Wound is 2cm x 2cm       1. \"Have you been to the ER, urgent care clinic since your last visit? Hospitalized since your last visit? \" No    2. \"Have you seen or consulted any other health care providers outside of the [address] [place] [address] since your last visit? \" No     3. For patients aged 39-70: Has the patient had a colonoscopy / FIT/ Cologuard? No      If the patient is female:    4. For patients aged 41-77: Has the patient had a mammogram within the past 2 years? No      5. For patients aged 21-65: Has the patient had a pap smear? NA - based on age or sex      Identified pt with two pt identifiers(name and ). Reviewed record in preparation for visit and have obtained necessary documentation.     Symptom review:    NO  Fever   NO  Shaking chills  NO  Cough  NO Headaches  NO  Body aches  NO  Coughing up blood  NO  Chest congestion  NO  Chest pain  NO  Shortness of breath  NO  Profound Loss of smell/taste  NO  Nausea/Vomiting   NO  Loose stool/Diarrhea  NO  any skin issues

## 2022-06-06 NOTE — PATIENT INSTRUCTIONS
Medicare Wellness Visit, Female     The best way to live healthy is to have a lifestyle where you eat a well-balanced diet, exercise regularly, limit alcohol use, and quit all forms of tobacco/nicotine, if applicable. Regular preventive services are another way to keep healthy. Preventive services (vaccines, screening tests, monitoring & exams) can help personalize your care plan, which helps you manage your own care. Screening tests can find health problems at the earliest stages, when they are easiest to treat. [facility] follows the current, evidence-based guidelines published by the [facility] White ([facility]) when recommending preventive services for our patients. Because we follow these guidelines, sometimes recommendations change over time as research supports it. (For example, mammograms used to be recommended annually. Even though Medicare will still pay for an annual mammogram, the newer guidelines recommend a mammogram every two years for women of average risk). Of course, you and your doctor may decide to screen more often for some diseases, based on your risk and your co-morbidities (chronic disease you are already diagnosed with). Preventive services for you include:  - Medicare offers their members a free annual wellness visit, which is time for you and your primary care provider to discuss and plan for your preventive service needs. Take advantage of this benefit every year!  -All adults over the age of 72 should receive the recommended pneumonia vaccines. Current USPSTF guidelines recommend a series of two vaccines for the best pneumonia protection.   -All adults should have a flu vaccine yearly and a tetanus vaccine every 10 years.   -All adults age 48 and older should receive the shingles vaccines (series of two vaccines).       -All adults age 38-68 who are overweight should have a diabetes screening test once every three years.   -All adults born between [birth year] and 1965 should be screened once for Hepatitis C.  -Other screening tests and preventive services for persons with diabetes include: an eye exam to screen for diabetic retinopathy, a kidney function test, a foot exam, and stricter control over your cholesterol.   -Cardiovascular screening for adults with routine risk involves an electrocardiogram (ECG) at intervals determined by your doctor.   -Colorectal cancer screenings should be done for adults age 54-65 with no increased risk factors for colorectal cancer. There are a number of acceptable methods of screening for this type of cancer. Each test has its own benefits and drawbacks. Discuss with your doctor what is most appropriate for you during your annual wellness visit. The different tests include: colonoscopy (considered the best screening method), a fecal occult blood test, a fecal DNA test, and sigmoidoscopy.    -A bone mass density test is recommended when a woman turns 65 to screen for osteoporosis. This test is only recommended one time, as a screening. Some providers will use this same test as a disease monitoring tool if you already have osteoporosis. -Breast cancer screenings are recommended every other year for women of normal risk, age 54-69.  -Cervical cancer screenings for women over age 72 are only recommended with certain risk factors.      Here is a list of your current Health Maintenance items (your personalized list of preventive services) with a due date:  Health Maintenance Due   Topic Date Due    Eye Exam  Never done    Mammogram  Never done    Pneumococcal Vaccine (2 - PCV) 10/18/2014    COVID-19 Vaccine (2 - Moderna 3-dose series) 05/25/2021    Colorectal Screening  04/02/2022

## 2022-06-07 ENCOUNTER — DOCUMENTATION ONLY (OUTPATIENT)
Dept: FAMILY MEDICINE CLINIC | Age: 67
End: 2022-06-07

## 2022-06-07 NOTE — PROGRESS NOTES
Xray of foot shows arthritis and bone spurs but no sign of any subcutaneous abscess or bone infection. F/U with podiatrist as planned. Appt scheduled for Friday 6-10-22 at 11:30- arrive at 11:15am (Dr Mikel Isabel in [place]).

## 2022-06-21 DIAGNOSIS — Z79.4 TYPE 2 DIABETES MELLITUS WITH OTHER CIRCULATORY COMPLICATION, WITH LONG-TERM CURRENT USE OF INSULIN (HCC): ICD-10-CM

## 2022-06-21 DIAGNOSIS — M15.9 PRIMARY OSTEOARTHRITIS INVOLVING MULTIPLE JOINTS: ICD-10-CM

## 2022-06-21 DIAGNOSIS — E11.59 TYPE 2 DIABETES MELLITUS WITH OTHER CIRCULATORY COMPLICATION, WITH LONG-TERM CURRENT USE OF INSULIN (HCC): ICD-10-CM

## 2022-06-21 DIAGNOSIS — E11.59 CONTROLLED TYPE 2 DIABETES MELLITUS WITH OTHER CIRCULATORY COMPLICATION, WITHOUT LONG-TERM CURRENT USE OF INSULIN (HCC): ICD-10-CM

## 2022-06-21 DIAGNOSIS — M54.50 CHRONIC RIGHT-SIDED LOW BACK PAIN WITHOUT SCIATICA: ICD-10-CM

## 2022-06-21 DIAGNOSIS — G89.29 CHRONIC RIGHT-SIDED LOW BACK PAIN WITHOUT SCIATICA: ICD-10-CM

## 2022-06-21 RX ORDER — CYCLOBENZAPRINE HCL 5 MG
TABLET ORAL
Qty: 270 TABLET | Refills: 0 | Status: SHIPPED | OUTPATIENT
Start: 2022-06-21

## 2022-06-21 RX ORDER — GABAPENTIN 600 MG/1
TABLET ORAL
Qty: 120 TABLET | Refills: 0 | Status: SHIPPED | OUTPATIENT
Start: 2022-06-21 | End: 2022-08-18 | Stop reason: SDUPTHER

## 2022-06-21 RX ORDER — PIOGLITAZONEHYDROCHLORIDE 15 MG/1
15 TABLET ORAL DAILY
Qty: 90 TABLET | Refills: 0 | Status: SHIPPED | OUTPATIENT
Start: 2022-06-21

## 2022-06-21 RX ORDER — GLIPIZIDE 5 MG/1
5 TABLET ORAL 2 TIMES DAILY
Qty: 180 TABLET | Refills: 0 | Status: SHIPPED | OUTPATIENT
Start: 2022-06-21 | End: 2022-08-23 | Stop reason: ALTCHOICE

## 2022-06-21 RX ORDER — INSULIN GLARGINE 100 [IU]/ML
34 INJECTION, SOLUTION SUBCUTANEOUS DAILY
Qty: 10 ML | Refills: 5 | Status: SHIPPED | OUTPATIENT
Start: 2022-06-21 | End: 2022-08-09

## 2022-07-13 ENCOUNTER — PATIENT MESSAGE (OUTPATIENT)
Dept: FAMILY MEDICINE CLINIC | Age: 67
End: 2022-07-13

## 2022-08-18 ENCOUNTER — OFFICE VISIT (OUTPATIENT)
Dept: FAMILY MEDICINE CLINIC | Age: 67
End: 2022-08-18
Payer: MEDICARE

## 2022-08-18 VITALS
HEIGHT: 66 IN | RESPIRATION RATE: 18 BRPM | TEMPERATURE: 98.4 F | SYSTOLIC BLOOD PRESSURE: 148 MMHG | DIASTOLIC BLOOD PRESSURE: 62 MMHG | OXYGEN SATURATION: 97 % | HEART RATE: 94 BPM | BODY MASS INDEX: 25.46 KG/M2 | WEIGHT: 158.4 LBS

## 2022-08-18 DIAGNOSIS — Z79.4 TYPE 2 DIABETES MELLITUS WITH OTHER CIRCULATORY COMPLICATION, WITH LONG-TERM CURRENT USE OF INSULIN (HCC): Primary | ICD-10-CM

## 2022-08-18 DIAGNOSIS — E11.59 TYPE 2 DIABETES MELLITUS WITH OTHER CIRCULATORY COMPLICATION, WITH LONG-TERM CURRENT USE OF INSULIN (HCC): Primary | ICD-10-CM

## 2022-08-18 DIAGNOSIS — M15.9 PRIMARY OSTEOARTHRITIS INVOLVING MULTIPLE JOINTS: ICD-10-CM

## 2022-08-18 PROCEDURE — 1101F PT FALLS ASSESS-DOCD LE1/YR: CPT | Performed by: NURSE PRACTITIONER

## 2022-08-18 PROCEDURE — G8427 DOCREV CUR MEDS BY ELIG CLIN: HCPCS | Performed by: NURSE PRACTITIONER

## 2022-08-18 PROCEDURE — 1090F PRES/ABSN URINE INCON ASSESS: CPT | Performed by: NURSE PRACTITIONER

## 2022-08-18 PROCEDURE — 3052F HG A1C>EQUAL 8.0%<EQUAL 9.0%: CPT | Performed by: NURSE PRACTITIONER

## 2022-08-18 PROCEDURE — G8753 SYS BP > OR = 140: HCPCS | Performed by: NURSE PRACTITIONER

## 2022-08-18 PROCEDURE — G8754 DIAS BP LESS 90: HCPCS | Performed by: NURSE PRACTITIONER

## 2022-08-18 PROCEDURE — G8417 CALC BMI ABV UP PARAM F/U: HCPCS | Performed by: NURSE PRACTITIONER

## 2022-08-18 PROCEDURE — 3017F COLORECTAL CA SCREEN DOC REV: CPT | Performed by: NURSE PRACTITIONER

## 2022-08-18 PROCEDURE — 36415 COLL VENOUS BLD VENIPUNCTURE: CPT | Performed by: NURSE PRACTITIONER

## 2022-08-18 PROCEDURE — G8400 PT W/DXA NO RESULTS DOC: HCPCS | Performed by: NURSE PRACTITIONER

## 2022-08-18 PROCEDURE — 99213 OFFICE O/P EST LOW 20 MIN: CPT | Performed by: NURSE PRACTITIONER

## 2022-08-18 PROCEDURE — G8432 DEP SCR NOT DOC, RNG: HCPCS | Performed by: NURSE PRACTITIONER

## 2022-08-18 PROCEDURE — 1123F ACP DISCUSS/DSCN MKR DOCD: CPT | Performed by: NURSE PRACTITIONER

## 2022-08-18 PROCEDURE — 2022F DILAT RTA XM EVC RTNOPTHY: CPT | Performed by: NURSE PRACTITIONER

## 2022-08-18 PROCEDURE — G9899 SCRN MAM PERF RSLTS DOC: HCPCS | Performed by: NURSE PRACTITIONER

## 2022-08-18 PROCEDURE — G8536 NO DOC ELDER MAL SCRN: HCPCS | Performed by: NURSE PRACTITIONER

## 2022-08-18 RX ORDER — GABAPENTIN 600 MG/1
TABLET ORAL
Qty: 180 TABLET | Refills: 0 | Status: SHIPPED | OUTPATIENT
Start: 2022-08-18

## 2022-08-18 NOTE — PROGRESS NOTES
Lainey Becker is a 78 y.o. female who presents today with c/o   Chief Complaint   Patient presents with    Anxiety           Assessment/ Plan:         ICD-10-CM ICD-9-CM    1. Type 2 diabetes mellitus with other circulatory complication, with long-term current use of insulin ([facility])  E11.59 250.70 HEMOGLOBIN A1C WITH EAG    Z79.4 V58.67 CBC WITH AUTOMATED DIFF      METABOLIC PANEL, COMPREHENSIVE      HEMOGLOBIN A1C WITH EAG      CBC WITH AUTOMATED DIFF      METABOLIC PANEL, COMPREHENSIVE      COLLECTION VENOUS BLOOD,VENIPUNCTURE      2. Primary osteoarthritis involving multiple joints  M15.9 715.98 gabapentin (NEURONTIN) 600 mg tablet        Check labs today  Follow up in three month    Orders Placed This Encounter    HEMOGLOBIN A1C WITH EAG     Standing Status:   Future     Number of Occurrences:   1     Standing Expiration Date:   8/18/2023    CBC WITH AUTOMATED DIFF     Standing Status:   Future     Number of Occurrences:   1     Standing Expiration Date:   7/14/1260    METABOLIC PANEL, COMPREHENSIVE     Standing Status:   Future     Number of Occurrences:   1     Standing Expiration Date:   8/18/2023    COLLECTION VENOUS BLOOD,VENIPUNCTURE    gabapentin (NEURONTIN) 600 mg tablet     Sig: Half in the morning half in the afternoon and 1 at bedtime     Dispense:  180 Tablet     Refill:  0       Follow-up and Dispositions    Return in about 3 months (around 11/18/2022). Subjective:  Lainey Becker is a 78 y.o. female here for three month follow up on her diabetes and medication refills. See PMH below. T2DM: Taking lantus 30 units daily, glipizide 5 mg BID and pioglitazone 15 mg daily. Radha well. Denies hypoglycemic episodes. Will check labs today. Lab Results   Component Value Date/Time    Hemoglobin A1c 8.2 (H) 02/23/2022 12:00 AM     The patient also needs a refill of her gabapentin because of her neuropathy and neuropathic pain related to her degenerative disc disease.     Patient Active Problem List Diagnosis Code    Smoker F17.200    Essential hypertension I10    Mixed dyslipidemia E78.2    Type 2 diabetes mellitus with circulatory disorder, with long-term current use of insulin ([facility]) E11.59, Z79.4    History of MRSA infection Z86.14    Gastroesophageal reflux disease without esophagitis K21.9    A-fib ([facility]) I48.91    Cardiac defibrillator in place Z95.810    Type 2 diabetes mellitus with diabetic neuropathy ([facility]) E11.40    Late effects of CVA (cerebrovascular accident) I69.90    Fatty liver K76.0    DDD (degenerative disc disease), lumbar M51.36    Bilateral carotid artery stenosis I65.23    Colitis K52.9    Muscle spasm M62.838       Past Medical History:   Diagnosis Date    A-fib ([facility]     Aphasia due to recent cerebrovascular accident 3/1/2016    Back pain     Grief reaction with prolonged bereavement 12/20/2016    Hepatitis     History of seasonal allergies     Hypertension     Kidney infection     MI (myocardial infarction) ([facility]     MRSA (methicillin resistant Staphylococcus aureus)     Recurrent boils     Stroke ([facility]     Type II or unspecified type diabetes mellitus without mention of complication, not stated as uncontrolled          Current Outpatient Medications:     gabapentin (NEURONTIN) 600 mg tablet, Half in the morning half in the afternoon and 1 at bedtime, Disp: 180 Tablet, Rfl: 0    Lantus U-100 Insulin 100 unit/mL injection, INJECT 30 UNITS SUBCUTANEOUSLY ONCE DAILY, Disp: 30 mL, Rfl: 0    pantoprazole (PROTONIX) 40 mg tablet, Take 1 tablet by mouth once daily, Disp: 90 Tablet, Rfl: 0    Xarelto 20 mg tab tablet, Take 1 tablet by mouth once daily with breakfast, Disp: 90 Tablet, Rfl: 0    glipiZIDE (GLUCOTROL) 5 mg tablet, Take 1 Tablet by mouth two (2) times a day., Disp: 180 Tablet, Rfl: 0    pioglitazone (ACTOS) 15 mg tablet, Take 1 Tablet by mouth daily. , Disp: 90 Tablet, Rfl: 0    cyclobenzaprine (FLEXERIL) 5 mg tablet, 1 3 times daily for back muscle spasm, Disp: 270 Tablet, Rfl: 0    Blood-Glucose Sensor (Dexcom G6 Sensor) adam, 1 Each by Does Not Apply route six (6) times daily. , Disp: 4 Each, Rfl: 5    Blood-Glucose Meter,Continuous (Dexcom ) misc, 1 Each by Does Not Apply route six (6) times daily. , Disp: 1 Each, Rfl: 5    pravastatin (PRAVACHOL) 20 mg tablet, Take 1 tablet by mouth nightly, Disp: 90 Tablet, Rfl: 0    valsartan (DIOVAN) 40 mg tablet, Take 1 tablet by mouth twice daily, Disp: 180 Tablet, Rfl: 0    carvediloL (COREG) 6.25 mg tablet, 1 twice daily, Disp: 180 Tablet, Rfl: 0    vit B Cmplx 3-FA-Vit C-Biotin (NEPHRO CHUCKIE RX) 1- mg-mg-mcg tablet, Take 1 Tab by mouth daily. , Disp: , Rfl:     Allergies   Allergen Reactions    Pcn [Penicillins] Anaphylaxis    Sulfa (Sulfonamide Antibiotics) Nausea Only    Valium [Diazepam] Unknown (comments)       Past Surgical History:   Procedure Laterality Date    HX BLADDER REPAIR      HX CHOLECYSTECTOMY      HX HYSTERECTOMY      2000    HX ORTHOPAEDIC      pin in left foot    HX OTHER SURGICAL      defiberilator left corotid    HX OTHER SURGICAL      chris abscess    HX OTHER SURGICAL Left 11/2019    Carotid artery ligation- Dr Josselyn Ramanr History     Tobacco Use   Smoking Status Every Day    Packs/day: 1.00    Years: 35.00    Pack years: 35.00    Types: Cigarettes   Smokeless Tobacco Never       Social History     Socioeconomic History    Marital status: SINGLE   Tobacco Use    Smoking status: Every Day     Packs/day: 1.00     Years: 35.00     Pack years: 35.00     Types: Cigarettes    Smokeless tobacco: Never   Vaping Use    Vaping Use: Never used   Substance and Sexual Activity    Alcohol use: No    Drug use: Never    Sexual activity: Never     Social Determinants of Health     Financial Resource Strain: Low Risk     Difficulty of Paying Living Expenses: Not hard at all   Food Insecurity: No Food Insecurity    Worried About Running Out of Food in the Last Year: Never true    Ran Out of Food in the Last Year: Never true Family History   Problem Relation Age of Onset    OSTEOARTHRITIS Mother     Diabetes Father     Hypertension Father     Heart Attack Father     Heart Disease Father     Diabetes Paternal Grandmother     Mult Sclerosis Brother        ROS:  Review of Systems   Constitutional:  Negative for chills, fever and weight loss. HENT:  Negative for hearing loss. Eyes:  Negative for blurred vision and double vision. Respiratory:  Negative for cough. Cardiovascular:  Negative for chest pain and palpitations. Gastrointestinal:  Negative for abdominal pain, heartburn, nausea and vomiting. Genitourinary:  Negative for dysuria and urgency. Musculoskeletal:  Negative for myalgias. Skin:  Negative for itching and rash. Neurological:  Negative for dizziness and headaches. Psychiatric/Behavioral:  Negative for depression. Objective:     Visit Vitals  BP (!) 148/62 (BP 1 Location: Left upper arm)   Pulse 94   Temp 98.4 °F (36.9 °C) (Oral)   Resp 18   Ht 5' 6\" (1.676 m)   Wt 158 lb 6.4 oz (71.8 kg)   SpO2 97%   BMI 25.57 kg/m²     Body mass index is 25.57 kg/m². Physical Exam  Vitals reviewed. Constitutional:       General: She is not in acute distress. Appearance: She is not ill-appearing or toxic-appearing. HENT:      Head: Normocephalic. Right Ear: External ear normal.      Left Ear: External ear normal.      Nose: Nose normal.      Mouth/Throat:      Mouth: Mucous membranes are moist.   Eyes:      Pupils: Pupils are equal, round, and reactive to light. Cardiovascular:      Rate and Rhythm: Normal rate. Pulses: Normal pulses. Pulmonary:      Effort: Pulmonary effort is normal.   Abdominal:      General: Abdomen is flat. Musculoskeletal:         General: Normal range of motion. Cervical back: Normal range of motion. Skin:     General: Skin is warm. Neurological:      Mental Status: She is alert and oriented to person, place, and time.    Psychiatric:         Behavior: Behavior normal.       Results for orders placed or performed in visit on 02/22/22   MICROALBUMIN, UR, RAND W/ MICROALB/CREAT RATIO   Result Value Ref Range    Creatinine, urine 53.0 Not Estab. mg/dL    Microalbumin, urine 26.8 Not Estab. ug/mL    Microalb/Creat ratio (ug/mg creat.) 51 (H) 0 - 29 mg/g creat   HEMOGLOBIN A1C WITH EAG   Result Value Ref Range    Hemoglobin A1c 8.2 (H) 4.8 - 5.6 %    Estimated average glucose 796 mg/dL   METABOLIC PANEL, COMPREHENSIVE   Result Value Ref Range    Glucose 184 (H) 65 - 99 mg/dL    BUN 13 8 - 27 mg/dL    Creatinine 0.80 0.57 - 1.00 mg/dL    GFR est non-AA 78 >59 mL/min/1.73    GFR est AA 89 >59 mL/min/1.73    BUN/Creatinine ratio 16 12 - 28    Sodium 139 134 - 144 mmol/L    Potassium 4.5 3.5 - 5.2 mmol/L    Chloride 99 96 - 106 mmol/L    CO2 21 20 - 29 mmol/L    Calcium 9.8 8.7 - 10.3 mg/dL    Protein, total 7.7 6.0 - 8.5 g/dL    Albumin 4.4 3.8 - 4.8 g/dL    GLOBULIN, TOTAL 3.3 1.5 - 4.5 g/dL    A-G Ratio 1.3 1.2 - 2.2    Bilirubin, total 0.5 0.0 - 1.2 mg/dL    Alk. phosphatase 109 44 - 121 IU/L    AST (SGOT) 32 0 - 40 IU/L    ALT (SGPT) 33 (H) 0 - 32 IU/L   LIPID PANEL   Result Value Ref Range    Cholesterol, total 148 100 - 199 mg/dL    Triglyceride 224 (H) 0 - 149 mg/dL    HDL Cholesterol 32 (L) >39 mg/dL    VLDL, calculated 37 5 - 40 mg/dL    LDL, calculated 79 0 - 99 mg/dL   CVD REPORT   Result Value Ref Range    INTERPRETATION Note        No results found for this visit on 08/18/22. Verbal and written instructions (see AVS) provided. Patient expresses understanding of diagnosis and treatment plan. Follow-up and Dispositions    Return in about 3 months (around 11/18/2022). Patient has been advised to contact practice or seek care if condition persists or worsens.      Rodríguez Lacey NP

## 2022-08-18 NOTE — PROGRESS NOTES
Patient refused Annual Wellness    1. \"Have you been to the ER, urgent care clinic since your last visit? Hospitalized since your last visit? \" No    2. \"Have you seen or consulted any other health care providers outside of the [address] [place] [address] since your last visit? \" No     3. For patients aged 39-70: Has the patient had a colonoscopy / FIT/ Cologuard? No      If the patient is female:    4. For patients aged 41-77: Has the patient had a mammogram within the past 2 years? No      5. For patients aged 21-65: Has the patient had a pap smear? NA - based on age or sex      Identified pt with two pt identifiers(name and ). Reviewed record in preparation for visit and have obtained necessary documentation.     Symptom review:    NO  Fever   NO  Shaking chills  NO  Cough  NO Headaches  NO  Body aches  NO  Coughing up blood  NO  Chest congestion  NO  Chest pain  NO  Shortness of breath  NO  Profound Loss of smell/taste  NO  Nausea/Vomiting   NO  Loose stool/Diarrhea  NO  any skin issues

## 2022-08-20 LAB
ALBUMIN SERPL-MCNC: 4.1 G/DL (ref 3.8–4.8)
ALBUMIN/GLOB SERPL: 1.1 {RATIO} (ref 1.2–2.2)
ALP SERPL-CCNC: 124 IU/L (ref 44–121)
ALT SERPL-CCNC: 31 IU/L (ref 0–32)
AST SERPL-CCNC: 25 IU/L (ref 0–40)
BASOPHILS # BLD AUTO: 0.1 X10E3/UL (ref 0–0.2)
BASOPHILS NFR BLD AUTO: 0 %
BILIRUB SERPL-MCNC: 0.3 MG/DL (ref 0–1.2)
BUN SERPL-MCNC: 11 MG/DL (ref 8–27)
BUN/CREAT SERPL: 15 (ref 12–28)
CALCIUM SERPL-MCNC: 9.5 MG/DL (ref 8.7–10.3)
CHLORIDE SERPL-SCNC: 100 MMOL/L (ref 96–106)
CO2 SERPL-SCNC: 21 MMOL/L (ref 20–29)
CREAT SERPL-MCNC: 0.71 MG/DL (ref 0.57–1)
EGFR: 94 ML/MIN/1.73
EOSINOPHIL # BLD AUTO: 0.4 X10E3/UL (ref 0–0.4)
EOSINOPHIL NFR BLD AUTO: 3 %
ERYTHROCYTE [DISTWIDTH] IN BLOOD BY AUTOMATED COUNT: 11.8 % (ref 11.7–15.4)
EST. AVERAGE GLUCOSE BLD GHB EST-MCNC: 200 MG/DL
GLOBULIN SER CALC-MCNC: 3.7 G/DL (ref 1.5–4.5)
GLUCOSE SERPL-MCNC: 221 MG/DL (ref 65–99)
HBA1C MFR BLD: 8.6 % (ref 4.8–5.6)
HCT VFR BLD AUTO: 44 % (ref 34–46.6)
HGB BLD-MCNC: 14.7 G/DL (ref 11.1–15.9)
IMM GRANULOCYTES # BLD AUTO: 0 X10E3/UL (ref 0–0.1)
IMM GRANULOCYTES NFR BLD AUTO: 0 %
LYMPHOCYTES # BLD AUTO: 2.4 X10E3/UL (ref 0.7–3.1)
LYMPHOCYTES NFR BLD AUTO: 21 %
MCH RBC QN AUTO: 31.5 PG (ref 26.6–33)
MCHC RBC AUTO-ENTMCNC: 33.4 G/DL (ref 31.5–35.7)
MCV RBC AUTO: 94 FL (ref 79–97)
MONOCYTES # BLD AUTO: 0.5 X10E3/UL (ref 0.1–0.9)
MONOCYTES NFR BLD AUTO: 4 %
MORPHOLOGY BLD-IMP: ABNORMAL
NEUTROPHILS # BLD AUTO: 8.2 X10E3/UL (ref 1.4–7)
NEUTROPHILS NFR BLD AUTO: 72 %
PLATELET # BLD AUTO: ABNORMAL X10E3/UL
POTASSIUM SERPL-SCNC: 4.6 MMOL/L (ref 3.5–5.2)
PROT SERPL-MCNC: 7.8 G/DL (ref 6–8.5)
RBC # BLD AUTO: 4.66 X10E6/UL (ref 3.77–5.28)
SODIUM SERPL-SCNC: 140 MMOL/L (ref 134–144)
WBC # BLD AUTO: 11.5 X10E3/UL (ref 3.4–10.8)

## 2022-08-20 NOTE — PROGRESS NOTES
Blood sugars are running higher than 5 months ago--A1C is now 8.6.  We can increase her glipizide or she can work on diet and exercise and we will increase in 3 months if A1C is not at goal.

## 2022-08-23 RX ORDER — GLIPIZIDE 10 MG/1
10 TABLET ORAL 2 TIMES DAILY
Qty: 180 TABLET | Refills: 0 | Status: SHIPPED | OUTPATIENT
Start: 2022-08-23

## 2022-08-31 ENCOUNTER — OFFICE VISIT (OUTPATIENT)
Dept: FAMILY MEDICINE CLINIC | Age: 67
End: 2022-08-31
Payer: MEDICARE

## 2022-08-31 VITALS
HEIGHT: 66 IN | DIASTOLIC BLOOD PRESSURE: 76 MMHG | RESPIRATION RATE: 18 BRPM | WEIGHT: 158 LBS | BODY MASS INDEX: 25.39 KG/M2 | TEMPERATURE: 97 F | SYSTOLIC BLOOD PRESSURE: 140 MMHG | HEART RATE: 70 BPM | OXYGEN SATURATION: 97 %
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
## 2022-08-31 PROCEDURE — 1123F ACP DISCUSS/DSCN MKR DOCD: CPT | Performed by: NURSE PRACTITIONER

## 2022-08-31 PROCEDURE — 1101F PT FALLS ASSESS-DOCD LE1/YR: CPT | Performed by: NURSE PRACTITIONER

## 2022-08-31 PROCEDURE — G9899 SCRN MAM PERF RSLTS DOC: HCPCS | Performed by: NURSE PRACTITIONER

## 2022-08-31 PROCEDURE — G8432 DEP SCR NOT DOC, RNG: HCPCS | Performed by: NURSE PRACTITIONER

## 2022-08-31 PROCEDURE — 99213 OFFICE O/P EST LOW 20 MIN: CPT | Performed by: NURSE PRACTITIONER

## 2022-08-31 PROCEDURE — G8427 DOCREV CUR MEDS BY ELIG CLIN: HCPCS | Performed by: NURSE PRACTITIONER

## 2022-08-31 PROCEDURE — G8400 PT W/DXA NO RESULTS DOC: HCPCS | Performed by: NURSE PRACTITIONER

## 2022-08-31 PROCEDURE — G8417 CALC BMI ABV UP PARAM F/U: HCPCS | Performed by: NURSE PRACTITIONER

## 2022-08-31 PROCEDURE — 3017F COLORECTAL CA SCREEN DOC REV: CPT | Performed by: NURSE PRACTITIONER

## 2022-08-31 PROCEDURE — G8754 DIAS BP LESS 90: HCPCS | Performed by: NURSE PRACTITIONER

## 2022-08-31 PROCEDURE — 1090F PRES/ABSN URINE INCON ASSESS: CPT | Performed by: NURSE PRACTITIONER

## 2022-08-31 PROCEDURE — G8753 SYS BP > OR = 140: HCPCS | Performed by: NURSE PRACTITIONER

## 2022-08-31 PROCEDURE — G8536 NO DOC ELDER MAL SCRN: HCPCS | Performed by: NURSE PRACTITIONER

## 2022-08-31 RX ORDER — DOXYCYCLINE 100 MG/1
100 TABLET ORAL 2 TIMES DAILY
Qty: 28 TABLET | Refills: 0 | Status: SHIPPED | OUTPATIENT
Start: 2022-08-31 | End: 2022-09-14

## 2022-08-31 RX ORDER — MUPIROCIN 20 MG/G
OINTMENT TOPICAL 2 TIMES DAILY
Qty: 22 G | Refills: 0 | Status: SHIPPED | OUTPATIENT
Start: 2022-08-31 | End: 2022-09-10

## 2022-08-31 NOTE — PROGRESS NOTES
1. \"Have you been to the ER, urgent care clinic since your last visit? Hospitalized since your last visit? \" No    2. \"Have you seen or consulted any other health care providers outside of the [address] [place] [address] since your last visit? \" No     3. For patients aged 39-70: Has the patient had a colonoscopy / FIT/ Cologuard? No      If the patient is female:    4. For patients aged 41-77: Has the patient had a mammogram within the past 2 years? No      5. For patients aged 21-65: Has the patient had a pap smear?  No

## 2022-08-31 NOTE — PROGRESS NOTES
Subjective:     Chief Complaint   Patient presents with    Insect Bite       Talha Kim is a 78 y.o. female who presents today with c/o an infected insect bite on the back of the left leg that occurred 2 days ago. She did not see what bit her, did not see a tick. It is red and swollen and draining fluid. It is painful, feels like it is burning. Denies fever or chills. Patient Active Problem List   Diagnosis Code    Smoker F17.200    Essential hypertension I10    Mixed dyslipidemia E78.2    Type 2 diabetes mellitus with circulatory disorder, with long-term current use of insulin (HCC) E11.59, Z79.4    History of MRSA infection Z86.14    Gastroesophageal reflux disease without esophagitis K21.9    A-fib ([facility] I48.91    Cardiac defibrillator in place Z95.810    Type 2 diabetes mellitus with diabetic neuropathy (HCC) E11.40    Late effects of CVA (cerebrovascular accident) I69.90    Fatty liver K76.0    DDD (degenerative disc disease), lumbar M51.36    Bilateral carotid artery stenosis I65.23    Colitis K52.9    Muscle spasm M62.838       Past Medical History:   Diagnosis Date    A-fib ([facility]     Aphasia due to recent cerebrovascular accident 3/1/2016    Back pain     Grief reaction with prolonged bereavement 12/20/2016    Hepatitis     History of seasonal allergies     Hypertension     Kidney infection     MI (myocardial infarction) ([facility]     MRSA (methicillin resistant Staphylococcus aureus)     Recurrent boils     Stroke ([facility]     Type II or unspecified type diabetes mellitus without mention of complication, not stated as uncontrolled          Current Outpatient Medications:     glipiZIDE (GLUCOTROL) 10 mg tablet, Take 1 Tablet by mouth two (2) times a day.  Indications: type 2 diabetes mellitus, Disp: 180 Tablet, Rfl: 0    gabapentin (NEURONTIN) 600 mg tablet, Half in the morning half in the afternoon and 1 at bedtime, Disp: 180 Tablet, Rfl: 0    Lantus U-100 Insulin 100 unit/mL injection, INJECT 30 UNITS SUBCUTANEOUSLY ONCE DAILY, Disp: 30 mL, Rfl: 0    pantoprazole (PROTONIX) 40 mg tablet, Take 1 tablet by mouth once daily, Disp: 90 Tablet, Rfl: 0    Xarelto 20 mg tab tablet, Take 1 tablet by mouth once daily with breakfast, Disp: 90 Tablet, Rfl: 0    pioglitazone (ACTOS) 15 mg tablet, Take 1 Tablet by mouth daily. , Disp: 90 Tablet, Rfl: 0    cyclobenzaprine (FLEXERIL) 5 mg tablet, 1 3 times daily for back muscle spasm, Disp: 270 Tablet, Rfl: 0    Blood-Glucose Sensor (Dexcom G6 Sensor) adam, 1 Each by Does Not Apply route six (6) times daily. , Disp: 4 Each, Rfl: 5    Blood-Glucose Meter,Continuous (Dexcom ) misc, 1 Each by Does Not Apply route six (6) times daily. , Disp: 1 Each, Rfl: 5    pravastatin (PRAVACHOL) 20 mg tablet, Take 1 tablet by mouth nightly, Disp: 90 Tablet, Rfl: 0    valsartan (DIOVAN) 40 mg tablet, Take 1 tablet by mouth twice daily, Disp: 180 Tablet, Rfl: 0    carvediloL (COREG) 6.25 mg tablet, 1 twice daily, Disp: 180 Tablet, Rfl: 0    vit B Cmplx 3-FA-Vit C-Biotin (NEPHRO CHUCKIE RX) 1- mg-mg-mcg tablet, Take 1 Tab by mouth daily. , Disp: , Rfl:     Allergies   Allergen Reactions    Pcn [Penicillins] Anaphylaxis    Sulfa (Sulfonamide Antibiotics) Nausea Only    Valium [Diazepam] Unknown (comments)       Past Surgical History:   Procedure Laterality Date    HX BLADDER REPAIR      HX CHOLECYSTECTOMY      HX HYSTERECTOMY      2000    HX ORTHOPAEDIC      pin in left foot    HX OTHER SURGICAL      defiberilator left corotid    HX OTHER SURGICAL      chris abscess    HX OTHER SURGICAL Left 11/2019    Carotid artery ligation- Dr Valdemar Hinojosa History     Tobacco Use   Smoking Status Every Day    Packs/day: 1.00    Years: 35.00    Pack years: 35.00    Types: Cigarettes   Smokeless Tobacco Never       Social History     Socioeconomic History    Marital status: SINGLE   Tobacco Use    Smoking status: Every Day     Packs/day: 1.00     Years: 35.00     Pack years: 35.00     Types: Cigarettes    Smokeless tobacco: Never   Vaping Use    Vaping Use: Never used   Substance and Sexual Activity    Alcohol use: No    Drug use: Never    Sexual activity: Never     Social Determinants of Health     Financial Resource Strain: Low Risk     Difficulty of Paying Living Expenses: Not hard at all   Food Insecurity: No Food Insecurity    Worried About Running Out of Food in the Last Year: Never true    Ran Out of Food in the Last Year: Never true       Family History   Problem Relation Age of Onset    OSTEOARTHRITIS Mother     Diabetes Father     Hypertension Father     Heart Attack Father     Heart Disease Father     Diabetes Paternal Grandmother     Mult Sclerosis Brother        ROS:  Gen: denies fever, chills, or fatigue  Resp: denies dyspnea, cough, or wheezing  CV: denies chest pain, pressure, or palpitations  Extremeties: denies edema, pallor, or cyanosis  GI[de-identified] denies nausea or vomiting  Musculoskeletal: no joint pain, stiffness, or muscle cramps  Neuro: denies numbness/tingling or dizziness  Skin: +infected bug bite on left lower leg with drainage    Objective:     Visit Vitals  BP (!) 140/76 (BP 1 Location: Left upper arm, BP Patient Position: Sitting, BP Cuff Size: Adult)   Pulse 70   Temp 97 °F (36.1 °C) (Temporal)   Resp 18   Ht 5' 6\" (1.676 m)   Wt 158 lb (71.7 kg)   SpO2 97%   BMI 25.50 kg/m²     Body mass index is 25.5 kg/m². General: Alert and oriented. No acute distress. Well nourished. HEENT :   Eyes: Sclera white, conjunctiva clear. PERRLA. Extra ocular movements intact. Neck: Supple with FROM. Lungs: Breathing even and unlabored. All lobes clear to auscultation bilaterally   Heart :RRR, S1 and S2 normal intensity, no extra heart sounds  Extremities: Non-edematous  Musculoskeletal: No joint pain, heat, erythema, or swelling. Neuro: Cranial nerves grossly normal.  Psych: Mood and thought content appropriate for situation. Dressed appropriately and with good hygiene.   Skin: Warm and dry, +large raised erythematous nodule with central opening draining serosanguinous fluid located on the left calf. It is tender to the touch and surrounding with erythema. No streaking up the leg      Assessment/ Plan:     Infected insect bite, initial encounter  Start Doxycycline 100mg BID x 2 weeks in case of possible tick bite with [place]  Start Mupirocin ointment- apply BID x 10 days  Sample of wound drainage obtained and sent for C/S- will call with results  Advised to go to ER for CP, SOB, fever, chills, vomiting, or other emergencies  F/U prn if symptoms worsen or do not improve. Verbal and written instructions (see AVS) provided. Patient expresses understanding of diagnosis and treatment plan. Health Maintenance Due   Topic Date Due    Eye Exam Retinal or Dilated  Never done    Low dose CT lung screening  Never done    Breast Cancer Screen Mammogram  Never done    Pneumococcal 65+ years (2 - PCV) 10/18/2014    Bone Densitometry (Dexa) Screening  Never done    Medicare Yearly Exam  Never done    COVID-19 Vaccine (2 - Verlena Feliz series) 05/25/2021    Colorectal Cancer Screening Combo  04/02/2022               Edna Jasmine, JOSE ALFREDO

## 2022-09-09 LAB
GRAM STN SPEC: ABNORMAL
GRAM STN SPEC: ABNORMAL
MICROORGANISM/AGENT SPEC: ABNORMAL

## 2022-09-09 RX ORDER — CIPROFLOXACIN 500 MG/1
500 TABLET ORAL 2 TIMES DAILY
Qty: 14 TABLET | Refills: 0 | Status: SHIPPED | OUTPATIENT
Start: 2022-09-09 | End: 2022-09-16

## 2022-11-14 ENCOUNTER — OFFICE VISIT (OUTPATIENT)
Dept: FAMILY MEDICINE CLINIC | Age: 67
End: 2022-11-14
Payer: MEDICARE

## 2022-11-14 VITALS
HEART RATE: 94 BPM | HEIGHT: 66 IN | BODY MASS INDEX: 25.39 KG/M2 | SYSTOLIC BLOOD PRESSURE: 121 MMHG | DIASTOLIC BLOOD PRESSURE: 71 MMHG | OXYGEN SATURATION: 97 % | WEIGHT: 158 LBS | TEMPERATURE: 97 F | RESPIRATION RATE: 18 BRPM

## 2022-11-14 DIAGNOSIS — M71.30 MYXOID CYST: ICD-10-CM

## 2022-11-14 DIAGNOSIS — M54.50 CHRONIC RIGHT-SIDED LOW BACK PAIN WITHOUT SCIATICA: ICD-10-CM

## 2022-11-14 DIAGNOSIS — M15.9 PRIMARY OSTEOARTHRITIS INVOLVING MULTIPLE JOINTS: ICD-10-CM

## 2022-11-14 DIAGNOSIS — G89.29 CHRONIC RIGHT-SIDED LOW BACK PAIN WITHOUT SCIATICA: ICD-10-CM

## 2022-11-14 DIAGNOSIS — J01.81 OTHER ACUTE RECURRENT SINUSITIS: ICD-10-CM

## 2022-11-14 DIAGNOSIS — M79.645 PAIN OF LEFT MIDDLE FINGER: ICD-10-CM

## 2022-11-14 DIAGNOSIS — G89.28 CHRONIC POSTOPERATIVE PAIN: ICD-10-CM

## 2022-11-14 DIAGNOSIS — Z23 NEEDS FLU SHOT: Primary | ICD-10-CM

## 2022-11-14 PROCEDURE — G8536 NO DOC ELDER MAL SCRN: HCPCS | Performed by: FAMILY MEDICINE

## 2022-11-14 PROCEDURE — 3074F SYST BP LT 130 MM HG: CPT | Performed by: FAMILY MEDICINE

## 2022-11-14 PROCEDURE — G8432 DEP SCR NOT DOC, RNG: HCPCS | Performed by: FAMILY MEDICINE

## 2022-11-14 PROCEDURE — G8752 SYS BP LESS 140: HCPCS | Performed by: FAMILY MEDICINE

## 2022-11-14 PROCEDURE — 3078F DIAST BP <80 MM HG: CPT | Performed by: FAMILY MEDICINE

## 2022-11-14 PROCEDURE — 3017F COLORECTAL CA SCREEN DOC REV: CPT | Performed by: FAMILY MEDICINE

## 2022-11-14 PROCEDURE — G8417 CALC BMI ABV UP PARAM F/U: HCPCS | Performed by: FAMILY MEDICINE

## 2022-11-14 PROCEDURE — G0008 ADMIN INFLUENZA VIRUS VAC: HCPCS | Performed by: FAMILY MEDICINE

## 2022-11-14 PROCEDURE — G8400 PT W/DXA NO RESULTS DOC: HCPCS | Performed by: FAMILY MEDICINE

## 2022-11-14 PROCEDURE — 90694 VACC AIIV4 NO PRSRV 0.5ML IM: CPT | Performed by: FAMILY MEDICINE

## 2022-11-14 PROCEDURE — G8754 DIAS BP LESS 90: HCPCS | Performed by: FAMILY MEDICINE

## 2022-11-14 PROCEDURE — G8427 DOCREV CUR MEDS BY ELIG CLIN: HCPCS | Performed by: FAMILY MEDICINE

## 2022-11-14 PROCEDURE — 99213 OFFICE O/P EST LOW 20 MIN: CPT | Performed by: FAMILY MEDICINE

## 2022-11-14 PROCEDURE — 1123F ACP DISCUSS/DSCN MKR DOCD: CPT | Performed by: FAMILY MEDICINE

## 2022-11-14 PROCEDURE — 1090F PRES/ABSN URINE INCON ASSESS: CPT | Performed by: FAMILY MEDICINE

## 2022-11-14 PROCEDURE — 1101F PT FALLS ASSESS-DOCD LE1/YR: CPT | Performed by: FAMILY MEDICINE

## 2022-11-14 PROCEDURE — G9899 SCRN MAM PERF RSLTS DOC: HCPCS | Performed by: FAMILY MEDICINE

## 2022-11-14 RX ORDER — MUPIROCIN 20 MG/G
OINTMENT TOPICAL DAILY
Qty: 22 G | Refills: 0 | Status: SHIPPED | OUTPATIENT
Start: 2022-11-14

## 2022-11-14 RX ORDER — GABAPENTIN 600 MG/1
TABLET ORAL
Qty: 180 TABLET | Refills: 0 | Status: SHIPPED | OUTPATIENT
Start: 2022-11-14

## 2022-11-14 RX ORDER — CYCLOBENZAPRINE HCL 5 MG
TABLET ORAL
Qty: 270 TABLET | Refills: 0 | Status: SHIPPED | OUTPATIENT
Start: 2022-11-14

## 2022-11-14 NOTE — PROGRESS NOTES
Radha Campa is a 78 y.o. female who presents with the following:  Chief Complaint   Patient presents with    Diabetes    Pain (Chronic)       Patient states her diabetes is been doing well without polyuria nor abnormal weight loss. The patient has developed a mucoid cyst on the dorsal aspect of her distal inner phalangeal joint of her right middle finger and she would like to have this removed. The patient has been trying to do this with a needle and I have advised her against continuing this and that she should see a hand surgeon and have referred her to 1. The patient also has complained of her nose being sore as she believes she has an infection and would like to have something for it. I have prescribed mupirocin with instructions how to properly use it. Patient also has chronic pain from arthritis and needs her gabapentin and cyclobenzaprine refilled and since it seems to be losing some of its effect that suggest that she go up to half in the morning and half in the afternoon and 1 about supper and 1 about bedtime and see if this does not settle it down some. Allergies   Allergen Reactions    Pcn [Penicillins] Anaphylaxis    Sulfa (Sulfonamide Antibiotics) Nausea Only    Valium [Diazepam] Unknown (comments)       Current Outpatient Medications   Medication Sig    cyclobenzaprine (FLEXERIL) 5 mg tablet 1 3 times daily for back muscle spasm    gabapentin (NEURONTIN) 600 mg tablet Half in the morning half in the afternoon and 1 at bedtime    mupirocin (BACTROBAN) 2 % ointment Apply  to affected area daily. pantoprazole (PROTONIX) 40 mg tablet Take 1 tablet by mouth once daily    pioglitazone (ACTOS) 15 mg tablet Take 1 tablet by mouth once daily    Xarelto 20 mg tab tablet Take 1 tablet by mouth once daily with breakfast    glipiZIDE (GLUCOTROL) 10 mg tablet Take 1 Tablet by mouth two (2) times a day.  Indications: type 2 diabetes mellitus    Lantus U-100 Insulin 100 unit/mL injection INJECT 30 UNITS SUBCUTANEOUSLY ONCE DAILY    Blood-Glucose Sensor (Dexcom G6 Sensor) adam 1 Each by Does Not Apply route six (6) times daily. Blood-Glucose Meter,Continuous (Dexcom ) misc 1 Each by Does Not Apply route six (6) times daily. pravastatin (PRAVACHOL) 20 mg tablet Take 1 tablet by mouth nightly    valsartan (DIOVAN) 40 mg tablet Take 1 tablet by mouth twice daily    carvediloL (COREG) 6.25 mg tablet 1 twice daily    vit B Cmplx 3-FA-Vit C-Biotin (NEPHRO CHUCKIE RX) 1- mg-mg-mcg tablet Take 1 Tab by mouth daily. No current facility-administered medications for this visit.        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    Hepatitis     History of seasonal allergies     Hypertension     Kidney infection     MI (myocardial infarction) ([facility]     MRSA (methicillin resistant Staphylococcus aureus)     Recurrent boils     Stroke ([facility]     Type II or unspecified type diabetes mellitus without mention of complication, not stated as uncontrolled        Past Surgical History:   Procedure Laterality Date    HX BLADDER REPAIR      HX CHOLECYSTECTOMY      HX HYSTERECTOMY      2000    HX ORTHOPAEDIC      pin in left foot    HX OTHER SURGICAL      defiberilator left corotid    HX OTHER SURGICAL      chris abscess    HX OTHER SURGICAL Left 11/2019    Carotid artery ligation- Dr Shelley Calloway       Family History   Problem Relation Age of Onset    OSTEOARTHRITIS Mother     Diabetes Father     Hypertension Father     Heart Attack Father     Heart Disease Father     Diabetes Paternal Grandmother     Mult Sclerosis Brother        Social History     Socioeconomic History    Marital status: SINGLE   Tobacco Use    Smoking status: Every Day     Packs/day: 1.00     Years: 35.00     Pack years: 35.00     Types: Cigarettes    Smokeless tobacco: Never   Vaping Use    Vaping Use: Never used Substance and Sexual Activity    Alcohol use: No    Drug use: Never    Sexual activity: Never     Social Determinants of Health     Financial Resource Strain: Low Risk     Difficulty of Paying Living Expenses: Not hard at all   Food Insecurity: No Food Insecurity    Worried About Running Out of Food in the Last Year: Never true    Ran Out of Food in the Last Year: Never true       Review of Systems   Constitutional:  Negative for chills, fever, malaise/fatigue and weight loss. HENT:  Negative for congestion, hearing loss, sore throat and tinnitus. Eyes:  Negative for blurred vision, pain and discharge. Respiratory:  Negative for cough, shortness of breath and wheezing. Cardiovascular:  Negative for chest pain, palpitations, orthopnea, claudication and leg swelling. Gastrointestinal:  Negative for abdominal pain, constipation and heartburn. Genitourinary:  Negative for dysuria, frequency and urgency. Musculoskeletal:  Positive for back pain, joint pain and myalgias. Negative for falls. Skin:  Negative for itching and rash. Neurological:  Negative for dizziness, tingling, tremors and headaches. Endo/Heme/Allergies:  Negative for environmental allergies and polydipsia. Psychiatric/Behavioral:  Negative for depression and substance abuse. The patient is not nervous/anxious. Visit Vitals  /71 (BP 1 Location: Left upper arm)   Pulse 94   Temp 97 °F (36.1 °C) (Temporal)   Resp 18   Ht 5' 6\" (1.676 m)   Wt 158 lb (71.7 kg)   SpO2 97%   BMI 25.50 kg/m²     Physical Exam  Vitals reviewed. Constitutional:       General: She is not in acute distress. Appearance: Normal appearance. She is well-developed. HENT:      Head: Normocephalic and atraumatic. Right Ear: Tympanic membrane, ear canal and external ear normal.      Left Ear: Tympanic membrane, ear canal and external ear normal.      Nose: Nose normal. No congestion or rhinorrhea.       Mouth/Throat:      Mouth: Mucous membranes are moist.      Pharynx: No posterior oropharyngeal erythema. Eyes:      General:         Right eye: No discharge. Left eye: No discharge. Extraocular Movements: Extraocular movements intact. Conjunctiva/sclera: Conjunctivae normal.      Pupils: Pupils are equal, round, and reactive to light. Comments: Cornea anterior chamber and iris are normal.   Neck:      Trachea: No tracheal deviation. Cardiovascular:      Rate and Rhythm: Normal rate and regular rhythm. Pulses: Normal pulses. Heart sounds: Normal heart sounds. No murmur heard. No friction rub. No gallop. Pulmonary:      Effort: Pulmonary effort is normal. No respiratory distress. Breath sounds: Normal breath sounds. No wheezing or rhonchi. Chest:      Chest wall: No tenderness. Abdominal:      General: Bowel sounds are normal. There is no distension. Palpations: Abdomen is soft. There is no mass. Tenderness: There is no abdominal tenderness. There is no guarding or rebound. Musculoskeletal:         General: No swelling, tenderness or deformity. Cervical back: Normal range of motion and neck supple. Right lower leg: No edema. Left lower leg: No edema. Lymphadenopathy:      Cervical: No cervical adenopathy. Skin:     General: Skin is warm and dry. Coloration: Skin is not pale. Findings: No erythema or rash. Neurological:      General: No focal deficit present. Mental Status: She is alert and oriented to person, place, and time. Cranial Nerves: No cranial nerve deficit. Sensory: No sensory deficit. Motor: No abnormal muscle tone. Deep Tendon Reflexes: Reflexes are normal and symmetric. Reflexes normal.      Comments: Cranial nerves II through XII are intact sensory and motor.   Biceps triceps knee and ankle DTRs are normal and symmetrical.   Psychiatric:         Mood and Affect: Mood normal.         Behavior: Behavior normal. Thought Content: Thought content normal.         Judgment: Judgment normal.         ICD-10-CM ICD-9-CM    1. Needs flu shot  Z23 V04.81 INFLUENZA, FLUAD, (AGE 65 Y+), IM, PF, 0.5 ML      2. Chronic right-sided low back pain without sciatica  M54.50 724.2 cyclobenzaprine (FLEXERIL) 5 mg tablet    G89.29 338.29       3. Primary osteoarthritis involving multiple joints  M15.9 715.98 gabapentin (NEURONTIN) 600 mg tablet      4. Chronic postoperative pain  G89.28 338.28       5. Pain of left middle finger  M79.645 729.5 REFERRAL TO ORTHOPEDICS      6. Other acute recurrent sinusitis  J01.81 461.9 mupirocin (BACTROBAN) 2 % ointment      7. Myxoid cyst  M71.30 727.40 REFERRAL TO ORTHOPEDICS          Orders Placed This Encounter    Influenza Vaccine, QUAD, 65 Yrs +  IM  (Fluad 35045 )    Rosita Urena Elbow, Hand, Wrist [facility]     Referral Priority:   Routine     Referral Type:   Consultation     Referral Reason:   Specialty Services Required     Referred to Provider:   Sheyla Lunsford MD     Number of Visits Requested:   1    cyclobenzaprine (FLEXERIL) 5 mg tablet     Si 3 times daily for back muscle spasm     Dispense:  270 Tablet     Refill:  0    gabapentin (NEURONTIN) 600 mg tablet     Sig: Half in the morning half in the afternoon and 1 at bedtime     Dispense:  180 Tablet     Refill:  0    mupirocin (BACTROBAN) 2 % ointment     Sig: Apply  to affected area daily. Dispense:  22 g     Refill:  0       Follow-up and Dispositions    Return in about 6 months (around 2023), or if symptoms worsen or fail to improve.          Hiram Kinney MD

## 2022-11-14 NOTE — PATIENT INSTRUCTIONS
Vaccine Information Statement    Influenza (Flu) Vaccine (Inactivated or Recombinant): What You Need to Know    Many vaccine information statements are available in [place]oruba and other languages. See www.immunize.org/vis. Hojas de información sobre vacunas están disponibles en español y en muchos otros idiomas. Visite www.immunize.org/vis. 1. Why get vaccinated? Influenza vaccine can prevent influenza (flu). Flu is a contagious disease that spreads around the [facility] every year, usually between October and May. Anyone can get the flu, but it is more dangerous for some people. Infants and young children, people 72 years and older, pregnant people, and people with certain health conditions or a weakened immune system are at greatest risk of flu complications. Pneumonia, bronchitis, sinus infections, and ear infections are examples of flu-related complications. If you have a medical condition, such as heart disease, cancer, or diabetes, flu can make it worse. Flu can cause fever and chills, sore throat, muscle aches, fatigue, cough, headache, and runny or stuffy nose. Some people may have vomiting and diarrhea, though this is more common in children than adults. In an average year, thousands of people in the [facility] die from flu, and many more are hospitalized. Flu vaccine prevents millions of illnesses and flu-related visits to the doctor each year. 2. Influenza vaccines     CDC recommends everyone 6 months and older get vaccinated every flu season. Children 6 months through 6years of age may need 2 doses during a single flu season. Everyone else needs only 1 dose each flu season. It takes about 2 weeks for protection to develop after vaccination. There are many flu viruses, and they are always changing. Each year a new flu vaccine is made to protect against the influenza viruses believed to be likely to cause disease in the upcoming flu season.  Even when the vaccine doesnt exactly match these viruses, it may still provide some protection. Influenza vaccine does not cause flu. Influenza vaccine may be given at the same time as other vaccines. 3. Talk with your health care provider    Tell your vaccination provider if the person getting the vaccine:  Has had an allergic reaction after a previous dose of influenza vaccine, or has any severe, life-threatening allergies   Has ever had Guillain-Barré Syndrome (also called GBS)    In some cases, your health care provider may decide to postpone influenza vaccination until a future visit. Influenza vaccine can be administered at any time during pregnancy. People who are or will be pregnant during influenza season should receive inactivated influenza vaccine. People with minor illnesses, such as a cold, may be vaccinated. People who are moderately or severely ill should usually wait until they recover before getting influenza vaccine. Your health care provider can give you more information. 4. Risks of a vaccine reaction    Soreness, redness, and swelling where the shot is given, fever, muscle aches, and headache can happen after influenza vaccination. There may be a very small increased risk of Guillain-Barré Syndrome (GBS) after inactivated influenza vaccine (the flu shot). Mauri Payan children who get the flu shot along with pneumococcal vaccine (PCV13) and/or DTaP vaccine at the same time might be slightly more likely to have a seizure caused by fever. Tell your health care provider if a child who is getting flu vaccine has ever had a seizure. People sometimes faint after medical procedures, including vaccination. Tell your provider if you feel dizzy or have vision changes or ringing in the ears. As with any medicine, there is a very remote chance of a vaccine causing a severe allergic reaction, other serious injury, or death. 5. What if there is a serious problem?     An allergic reaction could occur after the vaccinated person leaves the clinic. If you see signs of a severe allergic reaction (hives, swelling of the face and throat, difficulty breathing, a fast heartbeat, dizziness, or weakness), call 9-1-1 and get the person to the nearest hospital.    For other signs that concern you, call your health care provider. Adverse reactions should be reported to the Vaccine Adverse Event Reporting System (VAERS). Your health care provider will usually file this report, or you can do it yourself. Visit the VAERS website at www.vaers. [facility].gov or call 7-536.777.5750. VAERS is only for reporting reactions, and VAERS staff members do not give medical advice. 6. The National Vaccine Injury Compensation Program    The [facility] Vaccine Injury Compensation Program (VICP) is a federal program that was created to compensate people who may have been injured by certain vaccines. Claims regarding alleged injury or death due to vaccination have a time limit for filing, which may be as short as two years. Visit the VICP website at www.[facility].gov/vaccinecompensation or call 6-870.144.1372 to learn about the program and about filing a claim. 7. How can I learn more? Ask your health care provider. Call your local or state health department. Visit the website of the Food and Drug Administration (FDA) for vaccine package inserts and additional information at www.fda.gov/vaccines-blood-biologics/vaccines. Contact the Centers for Disease Control and Prevention (CDC): Call 6-657.492.1291 (1-800-CDC-INFO) or  Visit CDCs influenza website at www.cdc.gov/flu. Vaccine Information Statement   Inactivated Influenza Vaccine   8/6/2021  42 ARGENIS Dionicio Abhi [id number]-09   Department of Health and Human Services  Centers for Disease Control and Prevention    Office Use Only

## 2022-11-14 NOTE — PROGRESS NOTES
1. \"Have you been to the ER, urgent care clinic since your last visit? Hospitalized since your last visit? \" No    2. \"Have you seen or consulted any other health care providers outside of the [address] [place] [address] since your last visit? \" Yes When: cardiology 11-6-22      3. For patients aged 39-70: Has the patient had a colonoscopy / FIT/ Cologuard? No      If the patient is female:    4. For patients aged 41-77: Has the patient had a mammogram within the past 2 years? Yes - no Care Gap present      5. For patients aged 21-65: Has the patient had a pap smear?  Yes - no Care Gap present

## 2022-11-16 ENCOUNTER — DOCUMENTATION ONLY (OUTPATIENT)
Dept: FAMILY MEDICINE CLINIC | Age: 67
End: 2022-11-16

## 2022-12-23 RX ORDER — GLIPIZIDE 10 MG/1
10 TABLET ORAL 2 TIMES DAILY
Qty: 180 TABLET | Refills: 0 | Status: SHIPPED | OUTPATIENT
Start: 2022-12-23

## 2023-02-14 ENCOUNTER — OFFICE VISIT (OUTPATIENT)
Dept: FAMILY MEDICINE CLINIC | Age: 68
End: 2023-02-14
Payer: MEDICARE

## 2023-02-14 VITALS
HEIGHT: 66 IN | DIASTOLIC BLOOD PRESSURE: 73 MMHG | HEART RATE: 94 BPM | OXYGEN SATURATION: 99 % | BODY MASS INDEX: 25.75 KG/M2 | TEMPERATURE: 97 F | WEIGHT: 160.25 LBS | SYSTOLIC BLOOD PRESSURE: 125 MMHG | RESPIRATION RATE: 18 BRPM

## 2023-02-14 DIAGNOSIS — I48.91 ATRIAL FIBRILLATION, UNSPECIFIED TYPE (HCC): ICD-10-CM

## 2023-02-14 DIAGNOSIS — K21.9 GASTROESOPHAGEAL REFLUX DISEASE WITHOUT ESOPHAGITIS: ICD-10-CM

## 2023-02-14 DIAGNOSIS — M54.50 CHRONIC RIGHT-SIDED LOW BACK PAIN WITHOUT SCIATICA: ICD-10-CM

## 2023-02-14 DIAGNOSIS — E11.59 TYPE 2 DIABETES MELLITUS WITH OTHER CIRCULATORY COMPLICATION, WITH LONG-TERM CURRENT USE OF INSULIN (HCC): ICD-10-CM

## 2023-02-14 DIAGNOSIS — Z12.11 COLON CANCER SCREENING: ICD-10-CM

## 2023-02-14 DIAGNOSIS — I65.23 BILATERAL CAROTID ARTERY STENOSIS: Primary | Chronic | ICD-10-CM

## 2023-02-14 DIAGNOSIS — E11.40 TYPE 2 DIABETES MELLITUS WITH DIABETIC NEUROPATHY, WITH LONG-TERM CURRENT USE OF INSULIN (HCC): ICD-10-CM

## 2023-02-14 DIAGNOSIS — Z95.810 CARDIAC DEFIBRILLATOR IN PLACE: ICD-10-CM

## 2023-02-14 DIAGNOSIS — M62.838 MUSCLE SPASM: ICD-10-CM

## 2023-02-14 DIAGNOSIS — E78.2 MIXED DYSLIPIDEMIA: ICD-10-CM

## 2023-02-14 DIAGNOSIS — M15.9 PRIMARY OSTEOARTHRITIS INVOLVING MULTIPLE JOINTS: ICD-10-CM

## 2023-02-14 DIAGNOSIS — Z79.4 TYPE 2 DIABETES MELLITUS WITH OTHER CIRCULATORY COMPLICATION, WITH LONG-TERM CURRENT USE OF INSULIN (HCC): ICD-10-CM

## 2023-02-14 DIAGNOSIS — Z79.4 TYPE 2 DIABETES MELLITUS WITH DIABETIC NEUROPATHY, WITH LONG-TERM CURRENT USE OF INSULIN (HCC): ICD-10-CM

## 2023-02-14 DIAGNOSIS — F17.200 SMOKER: ICD-10-CM

## 2023-02-14 DIAGNOSIS — I69.90 LATE EFFECTS OF CVA (CEREBROVASCULAR ACCIDENT): ICD-10-CM

## 2023-02-14 DIAGNOSIS — Z12.31 ENCOUNTER FOR SCREENING MAMMOGRAM FOR BREAST CANCER: ICD-10-CM

## 2023-02-14 DIAGNOSIS — G89.29 CHRONIC RIGHT-SIDED LOW BACK PAIN WITHOUT SCIATICA: ICD-10-CM

## 2023-02-14 DIAGNOSIS — I10 ESSENTIAL HYPERTENSION: ICD-10-CM

## 2023-02-14 PROCEDURE — 99214 OFFICE O/P EST MOD 30 MIN: CPT | Performed by: FAMILY MEDICINE

## 2023-02-14 PROCEDURE — G8400 PT W/DXA NO RESULTS DOC: HCPCS | Performed by: FAMILY MEDICINE

## 2023-02-14 PROCEDURE — 3046F HEMOGLOBIN A1C LEVEL >9.0%: CPT | Performed by: FAMILY MEDICINE

## 2023-02-14 PROCEDURE — 1101F PT FALLS ASSESS-DOCD LE1/YR: CPT | Performed by: FAMILY MEDICINE

## 2023-02-14 PROCEDURE — 1090F PRES/ABSN URINE INCON ASSESS: CPT | Performed by: FAMILY MEDICINE

## 2023-02-14 PROCEDURE — 1123F ACP DISCUSS/DSCN MKR DOCD: CPT | Performed by: FAMILY MEDICINE

## 2023-02-14 PROCEDURE — G8536 NO DOC ELDER MAL SCRN: HCPCS | Performed by: FAMILY MEDICINE

## 2023-02-14 PROCEDURE — 3074F SYST BP LT 130 MM HG: CPT | Performed by: FAMILY MEDICINE

## 2023-02-14 PROCEDURE — G8427 DOCREV CUR MEDS BY ELIG CLIN: HCPCS | Performed by: FAMILY MEDICINE

## 2023-02-14 PROCEDURE — 3017F COLORECTAL CA SCREEN DOC REV: CPT | Performed by: FAMILY MEDICINE

## 2023-02-14 PROCEDURE — 3078F DIAST BP <80 MM HG: CPT | Performed by: FAMILY MEDICINE

## 2023-02-14 PROCEDURE — G8510 SCR DEP NEG, NO PLAN REQD: HCPCS | Performed by: FAMILY MEDICINE

## 2023-02-14 PROCEDURE — 2022F DILAT RTA XM EVC RTNOPTHY: CPT | Performed by: FAMILY MEDICINE

## 2023-02-14 PROCEDURE — G9899 SCRN MAM PERF RSLTS DOC: HCPCS | Performed by: FAMILY MEDICINE

## 2023-02-14 PROCEDURE — G8417 CALC BMI ABV UP PARAM F/U: HCPCS | Performed by: FAMILY MEDICINE

## 2023-02-14 RX ORDER — CYCLOBENZAPRINE HCL 5 MG
TABLET ORAL
Qty: 270 TABLET | Refills: 0 | Status: SHIPPED | OUTPATIENT
Start: 2023-02-14

## 2023-02-14 RX ORDER — CYCLOBENZAPRINE HCL 5 MG
TABLET ORAL
Qty: 270 TABLET | Refills: 0 | Status: SHIPPED | OUTPATIENT
Start: 2023-02-14 | End: 2023-02-14 | Stop reason: SDUPTHER

## 2023-02-14 RX ORDER — GABAPENTIN 600 MG/1
TABLET ORAL
Qty: 180 TABLET | Refills: 0 | Status: SHIPPED | OUTPATIENT
Start: 2023-02-14 | End: 2023-02-14 | Stop reason: SDUPTHER

## 2023-02-14 RX ORDER — BLOOD-GLUCOSE SENSOR
1 EACH MISCELLANEOUS
Qty: 4 EACH | Refills: 5 | Status: SHIPPED | OUTPATIENT
Start: 2023-02-14

## 2023-02-14 RX ORDER — GABAPENTIN 600 MG/1
TABLET ORAL
Qty: 180 TABLET | Refills: 0 | Status: SHIPPED | OUTPATIENT
Start: 2023-02-14

## 2023-02-14 RX ORDER — BLOOD-GLUCOSE,RECEIVER,CONT
1 EACH MISCELLANEOUS
Qty: 1 EACH | Refills: 5 | Status: SHIPPED | OUTPATIENT
Start: 2023-02-14

## 2023-02-14 NOTE — PROGRESS NOTES
Vicky Condon is a 78 y.o. female who presents with the following:  Chief Complaint   Patient presents with    Diabetes    Hypertension    Heart Problem    GERD    Cholesterol Problem       Patient's diabetes is doing questionably well because the patient has not been using her sensors from her Dexcom G6 sensor to check her blood sugars because she could not download the instructions from the Internet. She states over 300 pages of instructions. Patient states she intends to do this today. Patient states that her facial pain and other pains are being helped by her gabapentin 300 mg in the morning and afternoon and 600 mg at bedtime. The patient has been told by her dentist she is having masseter muscle spasm and needs to have physical therapy. Patient's hypertension is doing well on current medication without chest pain shortness of breath nor edema. Patient's atrial fibrillation is doing well on her current medications without any rapid ventricular response. Patient's GERD is doing well on her pantoprazole without any heartburn. Patient's hyperlipidemia is doing well with her pravastatin 20 mg daily. Allergies   Allergen Reactions    Pcn [Penicillins] Anaphylaxis    Sulfa (Sulfonamide Antibiotics) Nausea Only    Valium [Diazepam] Unknown (comments)       Current Outpatient Medications   Medication Sig    gabapentin (NEURONTIN) 600 mg tablet Half in the morning half in the afternoon and 1 at bedtime    cyclobenzaprine (FLEXERIL) 5 mg tablet 1 3 times daily for back muscle spasm    Blood-Glucose Meter,Continuous (Dexcom ) misc 1 Each by Does Not Apply route six (6) times daily. Blood-Glucose Sensor (Dexcom G6 Sensor) adam 1 Each by Does Not Apply route six (6) times daily.     pioglitazone (ACTOS) 15 mg tablet Take 1 tablet by mouth once daily    pantoprazole (PROTONIX) 40 mg tablet Take 1 tablet by mouth once daily    glipiZIDE (GLUCOTROL) 10 mg tablet Take 1 Tablet by mouth two (2) times a day. Indications: type 2 diabetes mellitus    Xarelto 20 mg tab tablet Take 1 tablet by mouth once daily with breakfast    Lantus U-100 Insulin 100 unit/mL injection INJECT 30 UNITS SUBCUTANEOUSLY ONCE DAILY    mupirocin (BACTROBAN) 2 % ointment Apply  to affected area daily. pravastatin (PRAVACHOL) 20 mg tablet Take 1 tablet by mouth nightly    valsartan (DIOVAN) 40 mg tablet Take 1 tablet by mouth twice daily    carvediloL (COREG) 6.25 mg tablet 1 twice daily    vit B Cmplx 3-FA-Vit C-Biotin (NEPHRO CHUCKIE RX) 1- mg-mg-mcg tablet Take 1 Tab by mouth daily. No current facility-administered medications for this visit.        Past Medical History:   Diagnosis Date    A-fib [facility])     Aphasia due to recent cerebrovascular accident 3/1/2016    Back pain     Grief reaction with prolonged bereavement 12/20/2016    Hepatitis     History of seasonal allergies     Hypertension     Kidney infection     MI (myocardial infarction) ([facility] [address]     MRSA (methicillin resistant Staphylococcus aureus)     Recurrent boils     Stroke ([facility] [address]     Type II or unspecified type diabetes mellitus without mention of complication, not stated as uncontrolled        Past Surgical History:   Procedure Laterality Date    HX BLADDER REPAIR      HX CHOLECYSTECTOMY      HX HYSTERECTOMY      2000    HX ORTHOPAEDIC      pin in left foot    HX OTHER SURGICAL      defiberilator left corotid    HX OTHER SURGICAL      chris abscess    HX OTHER SURGICAL Left 11/2019    Carotid artery ligation- Dr Agustín Chowdhury       Family History   Problem Relation Age of Onset    OSTEOARTHRITIS Mother     Diabetes Father     Hypertension Father     Heart Attack Father     Heart Disease Father     Diabetes Paternal Grandmother     Mult Sclerosis Brother        Social History     Socioeconomic History    Marital status: SINGLE   Tobacco Use    Smoking status: Every Day     Packs/day: 1.00     Years: 35.00     Pack years: 35.00     Types: Cigarettes    Smokeless tobacco: Never Vaping Use    Vaping Use: Never used   Substance and Sexual Activity    Alcohol use: No    Drug use: Never    Sexual activity: Never     Social Determinants of Health     Financial Resource Strain: Low Risk     Difficulty of Paying Living Expenses: Not hard at all   Food Insecurity: No Food Insecurity    Worried About Running Out of Food in the Last Year: Never true    Ran Out of Food in the Last Year: Never true       Review of Systems   Constitutional:  Negative for chills, fever, malaise/fatigue and weight loss. HENT:  Negative for congestion, hearing loss, sore throat and tinnitus. Eyes:  Negative for blurred vision, pain and discharge. Respiratory:  Negative for cough, shortness of breath and wheezing. Cardiovascular:  Negative for chest pain, palpitations, orthopnea, claudication and leg swelling. Gastrointestinal:  Negative for abdominal pain, constipation and heartburn. Genitourinary:  Negative for dysuria, frequency and urgency. Musculoskeletal:  Positive for myalgias (In the masseter muscles of the face. ). Negative for falls and joint pain. Skin:  Negative for itching and rash. Neurological:  Negative for dizziness, tingling, tremors and headaches. Endo/Heme/Allergies:  Negative for environmental allergies and polydipsia. Psychiatric/Behavioral:  Negative for depression and substance abuse. The patient is not nervous/anxious. Visit Vitals  /73 (BP 1 Location: Left upper arm)   Pulse 94   Temp 97 °F (36.1 °C) (Temporal)   Resp 18   Ht 5' 6\" (1.676 m)   Wt 160 lb 4 oz (72.7 kg)   SpO2 99%   BMI 25.87 kg/m²     Physical Exam  Constitutional:       General: She is not in acute distress. Appearance: Normal appearance. She is well-developed. HENT:      Head: Normocephalic and atraumatic. Comments: Masseter muscles are tender bilaterally and somewhat spasmed.      Right Ear: Tympanic membrane, ear canal and external ear normal.      Left Ear: Tympanic membrane, ear canal and external ear normal.      Nose: Nose normal. No congestion or rhinorrhea. Mouth/Throat:      Mouth: Mucous membranes are moist.      Pharynx: No posterior oropharyngeal erythema. Eyes:      General:         Right eye: No discharge. Left eye: No discharge. Extraocular Movements: Extraocular movements intact. Conjunctiva/sclera: Conjunctivae normal.      Pupils: Pupils are equal, round, and reactive to light. Comments: Cornea anterior chamber and iris are normal.   Neck:      Trachea: No tracheal deviation. Cardiovascular:      Rate and Rhythm: Tachycardia present. Rhythm irregular. Pulses: Normal pulses. Heart sounds: Normal heart sounds. No murmur heard. No friction rub. No gallop. Pulmonary:      Effort: Pulmonary effort is normal. No respiratory distress. Breath sounds: Normal breath sounds. No wheezing or rhonchi. Chest:      Chest wall: No tenderness. Abdominal:      General: Bowel sounds are normal. There is no distension. Palpations: Abdomen is soft. There is no mass. Tenderness: There is no abdominal tenderness. There is no guarding or rebound. Musculoskeletal:         General: No swelling, tenderness or deformity. Cervical back: Normal range of motion and neck supple. Lymphadenopathy:      Cervical: No cervical adenopathy. Skin:     General: Skin is warm and dry. Coloration: Skin is not pale. Findings: No erythema or rash. Neurological:      General: No focal deficit present. Mental Status: She is alert and oriented to person, place, and time. Cranial Nerves: No cranial nerve deficit. Sensory: No sensory deficit. Motor: No abnormal muscle tone. Deep Tendon Reflexes: Reflexes are normal and symmetric. Reflexes normal.      Comments: Cranial nerves II through XII are intact sensory and motor.   Biceps triceps knee and ankle DTRs are normal and symmetrical.   Psychiatric:         Mood and Affect: Mood normal.         Behavior: Behavior normal.         Thought Content: Thought content normal.         Judgment: Judgment normal.         ICD-10-CM ICD-9-CM    1. Bilateral carotid artery stenosis  I65.23 433.10      433.30       2. Chronic right-sided low back pain without sciatica  M54.50 724.2 cyclobenzaprine (FLEXERIL) 5 mg tablet    G89.29 338.29 DISCONTINUED: cyclobenzaprine (FLEXERIL) 5 mg tablet      3. Primary osteoarthritis involving multiple joints  M15.9 715.98 gabapentin (NEURONTIN) 600 mg tablet      DISCONTINUED: gabapentin (NEURONTIN) 600 mg tablet      4. Essential hypertension  I10 401.9       5. Type 2 diabetes mellitus with other circulatory complication, with long-term current use of insulin ([facility])  E11.59 250.70 LIPID PANEL    K51.0 Y49.24 METABOLIC PANEL, COMPREHENSIVE      HEMOGLOBIN A1C WITH EAG      MICROALBUMIN, UR, RAND W/ MICROALB/CREAT RATIO      LIPID PANEL      METABOLIC PANEL, COMPREHENSIVE      HEMOGLOBIN A1C WITH EAG      MICROALBUMIN, UR, RAND W/ MICROALB/CREAT RATIO      Blood-Glucose Meter,Continuous (Dexcom ) misc      Blood-Glucose Sensor (Dexcom G6 Sensor) adam      6. Atrial fibrillation, unspecified type ([facility] [address]  I48.91 427.31       7. Gastroesophageal reflux disease without esophagitis  K21.9 530.81       8. Type 2 diabetes mellitus with diabetic neuropathy, with long-term current use of insulin ([facility])  E11.40 250.60 Blood-Glucose Meter,Continuous (Dexcom ) misc    Z79.4 357.2 Blood-Glucose Sensor (Dexcom G6 Sensor) adam     V58.67       9. Smoker  F17.200 305.1       10. Mixed dyslipidemia  E78.2 272.2       11. Cardiac defibrillator in place  Z95.810 V45.02       12. Late effects of CVA (cerebrovascular accident)  I69.90 438.9       13. Encounter for screening mammogram for breast cancer  Z12.31 V76.12 RICHI 3D RELL W MAMMO BI SCREENING INCL CAD      15.  Colon cancer screening  Z12.11 V76.51 OCCULT BLOOD IMMUNOASSAY,DIAGNOSTIC      OCCULT BLOOD IMMUNOASSAY,DIAGNOSTIC      15. Muscle spasm  M62.838 728.85 REFERRAL TO PHYSICAL THERAPY          Orders Placed This Encounter    RICHI 3D RELL W MAMMO BI SCREENING INCL CAD     Standing Status:   Future     Standing Expiration Date:   2023    LIPID PANEL     Standing Status:   Future     Number of Occurrences:   1     Standing Expiration Date:   7657    METABOLIC PANEL, COMPREHENSIVE     Standing Status:   Future     Number of Occurrences:   1     Standing Expiration Date:   3/14/2023    HEMOGLOBIN A1C WITH EAG     Standing Status:   Future     Number of Occurrences:   1     Standing Expiration Date:   3/14/2023    MICROALBUMIN, UR, RAND W/ MICROALB/CREAT RATIO     Standing Status:   Future     Number of Occurrences:   1     Standing Expiration Date:   3/14/2023    OCCULT BLOOD IMMUNOASSAY,DIAGNOSTIC     Standing Status:   Future     Number of Occurrences:   1     Standing Expiration Date:   2024     Order Specific Question:   QUEST SOURCE     Answer:   Stool [1161]    REFERRAL TO PHYSICAL THERAPY     Referral Priority:   Routine     Referral Type:   PT/OT/ST     Referral Reason:   Specialty Services Required     Number of Visits Requested:   1    DISCONTD: cyclobenzaprine (FLEXERIL) 5 mg tablet     Si 3 times daily for back muscle spasm     Dispense:  270 Tablet     Refill:  0    DISCONTD: gabapentin (NEURONTIN) 600 mg tablet     Sig: Half in the morning half in the afternoon and 1 at bedtime     Dispense:  180 Tablet     Refill:  0    gabapentin (NEURONTIN) 600 mg tablet     Sig: Half in the morning half in the afternoon and 1 at bedtime     Dispense:  180 Tablet     Refill:  0    cyclobenzaprine (FLEXERIL) 5 mg tablet     Si 3 times daily for back muscle spasm     Dispense:  270 Tablet     Refill:  0    Blood-Glucose Meter,Continuous (Dexcom ) misc     Si Each by Does Not Apply route six (6) times daily.      Dispense:  1 Each     Refill:  5    Blood-Glucose Sensor (Dexcom G6 Sensor) adam     Si Each by Does Not Apply route six (6) times daily. Dispense:  4 Each     Refill:  5       Follow-up and Dispositions    Return in about 3 months (around 2023), or if symptoms worsen or fail to improve.          Daryle Blase, MD

## 2023-02-14 NOTE — PROGRESS NOTES
1. \"Have you been to the ER, urgent care clinic since your last visit? Hospitalized since your last visit? \" No    2. \"Have you seen or consulted any other health care providers outside of the [address] [place] [address] since your last visit? \" Yes When: dentist 2-2023      3. For patients aged 39-70: Has the patient had a colonoscopy / FIT/ Cologuard? No      If the patient is female:    4. For patients aged 41-77: Has the patient had a mammogram within the past 2 years? No      5. For patients aged 21-65: Has the patient had a pap smear?  No

## 2023-02-15 ENCOUNTER — DOCUMENTATION ONLY (OUTPATIENT)
Dept: FAMILY MEDICINE CLINIC | Age: 68
End: 2023-02-15

## 2023-02-17 LAB
ALBUMIN SERPL-MCNC: 4.4 G/DL (ref 3.8–4.8)
ALBUMIN/CREAT UR: 116 MG/G CREAT (ref 0–29)
ALBUMIN/GLOB SERPL: 1.3 {RATIO} (ref 1.2–2.2)
ALP SERPL-CCNC: 109 IU/L (ref 44–121)
ALT SERPL-CCNC: 28 IU/L (ref 0–32)
AST SERPL-CCNC: 25 IU/L (ref 0–40)
BILIRUB SERPL-MCNC: 0.3 MG/DL (ref 0–1.2)
BUN SERPL-MCNC: 12 MG/DL (ref 8–27)
BUN/CREAT SERPL: 16 (ref 12–28)
CALCIUM SERPL-MCNC: 9.9 MG/DL (ref 8.7–10.3)
CHLORIDE SERPL-SCNC: 94 MMOL/L (ref 96–106)
CHOLEST SERPL-MCNC: 141 MG/DL (ref 100–199)
CO2 SERPL-SCNC: 24 MMOL/L (ref 20–29)
CREAT SERPL-MCNC: 0.75 MG/DL (ref 0.57–1)
CREAT UR-MCNC: 34.1 MG/DL
EGFRCR SERPLBLD CKD-EPI 2021: 88 ML/MIN/1.73
EST. AVERAGE GLUCOSE BLD GHB EST-MCNC: 206 MG/DL
GLOBULIN SER CALC-MCNC: 3.3 G/DL (ref 1.5–4.5)
GLUCOSE SERPL-MCNC: 210 MG/DL (ref 70–99)
HBA1C MFR BLD: 8.8 % (ref 4.8–5.6)
HDLC SERPL-MCNC: 34 MG/DL
IMP & REVIEW OF LAB RESULTS: NORMAL
LDLC SERPL CALC-MCNC: 74 MG/DL (ref 0–99)
MICROALBUMIN UR-MCNC: 39.7 UG/ML
POTASSIUM SERPL-SCNC: 4.7 MMOL/L (ref 3.5–5.2)
PROT SERPL-MCNC: 7.7 G/DL (ref 6–8.5)
SODIUM SERPL-SCNC: 134 MMOL/L (ref 134–144)
TRIGL SERPL-MCNC: 193 MG/DL (ref 0–149)
VLDLC SERPL CALC-MCNC: 33 MG/DL (ref 5–40)

## 2023-02-28 ENCOUNTER — OFFICE VISIT (OUTPATIENT)
Dept: FAMILY MEDICINE CLINIC | Age: 68
End: 2023-02-28
Payer: MEDICARE

## 2023-02-28 VITALS
BODY MASS INDEX: 25.87 KG/M2 | RESPIRATION RATE: 18 BRPM | OXYGEN SATURATION: 95 % | DIASTOLIC BLOOD PRESSURE: 75 MMHG | TEMPERATURE: 98.1 F | HEART RATE: 102 BPM | HEIGHT: 66 IN | SYSTOLIC BLOOD PRESSURE: 129 MMHG

## 2023-02-28 DIAGNOSIS — I10 ESSENTIAL HYPERTENSION: ICD-10-CM

## 2023-02-28 DIAGNOSIS — E11.59 CONTROLLED TYPE 2 DIABETES MELLITUS WITH OTHER CIRCULATORY COMPLICATION, WITH LONG-TERM CURRENT USE OF INSULIN (HCC): Primary | ICD-10-CM

## 2023-02-28 DIAGNOSIS — F17.200 SMOKER: ICD-10-CM

## 2023-02-28 DIAGNOSIS — H54.8 LEGALLY BLIND IN LEFT EYE, AS DEFINED IN USA: ICD-10-CM

## 2023-02-28 DIAGNOSIS — E11.59 TYPE 2 DIABETES MELLITUS WITH OTHER CIRCULATORY COMPLICATION, WITH LONG-TERM CURRENT USE OF INSULIN (HCC): ICD-10-CM

## 2023-02-28 DIAGNOSIS — E11.40 TYPE 2 DIABETES MELLITUS WITH DIABETIC NEUROPATHY, WITH LONG-TERM CURRENT USE OF INSULIN (HCC): ICD-10-CM

## 2023-02-28 DIAGNOSIS — Z79.4 TYPE 2 DIABETES MELLITUS WITH DIABETIC NEUROPATHY, WITH LONG-TERM CURRENT USE OF INSULIN (HCC): ICD-10-CM

## 2023-02-28 DIAGNOSIS — Z79.4 TYPE 2 DIABETES MELLITUS WITH OTHER CIRCULATORY COMPLICATION, WITH LONG-TERM CURRENT USE OF INSULIN (HCC): ICD-10-CM

## 2023-02-28 DIAGNOSIS — I69.90 LATE EFFECTS OF CVA (CEREBROVASCULAR ACCIDENT): ICD-10-CM

## 2023-02-28 DIAGNOSIS — Z79.4 CONTROLLED TYPE 2 DIABETES MELLITUS WITH OTHER CIRCULATORY COMPLICATION, WITH LONG-TERM CURRENT USE OF INSULIN (HCC): Primary | ICD-10-CM

## 2023-02-28 DIAGNOSIS — E78.2 MIXED DYSLIPIDEMIA: ICD-10-CM

## 2023-02-28 DIAGNOSIS — I65.23 BILATERAL CAROTID ARTERY STENOSIS: Chronic | ICD-10-CM

## 2023-02-28 DIAGNOSIS — I48.91 ATRIAL FIBRILLATION, UNSPECIFIED TYPE (HCC): ICD-10-CM

## 2023-02-28 DIAGNOSIS — Z95.810 CARDIAC DEFIBRILLATOR IN PLACE: ICD-10-CM

## 2023-02-28 PROCEDURE — G8427 DOCREV CUR MEDS BY ELIG CLIN: HCPCS | Performed by: FAMILY MEDICINE

## 2023-02-28 PROCEDURE — 1090F PRES/ABSN URINE INCON ASSESS: CPT | Performed by: FAMILY MEDICINE

## 2023-02-28 PROCEDURE — 99213 OFFICE O/P EST LOW 20 MIN: CPT | Performed by: FAMILY MEDICINE

## 2023-02-28 PROCEDURE — G8400 PT W/DXA NO RESULTS DOC: HCPCS | Performed by: FAMILY MEDICINE

## 2023-02-28 PROCEDURE — 1101F PT FALLS ASSESS-DOCD LE1/YR: CPT | Performed by: FAMILY MEDICINE

## 2023-02-28 PROCEDURE — 3052F HG A1C>EQUAL 8.0%<EQUAL 9.0%: CPT | Performed by: FAMILY MEDICINE

## 2023-02-28 PROCEDURE — 1123F ACP DISCUSS/DSCN MKR DOCD: CPT | Performed by: FAMILY MEDICINE

## 2023-02-28 PROCEDURE — 3017F COLORECTAL CA SCREEN DOC REV: CPT | Performed by: FAMILY MEDICINE

## 2023-02-28 PROCEDURE — 3078F DIAST BP <80 MM HG: CPT | Performed by: FAMILY MEDICINE

## 2023-02-28 PROCEDURE — 2022F DILAT RTA XM EVC RTNOPTHY: CPT | Performed by: FAMILY MEDICINE

## 2023-02-28 PROCEDURE — G8417 CALC BMI ABV UP PARAM F/U: HCPCS | Performed by: FAMILY MEDICINE

## 2023-02-28 PROCEDURE — G8536 NO DOC ELDER MAL SCRN: HCPCS | Performed by: FAMILY MEDICINE

## 2023-02-28 PROCEDURE — G8510 SCR DEP NEG, NO PLAN REQD: HCPCS | Performed by: FAMILY MEDICINE

## 2023-02-28 PROCEDURE — G9899 SCRN MAM PERF RSLTS DOC: HCPCS | Performed by: FAMILY MEDICINE

## 2023-02-28 PROCEDURE — 3074F SYST BP LT 130 MM HG: CPT | Performed by: FAMILY MEDICINE

## 2023-02-28 NOTE — PROGRESS NOTES
Jt Andre is a 78 y.o. 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 The patient has essential hypertension without chest pain shortness of breath nor edema and is doing fairly well on current medications. Patient does have atrial fibrillation which seems to be well controlled with her pacemaker in place. Patient does have type 2 diabetes mellitus with diabetic neuropathy and with circulatory disorders and she is on long-term insulin has been working on trying to get better control as her last A1c was under 9 but over 8. Patient has been trying to utilize one of the newer glucose monitors but was shorted a transmitter by the pharmacy. The patient is now working on learning how to use all 3 components of the device to try to better control her diabetes. As the patient has been eating more than she should she has put on a fair amount of weight enough talk to her about utilization of [employer] which could possibly help with her weight problem as well as her diabetes and also give her kidney some protection. If it does result in some weight loss it would also help her pravastatin control her hyperlipidemia. I have warned the patient to be careful about hypoglycemia as well as being meticulous with cleaning her genitalia after she urinates.       Allergies   Allergen Reactions    Pcn [Penicillins] Anaphylaxis    Sulfa (Sulfonamide Antibiotics) Nausea Only    Valium [Diazepam] Unknown (comments)       Current Outpatient Medications   Medication Sig    dapagliflozin (Farxiga) 5 mg tab tablet Take 1 Tablet by mouth daily. glipiZIDE (GLUCOTROL) 10 mg tablet TAKE 1 TABLET BY MOUTH TWICE DAILY FOR DIABETES    Lantus U-100 Insulin 100 unit/mL injection INJECT 30 UNITS SUBCUTANEOUSLY ONCE DAILY    gabapentin (NEURONTIN) 600 mg tablet Half in the morning half in the afternoon and 1 at bedtime    cyclobenzaprine (FLEXERIL) 5 mg tablet 1 3 times daily for back muscle spasm    Blood-Glucose Meter,Continuous (Dexcom ) misc 1 Each by Does Not Apply route six (6) times daily. Blood-Glucose Sensor (Dexcom G6 Sensor) adam 1 Each by Does Not Apply route six (6) times daily. pioglitazone (ACTOS) 15 mg tablet Take 1 tablet by mouth once daily    pantoprazole (PROTONIX) 40 mg tablet Take 1 tablet by mouth once daily    Xarelto 20 mg tab tablet Take 1 tablet by mouth once daily with breakfast    mupirocin (BACTROBAN) 2 % ointment Apply  to affected area daily. pravastatin (PRAVACHOL) 20 mg tablet Take 1 tablet by mouth nightly    valsartan (DIOVAN) 40 mg tablet Take 1 tablet by mouth twice daily    carvediloL (COREG) 6.25 mg tablet 1 twice daily    vit B Cmplx 3-FA-Vit C-Biotin (NEPHRO CHUCKIE RX) 1- mg-mg-mcg tablet Take 1 Tab by mouth daily. No current facility-administered medications for this visit.        Past Medical History:   Diagnosis Date    A-fib [facility])     Aphasia due to recent cerebrovascular accident 3/1/2016    Back pain     Grief reaction with prolonged bereavement 12/20/2016    Hepatitis     History of seasonal allergies     Hypertension     Kidney infection     MI (myocardial infarction) ([facility] [address]     MRSA (methicillin resistant Staphylococcus aureus)     Recurrent boils     Stroke ([facility] [address]     Type II or unspecified type diabetes mellitus without mention of complication, not stated as uncontrolled        Past Surgical History:   Procedure Laterality Date    HX BLADDER REPAIR      HX CHOLECYSTECTOMY      HX HYSTERECTOMY      2000    HX ORTHOPAEDIC      pin in left foot    HX OTHER SURGICAL      defiberilator left corotid    HX OTHER SURGICAL      chris abscess    HX OTHER SURGICAL Left 11/2019    Carotid artery ligation- Dr Braydon Johnson       Family History   Problem Relation Age of Onset    OSTEOARTHRITIS Mother     Diabetes Father     Hypertension Father     Heart Attack Father     Heart Disease Father     Diabetes Paternal Grandmother     Mult Sclerosis Brother        Social History     Socioeconomic History    Marital status: SINGLE   Tobacco Use    Smoking status: Every Day     Packs/day: 1.00     Years: 35.00     Pack years: 35.00     Types: Cigarettes    Smokeless tobacco: Never   Vaping Use    Vaping Use: Never used   Substance and Sexual Activity    Alcohol use: No    Drug use: Never    Sexual activity: Never     Social Determinants of Health     Financial Resource Strain: Low Risk     Difficulty of Paying Living Expenses: Not hard at all   Food Insecurity: No Food Insecurity    Worried About Running Out of Food in the Last Year: Never true    Ran Out of Food in the Last Year: Never true   Transportation Needs: No Transportation Needs    Lack of Transportation (Medical): No    Lack of Transportation (Non-Medical): No   Physical Activity: Inactive    Days of Exercise per Week: 0 days    Minutes of Exercise per Session: 0 min   Stress: No Stress Concern Present    Feeling of Stress : Only a little   Social Connections: Unknown    Frequency of Communication with Friends and Family: Three times a week   Housing Stability: Unknown    Unable to Pay for Housing in the Last Year: No    Unstable Housing in the Last Year: No       Review of Systems   Constitutional:  Negative for chills, fever, malaise/fatigue and weight loss. HENT:  Negative for congestion, hearing loss, sore throat and tinnitus. Eyes:  Negative for blurred vision, pain and discharge. Respiratory:  Negative for cough, shortness of breath and wheezing. Cardiovascular:  Negative for chest pain, palpitations, orthopnea, claudication and leg swelling. Gastrointestinal:  Negative for abdominal pain, constipation and heartburn. Genitourinary:  Negative for dysuria, frequency and urgency. Musculoskeletal:  Negative for falls, joint pain and myalgias. Skin:  Negative for itching and rash. Neurological:  Negative for dizziness, tingling, tremors and headaches. Endo/Heme/Allergies:  Negative for environmental allergies and polydipsia. Psychiatric/Behavioral:  Negative for depression and substance abuse. The patient is not nervous/anxious. Visit Vitals  /75 (BP 1 Location: Left arm)   Pulse (!) 102   Temp 98.1 °F (36.7 °C)   Resp 18   Ht 5' 6\" (1.676 m)   SpO2 95%   BMI 25.87 kg/m²     Physical Exam  Constitutional:       General: She is not in acute distress. Appearance: Normal appearance. She is well-developed. HENT:      Head: Normocephalic and atraumatic. Right Ear: Tympanic membrane, ear canal and external ear normal.      Left Ear: Tympanic membrane, ear canal and external ear normal.      Nose: Nose normal. No congestion or rhinorrhea. Mouth/Throat:      Mouth: Mucous membranes are moist.      Pharynx: No posterior oropharyngeal erythema. Eyes:      General:         Right eye: No discharge. Left eye: No discharge. Extraocular Movements: Extraocular movements intact. Conjunctiva/sclera: Conjunctivae normal.      Pupils: Pupils are equal, round, and reactive to light. Comments: Cornea anterior chamber and iris are normal.   Neck:      Trachea: No tracheal deviation. Cardiovascular:      Rate and Rhythm: Normal rate and regular rhythm. Pulses: Normal pulses. Heart sounds: Normal heart sounds. No murmur heard. No friction rub. No gallop. Pulmonary:      Effort: Pulmonary effort is normal. No respiratory distress. Breath sounds: Normal breath sounds. No wheezing or rhonchi. Chest:      Chest wall: No tenderness.    Abdominal:      General: Bowel sounds are normal. There is no distension. Palpations: Abdomen is soft. There is no mass. Tenderness: There is no abdominal tenderness. There is no guarding or rebound. Musculoskeletal:         General: No swelling, tenderness or deformity. Cervical back: Normal range of motion and neck supple. Right lower leg: No edema. Left lower leg: No edema. Lymphadenopathy:      Cervical: No cervical adenopathy. Skin:     General: Skin is warm and dry. Coloration: Skin is not pale. Findings: No erythema or rash. Neurological:      General: No focal deficit present. Mental Status: She is alert and oriented to person, place, and time. Cranial Nerves: No cranial nerve deficit. Sensory: No sensory deficit. Motor: No abnormal muscle tone. Deep Tendon Reflexes: Reflexes are normal and symmetric. Reflexes normal.      Comments: Cranial nerves II through XII are intact sensory and motor. Biceps triceps knee and ankle DTRs are normal and symmetrical.   Psychiatric:         Mood and Affect: Mood normal.         Behavior: Behavior normal.         Thought Content: Thought content normal.         Judgment: Judgment normal.         ICD-10-CM ICD-9-CM    1. Controlled type 2 diabetes mellitus with other circulatory complication, with long-term current use of insulin ([facility])  E11.59 250.70 dapagliflozin (Farxiga) 5 mg tab tablet    Z79.4 V58.67       2. At[facility] unspecified [facility]  I48.91 427.31       3. Type 2 diabetes mellitus with other circulatory complication, with long-term current use of insulin ([facility])  E11.59 250.70     Z79.4 V58.67       4. Essential hypertension  I10 401.9       5. Bilateral carotid artery stenosis  I65.23 433.10      433.30       6. Type 2 diabetes mellitus with diabetic neuropathy, with long-term current use of insulin ([facility])  E11.40 250.60     Z79.4 357.2      V58.67       7. Smoker  F17.200 305.1       8. Mixed dyslipidemia  E78.2 272.2       9.  Cardiac defibrillator in place  Z95.810 V45.02       10. Late effects of CVA (cerebrovascular accident)  I69.90 438.9       11. Legally blind in left eye, as defined in [place]  H54.8 369.4           Orders Placed This Encounter    dapagliflozin (Farxiga) 5 mg tab tablet     Sig: Take 1 Tablet by mouth daily. Dispense:  90 Tablet     Refill:  1   Patient is to continue her other medicines as is.         Bethel Cespedes MD

## 2023-02-28 NOTE — PROGRESS NOTES
1. \"Have you been to the ER, urgent care clinic since your last visit? Hospitalized since your last visit? \" No    2. \"Have you seen or consulted any other health care providers outside of the [address] [place] [address] since your last visit? \" No     3. For patients aged 39-70: Has the patient had a colonoscopy / FIT/ Cologuard? No     If the patient is female:    4. For patients aged 41-77: Has the patient had a mammogram within the past 2 years? No    5. For patients aged 21-65: Has the patient had a pap smear?  No

## 2023-03-01 RX ORDER — BLOOD-GLUCOSE TRANSMITTER
EACH MISCELLANEOUS
Qty: 4 EACH | Refills: 5 | Status: SHIPPED | OUTPATIENT
Start: 2023-03-01

## 2023-03-08 DIAGNOSIS — I10 ESSENTIAL HYPERTENSION: ICD-10-CM

## 2023-03-08 DIAGNOSIS — Z79.4 TYPE 2 DIABETES MELLITUS WITH OTHER CIRCULATORY COMPLICATION, WITH LONG-TERM CURRENT USE OF INSULIN (HCC): ICD-10-CM

## 2023-03-08 DIAGNOSIS — E11.59 TYPE 2 DIABETES MELLITUS WITH OTHER CIRCULATORY COMPLICATION, WITH LONG-TERM CURRENT USE OF INSULIN (HCC): ICD-10-CM

## 2023-03-08 DIAGNOSIS — E78.2 MIXED DYSLIPIDEMIA: ICD-10-CM

## 2023-03-08 RX ORDER — PRAVASTATIN SODIUM 20 MG/1
20 TABLET ORAL
Qty: 90 TABLET | Refills: 0 | Status: SHIPPED | OUTPATIENT
Start: 2023-03-08

## 2023-03-08 NOTE — TELEPHONE ENCOUNTER
Pt is requesting a refill on her Pravastatin 20. Please send to [facility] in [place] if approved.

## 2023-05-09 RX ORDER — PANTOPRAZOLE SODIUM 40 MG/1
TABLET, DELAYED RELEASE ORAL
Qty: 90 TABLET | Refills: 0 | Status: SHIPPED | OUTPATIENT
Start: 2023-05-09

## 2023-05-17 ENCOUNTER — OFFICE VISIT (OUTPATIENT)
Age: 68
End: 2023-05-17
Payer: MEDICARE

## 2023-05-17 VITALS
WEIGHT: 158 LBS | DIASTOLIC BLOOD PRESSURE: 69 MMHG | SYSTOLIC BLOOD PRESSURE: 117 MMHG | RESPIRATION RATE: 20 BRPM | BODY MASS INDEX: 25.39 KG/M2 | OXYGEN SATURATION: 94 % | HEIGHT: 66 IN | HEART RATE: 94 BPM | TEMPERATURE: 98.1 F

## 2023-05-17 DIAGNOSIS — E78.2 MIXED DYSLIPIDEMIA: ICD-10-CM

## 2023-05-17 DIAGNOSIS — Z79.4 TYPE 2 DIABETES MELLITUS WITH OTHER CIRCULATORY COMPLICATION, WITH LONG-TERM CURRENT USE OF INSULIN (HCC): Primary | ICD-10-CM

## 2023-05-17 DIAGNOSIS — E11.59 TYPE 2 DIABETES MELLITUS WITH OTHER CIRCULATORY COMPLICATION, WITH LONG-TERM CURRENT USE OF INSULIN (HCC): Primary | ICD-10-CM

## 2023-05-17 DIAGNOSIS — Z95.810 CARDIAC DEFIBRILLATOR IN PLACE: ICD-10-CM

## 2023-05-17 DIAGNOSIS — I10 ESSENTIAL HYPERTENSION: ICD-10-CM

## 2023-05-17 DIAGNOSIS — I48.19 PERSISTENT ATRIAL FIBRILLATION (HCC): ICD-10-CM

## 2023-05-17 PROCEDURE — 3078F DIAST BP <80 MM HG: CPT | Performed by: FAMILY MEDICINE

## 2023-05-17 PROCEDURE — 99214 OFFICE O/P EST MOD 30 MIN: CPT | Performed by: FAMILY MEDICINE

## 2023-05-17 PROCEDURE — 36415 COLL VENOUS BLD VENIPUNCTURE: CPT | Performed by: FAMILY MEDICINE

## 2023-05-17 PROCEDURE — 3074F SYST BP LT 130 MM HG: CPT | Performed by: FAMILY MEDICINE

## 2023-05-17 PROCEDURE — 1123F ACP DISCUSS/DSCN MKR DOCD: CPT | Performed by: FAMILY MEDICINE

## 2023-05-17 PROCEDURE — 3052F HG A1C>EQUAL 8.0%<EQUAL 9.0%: CPT | Performed by: FAMILY MEDICINE

## 2023-05-17 RX ORDER — PIOGLITAZONEHYDROCHLORIDE 15 MG/1
15 TABLET ORAL DAILY
Qty: 30 TABLET | Refills: 5 | Status: SHIPPED | OUTPATIENT
Start: 2023-05-17

## 2023-05-17 SDOH — ECONOMIC STABILITY: FOOD INSECURITY: WITHIN THE PAST 12 MONTHS, YOU WORRIED THAT YOUR FOOD WOULD RUN OUT BEFORE YOU GOT MONEY TO BUY MORE.: NEVER TRUE

## 2023-05-17 SDOH — ECONOMIC STABILITY: HOUSING INSECURITY
IN THE LAST 12 MONTHS, WAS THERE A TIME WHEN YOU DID NOT HAVE A STEADY PLACE TO SLEEP OR SLEPT IN A SHELTER (INCLUDING NOW)?: NO

## 2023-05-17 SDOH — ECONOMIC STABILITY: FOOD INSECURITY: WITHIN THE PAST 12 MONTHS, THE FOOD YOU BOUGHT JUST DIDN'T LAST AND YOU DIDN'T HAVE MONEY TO GET MORE.: NEVER TRUE

## 2023-05-17 SDOH — ECONOMIC STABILITY: INCOME INSECURITY: HOW HARD IS IT FOR YOU TO PAY FOR THE VERY BASICS LIKE FOOD, HOUSING, MEDICAL CARE, AND HEATING?: NOT HARD AT ALL

## 2023-05-17 ASSESSMENT — ENCOUNTER SYMPTOMS
NAUSEA: 0
ANAL BLEEDING: 0
COUGH: 0
CONSTIPATION: 0
DIARRHEA: 0
VOICE CHANGE: 0
FACIAL SWELLING: 0
COLOR CHANGE: 0
WHEEZING: 0
EYE REDNESS: 0
ABDOMINAL DISTENTION: 0
BACK PAIN: 0
EYE PAIN: 0
SHORTNESS OF BREATH: 0
SORE THROAT: 0
CHEST TIGHTNESS: 0
ABDOMINAL PAIN: 0
BLOOD IN STOOL: 0
SINUS PRESSURE: 0
RHINORRHEA: 0

## 2023-05-17 ASSESSMENT — ANXIETY QUESTIONNAIRES
1. FEELING NERVOUS, ANXIOUS, OR ON EDGE: 0
5. BEING SO RESTLESS THAT IT IS HARD TO SIT STILL: 0
3. WORRYING TOO MUCH ABOUT DIFFERENT THINGS: 0
4. TROUBLE RELAXING: 0
GAD7 TOTAL SCORE: 0
7. FEELING AFRAID AS IF SOMETHING AWFUL MIGHT HAPPEN: 0
6. BECOMING EASILY ANNOYED OR IRRITABLE: 0
2. NOT BEING ABLE TO STOP OR CONTROL WORRYING: 0
IF YOU CHECKED OFF ANY PROBLEMS ON THIS QUESTIONNAIRE, HOW DIFFICULT HAVE THESE PROBLEMS MADE IT FOR YOU TO DO YOUR WORK, TAKE CARE OF THINGS AT HOME, OR GET ALONG WITH OTHER PEOPLE: NOT DIFFICULT AT ALL

## 2023-05-17 ASSESSMENT — PATIENT HEALTH QUESTIONNAIRE - PHQ9
2. FEELING DOWN, DEPRESSED OR HOPELESS: 0
SUM OF ALL RESPONSES TO PHQ9 QUESTIONS 1 & 2: 0
SUM OF ALL RESPONSES TO PHQ QUESTIONS 1-9: 0
1. LITTLE INTEREST OR PLEASURE IN DOING THINGS: 0
SUM OF ALL RESPONSES TO PHQ QUESTIONS 1-9: 0

## 2023-05-17 NOTE — PROGRESS NOTES
Patient refused medicare wellness
General: No focal deficit present. Mental Status: She is alert and oriented to person, place, and time. Cranial Nerves: No cranial nerve deficit. Sensory: No sensory deficit. Motor: No weakness. Coordination: Coordination normal.      Gait: Gait normal.      Deep Tendon Reflexes: Reflexes normal.   Psychiatric:         Mood and Affect: Mood normal.         Behavior: Behavior normal.         Thought Content: Thought content normal.         Judgment: Judgment normal.         Diagnosis Orders   1. Type 2 diabetes mellitus with other circulatory complication, with long-term current use of insulin ([facility])  Hemoglobin A1C    Comprehensive Metabolic Panel    CBC with Auto Differential    Microalbumin / Creatinine Urine Ratio    CBC with Auto Differential    Comprehensive Metabolic Panel    Hemoglobin A1C    pioglitazone (ACTOS) 15 MG tablet      2. Mixed dyslipidemia  UT COLLECTION VENOUS BLOOD VENIPUNCTURE    Lipid Panel    Lipid Panel      3. Essential hypertension        4. Cardiac defibrillator in place        5. Persistent atrial fibrillation ([facility] [address]            Orders Placed This Encounter   Procedures    Lipid Panel     Standing Status:   Future     Number of Occurrences:   1     Standing Expiration Date:   5/17/2024    Hemoglobin A1C     Standing Status:   Future     Number of Occurrences:   1     Standing Expiration Date:   5/17/2024    Comprehensive Metabolic Panel     Standing Status:   Future     Number of Occurrences:   1     Standing Expiration Date:   5/17/2024    CBC with Auto Differential     Standing Status:   Future     Number of Occurrences:   1     Standing Expiration Date:   5/17/2024    Microalbumin / Creatinine Urine Ratio     Standing Status:   Future     Standing Expiration Date:   5/17/2024    UT COLLECTION VENOUS BLOOD VENIPUNCTURE       Return in about 3 months (around 8/17/2023).      Mechele Baumgarten, MD

## 2023-05-18 LAB
ALBUMIN SERPL-MCNC: 4.3 G/DL (ref 3.8–4.8)
ALBUMIN/GLOB SERPL: 1.2 {RATIO} (ref 1.2–2.2)
ALP SERPL-CCNC: 113 IU/L (ref 44–121)
ALT SERPL-CCNC: 23 IU/L (ref 0–32)
AST SERPL-CCNC: 20 IU/L (ref 0–40)
BASOPHILS # BLD AUTO: 0.1 X10E3/UL (ref 0–0.2)
BASOPHILS NFR BLD AUTO: 1 %
BILIRUB SERPL-MCNC: <0.2 MG/DL (ref 0–1.2)
BUN SERPL-MCNC: 11 MG/DL (ref 8–27)
BUN/CREAT SERPL: 15 (ref 12–28)
CALCIUM SERPL-MCNC: 9.7 MG/DL (ref 8.7–10.3)
CHLORIDE SERPL-SCNC: 102 MMOL/L (ref 96–106)
CHOLEST SERPL-MCNC: 151 MG/DL (ref 100–199)
CO2 SERPL-SCNC: 19 MMOL/L (ref 20–29)
CREAT SERPL-MCNC: 0.73 MG/DL (ref 0.57–1)
EGFRCR SERPLBLD CKD-EPI 2021: 90 ML/MIN/1.73
EOSINOPHIL # BLD AUTO: 0.4 X10E3/UL (ref 0–0.4)
EOSINOPHIL NFR BLD AUTO: 3 %
ERYTHROCYTE [DISTWIDTH] IN BLOOD BY AUTOMATED COUNT: 11.8 % (ref 11.7–15.4)
GLOBULIN SER CALC-MCNC: 3.6 G/DL (ref 1.5–4.5)
GLUCOSE SERPL-MCNC: 185 MG/DL (ref 70–99)
HBA1C MFR BLD: 8.3 % (ref 4.8–5.6)
HCT VFR BLD AUTO: 44.5 % (ref 34–46.6)
HDLC SERPL-MCNC: 34 MG/DL
HGB BLD-MCNC: 14.9 G/DL (ref 11.1–15.9)
IMM GRANULOCYTES # BLD AUTO: 0 X10E3/UL (ref 0–0.1)
IMM GRANULOCYTES NFR BLD AUTO: 0 %
IMP & REVIEW OF LAB RESULTS: NORMAL
LDLC SERPL CALC-MCNC: 80 MG/DL (ref 0–99)
LYMPHOCYTES # BLD AUTO: 2.4 X10E3/UL (ref 0.7–3.1)
LYMPHOCYTES NFR BLD AUTO: 21 %
MCH RBC QN AUTO: 31.8 PG (ref 26.6–33)
MCHC RBC AUTO-ENTMCNC: 33.5 G/DL (ref 31.5–35.7)
MCV RBC AUTO: 95 FL (ref 79–97)
MONOCYTES # BLD AUTO: 0.7 X10E3/UL (ref 0.1–0.9)
MONOCYTES NFR BLD AUTO: 6 %
MORPHOLOGY BLD-IMP: ABNORMAL
NEUTROPHILS # BLD AUTO: 7.7 X10E3/UL (ref 1.4–7)
NEUTROPHILS NFR BLD AUTO: 69 %
PLATELET # BLD AUTO: 119 X10E3/UL (ref 150–450)
POTASSIUM SERPL-SCNC: 4.3 MMOL/L (ref 3.5–5.2)
PROT SERPL-MCNC: 7.9 G/DL (ref 6–8.5)
RBC # BLD AUTO: 4.68 X10E6/UL (ref 3.77–5.28)
SODIUM SERPL-SCNC: 139 MMOL/L (ref 134–144)
TRIGL SERPL-MCNC: 222 MG/DL (ref 0–149)
VLDLC SERPL CALC-MCNC: 37 MG/DL (ref 5–40)
WBC # BLD AUTO: 11.2 X10E3/UL (ref 3.4–10.8)

## 2023-05-23 ENCOUNTER — TELEPHONE (OUTPATIENT)
Age: 68
End: 2023-05-23

## 2023-05-30 RX ORDER — PROCHLORPERAZINE 25 MG/1
SUPPOSITORY RECTAL
Qty: 1 EACH | Refills: 0 | Status: SHIPPED | OUTPATIENT
Start: 2023-05-30

## 2023-06-22 RX ORDER — RIVAROXABAN 20 MG/1
TABLET, FILM COATED ORAL
Qty: 90 TABLET | Refills: 0 | Status: SHIPPED | OUTPATIENT
Start: 2023-06-22

## 2023-06-22 RX ORDER — INSULIN GLARGINE 100 [IU]/ML
INJECTION, SOLUTION SUBCUTANEOUS
Qty: 30 ML | Refills: 0 | Status: SHIPPED | OUTPATIENT
Start: 2023-06-22

## 2023-08-15 RX ORDER — PANTOPRAZOLE SODIUM 40 MG/1
TABLET, DELAYED RELEASE ORAL
Qty: 90 TABLET | Refills: 0 | Status: SHIPPED | OUTPATIENT
Start: 2023-08-15

## 2023-08-15 NOTE — TELEPHONE ENCOUNTER
Patient requesting refill on     Requested Prescriptions     Pending Prescriptions Disp Refills    pantoprazole (PROTONIX) 40 MG tablet [Pharmacy Med Name: Pantoprazole Sodium 40 MG Oral Tablet Delayed Release] 90 tablet 0     Sig: Take 1 tablet by mouth once daily        Last OV 8/18/2022     This patient still has Addie Hewitt as PCP    Last seen by María on 5/23/2023

## 2023-09-19 RX ORDER — RIVAROXABAN 20 MG/1
TABLET, FILM COATED ORAL
Qty: 90 TABLET | Refills: 0 | Status: SHIPPED | OUTPATIENT
Start: 2023-09-19

## 2023-11-21 RX ORDER — PANTOPRAZOLE SODIUM 40 MG/1
TABLET, DELAYED RELEASE ORAL
Qty: 90 TABLET | Refills: 0 | OUTPATIENT
Start: 2023-11-21

## 2024-03-12 RX ORDER — PROCHLORPERAZINE 25 MG/1
SUPPOSITORY RECTAL
Qty: 1 EACH | Refills: 0 | OUTPATIENT
Start: 2024-03-12

## 2024-07-28 ENCOUNTER — HOSPITAL ENCOUNTER (EMERGENCY)
Facility: HOSPITAL | Age: 69
Discharge: ANOTHER HOSPITAL | End: 2024-07-28
Attending: EMERGENCY MEDICINE
Payer: MEDICARE

## 2024-07-28 ENCOUNTER — APPOINTMENT (OUTPATIENT)
Facility: HOSPITAL | Age: 69
End: 2024-07-28
Payer: MEDICARE

## 2024-07-28 ENCOUNTER — HOSPITAL ENCOUNTER (OUTPATIENT)
Facility: HOSPITAL | Age: 69
Setting detail: OBSERVATION
LOS: 1 days | Discharge: HOME | End: 2024-07-29
Attending: INTERNAL MEDICINE | Admitting: INTERNAL MEDICINE
Payer: MEDICARE

## 2024-07-28 VITALS
OXYGEN SATURATION: 96 % | RESPIRATION RATE: 18 BRPM | SYSTOLIC BLOOD PRESSURE: 120 MMHG | DIASTOLIC BLOOD PRESSURE: 60 MMHG | TEMPERATURE: 98.3 F | HEART RATE: 108 BPM | WEIGHT: 140 LBS | BODY MASS INDEX: 22.6 KG/M2

## 2024-07-28 DIAGNOSIS — G93.89 BRAIN MASS: Primary | ICD-10-CM

## 2024-07-28 DIAGNOSIS — G93.89 MASS OF LEFT PARIETAL LOBE: Primary | ICD-10-CM

## 2024-07-28 DIAGNOSIS — G44.89 OTHER HEADACHE SYNDROME: ICD-10-CM

## 2024-07-28 PROBLEM — R51.9 HEADACHE: Status: ACTIVE | Noted: 2024-07-28

## 2024-07-28 LAB
ALBUMIN SERPL-MCNC: 3.3 G/DL (ref 3.5–5)
ALBUMIN/GLOB SERPL: 0.8 (ref 1.1–2.2)
ALP SERPL-CCNC: 108 U/L (ref 45–117)
ALT SERPL-CCNC: 21 U/L (ref 12–78)
ANION GAP SERPL CALC-SCNC: 9 MMOL/L (ref 5–15)
AST SERPL-CCNC: 14 U/L (ref 15–37)
BASOPHILS # BLD: 0 K/UL (ref 0–0.1)
BASOPHILS NFR BLD: 0 % (ref 0–1)
BILIRUB SERPL-MCNC: 0.7 MG/DL (ref 0.2–1)
BUN SERPL-MCNC: 14 MG/DL (ref 6–20)
BUN/CREAT SERPL: 14 (ref 12–20)
CALCIUM SERPL-MCNC: 8.9 MG/DL (ref 8.5–10.1)
CHLORIDE SERPL-SCNC: 99 MMOL/L (ref 97–108)
CO2 SERPL-SCNC: 29 MMOL/L (ref 21–32)
CREAT SERPL-MCNC: 0.99 MG/DL (ref 0.55–1.02)
D DIMER PPP FEU-MCNC: 0.99 MG/L FEU (ref 0–0.65)
DIFFERENTIAL METHOD BLD: ABNORMAL
EOSINOPHIL # BLD: 0.1 K/UL (ref 0–0.4)
EOSINOPHIL NFR BLD: 0 % (ref 0–7)
ERYTHROCYTE [DISTWIDTH] IN BLOOD BY AUTOMATED COUNT: 12.4 % (ref 11.5–14.5)
FLUAV RNA SPEC QL NAA+PROBE: NOT DETECTED
FLUBV RNA SPEC QL NAA+PROBE: NOT DETECTED
GLOBULIN SER CALC-MCNC: 4.1 G/DL (ref 2–4)
GLUCOSE BLD STRIP.AUTO-MCNC: 218 MG/DL (ref 65–117)
GLUCOSE BLD STRIP.AUTO-MCNC: 224 MG/DL (ref 65–117)
GLUCOSE SERPL-MCNC: 222 MG/DL (ref 65–100)
HCT VFR BLD AUTO: 40.5 % (ref 35–47)
HGB BLD-MCNC: 13.8 G/DL (ref 11.5–16)
IMM GRANULOCYTES # BLD AUTO: 0.1 K/UL (ref 0–0.04)
IMM GRANULOCYTES NFR BLD AUTO: 0 % (ref 0–0.5)
LYMPHOCYTES # BLD: 1.4 K/UL (ref 0.8–3.5)
LYMPHOCYTES NFR BLD: 9 % (ref 12–49)
MAGNESIUM SERPL-MCNC: 1.4 MG/DL (ref 1.6–2.4)
MCH RBC QN AUTO: 31.8 PG (ref 26–34)
MCHC RBC AUTO-ENTMCNC: 34.1 G/DL (ref 30–36.5)
MCV RBC AUTO: 93.3 FL (ref 80–99)
MONOCYTES # BLD: 0.9 K/UL (ref 0–1)
MONOCYTES NFR BLD: 6 % (ref 5–13)
NEUTS SEG # BLD: 13 K/UL (ref 1.8–8)
NEUTS SEG NFR BLD: 84 % (ref 32–75)
NRBC # BLD: 0 K/UL (ref 0–0.01)
NRBC BLD-RTO: 0 PER 100 WBC
NT PRO BNP: 366 PG/ML (ref 0–125)
PLATELET # BLD AUTO: 169 K/UL (ref 150–400)
PMV BLD AUTO: 11.1 FL (ref 8.9–12.9)
POTASSIUM SERPL-SCNC: 4 MMOL/L (ref 3.5–5.1)
PROT SERPL-MCNC: 7.4 G/DL (ref 6.4–8.2)
RBC # BLD AUTO: 4.34 M/UL (ref 3.8–5.2)
SARS-COV-2 RNA RESP QL NAA+PROBE: NOT DETECTED
SERVICE CMNT-IMP: ABNORMAL
SERVICE CMNT-IMP: ABNORMAL
SODIUM SERPL-SCNC: 137 MMOL/L (ref 136–145)
TROPONIN I SERPL HS-MCNC: 5 NG/L (ref 0–51)
WBC # BLD AUTO: 15.5 K/UL (ref 3.6–11)

## 2024-07-28 PROCEDURE — 73080 X-RAY EXAM OF ELBOW: CPT

## 2024-07-28 PROCEDURE — 82962 GLUCOSE BLOOD TEST: CPT

## 2024-07-28 PROCEDURE — 83880 ASSAY OF NATRIURETIC PEPTIDE: CPT

## 2024-07-28 PROCEDURE — 96366 THER/PROPH/DIAG IV INF ADDON: CPT

## 2024-07-28 PROCEDURE — 93005 ELECTROCARDIOGRAM TRACING: CPT | Performed by: EMERGENCY MEDICINE

## 2024-07-28 PROCEDURE — 2580000003 HC RX 258: Performed by: INTERNAL MEDICINE

## 2024-07-28 PROCEDURE — 87636 SARSCOV2 & INF A&B AMP PRB: CPT

## 2024-07-28 PROCEDURE — 2580000003 HC RX 258: Performed by: EMERGENCY MEDICINE

## 2024-07-28 PROCEDURE — 99285 EMERGENCY DEPT VISIT HI MDM: CPT

## 2024-07-28 PROCEDURE — 85379 FIBRIN DEGRADATION QUANT: CPT

## 2024-07-28 PROCEDURE — 70450 CT HEAD/BRAIN W/O DYE: CPT

## 2024-07-28 PROCEDURE — 85025 COMPLETE CBC W/AUTO DIFF WBC: CPT

## 2024-07-28 PROCEDURE — 80053 COMPREHEN METABOLIC PANEL: CPT

## 2024-07-28 PROCEDURE — 6370000000 HC RX 637 (ALT 250 FOR IP): Performed by: EMERGENCY MEDICINE

## 2024-07-28 PROCEDURE — 6360000004 HC RX CONTRAST MEDICATION: Performed by: EMERGENCY MEDICINE

## 2024-07-28 PROCEDURE — 96365 THER/PROPH/DIAG IV INF INIT: CPT

## 2024-07-28 PROCEDURE — 70498 CT ANGIOGRAPHY NECK: CPT

## 2024-07-28 PROCEDURE — 71046 X-RAY EXAM CHEST 2 VIEWS: CPT

## 2024-07-28 PROCEDURE — 0042T CT BRAIN PERFUSION: CPT

## 2024-07-28 PROCEDURE — 83735 ASSAY OF MAGNESIUM: CPT

## 2024-07-28 PROCEDURE — G0378 HOSPITAL OBSERVATION PER HR: HCPCS

## 2024-07-28 PROCEDURE — 6360000002 HC RX W HCPCS: Performed by: INTERNAL MEDICINE

## 2024-07-28 PROCEDURE — 84484 ASSAY OF TROPONIN QUANT: CPT

## 2024-07-28 PROCEDURE — 6370000000 HC RX 637 (ALT 250 FOR IP): Performed by: INTERNAL MEDICINE

## 2024-07-28 PROCEDURE — 36415 COLL VENOUS BLD VENIPUNCTURE: CPT

## 2024-07-28 PROCEDURE — 94760 N-INVAS EAR/PLS OXIMETRY 1: CPT

## 2024-07-28 RX ORDER — PRAVASTATIN SODIUM 20 MG
20 TABLET ORAL NIGHTLY
Status: DISCONTINUED | OUTPATIENT
Start: 2024-07-28 | End: 2024-07-29 | Stop reason: HOSPADM

## 2024-07-28 RX ORDER — GABAPENTIN 300 MG/1
300 CAPSULE ORAL 3 TIMES DAILY
Status: DISCONTINUED | OUTPATIENT
Start: 2024-07-28 | End: 2024-07-28 | Stop reason: HOSPADM

## 2024-07-28 RX ORDER — SODIUM CHLORIDE 9 MG/ML
INJECTION, SOLUTION INTRAVENOUS PRN
Status: DISCONTINUED | OUTPATIENT
Start: 2024-07-28 | End: 2024-07-29 | Stop reason: HOSPADM

## 2024-07-28 RX ORDER — POLYETHYLENE GLYCOL 3350 17 G/17G
17 POWDER, FOR SOLUTION ORAL DAILY PRN
Status: DISCONTINUED | OUTPATIENT
Start: 2024-07-28 | End: 2024-07-29 | Stop reason: HOSPADM

## 2024-07-28 RX ORDER — MAGNESIUM SULFATE IN WATER 40 MG/ML
2000 INJECTION, SOLUTION INTRAVENOUS PRN
Status: DISCONTINUED | OUTPATIENT
Start: 2024-07-28 | End: 2024-07-29 | Stop reason: HOSPADM

## 2024-07-28 RX ORDER — BUTALBITAL, ACETAMINOPHEN AND CAFFEINE 50; 325; 40 MG/1; MG/1; MG/1
1 TABLET ORAL EVERY 4 HOURS PRN
Status: DISCONTINUED | OUTPATIENT
Start: 2024-07-28 | End: 2024-07-29 | Stop reason: HOSPADM

## 2024-07-28 RX ORDER — ACETAMINOPHEN 650 MG/1
650 SUPPOSITORY RECTAL EVERY 6 HOURS PRN
Status: DISCONTINUED | OUTPATIENT
Start: 2024-07-28 | End: 2024-07-29 | Stop reason: HOSPADM

## 2024-07-28 RX ORDER — INSULIN LISPRO 100 [IU]/ML
0-8 INJECTION, SOLUTION INTRAVENOUS; SUBCUTANEOUS
Status: DISCONTINUED | OUTPATIENT
Start: 2024-07-28 | End: 2024-07-29 | Stop reason: HOSPADM

## 2024-07-28 RX ORDER — ONDANSETRON 2 MG/ML
4 INJECTION INTRAMUSCULAR; INTRAVENOUS EVERY 6 HOURS PRN
Status: DISCONTINUED | OUTPATIENT
Start: 2024-07-28 | End: 2024-07-29 | Stop reason: HOSPADM

## 2024-07-28 RX ORDER — DEXTROSE MONOHYDRATE 100 MG/ML
INJECTION, SOLUTION INTRAVENOUS CONTINUOUS PRN
Status: DISCONTINUED | OUTPATIENT
Start: 2024-07-28 | End: 2024-07-29 | Stop reason: HOSPADM

## 2024-07-28 RX ORDER — INSULIN GLARGINE 100 [IU]/ML
30 INJECTION, SOLUTION SUBCUTANEOUS EVERY MORNING
Status: DISCONTINUED | OUTPATIENT
Start: 2024-07-29 | End: 2024-07-29 | Stop reason: HOSPADM

## 2024-07-28 RX ORDER — INSULIN LISPRO 100 [IU]/ML
0-4 INJECTION, SOLUTION INTRAVENOUS; SUBCUTANEOUS NIGHTLY
Status: DISCONTINUED | OUTPATIENT
Start: 2024-07-28 | End: 2024-07-29 | Stop reason: HOSPADM

## 2024-07-28 RX ORDER — POTASSIUM CHLORIDE 7.45 MG/ML
10 INJECTION INTRAVENOUS PRN
Status: DISCONTINUED | OUTPATIENT
Start: 2024-07-28 | End: 2024-07-29 | Stop reason: HOSPADM

## 2024-07-28 RX ORDER — VALSARTAN 40 MG/1
40 TABLET ORAL 2 TIMES DAILY
Status: DISCONTINUED | OUTPATIENT
Start: 2024-07-28 | End: 2024-07-29 | Stop reason: HOSPADM

## 2024-07-28 RX ORDER — 0.9 % SODIUM CHLORIDE 0.9 %
500 INTRAVENOUS SOLUTION INTRAVENOUS ONCE
Status: COMPLETED | OUTPATIENT
Start: 2024-07-28 | End: 2024-07-28

## 2024-07-28 RX ORDER — POTASSIUM CHLORIDE 750 MG/1
40 TABLET, FILM COATED, EXTENDED RELEASE ORAL PRN
Status: DISCONTINUED | OUTPATIENT
Start: 2024-07-28 | End: 2024-07-29 | Stop reason: HOSPADM

## 2024-07-28 RX ORDER — MAGNESIUM SULFATE IN WATER 40 MG/ML
2000 INJECTION, SOLUTION INTRAVENOUS ONCE
Status: COMPLETED | OUTPATIENT
Start: 2024-07-28 | End: 2024-07-28

## 2024-07-28 RX ORDER — GABAPENTIN 600 MG/1
300 TABLET ORAL 2 TIMES DAILY
Status: DISCONTINUED | OUTPATIENT
Start: 2024-07-28 | End: 2024-07-29 | Stop reason: HOSPADM

## 2024-07-28 RX ORDER — SODIUM CHLORIDE 0.9 % (FLUSH) 0.9 %
5-40 SYRINGE (ML) INJECTION EVERY 12 HOURS SCHEDULED
Status: DISCONTINUED | OUTPATIENT
Start: 2024-07-28 | End: 2024-07-29 | Stop reason: HOSPADM

## 2024-07-28 RX ORDER — ACETAMINOPHEN 325 MG/1
650 TABLET ORAL
Status: COMPLETED | OUTPATIENT
Start: 2024-07-28 | End: 2024-07-28

## 2024-07-28 RX ORDER — TRAMADOL HYDROCHLORIDE 50 MG/1
50 TABLET ORAL EVERY 6 HOURS PRN
Status: DISCONTINUED | OUTPATIENT
Start: 2024-07-28 | End: 2024-07-29 | Stop reason: HOSPADM

## 2024-07-28 RX ORDER — CARVEDILOL 12.5 MG/1
6.25 TABLET ORAL 2 TIMES DAILY WITH MEALS
Status: DISCONTINUED | OUTPATIENT
Start: 2024-07-28 | End: 2024-07-29 | Stop reason: HOSPADM

## 2024-07-28 RX ORDER — SODIUM CHLORIDE 0.9 % (FLUSH) 0.9 %
5-40 SYRINGE (ML) INJECTION PRN
Status: DISCONTINUED | OUTPATIENT
Start: 2024-07-28 | End: 2024-07-29 | Stop reason: HOSPADM

## 2024-07-28 RX ORDER — ACETAMINOPHEN 325 MG/1
650 TABLET ORAL EVERY 6 HOURS PRN
Status: DISCONTINUED | OUTPATIENT
Start: 2024-07-28 | End: 2024-07-29 | Stop reason: HOSPADM

## 2024-07-28 RX ORDER — ONDANSETRON 4 MG/1
4 TABLET, ORALLY DISINTEGRATING ORAL EVERY 8 HOURS PRN
Status: DISCONTINUED | OUTPATIENT
Start: 2024-07-28 | End: 2024-07-29 | Stop reason: HOSPADM

## 2024-07-28 RX ORDER — PANTOPRAZOLE SODIUM 40 MG/1
40 TABLET, DELAYED RELEASE ORAL DAILY
Status: DISCONTINUED | OUTPATIENT
Start: 2024-07-28 | End: 2024-07-29 | Stop reason: HOSPADM

## 2024-07-28 RX ADMIN — ACETAMINOPHEN 650 MG: 325 TABLET ORAL at 08:37

## 2024-07-28 RX ADMIN — MAGNESIUM SULFATE HEPTAHYDRATE 2000 MG: 40 INJECTION, SOLUTION INTRAVENOUS at 16:57

## 2024-07-28 RX ADMIN — SODIUM CHLORIDE 500 ML: 9 INJECTION, SOLUTION INTRAVENOUS at 08:10

## 2024-07-28 RX ADMIN — GABAPENTIN 300 MG: 300 CAPSULE ORAL at 08:37

## 2024-07-28 RX ADMIN — PRAVASTATIN SODIUM 20 MG: 20 TABLET ORAL at 21:23

## 2024-07-28 RX ADMIN — IOPAMIDOL 100 ML: 755 INJECTION, SOLUTION INTRAVENOUS at 10:17

## 2024-07-28 RX ADMIN — INSULIN LISPRO 2 UNITS: 100 INJECTION, SOLUTION INTRAVENOUS; SUBCUTANEOUS at 16:54

## 2024-07-28 RX ADMIN — DICLOFENAC SODIUM 2 G: 10 GEL TOPICAL at 21:23

## 2024-07-28 RX ADMIN — SODIUM CHLORIDE, PRESERVATIVE FREE 10 ML: 5 INJECTION INTRAVENOUS at 21:34

## 2024-07-28 RX ADMIN — CARVEDILOL 6.25 MG: 12.5 TABLET, FILM COATED ORAL at 15:28

## 2024-07-28 RX ADMIN — GABAPENTIN 300 MG: 600 TABLET, FILM COATED ORAL at 15:28

## 2024-07-28 RX ADMIN — PANTOPRAZOLE SODIUM 40 MG: 40 TABLET, DELAYED RELEASE ORAL at 15:28

## 2024-07-28 RX ADMIN — ACETAMINOPHEN 650 MG: 325 TABLET ORAL at 15:35

## 2024-07-28 RX ADMIN — BUTALBITAL, ACETAMINOPHEN, AND CAFFEINE 1 TABLET: 50; 325; 40 TABLET ORAL at 23:19

## 2024-07-28 RX ADMIN — TRAMADOL HYDROCHLORIDE 50 MG: 50 TABLET ORAL at 21:23

## 2024-07-28 RX ADMIN — VALSARTAN 40 MG: 40 TABLET, FILM COATED ORAL at 21:23

## 2024-07-28 ASSESSMENT — LIFESTYLE VARIABLES
HOW OFTEN DO YOU HAVE A DRINK CONTAINING ALCOHOL: NEVER
HOW MANY STANDARD DRINKS CONTAINING ALCOHOL DO YOU HAVE ON A TYPICAL DAY: PATIENT DOES NOT DRINK

## 2024-07-28 ASSESSMENT — PAIN DESCRIPTION - LOCATION: LOCATION: HEAD;FOOT

## 2024-07-28 ASSESSMENT — PAIN SCALES - GENERAL
PAINLEVEL_OUTOF10: 8
PAINLEVEL_OUTOF10: 7

## 2024-07-28 ASSESSMENT — PAIN DESCRIPTION - DESCRIPTORS
DESCRIPTORS: STABBING;THROBBING;SORE
DESCRIPTORS: THROBBING;ACHING

## 2024-07-28 ASSESSMENT — PAIN - FUNCTIONAL ASSESSMENT
PAIN_FUNCTIONAL_ASSESSMENT: 0-10
PAIN_FUNCTIONAL_ASSESSMENT: PREVENTS OR INTERFERES SOME ACTIVE ACTIVITIES AND ADLS

## 2024-07-28 ASSESSMENT — PAIN DESCRIPTION - ORIENTATION
ORIENTATION: LEFT;RIGHT
ORIENTATION: RIGHT;LEFT

## 2024-07-28 ASSESSMENT — PAIN DESCRIPTION - PAIN TYPE: TYPE: CHRONIC PAIN

## 2024-07-28 NOTE — ED NOTES
Pt accepted to [facility] bed 667. Report Number [phone]. Nursing sup aware to arrange transport.

## 2024-07-28 NOTE — ED TRIAGE NOTES
Patient arrived with multiple concerns of bilateral foot pain, right elbow pain, left leg pain and pain to the back of the head. Patient denies known cause. Pain 8/10. Patient states she took a gabapentin last night but nothing today.

## 2024-07-28 NOTE — ED PROVIDER NOTES
tablet by mouth 2 times daily    XARELTO 20 MG TABS TABLET    Take 1 tablet by mouth once daily with breakfast       SCREENINGS               No data recorded        PHYSICAL EXAM      ED Triage Vitals [07/28/24 0626]   Enc Vitals Group      /60      Pulse (!) 108      Respirations 18      Temp 98.3 °F (36.8 °C)      Temp Source Oral      SpO2 96 %      Weight - Scale 63.5 kg (140 lb)      Height       Head Circumference       Peak Flow       Pain Score       Pain Loc       Pain Edu?       Excl. in GC?               Physical Exam  Vitals and nursing note reviewed.   Constitutional:       Comments: Very thin   HENT:      Head: Normocephalic and atraumatic.      Comments: Tender to palpation right posterior scalp     Mouth/Throat:      Mouth: Mucous membranes are moist.   Cardiovascular:      Rate and Rhythm: Regular rhythm. Tachycardia present.      Pulses: Normal pulses.      Heart sounds: Normal heart sounds. No murmur heard.     Comments: Pacemaker/AICD left upper chest  Pulmonary:      Effort: Pulmonary effort is normal.      Breath sounds: Normal breath sounds. No wheezing or rales.      Comments: Tender to palpation right mid back  Chest:      Chest wall: No tenderness.   Abdominal:      Palpations: Abdomen is soft.      Tenderness: There is no abdominal tenderness.   Musculoskeletal:         General: Tenderness (Right elbow tender to palpation over lateral elbow and posterior elbow, full range of motion, tender to palpation bilateral plantar feet, no erythema or swelling noted, no tenderness of ankles bilaterally.) present. No swelling.      Cervical back: Normal range of motion. No rigidity or tenderness.   Skin:     General: Skin is warm.      Capillary Refill: Capillary refill takes less than 2 seconds.      Coloration: Skin is not jaundiced.      Findings: No erythema.   Neurological:      General: No focal deficit present.      Mental Status: She is alert and oriented to person, place, and time.

## 2024-07-28 NOTE — PROGRESS NOTES
Hospitalist put in for MRI for pt. Per patient, she cannot receive an MRI because she has a defibrillator that is not compatible. Hospitalist canceled MRI.

## 2024-07-28 NOTE — ED NOTES
Arrangements made with [facility] dispatcher for BLS transport to [facility] room 667.  All questions answered and Er aware of 1530 ETA.

## 2024-07-28 NOTE — CONSULTS
This pt presented to [facility] with, \"Patient arrived with multiple concerns of bilateral foot pain, right elbow pain, left leg pain and pain to the back of the head.\"  CTH read as \"Vasogenic edema in the left parietal lobe with sparing of the cortex. Findings are suspicious for an underlying mass lesion.\"  CTA H/N without enhancing lesions, chronic left ICA occlusion, also present on CTA from 2/29/16.   CTH is reviewed and is c/w a chronic stroke in the left parietooccipital region and is stable in comparison to CTH 9/14/19 and c/w the acute stroke seen on MRI brain 3/2/16.  Please call if needed.

## 2024-07-28 NOTE — H&P
History and Physical    Date of Service:  7/28/2024  Primary Care Provider: Landon Starks MD  Source of information: The patient and Chart review    Chief Complaint: Headache, left foot pain , right elbow pain     History of Presenting Illness:   Madisyn Manriquez is a 69 y.o. female with PMH of Afib on Xarelto, CHF s/p ICD, CVA, HTN, HLD, IDDM, peripheral neuropathy presented to ED for evaluation of above mentioned symptoms. Pt woke up in middle of the night 3am with feet pain, right pain and headache. Headache present on right occipital region. Patient has been having feet pain yesterday while walking on new shoes. Denied any fall or injury. She is left handed, c/w right elbow pain - new onset. No known injury. She does smoke and has dry cough. Denied sob, fever, chest pain,a bd pain , urinary or bowel changes.   In [facility]SH ED, CT head showed Vasogenic edema in the left parietal lobe with sparing of the cortex. Findings are suspicious for an underlying mass lesion. These findings were communicated NSGY Dr. Hernandez. CTA chest  Mild perfusion defect in the left parietal-occipital subcortical white matter. No abnormal enhancing intracranial mass lesion. Pt was sent to [facility] for higher level of care.   NSGY Dr. Ott reviewed the case \" CT contrast shows NO brain tumor.\". Neurology  \"chronic stroke in the left parietooccipital region \"   No further work up needed     Patient is seen and examined at bedside. She still has headache - No blurry vision or nausea. Right elbow pain - not limiting range of motion. She c/w left feet pain on lateral and medial sides. Explained NSGY and neuro recs. She understood that there no further work up needed.          REVIEW OF SYSTEMS:  A comprehensive review of systems was negative except for that written in the History of Present Illness.     Past Medical History:   Diagnosis Date    A-fib (HCC)     Aphasia due to recent cerebrovascular accident 3/1/2016    Back pain

## 2024-07-28 NOTE — ED NOTES
TRANSFER - OUT REPORT:    Verbal report given to Maxime and ANYI Craft RN at [place] on Madisyn Manriquez  being transferred to [facility] for urgent transfer       Report consisted of patient's Situation, Background, Assessment and   Recommendations(SBAR).     Information from the following report(s) Nurse Handoff Report, Index, ED SBAR, Adult Overview, Intake/Output, MAR, Recent Results, and Cardiac Rhythm    was reviewed with the receiving nurse.    [place] Fall Assessment:    Presents to emergency department  because of falls (Syncope, seizure, or loss of consciousness): No  Age > 70: No  Altered Mental Status, Intoxication with alcohol or substance confusion (Disorientation, impaired judgment, poor safety awaremess, or inability to follow instructions): No  Impaired Mobility: Ambulates or transfers with assistive devices or assistance; Unable to ambulate or transer.: No  Nursing Judgement: No          Lines:   Peripheral IV 07/28/24 Left;Proximal Antecubital (Active)        Opportunity for questions and clarification was provided.

## 2024-07-29 VITALS
OXYGEN SATURATION: 100 % | BODY MASS INDEX: 23.95 KG/M2 | WEIGHT: 148.37 LBS | SYSTOLIC BLOOD PRESSURE: 106 MMHG | HEART RATE: 88 BPM | RESPIRATION RATE: 18 BRPM | DIASTOLIC BLOOD PRESSURE: 44 MMHG | TEMPERATURE: 98.1 F

## 2024-07-29 LAB
ANION GAP SERPL CALC-SCNC: 8 MMOL/L (ref 5–15)
BASOPHILS # BLD: 0 K/UL (ref 0–0.1)
BASOPHILS NFR BLD: 0 % (ref 0–1)
BUN SERPL-MCNC: 11 MG/DL (ref 6–20)
BUN/CREAT SERPL: 17 (ref 12–20)
CALCIUM SERPL-MCNC: 8.7 MG/DL (ref 8.5–10.1)
CHLORIDE SERPL-SCNC: 107 MMOL/L (ref 97–108)
CHOLEST SERPL-MCNC: 117 MG/DL
CO2 SERPL-SCNC: 20 MMOL/L (ref 21–32)
CREAT SERPL-MCNC: 0.64 MG/DL (ref 0.55–1.02)
DIFFERENTIAL METHOD BLD: ABNORMAL
EOSINOPHIL # BLD: 0.2 K/UL (ref 0–0.4)
EOSINOPHIL NFR BLD: 1 % (ref 0–7)
ERYTHROCYTE [DISTWIDTH] IN BLOOD BY AUTOMATED COUNT: 12.6 % (ref 11.5–14.5)
EST. AVERAGE GLUCOSE BLD GHB EST-MCNC: 151 MG/DL
GLUCOSE BLD STRIP.AUTO-MCNC: 206 MG/DL (ref 65–117)
GLUCOSE BLD STRIP.AUTO-MCNC: 245 MG/DL (ref 65–117)
GLUCOSE SERPL-MCNC: 196 MG/DL (ref 65–100)
HBA1C MFR BLD: 6.9 % (ref 4–5.6)
HCT VFR BLD AUTO: 38.3 % (ref 35–47)
HDLC SERPL-MCNC: 36 MG/DL
HDLC SERPL: 3.3 (ref 0–5)
HGB BLD-MCNC: 12.6 G/DL (ref 11.5–16)
IMM GRANULOCYTES # BLD AUTO: 0 K/UL (ref 0–0.04)
IMM GRANULOCYTES NFR BLD AUTO: 0 % (ref 0–0.5)
LDLC SERPL CALC-MCNC: 52.6 MG/DL (ref 0–100)
LYMPHOCYTES # BLD: 1.7 K/UL (ref 0.8–3.5)
LYMPHOCYTES NFR BLD: 11 % (ref 12–49)
MCH RBC QN AUTO: 31.1 PG (ref 26–34)
MCHC RBC AUTO-ENTMCNC: 32.9 G/DL (ref 30–36.5)
MCV RBC AUTO: 94.6 FL (ref 80–99)
MONOCYTES # BLD: 1.1 K/UL (ref 0–1)
MONOCYTES NFR BLD: 7 % (ref 5–13)
NEUTS SEG # BLD: 12.8 K/UL (ref 1.8–8)
NEUTS SEG NFR BLD: 81 % (ref 32–75)
NRBC # BLD: 0 K/UL (ref 0–0.01)
NRBC BLD-RTO: 0 PER 100 WBC
PLATELET # BLD AUTO: 56 K/UL (ref 150–400)
PLATELET COMMENT: ABNORMAL
POTASSIUM SERPL-SCNC: 4.3 MMOL/L (ref 3.5–5.1)
RBC # BLD AUTO: 4.05 M/UL (ref 3.8–5.2)
RBC MORPH BLD: ABNORMAL
SERVICE CMNT-IMP: ABNORMAL
SERVICE CMNT-IMP: ABNORMAL
SODIUM SERPL-SCNC: 135 MMOL/L (ref 136–145)
TRIGL SERPL-MCNC: 142 MG/DL
VLDLC SERPL CALC-MCNC: 28.4 MG/DL
WBC # BLD AUTO: 15.8 K/UL (ref 3.6–11)

## 2024-07-29 PROCEDURE — 97165 OT EVAL LOW COMPLEX 30 MIN: CPT

## 2024-07-29 PROCEDURE — 36415 COLL VENOUS BLD VENIPUNCTURE: CPT

## 2024-07-29 PROCEDURE — 83036 HEMOGLOBIN GLYCOSYLATED A1C: CPT

## 2024-07-29 PROCEDURE — 6370000000 HC RX 637 (ALT 250 FOR IP): Performed by: INTERNAL MEDICINE

## 2024-07-29 PROCEDURE — 97530 THERAPEUTIC ACTIVITIES: CPT

## 2024-07-29 PROCEDURE — 97162 PT EVAL MOD COMPLEX 30 MIN: CPT

## 2024-07-29 PROCEDURE — 80061 LIPID PANEL: CPT

## 2024-07-29 PROCEDURE — 80048 BASIC METABOLIC PNL TOTAL CA: CPT

## 2024-07-29 PROCEDURE — 82962 GLUCOSE BLOOD TEST: CPT

## 2024-07-29 PROCEDURE — 2580000003 HC RX 258: Performed by: INTERNAL MEDICINE

## 2024-07-29 PROCEDURE — G0378 HOSPITAL OBSERVATION PER HR: HCPCS

## 2024-07-29 PROCEDURE — 85025 COMPLETE CBC W/AUTO DIFF WBC: CPT

## 2024-07-29 RX ORDER — BUTALBITAL, ACETAMINOPHEN AND CAFFEINE 50; 325; 40 MG/1; MG/1; MG/1
1 TABLET ORAL EVERY 4 HOURS PRN
Qty: 4 TABLET | Refills: 0 | Status: SHIPPED | OUTPATIENT
Start: 2024-07-29

## 2024-07-29 RX ADMIN — CARVEDILOL 6.25 MG: 12.5 TABLET, FILM COATED ORAL at 08:35

## 2024-07-29 RX ADMIN — BUTALBITAL, ACETAMINOPHEN, AND CAFFEINE 1 TABLET: 50; 325; 40 TABLET ORAL at 09:28

## 2024-07-29 RX ADMIN — PANTOPRAZOLE SODIUM 40 MG: 40 TABLET, DELAYED RELEASE ORAL at 08:35

## 2024-07-29 RX ADMIN — INSULIN GLARGINE 30 UNITS: 100 INJECTION, SOLUTION SUBCUTANEOUS at 08:33

## 2024-07-29 RX ADMIN — TRAMADOL HYDROCHLORIDE 50 MG: 50 TABLET ORAL at 06:17

## 2024-07-29 RX ADMIN — SODIUM CHLORIDE, PRESERVATIVE FREE 10 ML: 5 INJECTION INTRAVENOUS at 09:29

## 2024-07-29 RX ADMIN — VALSARTAN 40 MG: 40 TABLET, FILM COATED ORAL at 08:34

## 2024-07-29 RX ADMIN — ACETAMINOPHEN 650 MG: 325 TABLET ORAL at 06:17

## 2024-07-29 RX ADMIN — RIVAROXABAN 20 MG: 20 TABLET, FILM COATED ORAL at 08:34

## 2024-07-29 RX ADMIN — GABAPENTIN 300 MG: 600 TABLET, FILM COATED ORAL at 08:34

## 2024-07-29 RX ADMIN — INSULIN LISPRO 2 UNITS: 100 INJECTION, SOLUTION INTRAVENOUS; SUBCUTANEOUS at 08:34

## 2024-07-29 ASSESSMENT — PAIN DESCRIPTION - LOCATION: LOCATION: HEAD

## 2024-07-29 NOTE — DISCHARGE SUMMARY
shortness of breath, fever, chills, nausea, vomiting, diarrhea, change in mentation, falling, weakness, bleeding. Severe pain or pain not relieved by medications.  Or, any other signs or symptoms that you may have questions about.    DISPOSITION:   X Home With: prescription for outpatient PT   OT  PT  HH  RN       Long term SNF/Inpatient Rehab    Independent/assisted living    Hospice    Other:       PATIENT CONDITION AT DISCHARGE:     Functional status    Poor     Deconditioned    X Independent      Cognition    X Lucid     Forgetful     Dementia      Catheters/lines (plus indication)    Martinez     PICC     PEG    X None      Code status    X Full code     DNR      PHYSICAL EXAMINATION AT DISCHARGE:  Patient seen and examined, sitting in chair. Has mild headache, but improved from yesterday. Feeling well overall and ready to go home.     General : alert x 3, awake, no acute distress  HEENT: PEERL, EOMI, moist mucus membranes  Neck: supple, no JVD, no meningeal signs  Chest: Clear to auscultation bilaterally, on room air   CVS: S1 S2 heard, Capillary refill less than 2 seconds, HR 90 (sinus rhythm on telemetry)  Abd: soft/non tender, non distended, BS physiological  Ext: no clubbing, no cyanosis, no edema, brisk 2+ DP pulses; right elbow mildly tender to palpation, no redness, edema, or warmth  Neuro/Psych: pleasant mood and affect, CN 2-12 grossly intact, sensory grossly within normal limit, Strength 5/5 in all extremities  Skin: warm, dry    CHRONIC MEDICAL DIAGNOSES:  Principal Problem:    Brain mass  Active Problems:    Headache  Resolved Problems:    * No resolved hospital problems. *        Greater than 31 minutes were spent with the patient on counseling and coordination of care    Signed:   MITCHELL Francois NP  7/29/2024  10:58 AM

## 2024-07-29 NOTE — CARE COORDINATION
PT/OT recommending home health, Pt declining, prefers to go to OP clinic across the street from her home. Pt does have a ride home who will be here before noon today.     JOSELUIS Hein (Ally).

## 2024-07-29 NOTE — PROGRESS NOTES
Discharge teaching explained to ptMadisyn.  Smoking cessation teaching explain, discussed new medications and follow up apts.     Stroke Education provided to patient and the following topics were discussed    1. Patients personal risk factors for stroke are diabetes mellitus and smoking    2. Warning signs of Stroke:        * Sudden numbness or weakness of the face, arm or leg, especially on one side of          The body            * Sudden confusion, trouble speaking or understanding        * Sudden trouble seeing in one or both eyes        * Sudden trouble walking, dizziness, loss of balance or coordination        * Sudden severe headache with no known cause      3. Importance of activation Emergency Medical Services ( 9-1-1 ) immediately if experience any warning signs of stroke.    4. Be sure and schedule a follow-up appointment with your primary care doctor or any specialists as instructed.     5. You must take medicine every day to treat your risk factors for stroke.  Be sure to take your medicines exactly as your doctor tells you: no more, no less.  Know what your medicines are for , what they do.  Anti-thrombotics /anticoagulants can help prevent strokes.  You are taking the following medicine(s)  xarelto     6.  Smoking and second-hand smoke greatly increase your risk of stroke, cardiovascular disease and death. Smoking history cigarettes, a few per day    7. Information provided was [facility] Stroke Education Binder    8. Documentation of teaching completed in Patient Education Activity and on Care Plan with teaching response noted?  yes

## 2024-07-29 NOTE — DISCHARGE INSTRUCTIONS
Discharge Instructions       PATIENT ID: Madisyn Manriquez  MRN: [id number]   YOB: 1955    DATE OF ADMISSION: 7/28/2024   DATE OF DISCHARGE: 7/29/2024    PRIMARY CARE PROVIDER: Landon Starks     ATTENDING PHYSICIAN: Santiago Van MD   DISCHARGING PROVIDER: MITCHELL Francois NP    To contact this individual call [phone] and ask the  to page.   If unavailable ask to be transferred the Adult Hospitalist Department.    DISCHARGE DIAGNOSES headache, right elbow pain, leukocytosis    CONSULTATIONS: Neurosurgery, Neurology    PROCEDURES/SURGERIES: * No surgery found *    PENDING TEST RESULTS:   At the time of discharge the following test results are still pending: CBC, A1c    Follow-up Information       Follow up With Specialties Details Why Contact Info    Landon Starks MD Internal Medicine Follow up Call to schedule follow-up in 1-2 weeks.  [address]  [facility] [address]  [phone]              ADDITIONAL CARE RECOMMENDATIONS:   Schedule follow up appointment as listed above.  If you develop fever/chills, cough, or pain/burning during urination, please call your doctor.   If you develop any signs or symptoms of stroke, you should be evaluated in the emergency department.   Continue your home medications as previously prescribed.  You are being discharged with a prescription for Fioricet to take if needed for headaches. Do not drink alcohol or drive while taking this medication.     DIET: cardiac diet and diabetic diet    ACTIVITY: activity as tolerated    WOUND CARE: none    EQUIPMENT needed: none      DISCHARGE MEDICATIONS:   See Medication Reconciliation Form    It is important that you take the medication exactly as they are prescribed.   Keep your medication in the bottles provided by the pharmacist and keep a list of the medication names, dosages, and times to be taken in your wallet.   Do not take other medications without consulting your doctor.       NOTIFY YOUR

## 2024-07-29 NOTE — PROGRESS NOTES
Attempted to deliver and verbally explain the MOON/with patient. Patient was with clinical staff. Ladan Huerta, Care Management Assistant

## 2024-07-29 NOTE — PLAN OF CARE
Problem: Discharge Planning  Goal: Discharge to home or other facility with appropriate resources  7/29/2024 0144 by Mitra Parrish, RN  Outcome: Progressing    Problem: Safety - Adult  Goal: Free from fall injury  7/29/2024 0144 by Mitra Parrish, RN  Outcome: Progressing     Problem: Pain  Goal: Verbalizes/displays adequate comfort level or baseline comfort level  Outcome: Progressing     
  Problem: Discharge Planning  Goal: Discharge to home or other facility with appropriate resources  Outcome: Progressing     Problem: Safety - Adult  Goal: Free from fall injury  Outcome: Progressing     
  Problem: Physical Therapy - Adult  Goal: By Discharge: Performs mobility at highest level of function for planned discharge setting.  See evaluation for individualized goals.  Description: FUNCTIONAL STATUS PRIOR TO ADMISSION: Patient was independent and active without use of DME.    HOME SUPPORT PRIOR TO ADMISSION: The patient lived with alone & with significant other, at times, but did not require assistance.    Physical Therapy Goals  Initiated 7/29/2024  1.  Patient will move from supine to sit and sit to supine, scoot up and down, and roll side to side in bed with independence within 7 day(s).    2.  Patient will perform sit to stand with independence within 7 day(s).  3.  Patient will transfer from bed to chair and chair to bed with independence using the least restrictive device within 7 day(s).  4.  Patient will ambulate with modified independence for 250 feet with the least restrictive device within 7 day(s).   5.  Patient will ascend/descend 4 stairs with handrail(s) with modified independence within 7 day(s).  6.  Patient will complete/improve Amor Balance score by 7 points within 7 days.   Outcome: Progressing   PHYSICAL THERAPY EVALUATION    Patient: Madisyn Manriquez (69 y.o. female)  Date: 7/29/2024  Primary Diagnosis: Brain mass [G93.89]  Headache [R51.9]       Precautions: Restrictions/Precautions: Fall Risk                      ASSESSMENT :   DEFICITS/IMPAIRMENTS:   The patient is limited by decreased functional mobility, independence in ADLs, high-level IADLs, activity tolerance, endurance, safety awareness, cognition, coordination, balance, vision/visual deficit, orthostatic hypotension, increased pain levels s/p admission for neuro workup; CT shows \"edema present in L parietal lobe sparing the cortex\".    Based on the impairments listed above Madisyn appears to be below her functional baseline. She was received in bed and cleared for mobility. She reports she was I with all mobility/ADLs prior 
Patient will perform lower body dressing with Contact Guard Assist within 7 day(s).  4.  Patient will perform toilet transfers with Contact Guard Assist within 7 day(s).  5.  Patient will perform all aspects of toileting with Supervision within 7 day(s).  6.  Patient will participate in upper extremity therapeutic exercise/activities with Contact Guard Assist within 7 day(s).    7.  Patient will utilize energy conservation techniques during functional activities with verbal cues within 7 day(s).  8.  Patient will improve their Fugl Ashby score by 5 points in prep for ADLs within 7 days.   7/29/2024 1009 by Tatyana Hermosillo, OT  Outcome: Progressing     
assistance                     Balance:      Balance  Sitting: Intact  Standing: Impaired  Standing - Static: Good;Constant support  Standing - Dynamic: Constant support;Fair;Occasional      ADL Assessment:          Feeding: Independent       Grooming: Minimal assistance       UE Bathing: Contact guard assistance            LE Bathing: Minimal assistance       UE Dressing: Contact guard assistance       LE Dressing: Minimal assistance       Toileting: Minimal assistance                         ADL Intervention and task modifications:                  [facility] \"6 Clicks\"                                                       Daily Activity Inpatient Short Form  How much help from another person does the patient currently need... Total; A Lot A Little None   1.  Putting on and taking off regular lower body clothing? []  1 []  2 [x]  3 []  4   2.  Bathing (including washing, rinsing, drying)? []  1 []  2 [x]  3 []  4   3.  Toileting, which includes using toilet, bedpan or urinal? [] 1 []  2 [x]  3 []  4   4.  Putting on and taking off regular upper body clothing? []  1 []  2 [x]  3 []  4   5.  Taking care of personal grooming such as brushing teeth? []  1 []  2 [x]  3 []  4   6.  Eating meals? []  1 []  2 []  3 [x]  4   © 2007, Trustees of [facility], under license to [employer]. All rights reserved     Score: 19/24     Interpretation of Tool:  Represents clinically-significant functional categories (i.e. Activities of daily living).    Cutoff score 39.4 (19) correlates to a good likelihood of discharging home versus a facility  Itzel Larsen, Neema Carbajal, Jv Mares, Gilma French, Ezra Rasheed, Brennan Larsen, -PAC “6-Clicks” Functional Assessment Scores Predict Acute Care Hospital Discharge Destination, Physical Therapy, Volume 94, Issue 9, 1 September 2014, Pages 8562-1243, [email or web address]      Activity Tolerance:   Fair  and requires rest

## 2024-07-30 LAB
EKG ATRIAL RATE: 122 BPM
EKG DIAGNOSIS: NORMAL
EKG P AXIS: 66 DEGREES
EKG P-R INTERVAL: 158 MS
EKG Q-T INTERVAL: 398 MS
EKG QRS DURATION: 88 MS
EKG QTC CALCULATION (BAZETT): 567 MS
EKG R AXIS: 90 DEGREES
EKG T AXIS: 81 DEGREES
EKG VENTRICULAR RATE: 122 BPM

## 2024-09-16 ENCOUNTER — HOSPITAL ENCOUNTER (OUTPATIENT)
Facility: HOSPITAL | Age: 69
Discharge: HOME | End: 2024-09-19

## 2024-09-16 ENCOUNTER — TRANSCRIBE ORDERS (OUTPATIENT)
Facility: HOSPITAL | Age: 69
End: 2024-09-16

## 2024-09-16 DIAGNOSIS — R05.9 COUGH, UNSPECIFIED TYPE: Primary | ICD-10-CM

## 2024-09-16 DIAGNOSIS — R05.9 COUGH, UNSPECIFIED TYPE: ICD-10-CM

## 2024-09-17 ENCOUNTER — TRANSCRIBE ORDERS (OUTPATIENT)
Facility: HOSPITAL | Age: 69
End: 2024-09-17

## 2024-09-17 ENCOUNTER — HOSPITAL ENCOUNTER (OUTPATIENT)
Facility: HOSPITAL | Age: 69
Discharge: HOME | End: 2024-09-20
Payer: MEDICARE

## 2024-09-17 DIAGNOSIS — R05.9 COUGH, UNSPECIFIED TYPE: Primary | ICD-10-CM

## 2024-09-17 DIAGNOSIS — R05.9 COUGH, UNSPECIFIED TYPE: ICD-10-CM

## 2024-09-17 PROCEDURE — 71046 X-RAY EXAM CHEST 2 VIEWS: CPT

## 2024-10-13 ENCOUNTER — APPOINTMENT (OUTPATIENT)
Facility: HOSPITAL | Age: 69
End: 2024-10-13
Payer: MEDICARE

## 2024-10-13 ENCOUNTER — HOSPITAL ENCOUNTER (INPATIENT)
Facility: HOSPITAL | Age: 69
LOS: 2 days | Discharge: HOME | End: 2024-10-15
Attending: EMERGENCY MEDICINE | Admitting: HOSPITALIST
Payer: MEDICARE

## 2024-10-13 DIAGNOSIS — J18.9 PNEUMONIA OF RIGHT LOWER LOBE DUE TO INFECTIOUS ORGANISM: ICD-10-CM

## 2024-10-13 DIAGNOSIS — A41.9 SEPTICEMIA (HCC): Primary | ICD-10-CM

## 2024-10-13 DIAGNOSIS — A41.9 SEVERE SEPSIS WITH ACUTE ORGAN DYSFUNCTION (HCC): ICD-10-CM

## 2024-10-13 DIAGNOSIS — Z79.4 TYPE 2 DIABETES MELLITUS WITH OTHER CIRCULATORY COMPLICATION, WITH LONG-TERM CURRENT USE OF INSULIN (HCC): ICD-10-CM

## 2024-10-13 DIAGNOSIS — R65.20 SEVERE SEPSIS WITH ACUTE ORGAN DYSFUNCTION (HCC): ICD-10-CM

## 2024-10-13 DIAGNOSIS — E11.59 TYPE 2 DIABETES MELLITUS WITH OTHER CIRCULATORY COMPLICATION, WITH LONG-TERM CURRENT USE OF INSULIN (HCC): ICD-10-CM

## 2024-10-13 LAB
ALBUMIN SERPL-MCNC: 2.9 G/DL (ref 3.5–5)
ALBUMIN/GLOB SERPL: 0.6 (ref 1.1–2.2)
ALP SERPL-CCNC: 107 U/L (ref 45–117)
ALT SERPL-CCNC: 15 U/L (ref 12–78)
ANION GAP SERPL CALC-SCNC: 11 MMOL/L (ref 2–12)
APPEARANCE UR: CLEAR
AST SERPL-CCNC: 18 U/L (ref 15–37)
BACTERIA URNS QL MICRO: NEGATIVE /HPF
BASOPHILS # BLD: 0 K/UL (ref 0–0.1)
BASOPHILS NFR BLD: 0 % (ref 0–1)
BILIRUB SERPL-MCNC: 0.6 MG/DL (ref 0.2–1)
BILIRUB UR QL: NEGATIVE
BUN SERPL-MCNC: 13 MG/DL (ref 6–20)
BUN/CREAT SERPL: 10 (ref 12–20)
CALCIUM SERPL-MCNC: 8.8 MG/DL (ref 8.5–10.1)
CHLORIDE SERPL-SCNC: 98 MMOL/L (ref 97–108)
CO2 SERPL-SCNC: 27 MMOL/L (ref 21–32)
COLOR UR: ABNORMAL
CREAT SERPL-MCNC: 1.31 MG/DL (ref 0.55–1.02)
DIFFERENTIAL METHOD BLD: ABNORMAL
EOSINOPHIL # BLD: 0 K/UL (ref 0–0.4)
EOSINOPHIL NFR BLD: 0 % (ref 0–7)
EPITH CASTS URNS QL MICRO: ABNORMAL /LPF
ERYTHROCYTE [DISTWIDTH] IN BLOOD BY AUTOMATED COUNT: 13.3 % (ref 11.5–14.5)
FLUAV RNA SPEC QL NAA+PROBE: NOT DETECTED
FLUBV RNA SPEC QL NAA+PROBE: NOT DETECTED
GLOBULIN SER CALC-MCNC: 4.9 G/DL (ref 2–4)
GLUCOSE BLD STRIP.AUTO-MCNC: 269 MG/DL (ref 65–117)
GLUCOSE SERPL-MCNC: 288 MG/DL (ref 65–100)
GLUCOSE UR STRIP.AUTO-MCNC: NEGATIVE MG/DL
HCT VFR BLD AUTO: 38.8 % (ref 35–47)
HGB BLD-MCNC: 12.8 G/DL (ref 11.5–16)
HGB UR QL STRIP: NEGATIVE
IMM GRANULOCYTES # BLD AUTO: 0.3 K/UL (ref 0–0.04)
IMM GRANULOCYTES NFR BLD AUTO: 1 % (ref 0–0.5)
KETONES UR QL STRIP.AUTO: NEGATIVE MG/DL
LACTATE SERPL-SCNC: 0.9 MMOL/L (ref 0.4–2)
LACTATE SERPL-SCNC: 3.4 MMOL/L (ref 0.4–2)
LEUKOCYTE ESTERASE UR QL STRIP.AUTO: NEGATIVE
LYMPHOCYTES # BLD: 1 K/UL (ref 0.8–3.5)
LYMPHOCYTES NFR BLD: 3 % (ref 12–49)
MCH RBC QN AUTO: 29.5 PG (ref 26–34)
MCHC RBC AUTO-ENTMCNC: 33 G/DL (ref 30–36.5)
MCV RBC AUTO: 89.4 FL (ref 80–99)
MONOCYTES # BLD: 1 K/UL (ref 0–1)
MONOCYTES NFR BLD: 3 % (ref 5–13)
NEUTS SEG # BLD: 29.4 K/UL (ref 1.8–8)
NEUTS SEG NFR BLD: 93 % (ref 32–75)
NITRITE UR QL STRIP.AUTO: NEGATIVE
NRBC # BLD: 0 K/UL (ref 0–0.01)
NRBC BLD-RTO: 0 PER 100 WBC
PH UR STRIP: 6 (ref 5–8)
PLATELET # BLD AUTO: 97 K/UL (ref 150–400)
PLATELET COMMENT: ABNORMAL
POTASSIUM SERPL-SCNC: 4.1 MMOL/L (ref 3.5–5.1)
PROT SERPL-MCNC: 7.8 G/DL (ref 6.4–8.2)
PROT UR STRIP-MCNC: ABNORMAL MG/DL
RBC # BLD AUTO: 4.34 M/UL (ref 3.8–5.2)
RBC #/AREA URNS HPF: ABNORMAL /HPF (ref 0–5)
RBC MORPH BLD: ABNORMAL
SARS-COV-2 RNA RESP QL NAA+PROBE: NOT DETECTED
SERVICE CMNT-IMP: ABNORMAL
SODIUM SERPL-SCNC: 136 MMOL/L (ref 136–145)
SOURCE: NORMAL
SP GR UR REFRACTOMETRY: <1.005 (ref 1–1.03)
TROPONIN I SERPL HS-MCNC: 12 NG/L (ref 0–51)
URINE CULTURE IF INDICATED: ABNORMAL
UROBILINOGEN UR QL STRIP.AUTO: 0.2 EU/DL (ref 0.2–1)
WBC # BLD AUTO: 31.7 K/UL (ref 3.6–11)
WBC URNS QL MICRO: ABNORMAL /HPF (ref 0–4)

## 2024-10-13 PROCEDURE — 87040 BLOOD CULTURE FOR BACTERIA: CPT

## 2024-10-13 PROCEDURE — 87636 SARSCOV2 & INF A&B AMP PRB: CPT

## 2024-10-13 PROCEDURE — 84145 PROCALCITONIN (PCT): CPT

## 2024-10-13 PROCEDURE — 87077 CULTURE AEROBIC IDENTIFY: CPT

## 2024-10-13 PROCEDURE — 1100000000 HC RM PRIVATE

## 2024-10-13 PROCEDURE — 80053 COMPREHEN METABOLIC PANEL: CPT

## 2024-10-13 PROCEDURE — 87186 SC STD MICRODIL/AGAR DIL: CPT

## 2024-10-13 PROCEDURE — 96366 THER/PROPH/DIAG IV INF ADDON: CPT

## 2024-10-13 PROCEDURE — 70450 CT HEAD/BRAIN W/O DYE: CPT

## 2024-10-13 PROCEDURE — 82962 GLUCOSE BLOOD TEST: CPT

## 2024-10-13 PROCEDURE — 85025 COMPLETE CBC W/AUTO DIFF WBC: CPT

## 2024-10-13 PROCEDURE — 6360000004 HC RX CONTRAST MEDICATION: Performed by: EMERGENCY MEDICINE

## 2024-10-13 PROCEDURE — 87154 CUL TYP ID BLD PTHGN 6+ TRGT: CPT

## 2024-10-13 PROCEDURE — 71045 X-RAY EXAM CHEST 1 VIEW: CPT

## 2024-10-13 PROCEDURE — 99285 EMERGENCY DEPT VISIT HI MDM: CPT

## 2024-10-13 PROCEDURE — 96375 TX/PRO/DX INJ NEW DRUG ADDON: CPT

## 2024-10-13 PROCEDURE — 96365 THER/PROPH/DIAG IV INF INIT: CPT

## 2024-10-13 PROCEDURE — 2580000003 HC RX 258: Performed by: HOSPITALIST

## 2024-10-13 PROCEDURE — 83605 ASSAY OF LACTIC ACID: CPT

## 2024-10-13 PROCEDURE — 6360000002 HC RX W HCPCS: Performed by: EMERGENCY MEDICINE

## 2024-10-13 PROCEDURE — 6360000002 HC RX W HCPCS: Performed by: HOSPITALIST

## 2024-10-13 PROCEDURE — 2580000003 HC RX 258: Performed by: EMERGENCY MEDICINE

## 2024-10-13 PROCEDURE — 81001 URINALYSIS AUTO W/SCOPE: CPT

## 2024-10-13 PROCEDURE — 74177 CT ABD & PELVIS W/CONTRAST: CPT

## 2024-10-13 PROCEDURE — 36415 COLL VENOUS BLD VENIPUNCTURE: CPT

## 2024-10-13 PROCEDURE — 84484 ASSAY OF TROPONIN QUANT: CPT

## 2024-10-13 RX ORDER — LANOLIN ALCOHOL/MO/W.PET/CERES
100 CREAM (GRAM) TOPICAL DAILY
Status: DISCONTINUED | OUTPATIENT
Start: 2024-10-14 | End: 2024-10-15 | Stop reason: HOSPADM

## 2024-10-13 RX ORDER — SODIUM CHLORIDE 9 MG/ML
INJECTION, SOLUTION INTRAVENOUS CONTINUOUS
Status: DISPENSED | OUTPATIENT
Start: 2024-10-13 | End: 2024-10-14

## 2024-10-13 RX ORDER — INSULIN LISPRO 100 [IU]/ML
0-8 INJECTION, SOLUTION INTRAVENOUS; SUBCUTANEOUS
Status: DISCONTINUED | OUTPATIENT
Start: 2024-10-14 | End: 2024-10-15 | Stop reason: HOSPADM

## 2024-10-13 RX ORDER — GLUCAGON 1 MG/ML
1 KIT INJECTION PRN
Status: DISCONTINUED | OUTPATIENT
Start: 2024-10-13 | End: 2024-10-15 | Stop reason: HOSPADM

## 2024-10-13 RX ORDER — GABAPENTIN 300 MG/1
300 CAPSULE ORAL 3 TIMES DAILY
Status: DISCONTINUED | OUTPATIENT
Start: 2024-10-14 | End: 2024-10-15 | Stop reason: HOSPADM

## 2024-10-13 RX ORDER — PRAVASTATIN SODIUM 20 MG
20 TABLET ORAL NIGHTLY
Status: DISCONTINUED | OUTPATIENT
Start: 2024-10-14 | End: 2024-10-15 | Stop reason: HOSPADM

## 2024-10-13 RX ORDER — ACETAMINOPHEN 650 MG/1
650 SUPPOSITORY RECTAL EVERY 6 HOURS PRN
Status: DISCONTINUED | OUTPATIENT
Start: 2024-10-13 | End: 2024-10-15 | Stop reason: HOSPADM

## 2024-10-13 RX ORDER — CARVEDILOL 6.25 MG/1
6.25 TABLET ORAL 2 TIMES DAILY
Status: DISCONTINUED | OUTPATIENT
Start: 2024-10-14 | End: 2024-10-15 | Stop reason: HOSPADM

## 2024-10-13 RX ORDER — DEXTROSE MONOHYDRATE 100 MG/ML
INJECTION, SOLUTION INTRAVENOUS CONTINUOUS PRN
Status: DISCONTINUED | OUTPATIENT
Start: 2024-10-13 | End: 2024-10-15 | Stop reason: HOSPADM

## 2024-10-13 RX ORDER — VANCOMYCIN 1.5 G/300ML
25 INJECTION, SOLUTION INTRAVENOUS ONCE
Status: COMPLETED | OUTPATIENT
Start: 2024-10-13 | End: 2024-10-13

## 2024-10-13 RX ORDER — IOPAMIDOL 755 MG/ML
100 INJECTION, SOLUTION INTRAVASCULAR
Status: COMPLETED | OUTPATIENT
Start: 2024-10-13 | End: 2024-10-13

## 2024-10-13 RX ORDER — MAGNESIUM HYDROXIDE/ALUMINUM HYDROXICE/SIMETHICONE 120; 1200; 1200 MG/30ML; MG/30ML; MG/30ML
30 SUSPENSION ORAL EVERY 6 HOURS PRN
Status: DISCONTINUED | OUTPATIENT
Start: 2024-10-13 | End: 2024-10-15 | Stop reason: HOSPADM

## 2024-10-13 RX ORDER — ACETAMINOPHEN 325 MG/1
650 TABLET ORAL EVERY 6 HOURS PRN
Status: DISCONTINUED | OUTPATIENT
Start: 2024-10-13 | End: 2024-10-13 | Stop reason: SDUPTHER

## 2024-10-13 RX ORDER — ACETAMINOPHEN 325 MG/1
650 TABLET ORAL EVERY 6 HOURS PRN
Status: DISCONTINUED | OUTPATIENT
Start: 2024-10-13 | End: 2024-10-15 | Stop reason: HOSPADM

## 2024-10-13 RX ORDER — ENOXAPARIN SODIUM 100 MG/ML
40 INJECTION SUBCUTANEOUS DAILY
Status: DISCONTINUED | OUTPATIENT
Start: 2024-10-14 | End: 2024-10-13

## 2024-10-13 RX ORDER — SODIUM CHLORIDE 9 MG/ML
INJECTION, SOLUTION INTRAVENOUS PRN
Status: DISCONTINUED | OUTPATIENT
Start: 2024-10-13 | End: 2024-10-15 | Stop reason: HOSPADM

## 2024-10-13 RX ORDER — PANTOPRAZOLE SODIUM 40 MG/1
40 TABLET, DELAYED RELEASE ORAL DAILY
Status: DISCONTINUED | OUTPATIENT
Start: 2024-10-14 | End: 2024-10-15 | Stop reason: HOSPADM

## 2024-10-13 RX ORDER — NEPHROCAP 1 MG
1 CAP ORAL DAILY
Status: DISCONTINUED | OUTPATIENT
Start: 2024-10-14 | End: 2024-10-15 | Stop reason: HOSPADM

## 2024-10-13 RX ORDER — VITAMIN B COMPLEX
1 CAPSULE ORAL DAILY
COMMUNITY

## 2024-10-13 RX ORDER — VITAMIN B COMPLEX
1 CAPSULE ORAL DAILY
Status: DISCONTINUED | OUTPATIENT
Start: 2024-10-14 | End: 2024-10-13 | Stop reason: SDUPTHER

## 2024-10-13 RX ORDER — VALSARTAN 40 MG/1
40 TABLET ORAL 2 TIMES DAILY
Status: DISCONTINUED | OUTPATIENT
Start: 2024-10-14 | End: 2024-10-15 | Stop reason: HOSPADM

## 2024-10-13 RX ORDER — INSULIN GLARGINE 100 [IU]/ML
31 INJECTION, SOLUTION SUBCUTANEOUS EVERY MORNING
Status: DISCONTINUED | OUTPATIENT
Start: 2024-10-14 | End: 2024-10-15 | Stop reason: HOSPADM

## 2024-10-13 RX ORDER — GLIPIZIDE 5 MG/1
10 TABLET ORAL
Status: DISCONTINUED | OUTPATIENT
Start: 2024-10-14 | End: 2024-10-14

## 2024-10-13 RX ORDER — SODIUM CHLORIDE 0.9 % (FLUSH) 0.9 %
5-40 SYRINGE (ML) INJECTION PRN
Status: DISCONTINUED | OUTPATIENT
Start: 2024-10-13 | End: 2024-10-15 | Stop reason: HOSPADM

## 2024-10-13 RX ORDER — SODIUM CHLORIDE 0.9 % (FLUSH) 0.9 %
5-40 SYRINGE (ML) INJECTION EVERY 12 HOURS SCHEDULED
Status: DISCONTINUED | OUTPATIENT
Start: 2024-10-13 | End: 2024-10-15 | Stop reason: HOSPADM

## 2024-10-13 RX ORDER — 0.9 % SODIUM CHLORIDE 0.9 %
30 INTRAVENOUS SOLUTION INTRAVENOUS ONCE
Status: COMPLETED | OUTPATIENT
Start: 2024-10-13 | End: 2024-10-13

## 2024-10-13 RX ORDER — ONDANSETRON 2 MG/ML
4 INJECTION INTRAMUSCULAR; INTRAVENOUS EVERY 6 HOURS PRN
Status: DISCONTINUED | OUTPATIENT
Start: 2024-10-13 | End: 2024-10-15 | Stop reason: HOSPADM

## 2024-10-13 RX ADMIN — SODIUM CHLORIDE 1659 ML: 9 INJECTION, SOLUTION INTRAVENOUS at 14:58

## 2024-10-13 RX ADMIN — SODIUM CHLORIDE: 9 INJECTION, SOLUTION INTRAVENOUS at 23:03

## 2024-10-13 RX ADMIN — VANCOMYCIN 1500 MG: 1.5 INJECTION, SOLUTION INTRAVENOUS at 15:07

## 2024-10-13 RX ADMIN — WATER 2000 MG: 1 INJECTION INTRAMUSCULAR; INTRAVENOUS; SUBCUTANEOUS at 14:59

## 2024-10-13 RX ADMIN — IOPAMIDOL 100 ML: 755 INJECTION, SOLUTION INTRAVENOUS at 18:49

## 2024-10-13 RX ADMIN — WATER 1000 MG: 1 INJECTION INTRAMUSCULAR; INTRAVENOUS; SUBCUTANEOUS at 23:53

## 2024-10-13 RX ADMIN — SODIUM CHLORIDE, PRESERVATIVE FREE 10 ML: 5 INJECTION INTRAVENOUS at 23:05

## 2024-10-13 ASSESSMENT — LIFESTYLE VARIABLES
HOW MANY STANDARD DRINKS CONTAINING ALCOHOL DO YOU HAVE ON A TYPICAL DAY: PATIENT DOES NOT DRINK
HOW OFTEN DO YOU HAVE A DRINK CONTAINING ALCOHOL: NEVER

## 2024-10-13 NOTE — ED TRIAGE NOTES
Pt presents via wheelchair with AMS and feeling very cold @ 1000 today per family. Family states the pt was acutely confused and not making sense around 1000. Pt is A&O x 4 during traiage. MD at bedside to evaluate patient.

## 2024-10-13 NOTE — ED PROVIDER NOTES
other patients and teaching time    Critical care was necessary to treat or prevent imminent or life-threatening deterioration of the following conditions:  Sepsis    Critical care was time spent personally by me on the following activities:  Development of treatment plan with patient or surrogate, ordering and review of laboratory studies, ordering and review of radiographic studies, re-evaluation of patient's condition, review of old charts and examination of patient       EMERGENCY DEPARTMENT COURSE and DIFFERENTIAL DIAGNOSIS/MDM   Vitals:    Vitals:    10/14/24 0000 10/14/24 0028 10/14/24 0400 10/14/24 0452   BP:  115/63  110/60   Pulse: (!) 102 98 81 87   Resp:  18  19   Temp:  97.5 °F (36.4 °C)  98.2 °F (36.8 °C)   TempSrc:       SpO2:  99%  97%   Weight:       Height:            Patient was given the following medications:  Medications   ondansetron (ZOFRAN) injection 4 mg (has no administration in time range)   sodium chloride flush 0.9 % injection 5-40 mL (10 mLs IntraVENous Given 10/13/24 2305)   sodium chloride flush 0.9 % injection 5-40 mL (has no administration in time range)   0.9 % sodium chloride infusion (has no administration in time range)   acetaminophen (TYLENOL) tablet 650 mg (has no administration in time range)     Or   acetaminophen (TYLENOL) suppository 650 mg (has no administration in time range)   0.9 % sodium chloride infusion ( IntraVENous New Bag 10/13/24 2303)   aluminum & magnesium hydroxide-simethicone (MAALOX) 200-200-20 MG/5ML suspension 30 mL (has no administration in time range)   cefTRIAXone (ROCEPHIN) 1,000 mg in sterile water 10 mL IV syringe (1,000 mg IntraVENous Given 10/13/24 2353)     And   azithromycin (ZITHROMAX) 500 mg in sodium chloride 0.9 % 250 mL IVPB ([id number]) (0 mg IntraVENous Stopped 10/14/24 0137)   melatonin tablet 5 mg (has no administration in time range)   rivaroxaban (XARELTO) tablet 20 mg (has no administration in time range)   carvedilol (COREG) tablet  instructions for her that include: educational information regarding their diagnosis and treatment, and list of reasons why they would want to return to the ED prior to their follow-up appointment, should her condition change.     CLINICAL IMPRESSION    Admit Note: Pt is being admitted by Dr. Jose. The results of their tests and reason(s) for their admission have been discussed with pt and/or available family. They convey agreement and understanding for the need to be admitted and for the admission diagnosis.     PATIENT REFERRED TO:  No follow-up provider specified.     DISCHARGE MEDICATIONS:     Medication List        CONTINUE taking these medications      Dexcom G6 Transmitter Misc  USE AS DIRECTED TO  MONITOR  BLOOD  SUGAR            STOP taking these medications      butalbital-acetaminophen-caffeine -40 MG per tablet  Commonly known as: FIORICET, ESGIC     diclofenac sodium 1 % Gel  Commonly known as: VOLTAREN     pioglitazone 15 MG tablet  Commonly known as: ACTOS            ASK your doctor about these medications      b complex vitamins capsule     carvedilol 6.25 MG tablet  Commonly known as: COREG     cyclobenzaprine 5 MG tablet  Commonly known as: FLEXERIL     gabapentin 600 MG tablet  Commonly known as: NEURONTIN     glipiZIDE 10 MG tablet  Commonly known as: GLUCOTROL     Lantus 100 UNIT/ML injection vial  Generic drug: insulin glargine  INJECT 30 UNITS SUBCUTANEOUSLY ONCE DAILY     pantoprazole 40 MG tablet  Commonly known as: PROTONIX  Take 1 tablet by mouth once daily     pravastatin 20 MG tablet  Commonly known as: PRAVACHOL     pyridoxine 100 MG tablet  Commonly known as: B-6     valsartan 40 MG tablet  Commonly known as: DIOVAN     Xarelto 20 MG Tabs tablet  Generic drug: rivaroxaban  Take 1 tablet by mouth once daily with breakfast                DISCONTINUED MEDICATIONS:  Current Discharge Medication List        STOP taking these medications       butalbital-acetaminophen-caffeine

## 2024-10-14 LAB
ALBUMIN SERPL-MCNC: 2.2 G/DL (ref 3.5–5)
ALBUMIN/GLOB SERPL: 0.5 (ref 1.1–2.2)
ALP SERPL-CCNC: 79 U/L (ref 45–117)
ALT SERPL-CCNC: 10 U/L (ref 12–78)
ANION GAP SERPL CALC-SCNC: 9 MMOL/L (ref 2–12)
AST SERPL-CCNC: 13 U/L (ref 15–37)
BASOPHILS # BLD: 0 K/UL (ref 0–0.1)
BASOPHILS NFR BLD: 0 % (ref 0–1)
BILIRUB SERPL-MCNC: 0.3 MG/DL (ref 0.2–1)
BUN SERPL-MCNC: 9 MG/DL (ref 6–20)
BUN/CREAT SERPL: 13 (ref 12–20)
CALCIUM SERPL-MCNC: 8.5 MG/DL (ref 8.5–10.1)
CHLORIDE SERPL-SCNC: 108 MMOL/L (ref 97–108)
CO2 SERPL-SCNC: 26 MMOL/L (ref 21–32)
CREAT SERPL-MCNC: 0.71 MG/DL (ref 0.55–1.02)
DIFFERENTIAL METHOD BLD: ABNORMAL
EOSINOPHIL # BLD: 0.1 K/UL (ref 0–0.4)
EOSINOPHIL NFR BLD: 1 % (ref 0–7)
ERYTHROCYTE [DISTWIDTH] IN BLOOD BY AUTOMATED COUNT: 13.4 % (ref 11.5–14.5)
EST. AVERAGE GLUCOSE BLD GHB EST-MCNC: 180 MG/DL
GLOBULIN SER CALC-MCNC: 4.6 G/DL (ref 2–4)
GLUCOSE BLD STRIP.AUTO-MCNC: 112 MG/DL (ref 65–117)
GLUCOSE BLD STRIP.AUTO-MCNC: 119 MG/DL (ref 65–117)
GLUCOSE BLD STRIP.AUTO-MCNC: 126 MG/DL (ref 65–117)
GLUCOSE BLD STRIP.AUTO-MCNC: 144 MG/DL (ref 65–117)
GLUCOSE BLD STRIP.AUTO-MCNC: 92 MG/DL (ref 65–117)
GLUCOSE SERPL-MCNC: 92 MG/DL (ref 65–100)
HBA1C MFR BLD: 7.9 % (ref 4–5.6)
HCT VFR BLD AUTO: 29.7 % (ref 35–47)
HGB BLD-MCNC: 9.5 G/DL (ref 11.5–16)
IMM GRANULOCYTES # BLD AUTO: 0 K/UL (ref 0–0.04)
IMM GRANULOCYTES NFR BLD AUTO: 0 % (ref 0–0.5)
LYMPHOCYTES # BLD: 2.8 K/UL (ref 0.8–3.5)
LYMPHOCYTES NFR BLD: 19 % (ref 12–49)
MCH RBC QN AUTO: 29.3 PG (ref 26–34)
MCHC RBC AUTO-ENTMCNC: 32 G/DL (ref 30–36.5)
MCV RBC AUTO: 91.7 FL (ref 80–99)
MONOCYTES # BLD: 0.9 K/UL (ref 0–1)
MONOCYTES NFR BLD: 6 % (ref 5–13)
NEUTS SEG # BLD: 11.1 K/UL (ref 1.8–8)
NEUTS SEG NFR BLD: 74 % (ref 32–75)
NRBC # BLD: 0 K/UL (ref 0–0.01)
NRBC BLD-RTO: 0 PER 100 WBC
PLATELET # BLD AUTO: 180 K/UL (ref 150–400)
PMV BLD AUTO: 11.9 FL (ref 8.9–12.9)
POTASSIUM SERPL-SCNC: 3.3 MMOL/L (ref 3.5–5.1)
PROCALCITONIN SERPL-MCNC: 30.11 NG/ML
PROCALCITONIN SERPL-MCNC: 52.49 NG/ML
PROT SERPL-MCNC: 6.8 G/DL (ref 6.4–8.2)
RBC # BLD AUTO: 3.24 M/UL (ref 3.8–5.2)
RBC MORPH BLD: ABNORMAL
SERVICE CMNT-IMP: ABNORMAL
SERVICE CMNT-IMP: NORMAL
SERVICE CMNT-IMP: NORMAL
SODIUM SERPL-SCNC: 143 MMOL/L (ref 136–145)
WBC # BLD AUTO: 14.9 K/UL (ref 3.6–11)

## 2024-10-14 PROCEDURE — 82962 GLUCOSE BLOOD TEST: CPT

## 2024-10-14 PROCEDURE — 84145 PROCALCITONIN (PCT): CPT

## 2024-10-14 PROCEDURE — 6360000002 HC RX W HCPCS: Performed by: HOSPITALIST

## 2024-10-14 PROCEDURE — 94760 N-INVAS EAR/PLS OXIMETRY 1: CPT

## 2024-10-14 PROCEDURE — 85025 COMPLETE CBC W/AUTO DIFF WBC: CPT

## 2024-10-14 PROCEDURE — 83036 HEMOGLOBIN GLYCOSYLATED A1C: CPT

## 2024-10-14 PROCEDURE — 1100000003 HC PRIVATE W/ TELEMETRY

## 2024-10-14 PROCEDURE — 6360000002 HC RX W HCPCS: Performed by: INTERNAL MEDICINE

## 2024-10-14 PROCEDURE — 2580000003 HC RX 258: Performed by: HOSPITALIST

## 2024-10-14 PROCEDURE — 6370000000 HC RX 637 (ALT 250 FOR IP): Performed by: INTERNAL MEDICINE

## 2024-10-14 PROCEDURE — 6370000000 HC RX 637 (ALT 250 FOR IP): Performed by: HOSPITALIST

## 2024-10-14 PROCEDURE — 36415 COLL VENOUS BLD VENIPUNCTURE: CPT

## 2024-10-14 PROCEDURE — 80053 COMPREHEN METABOLIC PANEL: CPT

## 2024-10-14 PROCEDURE — 87040 BLOOD CULTURE FOR BACTERIA: CPT

## 2024-10-14 RX ORDER — POTASSIUM CHLORIDE 750 MG/1
40 TABLET, EXTENDED RELEASE ORAL ONCE
Status: COMPLETED | OUTPATIENT
Start: 2024-10-14 | End: 2024-10-14

## 2024-10-14 RX ORDER — VANCOMYCIN 1.5 G/300ML
1500 INJECTION, SOLUTION INTRAVENOUS
Status: DISCONTINUED | OUTPATIENT
Start: 2024-10-14 | End: 2024-10-15 | Stop reason: HOSPADM

## 2024-10-14 RX ADMIN — GABAPENTIN 300 MG: 300 CAPSULE ORAL at 00:30

## 2024-10-14 RX ADMIN — WATER 1000 MG: 1 INJECTION INTRAMUSCULAR; INTRAVENOUS; SUBCUTANEOUS at 21:48

## 2024-10-14 RX ADMIN — GABAPENTIN 300 MG: 300 CAPSULE ORAL at 14:35

## 2024-10-14 RX ADMIN — POTASSIUM CHLORIDE 40 MEQ: 750 TABLET, EXTENDED RELEASE ORAL at 16:45

## 2024-10-14 RX ADMIN — GABAPENTIN 300 MG: 300 CAPSULE ORAL at 08:20

## 2024-10-14 RX ADMIN — INSULIN GLARGINE 31 UNITS: 100 INJECTION, SOLUTION SUBCUTANEOUS at 08:19

## 2024-10-14 RX ADMIN — PRAVASTATIN SODIUM 20 MG: 20 TABLET ORAL at 21:47

## 2024-10-14 RX ADMIN — GABAPENTIN 300 MG: 300 CAPSULE ORAL at 21:47

## 2024-10-14 RX ADMIN — SODIUM CHLORIDE, PRESERVATIVE FREE 10 ML: 5 INJECTION INTRAVENOUS at 21:57

## 2024-10-14 RX ADMIN — VALSARTAN 40 MG: 40 TABLET, FILM COATED ORAL at 21:47

## 2024-10-14 RX ADMIN — PRAVASTATIN SODIUM 20 MG: 20 TABLET ORAL at 00:30

## 2024-10-14 RX ADMIN — CARVEDILOL 6.25 MG: 6.25 TABLET, FILM COATED ORAL at 00:30

## 2024-10-14 RX ADMIN — VANCOMYCIN 1500 MG: 1.5 INJECTION, SOLUTION INTRAVENOUS at 12:41

## 2024-10-14 RX ADMIN — VALSARTAN 40 MG: 40 TABLET, FILM COATED ORAL at 00:30

## 2024-10-14 RX ADMIN — VALSARTAN 40 MG: 40 TABLET, FILM COATED ORAL at 08:20

## 2024-10-14 RX ADMIN — GLIPIZIDE 10 MG: 5 TABLET ORAL at 08:20

## 2024-10-14 RX ADMIN — Medication 1 MG: at 08:20

## 2024-10-14 RX ADMIN — PANTOPRAZOLE SODIUM 40 MG: 40 TABLET, DELAYED RELEASE ORAL at 08:20

## 2024-10-14 RX ADMIN — SODIUM CHLORIDE, PRESERVATIVE FREE 10 ML: 5 INJECTION INTRAVENOUS at 08:20

## 2024-10-14 RX ADMIN — CARVEDILOL 6.25 MG: 6.25 TABLET, FILM COATED ORAL at 08:20

## 2024-10-14 RX ADMIN — AZITHROMYCIN MONOHYDRATE 500 MG: 500 INJECTION, POWDER, LYOPHILIZED, FOR SOLUTION INTRAVENOUS at 00:01

## 2024-10-14 RX ADMIN — RIVAROXABAN 20 MG: 20 TABLET, FILM COATED ORAL at 08:21

## 2024-10-14 RX ADMIN — CARVEDILOL 6.25 MG: 6.25 TABLET, FILM COATED ORAL at 21:47

## 2024-10-14 RX ADMIN — PYRIDOXINE HCL TAB 50 MG 100 MG: 50 TAB at 08:20

## 2024-10-14 NOTE — H&P
(H) 65 - 117 mg/dL    Performed by: Rufino Celaya RN Flex    CBC with Auto Differential    Collection Time: 10/13/24  2:45 PM   Result Value Ref Range    WBC 31.7 (H) 3.6 - 11.0 K/uL    RBC 4.34 3.80 - 5.20 M/uL    Hemoglobin 12.8 11.5 - 16.0 g/dL    Hematocrit 38.8 35.0 - 47.0 %    MCV 89.4 80.0 - 99.0 FL    MCH 29.5 26.0 - 34.0 PG    MCHC 33.0 30.0 - 36.5 g/dL    RDW 13.3 11.5 - 14.5 %    Platelets 97 (L) 150 - 400 K/uL    Nucleated RBCs 0.0 0.0  WBC    nRBC 0.00 0.00 - 0.01 K/uL    Neutrophils % 93 (H) 32 - 75 %    Lymphocytes % 3 (L) 12 - 49 %    Monocytes % 3 (L) 5 - 13 %    Eosinophils % 0 0 - 7 %    Basophils % 0 0 - 1 %    Immature Granulocytes % 1 (H) 0 - 0.5 %    Neutrophils Absolute 29.4 (H) 1.8 - 8.0 K/UL    Lymphocytes Absolute 1.0 0.8 - 3.5 K/UL    Monocytes Absolute 1.0 0.0 - 1.0 K/UL    Eosinophils Absolute 0.0 0.0 - 0.4 K/UL    Basophils Absolute 0.0 0.0 - 0.1 K/UL    Immature Granulocytes Absolute 0.3 (H) 0.00 - 0.04 K/UL    Differential Type AUTOMATED      Platelet Comment DECREASED PLATELETS      RBC Comment NORMOCYTIC, NORMOCHROMIC     Comprehensive Metabolic Panel    Collection Time: 10/13/24  2:45 PM   Result Value Ref Range    Sodium 136 136 - 145 mmol/L    Potassium 4.1 3.5 - 5.1 mmol/L    Chloride 98 97 - 108 mmol/L    CO2 27 21 - 32 mmol/L    Anion Gap 11 2 - 12 mmol/L    Glucose 288 (H) 65 - 100 mg/dL    BUN 13 6 - 20 MG/DL    Creatinine 1.31 (H) 0.55 - 1.02 MG/DL    BUN/Creatinine Ratio 10 (L) 12 - 20      Est, Glom Filt Rate 44 (L) >60 ml/min/1.73m2    Calcium 8.8 8.5 - 10.1 MG/DL    Total Bilirubin 0.6 0.2 - 1.0 MG/DL    ALT 15 12 - 78 U/L    AST 18 15 - 37 U/L    Alk Phosphatase 107 45 - 117 U/L    Total Protein 7.8 6.4 - 8.2 g/dL    Albumin 2.9 (L) 3.5 - 5.0 g/dL    Globulin 4.9 (H) 2.0 - 4.0 g/dL    Albumin/Globulin Ratio 0.6 (L) 1.1 - 2.2     Lactate, Sepsis    Collection Time: 10/13/24  2:45 PM   Result Value Ref Range    Lactic Acid, Sepsis 3.4 (HH) 0.4 - 2.0 MMOL/L    Troponin    Collection Time: 10/13/24  2:45 PM   Result Value Ref Range    Troponin, High Sensitivity 12 0 - 51 ng/L   Urinalysis with Reflex to Culture    Collection Time: 10/13/24  4:48 PM    Specimen: Urine   Result Value Ref Range    Color, UA YELLOW/STRAW      Appearance CLEAR CLEAR      Specific Gravity, UA <1.005 1.003 - 1.030    pH, Urine 6.0 5.0 - 8.0      Protein, UA TRACE (A) NEG mg/dL    Glucose, Ur Negative NEG mg/dL    Ketones, Urine Negative NEG mg/dL    Bilirubin, Urine Negative NEG      Blood, Urine Negative NEG      Urobilinogen, Urine 0.2 0.2 - 1.0 EU/dL    Nitrite, Urine Negative NEG      Leukocyte Esterase, Urine Negative NEG      WBC, UA 0-4 0 - 4 /hpf    RBC, UA 0-5 0 - 5 /hpf    Epithelial Cells, UA FEW FEW /lpf    BACTERIA, URINE Negative NEG /hpf    Urine Culture if Indicated CULTURE NOT INDICATED BY UA RESULT CNI     Lactate, Sepsis    Collection Time: 10/13/24  5:12 PM   Result Value Ref Range    Lactic Acid, Sepsis 0.9 0.4 - 2.0 MMOL/L   COVID-19 & Influenza Combo    Collection Time: 10/13/24  5:12 PM    Specimen: Nasopharyngeal   Result Value Ref Range    Source Nasopharyngeal      SARS-CoV-2, PCR Not detected NOTD      Rapid Influenza A By PCR Not detected NOTD      Rapid Influenza B By PCR Not detected NOTD           CT HEAD WO CONTRAST    Result Date: 10/13/2024  EXAM: CT HEAD WO CONTRAST INDICATION: New onset confusion. Previous CVA. COMPARISON: CT head on 7/20/2024. CONTRAST: None. TECHNIQUE: Unenhanced CT of the head was performed using 5 mm images. Brain and bone windows were generated. Coronal and sagittal reformats. CT dose reduction was achieved through use of a standardized protocol tailored for this examination and automatic exposure control for dose modulation.  FINDINGS: The ventricles and sulci are normal in size, shape and configuration. Posterior left MCA territory chronic encephalomalacia is unchanged. Old infarct in the left cerebellum is unchanged. There is no

## 2024-10-14 NOTE — PROGRESS NOTES
Pharmacy Antimicrobial Dosing    Indication for Antimicrobials: bacteremia    Current Regimen of Each Antimicrobial (Start Day & Day of Therapy):  Start date 10/13  Vancomycin 1500mg IV Q18H   Ceftriaxone 1000mg IV Q24H     Discontinued:  Cefepime    Goal Vancomycin Level : -600  Level(s) Ordered: Random level ordered to be drawn @ 0600 10/15    Significant Cultures:  10/13 blood cultures: 2/2 bottles + gram + cocci    Paralysis, amputations:   Recent Labs     10/13/24  1445 10/14/24  0540   CREATININE 1.31* 0.71   BUN 13 9   WBC 31.7* 14.9*     Temp (24hrs), Av.9 °F (36.6 °C), Min:97.5 °F (36.4 °C), Max:98.2 °F (36.8 °C)    Creatinine Clearance (Creatinine Clearance (ml/min)): 71mL/min     Impression/Plan:   68yo 60.8kg F CrCl 71mL/min patient presented to ED with AMS. WBC 31.7, lactic acid 3.4. Blood cultures positive for gram + cocci. Patient has been started empirically on ceftriaxone and vancomycin. Vancomycin dose to be 1500mg Q18H for a predicted AUC of 489. Random Vancomycin level ordered to be drawn at 0600 10/15. Daily CBC and BMP ordered. Pharmacy will continue to monitor and dose adjust accordingly.        Pharmacy will follow daily and adjust medications as appropriate for renal function and/or serum levels.    Thank you,  Francine Kirk formerly [facility]     Renal Dosing Tables on [employer]

## 2024-10-14 NOTE — PROGRESS NOTES
Patient is unaware on dosage of medications. Patient was looking in her purse for list. She could not find it.Patient stated she would have someone to bring them in the morning.

## 2024-10-14 NOTE — PROGRESS NOTES
Hospitalist Progress Note    NAME:   Madisyn Manriquez   : 1955   MRN: [id number]     Date/Time: 10/14/2024 1:55 PM  Patient PCP: Landon Starks MD      Assessment / Plan:    Severe sepsis   -secondary to right lower lobe pneumonia:   -blood cultures x 2 obtained are POSITIVE for gram negative cocci  -Initially given vancomycin and cefepime as well as a sepsis fluid bolus in the emergency department.  -will continue Vanc given culture results + Rocephin and adjust pending sensitivity results  -2D echocardiogram to evaluate for infectious endocarditis  -Telemetry     2.  Acute kidney injury  -resolved after IV fluids     3.  Atrial fibrillation on Xarelto:  -Continue home Xarelto  -Continue home Coreg  -Telemetry     4.  Acute metabolic encephalopathy  -resolved, confusion likely secondary to severe sepsis.    -CT head was negative for acute findings     5.  Insulin-dependent diabetes mellitus type 2:  -will continue Lantus however patient has a very poor appetite, will hold glipizide to avoid hypoglycemia     6.  Hypertension:   -Continue home medications     7.  Hyperlipidemia:  -Continue home pravastatin     8.  GERD:  -Continue home Protonix     Code Status: FULL  DVT Prophylaxis: Xarelto      Subjective:     Chief Complaint / Reason for Physician Visit  This am patient had no new complaints.  No nursing concerns.      Objective:     VITALS:   Last 24hrs VS reviewed since prior progress note. Most recent are:  Patient Vitals for the past 24 hrs:   BP Temp Temp src Pulse Resp SpO2 Height Weight   10/14/24 1215 134/64 -- -- -- 18 98 % -- --   10/14/24 1200 -- -- -- 88 -- -- -- --   10/14/24 0855 -- -- -- -- 18 98 % -- --   10/14/24 0800 -- -- -- 88 -- -- -- --   10/14/24 0719 (!) 142/70 97.9 °F (36.6 °C) Oral 86 18 98 % -- --   10/14/24 0452 110/60 98.2 °F (36.8 °C) Oral 87 19 97 % -- --   10/14/24 0400 -- -- -- 81 -- -- -- --   10/14/24 0028 115/63 97.5 °F (36.4 °C) -- 98 18 99 % -- --   10/14/24  0000 -- -- -- (!) 102 -- -- -- --   10/13/24 2237 (!) 150/70 97.9 °F (36.6 °C) Oral 99 17 96 % -- --   10/13/24 2234 -- -- -- 99 -- -- -- --   10/13/24 2230 -- -- -- -- -- -- 1.676 m (5' 6\") 60.8 kg (134 lb 1.6 oz)   10/13/24 2215 (!) 137/57 -- -- -- -- -- -- --   10/13/24 2214 -- -- -- -- -- 93 % -- --   10/13/24 2119 126/88 -- -- -- -- 90 % -- --   10/13/24 2114 119/89 -- -- -- -- 95 % -- --   10/13/24 2029 -- -- -- -- -- 96 % -- --   10/13/24 1815 (!) 109/54 -- -- (!) 106 24 95 % -- --   10/13/24 1800 (!) 125/56 -- -- (!) 107 24 95 % -- --   10/13/24 1745 (!) 110/56 -- -- (!) 104 27 95 % -- --   10/13/24 1730 (!) 102/49 -- -- (!) 105 28 95 % -- --   10/13/24 1716 (!) 110/49 -- -- (!) 104 21 95 % -- --   10/13/24 1700 (!) 124/55 -- -- (!) 104 24 94 % -- --   10/13/24 1648 (!) 99/54 -- -- (!) 104 27 95 % -- --   10/13/24 1630 (!) 111/51 -- -- (!) 104 21 95 % -- --   10/13/24 1615 (!) 106/51 -- -- (!) 107 30 95 % -- --   10/13/24 1600 95/79 -- -- (!) 109 (!) 31 -- -- --   10/13/24 1520 -- -- -- (!) 118 -- -- -- --   10/13/24 1515 (!) 113/56 -- -- (!) 122 28 -- -- --   10/13/24 1500 (!) 98/47 -- -- (!) 125 (!) 32 -- -- --   10/13/24 1445 (!) 123/59 -- -- (!) 127 22 -- -- --   10/13/24 1439 -- -- -- -- -- -- -- 55.3 kg (122 lb)   10/13/24 1438 -- 98.2 °F (36.8 °C) Oral (!) 130 25 -- -- --   10/13/24 1434 -- -- -- -- -- 92 % -- --   10/13/24 1433 -- -- -- -- -- 91 % -- --   10/13/24 1432 -- -- -- -- -- 97 % -- --   10/13/24 1431 116/60 -- -- -- -- -- -- --         Intake/Output Summary (Last 24 hours) at 10/14/2024 1355  Last data filed at 10/14/2024 1200  Gross per 24 hour   Intake 2439 ml   Output 500 ml   Net 1939 ml        I had a face to face encounter and independently examined this patient on 10/14/2024, as outlined below:  PHYSICAL EXAM:  General: WD, WN. Alert, cooperative, no acute distress    EENT:  EOMI. Anicteric sclerae. MMM  Resp:  CTA bilaterally, no wheezing or rales.  No accessory muscle

## 2024-10-14 NOTE — ED NOTES
Admission SBAR Note  Situation/Background:     Patient is being transferred to [facility] surg  ([facility]), Room# 120    Patient's Chief Complaint was ams and is admitted for septicemia.    CODE STATUS: Full  CSSRS: 0 - No Risk    ISOLATION/PRECAUTIONS: No  ISOLATION TYPE:     Is this a behavioral health patient? No  Has wanding been completed No  Are belongings secure? No    Called outstanding consults: No    STAT labs collected: No    Repeat Lactic Acid DUE? No  TIME DUE: na      All STAT orders are complete: No    The following personal items will be sent with the patient during transfer to the floor:     All valuables: none       ASSESSMENT:    NEURO:   NIH SCORE: 0,1-4,5-15,15-20,21-42: 0   RIZWANA SWALLOW SCREEN COMPLETE: No  ORIENTATION LEVEL: ORIENTATION LEVEL: Person, Place, and Time  Cognition:  appropriate decision making  Speech: shows no evidence of impairment    Is patient impulsive? No  Is patient oriented? Yes  Do they follow commands? Yes  Is the patient ambulatory? Yes    FALL RISK? Yes  Interventions: Implemented/recommended use of non-skid footwear    RESPIRATORY:   Is patient on oxygen? No  Oxygen therapy: room air  O2 rate: na    CARDIAC:   Is cardiac monitoring ordered? No    Last Rhythm:   Patient to transfer with tele box on? Yes  Infusions: Meds; iv fluids: none  LINE ACCESS: 22G Peripheral IV , Arm ,       /GI:   Continent Bowel/Bladder? Yes  Urinary Output: na  Chronic or Acute: Acute on Chronic  If Chronic, is it 3 days old, was it changed prior to specimen collection? No  Was UA with reflex sent to lab? No  If no, collect and send prior to transport to inpatient area.    INTEGUMENTARY:  IS THE PATIENT UNDRESSED? Yes  ARE THERE WOUNDS PRESENT? No  ARE THE WOUNDS DOCUMENTED? No    RESTRAINTS IN USE: No    IS THE DOCUMENTATION COMPLETE? No  Is there a current order?  No  When does it ? Not ordered        Vital Signs:   /  Level of Consciousness: Alert (0)    Vitals:    10/13/24 1815 10/13/24 2029 10/13/24 2114 10/13/24 2119   BP: (!) 109/54  119/89 126/88   Pulse: (!) 106      Resp: 24      Temp:       TempSrc:       SpO2: 95% 96% 95% 90%   Weight:              Pain 1: 0  Pain Scale 1: 0    REVIEW:    IP UNIT CALLED NOTE IS READY: Yes  IF THERE ARE QUESTIONS, CALL er AT PHONE # [phone]

## 2024-10-14 NOTE — CARE COORDINATION
Care Management Initial Assessment       RUR: 12% LOW   Readmission? No  1st IM letter given? Yes - 10/14 by patient access   1st  letter given: No     10/14/24 1213   Service Assessment   Patient Orientation Person;Place   Cognition Alert   History Provided By Patient   Primary Caregiver Self   Support Systems Spouse/Significant Other   Patient's Healthcare Decision Maker is: Named in Scanned ACP Document   PCP Verified by CM Yes  (Landon Starks MD)   Last Visit to PCP Within last 3 months   Prior Functional Level Independent in ADLs/IADLs   Current Functional Level Independent in ADLs/IADLs   Can patient return to prior living arrangement Yes   Ability to make needs known: Good   Family able to assist with home care needs: Yes  (Significant other)   Would you like for me to discuss the discharge plan with any other family members/significant others, and if so, who? Yes  (Significant other)   Financial Resources Medicare   Social/Functional History   Lives With Significant other  (Evita Cifuentes)   Type of Home House   Home Equipment Grab bars   Active  Yes   Discharge Planning   Type of Residence House   Living Arrangements Spouse/Significant Other   Current Services Prior To Admission None   Potential Assistance Needed N/A   Type of Home Care Services None   Patient expects to be discharged to: House     Ms. Manriquez lives at home with her significant other Evita Cifuentes who advised she come to the hospital. She said she had 4 children but sounds like one may have committed suicide. She seems to jump from idea to idea but mostly makes sense. She said she only has grab bars at home. Doesn't use a cane or walker. She doesn't think she needs any home health or anything. She is currently in inpt status. Patient access obtained 1st [facility] today 10/14. Ms. Manriquez provided CM info and told her to call if she has any questions or concerns.

## 2024-10-15 ENCOUNTER — APPOINTMENT (OUTPATIENT)
Facility: HOSPITAL | Age: 69
End: 2024-10-15
Payer: MEDICARE

## 2024-10-15 VITALS
HEART RATE: 85 BPM | BODY MASS INDEX: 21.71 KG/M2 | WEIGHT: 135.1 LBS | SYSTOLIC BLOOD PRESSURE: 147 MMHG | DIASTOLIC BLOOD PRESSURE: 63 MMHG | TEMPERATURE: 98.1 F | HEIGHT: 66 IN | OXYGEN SATURATION: 96 % | RESPIRATION RATE: 18 BRPM

## 2024-10-15 LAB
ANION GAP SERPL CALC-SCNC: 9 MMOL/L (ref 2–12)
BASOPHILS # BLD: 0 K/UL (ref 0–0.1)
BASOPHILS NFR BLD: 0 % (ref 0–1)
BUN SERPL-MCNC: 6 MG/DL (ref 6–20)
BUN/CREAT SERPL: 8 (ref 12–20)
CALCIUM SERPL-MCNC: 9.1 MG/DL (ref 8.5–10.1)
CHLORIDE SERPL-SCNC: 107 MMOL/L (ref 97–108)
CO2 SERPL-SCNC: 25 MMOL/L (ref 21–32)
CREAT SERPL-MCNC: 0.72 MG/DL (ref 0.55–1.02)
DIFFERENTIAL METHOD BLD: ABNORMAL
ECHO AO ASC DIAM: 3.1 CM
ECHO AO ASCENDING AORTA INDEX: 1.83 CM/M2
ECHO AO ROOT DIAM: 2.9 CM
ECHO AO ROOT INDEX: 1.72 CM/M2
ECHO AV PEAK GRADIENT: 5 MMHG
ECHO AV PEAK VELOCITY: 1.2 M/S
ECHO BSA: 1.68 M2
ECHO EST RA PRESSURE: 3 MMHG
ECHO LA DIAMETER INDEX: 2.13 CM/M2
ECHO LA DIAMETER: 3.6 CM
ECHO LA TO AORTIC ROOT RATIO: 1.24
ECHO LA VOL A-L A2C: 56 ML (ref 22–52)
ECHO LA VOL A-L A4C: 40 ML (ref 22–52)
ECHO LA VOL MOD A2C: 53 ML (ref 22–52)
ECHO LA VOL MOD A4C: 39 ML (ref 22–52)
ECHO LA VOLUME AREA LENGTH: 48 ML
ECHO LA VOLUME INDEX A-L A2C: 33 ML/M2 (ref 16–34)
ECHO LA VOLUME INDEX A-L A4C: 24 ML/M2 (ref 16–34)
ECHO LA VOLUME INDEX AREA LENGTH: 28 ML/M2 (ref 16–34)
ECHO LA VOLUME INDEX MOD A2C: 31 ML/M2 (ref 16–34)
ECHO LA VOLUME INDEX MOD A4C: 23 ML/M2 (ref 16–34)
ECHO LV E' LATERAL VELOCITY: 8.9 CM/S
ECHO LV E' SEPTAL VELOCITY: 6.4 CM/S
ECHO LV EF PHYSICIAN: 60 %
ECHO LV FRACTIONAL SHORTENING: 35 % (ref 28–44)
ECHO LV INTERNAL DIMENSION DIASTOLE INDEX: 2.37 CM/M2
ECHO LV INTERNAL DIMENSION DIASTOLIC: 4 CM (ref 3.9–5.3)
ECHO LV INTERNAL DIMENSION SYSTOLIC INDEX: 1.54 CM/M2
ECHO LV INTERNAL DIMENSION SYSTOLIC: 2.6 CM
ECHO LV IVSD: 0.9 CM (ref 0.6–0.9)
ECHO LV MASS 2D: 118.2 G (ref 67–162)
ECHO LV MASS INDEX 2D: 70 G/M2 (ref 43–95)
ECHO LV POSTERIOR WALL DIASTOLIC: 1 CM (ref 0.6–0.9)
ECHO LV RELATIVE WALL THICKNESS RATIO: 0.5
ECHO LVOT AREA: 2.8 CM2
ECHO LVOT DIAM: 1.9 CM
ECHO MV A VELOCITY: 0.86 M/S
ECHO MV E DECELERATION TIME (DT): 163 MS
ECHO MV E VELOCITY: 0.92 M/S
ECHO MV E/A RATIO: 1.07
ECHO MV E/E' LATERAL: 10.34
ECHO MV E/E' RATIO (AVERAGED): 12.36
ECHO MV E/E' SEPTAL: 14.38
ECHO PULMONARY ARTERY SYSTOLIC PRESSURE (PASP): 21 MMHG
ECHO RA AREA 4C: 10.5 CM2
ECHO RIGHT VENTRICULAR SYSTOLIC PRESSURE (RVSP): 21 MMHG
ECHO RV BASAL DIMENSION: 2.9 CM
ECHO RV FREE WALL PEAK S': 15.5 CM/S
ECHO RV TAPSE: 1.7 CM (ref 1.7–?)
ECHO TV REGURGITANT MAX VELOCITY: 2.12 M/S
ECHO TV REGURGITANT PEAK GRADIENT: 18 MMHG
EOSINOPHIL # BLD: 0.2 K/UL (ref 0–0.4)
EOSINOPHIL NFR BLD: 2 % (ref 0–7)
ERYTHROCYTE [DISTWIDTH] IN BLOOD BY AUTOMATED COUNT: 13.5 % (ref 11.5–14.5)
GLUCOSE BLD STRIP.AUTO-MCNC: 112 MG/DL (ref 65–117)
GLUCOSE SERPL-MCNC: 80 MG/DL (ref 65–100)
HCT VFR BLD AUTO: 31.4 % (ref 35–47)
HGB BLD-MCNC: 10 G/DL (ref 11.5–16)
IMM GRANULOCYTES # BLD AUTO: 0 K/UL (ref 0–0.04)
IMM GRANULOCYTES NFR BLD AUTO: 0 % (ref 0–0.5)
LYMPHOCYTES # BLD: 2.3 K/UL (ref 0.8–3.5)
LYMPHOCYTES NFR BLD: 24 % (ref 12–49)
MCH RBC QN AUTO: 28.8 PG (ref 26–34)
MCHC RBC AUTO-ENTMCNC: 31.8 G/DL (ref 30–36.5)
MCV RBC AUTO: 90.5 FL (ref 80–99)
MONOCYTES # BLD: 0.7 K/UL (ref 0–1)
MONOCYTES NFR BLD: 7 % (ref 5–13)
NEUTS SEG # BLD: 6.5 K/UL (ref 1.8–8)
NEUTS SEG NFR BLD: 67 % (ref 32–75)
NRBC # BLD: 0 K/UL (ref 0–0.01)
NRBC BLD-RTO: 0 PER 100 WBC
PLATELET # BLD AUTO: 142 K/UL (ref 150–400)
PLATELET COMMENT: ABNORMAL
POTASSIUM SERPL-SCNC: 3.8 MMOL/L (ref 3.5–5.1)
RBC # BLD AUTO: 3.47 M/UL (ref 3.8–5.2)
RBC MORPH BLD: ABNORMAL
SERVICE CMNT-IMP: NORMAL
SODIUM SERPL-SCNC: 141 MMOL/L (ref 136–145)
VANCOMYCIN SERPL-MCNC: 9.3 UG/ML
WBC # BLD AUTO: 9.7 K/UL (ref 3.6–11)

## 2024-10-15 PROCEDURE — 2580000003 HC RX 258: Performed by: HOSPITALIST

## 2024-10-15 PROCEDURE — 6370000000 HC RX 637 (ALT 250 FOR IP): Performed by: HOSPITALIST

## 2024-10-15 PROCEDURE — 85025 COMPLETE CBC W/AUTO DIFF WBC: CPT

## 2024-10-15 PROCEDURE — 6360000002 HC RX W HCPCS: Performed by: INTERNAL MEDICINE

## 2024-10-15 PROCEDURE — 93306 TTE W/DOPPLER COMPLETE: CPT

## 2024-10-15 PROCEDURE — 2580000003 HC RX 258: Performed by: INTERNAL MEDICINE

## 2024-10-15 PROCEDURE — 80202 ASSAY OF VANCOMYCIN: CPT

## 2024-10-15 PROCEDURE — 87449 NOS EACH ORGANISM AG IA: CPT

## 2024-10-15 PROCEDURE — 94760 N-INVAS EAR/PLS OXIMETRY 1: CPT

## 2024-10-15 PROCEDURE — 36415 COLL VENOUS BLD VENIPUNCTURE: CPT

## 2024-10-15 PROCEDURE — 80048 BASIC METABOLIC PNL TOTAL CA: CPT

## 2024-10-15 PROCEDURE — 93306 TTE W/DOPPLER COMPLETE: CPT | Performed by: INTERNAL MEDICINE

## 2024-10-15 PROCEDURE — 82962 GLUCOSE BLOOD TEST: CPT

## 2024-10-15 RX ORDER — PIOGLITAZONEHYDROCHLORIDE 15 MG/1
15 TABLET ORAL DAILY
Qty: 30 TABLET | Refills: 0 | Status: SHIPPED | OUTPATIENT
Start: 2024-10-15

## 2024-10-15 RX ORDER — AZITHROMYCIN 250 MG/1
TABLET, FILM COATED ORAL
Qty: 6 TABLET | Refills: 0 | Status: SHIPPED | OUTPATIENT
Start: 2024-10-15 | End: 2024-10-25

## 2024-10-15 RX ORDER — METHYLPREDNISOLONE 4 MG/1
TABLET ORAL
Qty: 1 KIT | Refills: 0 | Status: SHIPPED | OUTPATIENT
Start: 2024-10-15 | End: 2024-10-21

## 2024-10-15 RX ADMIN — INSULIN GLARGINE 31 UNITS: 100 INJECTION, SOLUTION SUBCUTANEOUS at 08:51

## 2024-10-15 RX ADMIN — PYRIDOXINE HCL TAB 50 MG 100 MG: 50 TAB at 08:51

## 2024-10-15 RX ADMIN — VALSARTAN 40 MG: 40 TABLET, FILM COATED ORAL at 08:51

## 2024-10-15 RX ADMIN — CARVEDILOL 6.25 MG: 6.25 TABLET, FILM COATED ORAL at 08:52

## 2024-10-15 RX ADMIN — GABAPENTIN 300 MG: 300 CAPSULE ORAL at 08:51

## 2024-10-15 RX ADMIN — PANTOPRAZOLE SODIUM 40 MG: 40 TABLET, DELAYED RELEASE ORAL at 08:52

## 2024-10-15 RX ADMIN — VANCOMYCIN 1500 MG: 1.5 INJECTION, SOLUTION INTRAVENOUS at 06:25

## 2024-10-15 RX ADMIN — SODIUM CHLORIDE, PRESERVATIVE FREE 10 ML: 5 INJECTION INTRAVENOUS at 08:52

## 2024-10-15 RX ADMIN — RIVAROXABAN 20 MG: 20 TABLET, FILM COATED ORAL at 08:51

## 2024-10-15 RX ADMIN — Medication 1 MG: at 08:51

## 2024-10-15 RX ADMIN — WATER 1000 MG: 1 INJECTION INTRAMUSCULAR; INTRAVENOUS; SUBCUTANEOUS at 11:34

## 2024-10-15 NOTE — DISCHARGE SUMMARY
Discharge Summary    Name: Madisyn Manriquez  [id number]  YOB: 1955 (Age: 69 y.o.)   Date of Admission: 10/13/2024  Date of Discharge: 10/15/2024  Attending Physician: Cristal Reid MD    Discharge Diagnosis:   Severe sepsis secondary to RLL pneumonia  Bacteremia.  1/2 blood cultures positive for Enterococcus faecalis  Acute kidney injury  Acute metabolic encephalopathy - resolved    Secondary diagnosis:  Insulin-dependent DM Type 2  Chronic atrial fibrillation anticoagulated with Xarelto  Hypertension  Hyperlipidemia  GERD    Consultations:  IP CONSULT TO PHARMACY      Brief Admission History/Reason for Admission   Madisyn Manriquez is a 69 y.o.  female with PMHx significant for previous CVA, atrial fibrillation on Xarelto, hypertension, hyperlipidemia, insulin-dependent diabetes mellitus type 2 and GERD who presents to the emergency department complaining of confusion, cough and chills. She states that she has been feeling unwell throughout the day but did not want to come to the emergency department.  She states that she was forced to come to the emergency department by her family.  She states that she has been having a nonproductive cough and chills for the last day or 2.  Per the triage note, she was also acutely confused today.  She otherwise denies any chest pain, palpitations, shortness of breath, abdominal pain, nausea, vomiting, diarrhea or lower extremity swelling.  She has no other complaints at this time.     In the emergency department, she was noted to have a borderline high blood pressure, tachycardic initially in the 120s to 130s, tachypneic, afebrile and satting well on room air.  CBC was significant for leukocytosis of 32 and a CMP was significant for a creatinine of 1.3 compared to her baseline of 0.6.  Initial lactic acid was 3.4 with repeat of 0.9.  UA was unremarkable.  CTA chest abdomen pelvis was concerning for a right lower lobe pneumonia.     10/15/24 0400 -- -- -- 87 -- -- --   10/15/24 0335 (!) 111/57 98.1 °F (36.7 °C) Oral 88 18 96 % --   10/15/24 0000 -- -- -- 90 -- -- --   10/14/24 2357 136/64 98.4 °F (36.9 °C) Oral 87 18 98 % --   10/14/24 2000 -- -- -- 100 -- -- --   10/14/24 1958 138/70 97.9 °F (36.6 °C) Oral 93 20 98 % --   10/14/24 1631 (!) 150/74 97.9 °F (36.6 °C) -- 86 20 97 % --   10/14/24 1600 -- -- -- 84 -- -- --   10/14/24 1215 134/64 -- -- -- 18 98 % --   10/14/24 1200 -- -- -- 88 -- -- --       Gen:    Not in distress  Chest: Clear lungs  CVS:   Regular rhythm.  No edema  Abd:  Soft, not distended, not tender  Neuro: No gross neurological deficits    Discharge/Recent Laboratory Results:  Recent Labs     10/15/24  0601      K 3.8      CO2 25   BUN 6   CREATININE 0.72   GLUCOSE 80   CALCIUM 9.1     Recent Labs     10/15/24  0601   HGB 10.0*   HCT 31.4*   WBC 9.7   *       Discharge Medications:     Medication List        START taking these medications      azithromycin 250 MG tablet  Commonly known as: ZITHROMAX  500mg on day 1 followed by 250mg on days 2 - 5     methylPREDNISolone 4 MG tablet  Commonly known as: MEDROL DOSEPACK  Take by mouth.            CONTINUE taking these medications      b complex vitamins capsule     carvedilol 6.25 MG tablet  Commonly known as: COREG     cyclobenzaprine 5 MG tablet  Commonly known as: FLEXERIL     Dexcom G6 Transmitter Misc  USE AS DIRECTED TO  MONITOR  BLOOD  SUGAR     gabapentin 600 MG tablet  Commonly known as: NEURONTIN     glipiZIDE 10 MG tablet  Commonly known as: GLUCOTROL     Lantus 100 UNIT/ML injection vial  Generic drug: insulin glargine  INJECT 30 UNITS SUBCUTANEOUSLY ONCE DAILY     pantoprazole 40 MG tablet  Commonly known as: PROTONIX  Take 1 tablet by mouth once daily     pioglitazone 15 MG tablet  Commonly known as: ACTOS  Take 1 tablet by mouth daily     pravastatin 20 MG tablet  Commonly known as: PRAVACHOL     pyridoxine 100 MG tablet  Commonly known as:

## 2024-10-15 NOTE — PROGRESS NOTES
Discharge Summary    Madisyn Manriquez  :  1955  MRN:  [id number]    ADMIT DATE:  10/13/2024  DISCHARGE DATE:  10/15/2024      Discharge instruction reviewed with Patient    Home med's returned n/a    Personal belongings returned Yes    Patient Wheeled to front entrance via wheelchair with Nurse        SIGNED:    Vivian Novoa RN

## 2024-10-15 NOTE — CARE COORDINATION
10/15/24 1052   Services At/After Discharge   Transition of Care Consult (CM Consult) N/A   Services At/After Discharge None   [facility] Resource Information Provided? No   Mode of Transport at Discharge Self   Confirm Follow Up Transport Family     Ms. Manriquez is discharging home today. Denies any needs. She is aware to contact CM for any questions or concerns even after dc.     Transition of Care Plan:    RUR: 15%   Prior Level of Functioning: Independent   Disposition: Home. Refused HH.   If SNF or IPR: Date FOC offered:   Date FOC received:   Accepting facility:   Date authorization started with reference number:   Date authorization received and expires:   Follow up appointments: Landon Starks MD 10/21 at 1pm   DME needed:   Transportation at discharge:   IM/IMM Medicare/ letter given: 1st IMM obtained 10/14 by patient access   Is patient a [facility] and connected with [facility]?    If yes, was [facility] transfer form completed and [facility] notified?   Caregiver Contact:   Discharge Caregiver contacted prior to discharge?   Care Conference needed?   Barriers to discharge: None

## 2024-10-15 NOTE — PLAN OF CARE
Problem: Chronic Conditions and Co-morbidities  Goal: Patient's chronic conditions and co-morbidity symptoms are monitored and maintained or improved  10/15/2024 0859 by Vivian Novoa RN  Outcome: Progressing  10/14/2024 2345 by Cristal Diez RN  Outcome: Progressing     Problem: Safety - Adult  Goal: Free from fall injury  10/15/2024 0859 by Vivian Novoa RN  Outcome: Progressing  10/14/2024 2345 by Cristal Diez RN  Outcome: Progressing     Problem: ABCDS Injury Assessment  Goal: Absence of physical injury  10/15/2024 0859 by Vivian Novoa RN  Outcome: Progressing  10/14/2024 2345 by Cristal Diez RN  Outcome: Progressing

## 2024-10-16 LAB
BACTERIA SPEC CULT: ABNORMAL
SERVICE CMNT-IMP: ABNORMAL
SERVICE CMNT-IMP: ABNORMAL

## 2024-10-16 RX ORDER — LINEZOLID 600 MG/1
600 TABLET, FILM COATED ORAL 2 TIMES DAILY
Qty: 14 TABLET | Refills: 0 | Status: SHIPPED | OUTPATIENT
Start: 2024-10-16 | End: 2024-10-23

## 2024-10-17 LAB
L PNEUMO1 AG UR QL IA: NEGATIVE
SPECIMEN SOURCE: NORMAL

## 2024-10-18 LAB
ACB COMPLEX DNA BLD POS QL NAA+NON-PROBE: NOT DETECTED
ACCESSION NUMBER, LLC1M: ABNORMAL
B FRAGILIS DNA BLD POS QL NAA+NON-PROBE: NOT DETECTED
BIOFIRE TEST COMMENT: ABNORMAL
C ALBICANS DNA BLD POS QL NAA+NON-PROBE: NOT DETECTED
C AURIS DNA BLD POS QL NAA+NON-PROBE: NOT DETECTED
C GATTII+NEOFOR DNA BLD POS QL NAA+N-PRB: NOT DETECTED
C GLABRATA DNA BLD POS QL NAA+NON-PROBE: NOT DETECTED
C KRUSEI DNA BLD POS QL NAA+NON-PROBE: NOT DETECTED
C PARAP DNA BLD POS QL NAA+NON-PROBE: NOT DETECTED
C TROPICLS DNA BLD POS QL NAA+NON-PROBE: NOT DETECTED
E CLOAC COMP DNA BLD POS NAA+NON-PROBE: NOT DETECTED
E COLI DNA BLD POS QL NAA+NON-PROBE: NOT DETECTED
E FAECALIS DNA BLD POS QL NAA+NON-PROBE: DETECTED
E FAECIUM DNA BLD POS QL NAA+NON-PROBE: NOT DETECTED
EKG ATRIAL RATE: 129 BPM
EKG DIAGNOSIS: NORMAL
EKG P AXIS: 45 DEGREES
EKG P-R INTERVAL: 146 MS
EKG Q-T INTERVAL: 318 MS
EKG QRS DURATION: 78 MS
EKG QTC CALCULATION (BAZETT): 465 MS
EKG R AXIS: 50 DEGREES
EKG T AXIS: 71 DEGREES
EKG VENTRICULAR RATE: 129 BPM
ENTEROBACTERALES DNA BLD POS NAA+N-PRB: NOT DETECTED
GP B STREP DNA BLD POS QL NAA+NON-PROBE: NOT DETECTED
HAEM INFLU DNA BLD POS QL NAA+NON-PROBE: NOT DETECTED
K OXYTOCA DNA BLD POS QL NAA+NON-PROBE: NOT DETECTED
KLEBSIELLA SP DNA BLD POS QL NAA+NON-PRB: NOT DETECTED
KLEBSIELLA SP DNA BLD POS QL NAA+NON-PRB: NOT DETECTED
L MONOCYTOG DNA BLD POS QL NAA+NON-PROBE: NOT DETECTED
N MEN DNA BLD POS QL NAA+NON-PROBE: NOT DETECTED
P AERUGINOSA DNA BLD POS NAA+NON-PROBE: NOT DETECTED
PROTEUS SP DNA BLD POS QL NAA+NON-PROBE: NOT DETECTED
RESISTANT GENE TARGETS: ABNORMAL
S AUREUS DNA BLD POS QL NAA+NON-PROBE: NOT DETECTED
S AUREUS+CONS DNA BLD POS NAA+NON-PROBE: NOT DETECTED
S EPIDERMIDIS DNA BLD POS QL NAA+NON-PRB: NOT DETECTED
S LUGDUNENSIS DNA BLD POS QL NAA+NON-PRB: NOT DETECTED
S MALTOPHILIA DNA BLD POS QL NAA+NON-PRB: NOT DETECTED
S MARCESCENS DNA BLD POS NAA+NON-PROBE: NOT DETECTED
S PNEUM DNA BLD POS QL NAA+NON-PROBE: NOT DETECTED
S PYO DNA BLD POS QL NAA+NON-PROBE: NOT DETECTED
SALMONELLA DNA BLD POS QL NAA+NON-PROBE: NOT DETECTED
STREPTOCOCCUS DNA BLD POS NAA+NON-PROBE: NOT DETECTED
VANA+VANB ISLT/SPM QL: NOT DETECTED

## 2024-10-20 LAB
BACTERIA SPEC CULT: NORMAL
BACTERIA SPEC CULT: NORMAL
SERVICE CMNT-IMP: NORMAL
SERVICE CMNT-IMP: NORMAL

## 2024-11-11 ENCOUNTER — APPOINTMENT (OUTPATIENT)
Facility: HOSPITAL | Age: 69
End: 2024-11-11
Payer: MEDICARE

## 2024-11-11 ENCOUNTER — HOSPITAL ENCOUNTER (INPATIENT)
Facility: HOSPITAL | Age: 69
LOS: 10 days | Discharge: HOME HEALTH | DRG: 260 | End: 2024-11-21
Attending: STUDENT IN AN ORGANIZED HEALTH CARE EDUCATION/TRAINING PROGRAM | Admitting: STUDENT IN AN ORGANIZED HEALTH CARE EDUCATION/TRAINING PROGRAM
Payer: MEDICARE

## 2024-11-11 ENCOUNTER — HOSPITAL ENCOUNTER (EMERGENCY)
Facility: HOSPITAL | Age: 69
Discharge: ANOTHER HOSPITAL | End: 2024-11-11
Attending: EMERGENCY MEDICINE
Payer: MEDICARE

## 2024-11-11 VITALS
DIASTOLIC BLOOD PRESSURE: 50 MMHG | SYSTOLIC BLOOD PRESSURE: 119 MMHG | TEMPERATURE: 99 F | OXYGEN SATURATION: 98 % | RESPIRATION RATE: 19 BRPM | HEART RATE: 92 BPM | HEIGHT: 66 IN | WEIGHT: 130 LBS | BODY MASS INDEX: 20.89 KG/M2

## 2024-11-11 DIAGNOSIS — N17.9 AKI (ACUTE KIDNEY INJURY) (HCC): ICD-10-CM

## 2024-11-11 DIAGNOSIS — Z95.810 PRESENCE OF AUTOMATIC CARDIOVERTER/DEFIBRILLATOR (AICD): ICD-10-CM

## 2024-11-11 DIAGNOSIS — E11.59 TYPE 2 DIABETES MELLITUS WITH OTHER CIRCULATORY COMPLICATION, WITH LONG-TERM CURRENT USE OF INSULIN (HCC): ICD-10-CM

## 2024-11-11 DIAGNOSIS — A41.9 SEPTICEMIA (HCC): Primary | ICD-10-CM

## 2024-11-11 DIAGNOSIS — A41.81 SEPSIS DUE TO ENTEROCOCCUS (HCC): ICD-10-CM

## 2024-11-11 DIAGNOSIS — R78.81 BACTEREMIA: ICD-10-CM

## 2024-11-11 DIAGNOSIS — I33.0 ACUTE BACTERIAL ENDOCARDITIS: Primary | ICD-10-CM

## 2024-11-11 DIAGNOSIS — Z79.4 TYPE 2 DIABETES MELLITUS WITH OTHER CIRCULATORY COMPLICATION, WITH LONG-TERM CURRENT USE OF INSULIN (HCC): ICD-10-CM

## 2024-11-11 LAB
ALBUMIN SERPL-MCNC: 2.8 G/DL (ref 3.5–5)
ALBUMIN/GLOB SERPL: 0.7 (ref 1.1–2.2)
ALP SERPL-CCNC: 94 U/L (ref 45–117)
ALT SERPL-CCNC: 15 U/L (ref 12–78)
ANION GAP SERPL CALC-SCNC: 9 MMOL/L (ref 2–12)
APPEARANCE UR: CLEAR
AST SERPL-CCNC: 18 U/L (ref 15–37)
BACTERIA URNS QL MICRO: NEGATIVE /HPF
BASOPHILS # BLD: 0 K/UL (ref 0–0.1)
BASOPHILS NFR BLD: 0 % (ref 0–1)
BILIRUB SERPL-MCNC: 0.7 MG/DL (ref 0.2–1)
BILIRUB UR QL: NEGATIVE
BUN SERPL-MCNC: 17 MG/DL (ref 6–20)
BUN/CREAT SERPL: 12 (ref 12–20)
CALCIUM SERPL-MCNC: 8.5 MG/DL (ref 8.5–10.1)
CHLORIDE SERPL-SCNC: 102 MMOL/L (ref 97–108)
CK SERPL-CCNC: 30 U/L (ref 26–192)
CO2 SERPL-SCNC: 27 MMOL/L (ref 21–32)
COLOR UR: ABNORMAL
CREAT SERPL-MCNC: 1.41 MG/DL (ref 0.55–1.02)
DIFFERENTIAL METHOD BLD: ABNORMAL
EKG ATRIAL RATE: 113 BPM
EKG DIAGNOSIS: NORMAL
EKG P AXIS: 51 DEGREES
EKG P-R INTERVAL: 178 MS
EKG Q-T INTERVAL: 324 MS
EKG QRS DURATION: 72 MS
EKG QTC CALCULATION (BAZETT): 444 MS
EKG R AXIS: 69 DEGREES
EKG T AXIS: 83 DEGREES
EKG VENTRICULAR RATE: 113 BPM
EOSINOPHIL # BLD: 0 K/UL (ref 0–0.4)
EOSINOPHIL NFR BLD: 0 % (ref 0–7)
EPITH CASTS URNS QL MICRO: ABNORMAL /LPF
ERYTHROCYTE [DISTWIDTH] IN BLOOD BY AUTOMATED COUNT: 15.6 % (ref 11.5–14.5)
FLUAV RNA SPEC QL NAA+PROBE: NOT DETECTED
FLUBV RNA SPEC QL NAA+PROBE: NOT DETECTED
GLOBULIN SER CALC-MCNC: 4.1 G/DL (ref 2–4)
GLUCOSE BLD STRIP.AUTO-MCNC: 151 MG/DL (ref 65–117)
GLUCOSE BLD STRIP.AUTO-MCNC: 201 MG/DL (ref 65–117)
GLUCOSE SERPL-MCNC: 294 MG/DL (ref 65–100)
GLUCOSE UR STRIP.AUTO-MCNC: NEGATIVE MG/DL
HCT VFR BLD AUTO: 34.9 % (ref 35–47)
HGB BLD-MCNC: 11.8 G/DL (ref 11.5–16)
HGB UR QL STRIP: ABNORMAL
IMM GRANULOCYTES # BLD AUTO: 0.2 K/UL (ref 0–0.04)
IMM GRANULOCYTES NFR BLD AUTO: 1 % (ref 0–0.5)
KETONES UR QL STRIP.AUTO: NEGATIVE MG/DL
LACTATE SERPL-SCNC: 1.4 MMOL/L (ref 0.4–2)
LACTATE SERPL-SCNC: 2.2 MMOL/L (ref 0.4–2)
LEUKOCYTE ESTERASE UR QL STRIP.AUTO: NEGATIVE
LYMPHOCYTES # BLD: 0.8 K/UL (ref 0.8–3.5)
LYMPHOCYTES NFR BLD: 4 % (ref 12–49)
MCH RBC QN AUTO: 30.8 PG (ref 26–34)
MCHC RBC AUTO-ENTMCNC: 33.8 G/DL (ref 30–36.5)
MCV RBC AUTO: 91.1 FL (ref 80–99)
MONOCYTES # BLD: 1 K/UL (ref 0–1)
MONOCYTES NFR BLD: 5 % (ref 5–13)
NEUTS SEG # BLD: 17.9 K/UL (ref 1.8–8)
NEUTS SEG NFR BLD: 90 % (ref 32–75)
NITRITE UR QL STRIP.AUTO: NEGATIVE
NRBC # BLD: 0 K/UL (ref 0–0.01)
NRBC BLD-RTO: 0 PER 100 WBC
PH UR STRIP: 6 (ref 5–8)
PLATELET # BLD AUTO: 125 K/UL (ref 150–400)
PLATELET COMMENT: ABNORMAL
PMV BLD AUTO: 12.1 FL (ref 8.9–12.9)
POTASSIUM SERPL-SCNC: 3.9 MMOL/L (ref 3.5–5.1)
PROCALCITONIN SERPL-MCNC: 70.55 NG/ML
PROT SERPL-MCNC: 6.9 G/DL (ref 6.4–8.2)
PROT UR STRIP-MCNC: 30 MG/DL
RBC # BLD AUTO: 3.83 M/UL (ref 3.8–5.2)
RBC #/AREA URNS HPF: ABNORMAL /HPF (ref 0–5)
RBC MORPH BLD: ABNORMAL
SARS-COV-2 RNA RESP QL NAA+PROBE: NOT DETECTED
SERVICE CMNT-IMP: ABNORMAL
SERVICE CMNT-IMP: ABNORMAL
SODIUM SERPL-SCNC: 138 MMOL/L (ref 136–145)
SOURCE: NORMAL
SP GR UR REFRACTOMETRY: 1.01 (ref 1–1.03)
TROPONIN I SERPL HS-MCNC: 27 NG/L (ref 0–51)
URINE CULTURE IF INDICATED: ABNORMAL
UROBILINOGEN UR QL STRIP.AUTO: 0.2 EU/DL (ref 0.2–1)
WBC # BLD AUTO: 19.9 K/UL (ref 3.6–11)
WBC URNS QL MICRO: ABNORMAL /HPF (ref 0–4)

## 2024-11-11 PROCEDURE — 87186 SC STD MICRODIL/AGAR DIL: CPT

## 2024-11-11 PROCEDURE — 87636 SARSCOV2 & INF A&B AMP PRB: CPT

## 2024-11-11 PROCEDURE — 87040 BLOOD CULTURE FOR BACTERIA: CPT

## 2024-11-11 PROCEDURE — 84145 PROCALCITONIN (PCT): CPT

## 2024-11-11 PROCEDURE — 83605 ASSAY OF LACTIC ACID: CPT

## 2024-11-11 PROCEDURE — 85025 COMPLETE CBC W/AUTO DIFF WBC: CPT

## 2024-11-11 PROCEDURE — 1100000003 HC PRIVATE W/ TELEMETRY

## 2024-11-11 PROCEDURE — 96361 HYDRATE IV INFUSION ADD-ON: CPT

## 2024-11-11 PROCEDURE — 93005 ELECTROCARDIOGRAM TRACING: CPT | Performed by: EMERGENCY MEDICINE

## 2024-11-11 PROCEDURE — 36415 COLL VENOUS BLD VENIPUNCTURE: CPT

## 2024-11-11 PROCEDURE — 2580000003 HC RX 258: Performed by: EMERGENCY MEDICINE

## 2024-11-11 PROCEDURE — 6360000004 HC RX CONTRAST MEDICATION: Performed by: EMERGENCY MEDICINE

## 2024-11-11 PROCEDURE — 2060000000 HC ICU INTERMEDIATE R&B

## 2024-11-11 PROCEDURE — 84484 ASSAY OF TROPONIN QUANT: CPT

## 2024-11-11 PROCEDURE — 83036 HEMOGLOBIN GLYCOSYLATED A1C: CPT

## 2024-11-11 PROCEDURE — 6360000002 HC RX W HCPCS: Performed by: STUDENT IN AN ORGANIZED HEALTH CARE EDUCATION/TRAINING PROGRAM

## 2024-11-11 PROCEDURE — 2580000003 HC RX 258: Performed by: STUDENT IN AN ORGANIZED HEALTH CARE EDUCATION/TRAINING PROGRAM

## 2024-11-11 PROCEDURE — 81001 URINALYSIS AUTO W/SCOPE: CPT

## 2024-11-11 PROCEDURE — 71045 X-RAY EXAM CHEST 1 VIEW: CPT

## 2024-11-11 PROCEDURE — 80053 COMPREHEN METABOLIC PANEL: CPT

## 2024-11-11 PROCEDURE — 96365 THER/PROPH/DIAG IV INF INIT: CPT

## 2024-11-11 PROCEDURE — 6370000000 HC RX 637 (ALT 250 FOR IP): Performed by: STUDENT IN AN ORGANIZED HEALTH CARE EDUCATION/TRAINING PROGRAM

## 2024-11-11 PROCEDURE — 02PA3MZ REMOVAL OF CARDIAC LEAD FROM HEART, PERCUTANEOUS APPROACH: ICD-10-PCS | Performed by: INTERNAL MEDICINE

## 2024-11-11 PROCEDURE — 71260 CT THORAX DX C+: CPT

## 2024-11-11 PROCEDURE — 99285 EMERGENCY DEPT VISIT HI MDM: CPT

## 2024-11-11 PROCEDURE — 82550 ASSAY OF CK (CPK): CPT

## 2024-11-11 PROCEDURE — 96374 THER/PROPH/DIAG INJ IV PUSH: CPT

## 2024-11-11 PROCEDURE — 87077 CULTURE AEROBIC IDENTIFY: CPT

## 2024-11-11 PROCEDURE — 82962 GLUCOSE BLOOD TEST: CPT

## 2024-11-11 PROCEDURE — B246ZZ4 ULTRASONOGRAPHY OF RIGHT AND LEFT HEART, TRANSESOPHAGEAL: ICD-10-PCS | Performed by: ANESTHESIOLOGY

## 2024-11-11 PROCEDURE — 87154 CUL TYP ID BLD PTHGN 6+ TRGT: CPT

## 2024-11-11 PROCEDURE — 6360000002 HC RX W HCPCS: Performed by: EMERGENCY MEDICINE

## 2024-11-11 PROCEDURE — 96366 THER/PROPH/DIAG IV INF ADDON: CPT

## 2024-11-11 RX ORDER — PANTOPRAZOLE SODIUM 40 MG/1
40 TABLET, DELAYED RELEASE ORAL
Status: DISCONTINUED | OUTPATIENT
Start: 2024-11-12 | End: 2024-11-21 | Stop reason: HOSPADM

## 2024-11-11 RX ORDER — IOPAMIDOL 755 MG/ML
100 INJECTION, SOLUTION INTRAVASCULAR
Status: COMPLETED | OUTPATIENT
Start: 2024-11-11 | End: 2024-11-11

## 2024-11-11 RX ORDER — VALSARTAN 40 MG/1
40 TABLET ORAL 2 TIMES DAILY
Status: DISCONTINUED | OUTPATIENT
Start: 2024-11-11 | End: 2024-11-21 | Stop reason: HOSPADM

## 2024-11-11 RX ORDER — PRAVASTATIN SODIUM 10 MG
20 TABLET ORAL NIGHTLY
Status: DISCONTINUED | OUTPATIENT
Start: 2024-11-11 | End: 2024-11-21 | Stop reason: HOSPADM

## 2024-11-11 RX ORDER — ACETAMINOPHEN 325 MG/1
650 TABLET ORAL EVERY 6 HOURS PRN
Status: DISCONTINUED | OUTPATIENT
Start: 2024-11-11 | End: 2024-11-21 | Stop reason: HOSPADM

## 2024-11-11 RX ORDER — ONDANSETRON 4 MG/1
4 TABLET, ORALLY DISINTEGRATING ORAL EVERY 8 HOURS PRN
Status: DISCONTINUED | OUTPATIENT
Start: 2024-11-11 | End: 2024-11-21 | Stop reason: HOSPADM

## 2024-11-11 RX ORDER — VANCOMYCIN 1 G/200ML
1000 INJECTION, SOLUTION INTRAVENOUS EVERY 24 HOURS
Status: DISCONTINUED | OUTPATIENT
Start: 2024-11-12 | End: 2024-11-12

## 2024-11-11 RX ORDER — GABAPENTIN 300 MG/1
300 CAPSULE ORAL 2 TIMES DAILY
Status: DISCONTINUED | OUTPATIENT
Start: 2024-11-11 | End: 2024-11-21 | Stop reason: HOSPADM

## 2024-11-11 RX ORDER — CARVEDILOL 6.25 MG/1
6.25 TABLET ORAL 2 TIMES DAILY WITH MEALS
Status: DISCONTINUED | OUTPATIENT
Start: 2024-11-11 | End: 2024-11-21 | Stop reason: HOSPADM

## 2024-11-11 RX ORDER — VANCOMYCIN 1.5 G/300ML
25 INJECTION, SOLUTION INTRAVENOUS ONCE
Status: COMPLETED | OUTPATIENT
Start: 2024-11-11 | End: 2024-11-11

## 2024-11-11 RX ORDER — ACETAMINOPHEN 650 MG/1
650 SUPPOSITORY RECTAL EVERY 6 HOURS PRN
Status: DISCONTINUED | OUTPATIENT
Start: 2024-11-11 | End: 2024-11-21 | Stop reason: HOSPADM

## 2024-11-11 RX ORDER — POLYETHYLENE GLYCOL 3350 17 G/17G
17 POWDER, FOR SOLUTION ORAL DAILY PRN
Status: DISCONTINUED | OUTPATIENT
Start: 2024-11-11 | End: 2024-11-21 | Stop reason: HOSPADM

## 2024-11-11 RX ORDER — ONDANSETRON 2 MG/ML
4 INJECTION INTRAMUSCULAR; INTRAVENOUS EVERY 6 HOURS PRN
Status: DISCONTINUED | OUTPATIENT
Start: 2024-11-11 | End: 2024-11-21 | Stop reason: HOSPADM

## 2024-11-11 RX ORDER — DEXTROSE MONOHYDRATE 100 MG/ML
INJECTION, SOLUTION INTRAVENOUS CONTINUOUS PRN
Status: DISCONTINUED | OUTPATIENT
Start: 2024-11-11 | End: 2024-11-21 | Stop reason: HOSPADM

## 2024-11-11 RX ORDER — INSULIN GLARGINE 100 [IU]/ML
30 INJECTION, SOLUTION SUBCUTANEOUS NIGHTLY
Status: DISCONTINUED | OUTPATIENT
Start: 2024-11-11 | End: 2024-11-21 | Stop reason: HOSPADM

## 2024-11-11 RX ORDER — INSULIN LISPRO 100 [IU]/ML
0-8 INJECTION, SOLUTION INTRAVENOUS; SUBCUTANEOUS
Status: DISCONTINUED | OUTPATIENT
Start: 2024-11-11 | End: 2024-11-21 | Stop reason: HOSPADM

## 2024-11-11 RX ORDER — GLUCAGON 1 MG/ML
1 KIT INJECTION PRN
Status: DISCONTINUED | OUTPATIENT
Start: 2024-11-11 | End: 2024-11-21 | Stop reason: HOSPADM

## 2024-11-11 RX ORDER — 0.9 % SODIUM CHLORIDE 0.9 %
30 INTRAVENOUS SOLUTION INTRAVENOUS ONCE
Status: COMPLETED | OUTPATIENT
Start: 2024-11-11 | End: 2024-11-11

## 2024-11-11 RX ADMIN — VANCOMYCIN 1500 MG: 1.5 INJECTION, SOLUTION INTRAVENOUS at 05:38

## 2024-11-11 RX ADMIN — IOPAMIDOL 100 ML: 755 INJECTION, SOLUTION INTRAVENOUS at 08:11

## 2024-11-11 RX ADMIN — CEFEPIME 2000 MG: 2 INJECTION, POWDER, FOR SOLUTION INTRAVENOUS at 17:45

## 2024-11-11 RX ADMIN — RIVAROXABAN 15 MG: 15 TABLET, FILM COATED ORAL at 17:44

## 2024-11-11 RX ADMIN — INSULIN GLARGINE 30 UNITS: 100 INJECTION, SOLUTION SUBCUTANEOUS at 20:28

## 2024-11-11 RX ADMIN — INSULIN LISPRO 2 UNITS: 100 INJECTION, SOLUTION INTRAVENOUS; SUBCUTANEOUS at 20:28

## 2024-11-11 RX ADMIN — GABAPENTIN 300 MG: 300 CAPSULE ORAL at 20:28

## 2024-11-11 RX ADMIN — WATER 2000 MG: 1 INJECTION INTRAMUSCULAR; INTRAVENOUS; SUBCUTANEOUS at 05:41

## 2024-11-11 RX ADMIN — SODIUM CHLORIDE 1770 ML: 9 INJECTION, SOLUTION INTRAVENOUS at 05:41

## 2024-11-11 RX ADMIN — PRAVASTATIN SODIUM 20 MG: 10 TABLET ORAL at 20:28

## 2024-11-11 ASSESSMENT — PAIN SCALES - GENERAL
PAINLEVEL_OUTOF10: 0

## 2024-11-11 ASSESSMENT — PAIN - FUNCTIONAL ASSESSMENT: PAIN_FUNCTIONAL_ASSESSMENT: NONE - DENIES PAIN

## 2024-11-11 NOTE — ED NOTES
TRANSFER - OUT REPORT:    Verbal report given to Lyssa Mata RN on Madisyn Manriquez  being transferred to [facility] 2327 for routine progression of patient care       Report consisted of patient's Situation, Background, Assessment and   Recommendations(SBAR).     Information from the following report(s) Nurse Handoff Report, ED SBAR, MAR, Recent Results, Med Rec Status, Cardiac Rhythm NSR, and Quality Measures was reviewed with the receiving nurse.    [place] Fall Assessment:    Presents to emergency department  because of falls (Syncope, seizure, or loss of consciousness): No  Age > 70: No  Altered Mental Status, Intoxication with alcohol or substance confusion (Disorientation, impaired judgment, poor safety awaremess, or inability to follow instructions): No  Impaired Mobility: Ambulates or transfers with assistive devices or assistance; Unable to ambulate or transer.: No  Nursing Judgement: No          Lines:   Peripheral IV 11/11/24 Left;Proximal Forearm (Active)       Peripheral IV 11/11/24 Right Antecubital (Active)        Opportunity for questions and clarification was provided.      Patient to be transported with:  [facility] to [facility]        Post-Care Instructions: I reviewed with the patient in detail post-care instructions. Patient is not to engage in any heavy lifting, exercise, or swimming for the next 14 days. Should the patient develop any fevers, chills, bleeding, severe pain patient will contact the office immediately.

## 2024-11-11 NOTE — H&P
sizable pleural effusion. A pacemaker/AICD is in place. Cholecystectomy clips in the right upper quadrant.     Shallow volumes but clear lungs. Electronically signed by Enrique Suazo     _______________________________________________________________________    TOTAL TIME:  76 Minutes    Critical Care Provided     Minutes non procedure based    Signed: Meseret Avery MD    Procedures: see electronic medical records for all procedures/Xrays and details which were not copied into this note but were reviewed prior to creation of Plan.

## 2024-11-11 NOTE — ED NOTES
TRANSFER - OUT REPORT:    Verbal report given to Rickey Lopez EMT-P on Madisyn Manriquez  being transferred to [place] [address] for routine progression of patient care       Report consisted of patient's Situation, Background, Assessment and   Recommendations(SBAR).     Information from the following report(s) Nurse Handoff Report, ED SBAR, Adult Overview, MAR, Recent Results, Cardiac Rhythm NSR, and Quality Measures was reviewed with the receiving nurse.    [place] Fall Assessment:    Presents to emergency department  because of falls (Syncope, seizure, or loss of consciousness): No  Age > 70: No  Altered Mental Status, Intoxication with alcohol or substance confusion (Disorientation, impaired judgment, poor safety awaremess, or inability to follow instructions): No  Impaired Mobility: Ambulates or transfers with assistive devices or assistance; Unable to ambulate or transer.: No  Nursing Judgement: No          Lines:   Peripheral IV 11/11/24 Left;Proximal Forearm (Active)       Peripheral IV 11/11/24 Right Antecubital (Active)        Opportunity for questions and clarification was provided.      Patient transported with:  [facility] on cardiac monitoring

## 2024-11-11 NOTE — ED PROVIDER NOTES
[facility] EMERGENCY DEP  EMERGENCY DEPARTMENT ENCOUNTER       Pt Name: Madisyn Manriquez  MRN: [id number]  Birthdate 1955  Date of evaluation: 11/11/2024  Provider: Mallory Garcia MD   PCP: Landon Starks MD  Note Started: 6:20 AM EST 11/11/24     CHIEF COMPLAINT       Chief Complaint   Patient presents with    Fatigue        HISTORY OF PRESENT ILLNESS: 1 or more elements      History From: Patient and EMS  HPI Limitations: None     Madisyn Manriquez is a 69 y.o. female with history of A-fib on Xarelto, CVA, type 2 diabetes, and recent admission for right lower lobe pneumonia with sepsis and bacteremia (Enterococcus faecalis) who presents to the ED by EMS with generalized weakness and chills since this afternoon.  Patient reports that she completed her course of antibiotics (linezolid) last week.  She has continued to have a cough that is productive of clear to white sputum since her admission.  Today, she started to feel weak in the afternoon.  She turned up the heat because she was having chills.  She got up to use the restroom in the middle the night and felt weak and slid to the ground.  States she did not fall.  Did not hit her head.  Denies any shortness of breath, chest pain, abdominal pain, urinary symptoms.  Denies any vomiting.  States she has some nausea when she raises her arms above her head.  EMS reported blood pressure in the 80s systolic.  IV fluid bolus started in route.  REVIEW OF SYSTEMS      Review of Systems     Positives and Pertinent negatives as per HPI.    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    Hepatitis     History of seasonal allergies     Hypertension     Kidney infection     MI (myocardial infarction) (HCC)     MRSA (methicillin resistant Staphylococcus aureus)     Recurrent boils     Stroke (HCC)

## 2024-11-11 NOTE — ED NOTES
Pt has been assigned room number 2327 @ [facility]. Number for nursing report is [phone]. Nursing supervisor has been made aware and will arrange transport.

## 2024-11-11 NOTE — PROGRESS NOTES
arranged ALS transportation from [facility]# ed 3 to  [facility]324 .  Arranged ALS transport w/[facility] -  advised to bring appropiate equipment - ETA of 20   minutes given - updated ED staff w/ETA

## 2024-11-11 NOTE — ED NOTES
RT in for portable chest xray    First name:                       MR # [id number]  Date of Birth: [birth year] 	Time of Birth: 11:10    Birth Weight: 1890g    Date of Admission: 6/17/20          Gestational Age: 31.3        Active Diagnoses: PPPROM, feeding issues, FTT, anemia of prematurity    Resolved Diagnoses: r/o sepsis, RDS, hyperbilirubinemia, apnea of prematurity,         ICU Vital Signs Last 24 Hrs  T(C): 37 (16 Jul 2020 08:00), Max: 37 (16 Jul 2020 08:00)  T(F): 98.6 (16 Jul 2020 08:00), Max: 98.6 (16 Jul 2020 08:00)  HR: 148 (16 Jul 2020 08:00) (145 - 178)  BP: 75/37 (15 Jul 2020 23:00) (75/37 - 75/37)  BP(mean): 54 (15 Jul 2020 23:00) (54 - 54)  ABP: --  ABP(mean): --  RR: 52 (16 Jul 2020 08:00) (30 - 55)  SpO2: 100% (16 Jul 2020 08:00) (97% - 100%)      Interval Events: Pt remains stable on RA. Tachypneic with feeds but tends to settle in between feeds. No episodes since 7/11. Pt taking partial volume of PO feeds.         WEIGHT: Daily     Daily Weight Gm: 2498 (15 Jul 2020 23:00) (-12g)  FLUIDS AND NUTRITION:     I&O's Detail    15 Jul 2020 07:01  -  16 Jul 2020 07:00  --------------------------------------------------------  IN:    Oral Fluid: 261 mL    Tube Feeding Fluid: 37 mL  Total IN: 298 mL    OUT:  Total OUT: 0 mL    Total NET: 298 mL      16 Jul 2020 07:01  -  16 Jul 2020 10:32  --------------------------------------------------------  IN:    Oral Fluid: 38 mL  Total IN: 38 mL    OUT:  Total OUT: 0 mL    Total NET: 38 mL          Intake(ml/kg/day): 160  Urine output: 9WD  Stools: x3    Diet - Enteral: 50mL Q3hrs EBM+HMF24  Diet - Parenteral: none    PHYSICAL EXAM:    General:	         Alert, pink  Head:               AFOF  Eyes:                Normally Set bilaterally  Nose/Mouth: Nares patent bilaterally, palate intact  Chest/Lungs:  Breath sounds equal to auscultation. No retractions  CV:		         No murmurs appreciated, normal pulses bilaterally  Abdomen:      Soft nontender nondistended, no masses, bowel sounds present  Neuro exam:	 Appropriate tone

## 2024-11-11 NOTE — ED TRIAGE NOTES
Patient states that she fell to the floor while attempting to go to the bathroom and was unable to get back up. Patient states that she was down for a few hours.

## 2024-11-12 LAB
ACB COMPLEX DNA BLD POS QL NAA+NON-PROBE: NOT DETECTED
ACCESSION NUMBER, LLC1M: ABNORMAL
ALBUMIN SERPL-MCNC: 2.7 G/DL (ref 3.5–5)
ALBUMIN/GLOB SERPL: 0.6 (ref 1.1–2.2)
ALP SERPL-CCNC: 78 U/L (ref 45–117)
ALT SERPL-CCNC: 15 U/L (ref 12–78)
ANION GAP SERPL CALC-SCNC: 4 MMOL/L (ref 2–12)
AST SERPL-CCNC: 17 U/L (ref 15–37)
B FRAGILIS DNA BLD POS QL NAA+NON-PROBE: NOT DETECTED
BASOPHILS # BLD: 0 K/UL (ref 0–0.1)
BASOPHILS NFR BLD: 0 % (ref 0–1)
BILIRUB SERPL-MCNC: 0.5 MG/DL (ref 0.2–1)
BIOFIRE TEST COMMENT: ABNORMAL
BLACTX-M ISLT/SPM QL: NOT DETECTED
BLAIMP ISLT/SPM QL: NOT DETECTED
BLAKPC ISLT/SPM QL: NOT DETECTED
BLAOXA-48-LIKE ISLT/SPM QL: NOT DETECTED
BLAVIM ISLT/SPM QL: NOT DETECTED
BUN SERPL-MCNC: 11 MG/DL (ref 6–20)
BUN/CREAT SERPL: 16 (ref 12–20)
C ALBICANS DNA BLD POS QL NAA+NON-PROBE: NOT DETECTED
C AURIS DNA BLD POS QL NAA+NON-PROBE: NOT DETECTED
C GATTII+NEOFOR DNA BLD POS QL NAA+N-PRB: NOT DETECTED
C GLABRATA DNA BLD POS QL NAA+NON-PROBE: NOT DETECTED
C KRUSEI DNA BLD POS QL NAA+NON-PROBE: NOT DETECTED
C PARAP DNA BLD POS QL NAA+NON-PROBE: NOT DETECTED
C TROPICLS DNA BLD POS QL NAA+NON-PROBE: NOT DETECTED
CALCIUM SERPL-MCNC: 8.6 MG/DL (ref 8.5–10.1)
CHLORIDE SERPL-SCNC: 112 MMOL/L (ref 97–108)
CO2 SERPL-SCNC: 24 MMOL/L (ref 21–32)
COLISTIN RES MCR-1 ISLT/SPM QL: NOT DETECTED
CREAT SERPL-MCNC: 0.68 MG/DL (ref 0.55–1.02)
DIFFERENTIAL METHOD BLD: ABNORMAL
E CLOAC COMP DNA BLD POS NAA+NON-PROBE: NOT DETECTED
E COLI DNA BLD POS QL NAA+NON-PROBE: NOT DETECTED
E FAECALIS DNA BLD POS QL NAA+NON-PROBE: DETECTED
E FAECIUM DNA BLD POS QL NAA+NON-PROBE: NOT DETECTED
ENTEROBACTERALES DNA BLD POS NAA+N-PRB: NOT DETECTED
EOSINOPHIL # BLD: 0.1 K/UL (ref 0–0.4)
EOSINOPHIL NFR BLD: 1 % (ref 0–7)
ERYTHROCYTE [DISTWIDTH] IN BLOOD BY AUTOMATED COUNT: 15.5 % (ref 11.5–14.5)
EST. AVERAGE GLUCOSE BLD GHB EST-MCNC: 166 MG/DL
GLOBULIN SER CALC-MCNC: 4.3 G/DL (ref 2–4)
GLUCOSE BLD STRIP.AUTO-MCNC: 125 MG/DL (ref 65–117)
GLUCOSE BLD STRIP.AUTO-MCNC: 152 MG/DL (ref 65–117)
GLUCOSE BLD STRIP.AUTO-MCNC: 160 MG/DL (ref 65–117)
GLUCOSE BLD STRIP.AUTO-MCNC: 178 MG/DL (ref 65–117)
GLUCOSE SERPL-MCNC: 104 MG/DL (ref 65–100)
GP B STREP DNA BLD POS QL NAA+NON-PROBE: NOT DETECTED
HAEM INFLU DNA BLD POS QL NAA+NON-PROBE: NOT DETECTED
HBA1C MFR BLD: 7.4 % (ref 4–5.6)
HCT VFR BLD AUTO: 30.5 % (ref 35–47)
HGB BLD-MCNC: 10.4 G/DL (ref 11.5–16)
IMM GRANULOCYTES # BLD AUTO: 0.1 K/UL (ref 0–0.04)
IMM GRANULOCYTES NFR BLD AUTO: 1 % (ref 0–0.5)
K OXYTOCA DNA BLD POS QL NAA+NON-PROBE: NOT DETECTED
KLEBSIELLA SP DNA BLD POS QL NAA+NON-PRB: NOT DETECTED
KLEBSIELLA SP DNA BLD POS QL NAA+NON-PRB: NOT DETECTED
L MONOCYTOG DNA BLD POS QL NAA+NON-PROBE: NOT DETECTED
LYMPHOCYTES # BLD: 1.8 K/UL (ref 0.8–3.5)
LYMPHOCYTES NFR BLD: 14 % (ref 12–49)
MCH RBC QN AUTO: 30.9 PG (ref 26–34)
MCHC RBC AUTO-ENTMCNC: 34.1 G/DL (ref 30–36.5)
MCV RBC AUTO: 90.5 FL (ref 80–99)
MECA+MECC ISLT/SPM QL: NOT DETECTED
MECA+MECC+MREJ ISLT/SPM QL: NOT DETECTED
MONOCYTES # BLD: 0.9 K/UL (ref 0–1)
MONOCYTES NFR BLD: 7 % (ref 5–13)
N MEN DNA BLD POS QL NAA+NON-PROBE: NOT DETECTED
NEUTS SEG # BLD: 9.7 K/UL (ref 1.8–8)
NEUTS SEG NFR BLD: 77 % (ref 32–75)
NRBC # BLD: 0 K/UL (ref 0–0.01)
NRBC BLD-RTO: 0 PER 100 WBC
P AERUGINOSA DNA BLD POS NAA+NON-PROBE: NOT DETECTED
PLATELET # BLD AUTO: 70 K/UL (ref 150–400)
PMV BLD AUTO: 12.8 FL (ref 8.9–12.9)
POTASSIUM SERPL-SCNC: 3.3 MMOL/L (ref 3.5–5.1)
PROT SERPL-MCNC: 7 G/DL (ref 6.4–8.2)
PROTEUS SP DNA BLD POS QL NAA+NON-PROBE: NOT DETECTED
RBC # BLD AUTO: 3.37 M/UL (ref 3.8–5.2)
RESISTANT GENE NDM BY PCR: NOT DETECTED
RESISTANT GENE TARGETS: ABNORMAL
S AUREUS DNA BLD POS QL NAA+NON-PROBE: NOT DETECTED
S AUREUS+CONS DNA BLD POS NAA+NON-PROBE: NOT DETECTED
S EPIDERMIDIS DNA BLD POS QL NAA+NON-PRB: NOT DETECTED
S LUGDUNENSIS DNA BLD POS QL NAA+NON-PRB: NOT DETECTED
S MALTOPHILIA DNA BLD POS QL NAA+NON-PRB: NOT DETECTED
S MARCESCENS DNA BLD POS NAA+NON-PROBE: NOT DETECTED
S PNEUM DNA BLD POS QL NAA+NON-PROBE: NOT DETECTED
S PYO DNA BLD POS QL NAA+NON-PROBE: NOT DETECTED
SALMONELLA DNA BLD POS QL NAA+NON-PROBE: NOT DETECTED
SERVICE CMNT-IMP: ABNORMAL
SODIUM SERPL-SCNC: 140 MMOL/L (ref 136–145)
STREPTOCOCCUS DNA BLD POS NAA+NON-PROBE: NOT DETECTED
VANA+VANB ISLT/SPM QL: NOT DETECTED
WBC # BLD AUTO: 12.4 K/UL (ref 3.6–11)

## 2024-11-12 PROCEDURE — 6360000002 HC RX W HCPCS: Performed by: STUDENT IN AN ORGANIZED HEALTH CARE EDUCATION/TRAINING PROGRAM

## 2024-11-12 PROCEDURE — 87040 BLOOD CULTURE FOR BACTERIA: CPT

## 2024-11-12 PROCEDURE — 97161 PT EVAL LOW COMPLEX 20 MIN: CPT

## 2024-11-12 PROCEDURE — 97530 THERAPEUTIC ACTIVITIES: CPT

## 2024-11-12 PROCEDURE — 87154 CUL TYP ID BLD PTHGN 6+ TRGT: CPT

## 2024-11-12 PROCEDURE — 36415 COLL VENOUS BLD VENIPUNCTURE: CPT

## 2024-11-12 PROCEDURE — 2580000003 HC RX 258: Performed by: INTERNAL MEDICINE

## 2024-11-12 PROCEDURE — 2060000000 HC ICU INTERMEDIATE R&B

## 2024-11-12 PROCEDURE — 97165 OT EVAL LOW COMPLEX 30 MIN: CPT

## 2024-11-12 PROCEDURE — 80053 COMPREHEN METABOLIC PANEL: CPT

## 2024-11-12 PROCEDURE — 6360000002 HC RX W HCPCS: Performed by: INTERNAL MEDICINE

## 2024-11-12 PROCEDURE — 97535 SELF CARE MNGMENT TRAINING: CPT

## 2024-11-12 PROCEDURE — 2580000003 HC RX 258: Performed by: STUDENT IN AN ORGANIZED HEALTH CARE EDUCATION/TRAINING PROGRAM

## 2024-11-12 PROCEDURE — 6370000000 HC RX 637 (ALT 250 FOR IP): Performed by: STUDENT IN AN ORGANIZED HEALTH CARE EDUCATION/TRAINING PROGRAM

## 2024-11-12 PROCEDURE — 85025 COMPLETE CBC W/AUTO DIFF WBC: CPT

## 2024-11-12 PROCEDURE — 82962 GLUCOSE BLOOD TEST: CPT

## 2024-11-12 PROCEDURE — 6370000000 HC RX 637 (ALT 250 FOR IP): Performed by: INTERNAL MEDICINE

## 2024-11-12 RX ORDER — POTASSIUM CHLORIDE 750 MG/1
40 TABLET, EXTENDED RELEASE ORAL ONCE
Status: COMPLETED | OUTPATIENT
Start: 2024-11-12 | End: 2024-11-12

## 2024-11-12 RX ORDER — VANCOMYCIN 1 G/200ML
1000 INJECTION, SOLUTION INTRAVENOUS EVERY 12 HOURS
Status: DISCONTINUED | OUTPATIENT
Start: 2024-11-12 | End: 2024-11-21 | Stop reason: HOSPADM

## 2024-11-12 RX ADMIN — CEFEPIME 2000 MG: 2 INJECTION, POWDER, FOR SOLUTION INTRAVENOUS at 21:19

## 2024-11-12 RX ADMIN — CARVEDILOL 6.25 MG: 6.25 TABLET, FILM COATED ORAL at 09:18

## 2024-11-12 RX ADMIN — PANTOPRAZOLE SODIUM 40 MG: 40 TABLET, DELAYED RELEASE ORAL at 05:56

## 2024-11-12 RX ADMIN — CEFEPIME 2000 MG: 2 INJECTION, POWDER, FOR SOLUTION INTRAVENOUS at 07:01

## 2024-11-12 RX ADMIN — INSULIN GLARGINE 30 UNITS: 100 INJECTION, SOLUTION SUBCUTANEOUS at 20:12

## 2024-11-12 RX ADMIN — CEFEPIME 2000 MG: 2 INJECTION, POWDER, FOR SOLUTION INTRAVENOUS at 15:35

## 2024-11-12 RX ADMIN — VANCOMYCIN 1000 MG: 1 INJECTION, SOLUTION INTRAVENOUS at 20:09

## 2024-11-12 RX ADMIN — CARVEDILOL 6.25 MG: 6.25 TABLET, FILM COATED ORAL at 18:04

## 2024-11-12 RX ADMIN — GABAPENTIN 300 MG: 300 CAPSULE ORAL at 09:18

## 2024-11-12 RX ADMIN — RIVAROXABAN 20 MG: 20 TABLET, FILM COATED ORAL at 18:04

## 2024-11-12 RX ADMIN — VANCOMYCIN 1000 MG: 1 INJECTION, SOLUTION INTRAVENOUS at 05:55

## 2024-11-12 RX ADMIN — POTASSIUM CHLORIDE 40 MEQ: 750 TABLET, EXTENDED RELEASE ORAL at 09:18

## 2024-11-12 RX ADMIN — GABAPENTIN 300 MG: 300 CAPSULE ORAL at 20:12

## 2024-11-12 RX ADMIN — PRAVASTATIN SODIUM 20 MG: 10 TABLET ORAL at 20:12

## 2024-11-12 ASSESSMENT — PAIN SCALES - GENERAL
PAINLEVEL_OUTOF10: 0

## 2024-11-12 NOTE — CARE COORDINATION
Care Management Initial Assessment       RUR: 16%  Readmission? Yes   1st IM letter given? Yes              11/12/24 1538   Service Assessment   Patient Orientation Alert and Oriented   Cognition Alert   History Provided By Medical Record  (multiple attempts to complete assessment with patient at bedside, each attempt there was medical staff at bedside)   Primary Caregiver Self   Accompanied By/Relationship n/a   Support Systems Spouse/Significant Other   Patient's Healthcare Decision Maker is: Named in Scanned ACP Document   PCP Verified by CM Yes   Last Visit to PCP Within last 3 months   Prior Functional Level Independent in ADLs/IADLs   Current Functional Level Independent in ADLs/IADLs   Can patient return to prior living arrangement Yes   Family able to assist with home care needs: Yes   Would you like for me to discuss the discharge plan with any other family members/significant others, and if so, who? No   Financial Resources Medicare   Social/Functional History   Lives With Significant other   Type of Home House   Bathroom Equipment Grab bars in shower   Active  Yes   Discharge Planning   Patient expects to be discharged to: House   Services At/After Discharge   Mode of Transport at Discharge Other (see comment)  (s/o)   Confirm Follow Up Transport Family   Condition of Participation: Discharge Planning   The Plan for Transition of Care is related to the following treatment goals: home       Readmission Assessment  Number of Days since last admission?: 8-30 days  Previous Disposition: Home with Family  Who is being Interviewed: Unable to Complete  What was the patient's/caregiver's perception as to why they think they needed to return back to the hospital?: Other (Comment) (fall)  Did you visit your Primary Care Physician after you left the hospital, before you returned this time?: Yes  Did you see a specialist, such as Cardiac, Pulmonary, Orthopedic Physician, etc. after you left the hospital?:

## 2024-11-13 LAB
ACB COMPLEX DNA BLD POS QL NAA+NON-PROBE: NOT DETECTED
ACCESSION NUMBER, LLC1M: ABNORMAL
ALBUMIN SERPL-MCNC: 2.6 G/DL (ref 3.5–5)
ALBUMIN/GLOB SERPL: 0.6 (ref 1.1–2.2)
ALP SERPL-CCNC: 73 U/L (ref 45–117)
ALT SERPL-CCNC: 14 U/L (ref 12–78)
ANION GAP SERPL CALC-SCNC: 6 MMOL/L (ref 2–12)
AST SERPL-CCNC: 11 U/L (ref 15–37)
B FRAGILIS DNA BLD POS QL NAA+NON-PROBE: NOT DETECTED
BASOPHILS # BLD: 0 K/UL (ref 0–0.1)
BASOPHILS NFR BLD: 0 % (ref 0–1)
BILIRUB SERPL-MCNC: 0.6 MG/DL (ref 0.2–1)
BIOFIRE TEST COMMENT: ABNORMAL
BUN SERPL-MCNC: 12 MG/DL (ref 6–20)
BUN/CREAT SERPL: 19 (ref 12–20)
C ALBICANS DNA BLD POS QL NAA+NON-PROBE: NOT DETECTED
C AURIS DNA BLD POS QL NAA+NON-PROBE: NOT DETECTED
C GATTII+NEOFOR DNA BLD POS QL NAA+N-PRB: NOT DETECTED
C GLABRATA DNA BLD POS QL NAA+NON-PROBE: NOT DETECTED
C KRUSEI DNA BLD POS QL NAA+NON-PROBE: NOT DETECTED
C PARAP DNA BLD POS QL NAA+NON-PROBE: NOT DETECTED
C TROPICLS DNA BLD POS QL NAA+NON-PROBE: NOT DETECTED
CALCIUM SERPL-MCNC: 8.7 MG/DL (ref 8.5–10.1)
CHLORIDE SERPL-SCNC: 115 MMOL/L (ref 97–108)
CO2 SERPL-SCNC: 21 MMOL/L (ref 21–32)
CREAT SERPL-MCNC: 0.62 MG/DL (ref 0.55–1.02)
DIFFERENTIAL METHOD BLD: ABNORMAL
E CLOAC COMP DNA BLD POS NAA+NON-PROBE: NOT DETECTED
E COLI DNA BLD POS QL NAA+NON-PROBE: NOT DETECTED
E FAECALIS DNA BLD POS QL NAA+NON-PROBE: DETECTED
E FAECIUM DNA BLD POS QL NAA+NON-PROBE: NOT DETECTED
ENTEROBACTERALES DNA BLD POS NAA+N-PRB: NOT DETECTED
EOSINOPHIL # BLD: 0.4 K/UL (ref 0–0.4)
EOSINOPHIL NFR BLD: 5 % (ref 0–7)
ERYTHROCYTE [DISTWIDTH] IN BLOOD BY AUTOMATED COUNT: 15.2 % (ref 11.5–14.5)
GLOBULIN SER CALC-MCNC: 4.3 G/DL (ref 2–4)
GLUCOSE BLD STRIP.AUTO-MCNC: 119 MG/DL (ref 65–117)
GLUCOSE BLD STRIP.AUTO-MCNC: 127 MG/DL (ref 65–117)
GLUCOSE BLD STRIP.AUTO-MCNC: 203 MG/DL (ref 65–117)
GLUCOSE SERPL-MCNC: 95 MG/DL (ref 65–100)
GP B STREP DNA BLD POS QL NAA+NON-PROBE: NOT DETECTED
HAEM INFLU DNA BLD POS QL NAA+NON-PROBE: NOT DETECTED
HCT VFR BLD AUTO: 30.6 % (ref 35–47)
HGB BLD-MCNC: 10.2 G/DL (ref 11.5–16)
IMM GRANULOCYTES # BLD AUTO: 0 K/UL (ref 0–0.04)
IMM GRANULOCYTES NFR BLD AUTO: 0 % (ref 0–0.5)
K OXYTOCA DNA BLD POS QL NAA+NON-PROBE: NOT DETECTED
KLEBSIELLA SP DNA BLD POS QL NAA+NON-PRB: NOT DETECTED
KLEBSIELLA SP DNA BLD POS QL NAA+NON-PRB: NOT DETECTED
L MONOCYTOG DNA BLD POS QL NAA+NON-PROBE: NOT DETECTED
LYMPHOCYTES # BLD: 1.5 K/UL (ref 0.8–3.5)
LYMPHOCYTES NFR BLD: 21 % (ref 12–49)
MCH RBC QN AUTO: 30.3 PG (ref 26–34)
MCHC RBC AUTO-ENTMCNC: 33.3 G/DL (ref 30–36.5)
MCV RBC AUTO: 90.8 FL (ref 80–99)
MONOCYTES # BLD: 0.4 K/UL (ref 0–1)
MONOCYTES NFR BLD: 6 % (ref 5–13)
N MEN DNA BLD POS QL NAA+NON-PROBE: NOT DETECTED
NEUTS SEG # BLD: 4.8 K/UL (ref 1.8–8)
NEUTS SEG NFR BLD: 68 % (ref 32–75)
NRBC # BLD: 0 K/UL (ref 0–0.01)
NRBC BLD-RTO: 0 PER 100 WBC
P AERUGINOSA DNA BLD POS NAA+NON-PROBE: NOT DETECTED
PLATELET # BLD AUTO: 105 K/UL (ref 150–400)
PMV BLD AUTO: 11.8 FL (ref 8.9–12.9)
POTASSIUM SERPL-SCNC: 3.8 MMOL/L (ref 3.5–5.1)
PROT SERPL-MCNC: 6.9 G/DL (ref 6.4–8.2)
PROTEUS SP DNA BLD POS QL NAA+NON-PROBE: NOT DETECTED
RBC # BLD AUTO: 3.37 M/UL (ref 3.8–5.2)
RBC MORPH BLD: ABNORMAL
RESISTANT GENE TARGETS: ABNORMAL
S AUREUS DNA BLD POS QL NAA+NON-PROBE: NOT DETECTED
S AUREUS+CONS DNA BLD POS NAA+NON-PROBE: NOT DETECTED
S EPIDERMIDIS DNA BLD POS QL NAA+NON-PRB: NOT DETECTED
S LUGDUNENSIS DNA BLD POS QL NAA+NON-PRB: NOT DETECTED
S MALTOPHILIA DNA BLD POS QL NAA+NON-PRB: NOT DETECTED
S MARCESCENS DNA BLD POS NAA+NON-PROBE: NOT DETECTED
S PNEUM DNA BLD POS QL NAA+NON-PROBE: NOT DETECTED
S PYO DNA BLD POS QL NAA+NON-PROBE: NOT DETECTED
SALMONELLA DNA BLD POS QL NAA+NON-PROBE: NOT DETECTED
SERVICE CMNT-IMP: ABNORMAL
SODIUM SERPL-SCNC: 142 MMOL/L (ref 136–145)
STREPTOCOCCUS DNA BLD POS NAA+NON-PROBE: NOT DETECTED
VANA+VANB ISLT/SPM QL: NOT DETECTED
VANCOMYCIN SERPL-MCNC: 11.1 UG/ML
WBC # BLD AUTO: 7.1 K/UL (ref 3.6–11)

## 2024-11-13 PROCEDURE — 97530 THERAPEUTIC ACTIVITIES: CPT

## 2024-11-13 PROCEDURE — 6370000000 HC RX 637 (ALT 250 FOR IP): Performed by: INTERNAL MEDICINE

## 2024-11-13 PROCEDURE — 2580000003 HC RX 258: Performed by: INTERNAL MEDICINE

## 2024-11-13 PROCEDURE — 6360000002 HC RX W HCPCS: Performed by: INTERNAL MEDICINE

## 2024-11-13 PROCEDURE — 6370000000 HC RX 637 (ALT 250 FOR IP): Performed by: STUDENT IN AN ORGANIZED HEALTH CARE EDUCATION/TRAINING PROGRAM

## 2024-11-13 PROCEDURE — 97116 GAIT TRAINING THERAPY: CPT

## 2024-11-13 PROCEDURE — 85025 COMPLETE CBC W/AUTO DIFF WBC: CPT

## 2024-11-13 PROCEDURE — 80202 ASSAY OF VANCOMYCIN: CPT

## 2024-11-13 PROCEDURE — 80053 COMPREHEN METABOLIC PANEL: CPT

## 2024-11-13 PROCEDURE — 36415 COLL VENOUS BLD VENIPUNCTURE: CPT

## 2024-11-13 PROCEDURE — 87040 BLOOD CULTURE FOR BACTERIA: CPT

## 2024-11-13 PROCEDURE — 2060000000 HC ICU INTERMEDIATE R&B

## 2024-11-13 PROCEDURE — 82962 GLUCOSE BLOOD TEST: CPT

## 2024-11-13 RX ADMIN — VANCOMYCIN 1000 MG: 1 INJECTION, SOLUTION INTRAVENOUS at 10:29

## 2024-11-13 RX ADMIN — RIVAROXABAN 20 MG: 20 TABLET, FILM COATED ORAL at 17:29

## 2024-11-13 RX ADMIN — VANCOMYCIN 1000 MG: 1 INJECTION, SOLUTION INTRAVENOUS at 17:28

## 2024-11-13 RX ADMIN — CARVEDILOL 6.25 MG: 6.25 TABLET, FILM COATED ORAL at 07:47

## 2024-11-13 RX ADMIN — CEFEPIME 2000 MG: 2 INJECTION, POWDER, FOR SOLUTION INTRAVENOUS at 20:59

## 2024-11-13 RX ADMIN — CEFEPIME 2000 MG: 2 INJECTION, POWDER, FOR SOLUTION INTRAVENOUS at 05:37

## 2024-11-13 RX ADMIN — INSULIN GLARGINE 30 UNITS: 100 INJECTION, SOLUTION SUBCUTANEOUS at 20:54

## 2024-11-13 RX ADMIN — PRAVASTATIN SODIUM 20 MG: 10 TABLET ORAL at 20:54

## 2024-11-13 RX ADMIN — PANTOPRAZOLE SODIUM 40 MG: 40 TABLET, DELAYED RELEASE ORAL at 05:30

## 2024-11-13 RX ADMIN — CARVEDILOL 6.25 MG: 6.25 TABLET, FILM COATED ORAL at 17:29

## 2024-11-13 RX ADMIN — GABAPENTIN 300 MG: 300 CAPSULE ORAL at 07:47

## 2024-11-13 RX ADMIN — GABAPENTIN 300 MG: 300 CAPSULE ORAL at 20:54

## 2024-11-13 RX ADMIN — INSULIN LISPRO 2 UNITS: 100 INJECTION, SOLUTION INTRAVENOUS; SUBCUTANEOUS at 20:53

## 2024-11-13 RX ADMIN — ONDANSETRON 4 MG: 4 TABLET, ORALLY DISINTEGRATING ORAL at 05:30

## 2024-11-13 RX ADMIN — CEFEPIME 2000 MG: 2 INJECTION, POWDER, FOR SOLUTION INTRAVENOUS at 15:54

## 2024-11-13 ASSESSMENT — PAIN SCALES - GENERAL
PAINLEVEL_OUTOF10: 0
PAINLEVEL_OUTOF10: 0

## 2024-11-14 ENCOUNTER — APPOINTMENT (OUTPATIENT)
Facility: HOSPITAL | Age: 69
DRG: 260 | End: 2024-11-14
Attending: INTERNAL MEDICINE
Payer: MEDICARE

## 2024-11-14 LAB
ALBUMIN SERPL-MCNC: 2.8 G/DL (ref 3.5–5)
ALBUMIN/GLOB SERPL: 0.6 (ref 1.1–2.2)
ALP SERPL-CCNC: 81 U/L (ref 45–117)
ALT SERPL-CCNC: 14 U/L (ref 12–78)
ANION GAP SERPL CALC-SCNC: 5 MMOL/L (ref 2–12)
AST SERPL-CCNC: 10 U/L (ref 15–37)
BACTERIA SPEC CULT: ABNORMAL
BASOPHILS # BLD: 0 K/UL (ref 0–0.1)
BASOPHILS NFR BLD: 1 % (ref 0–1)
BILIRUB SERPL-MCNC: 0.5 MG/DL (ref 0.2–1)
BUN SERPL-MCNC: 10 MG/DL (ref 6–20)
BUN/CREAT SERPL: 15 (ref 12–20)
CALCIUM SERPL-MCNC: 9.2 MG/DL (ref 8.5–10.1)
CHLORIDE SERPL-SCNC: 112 MMOL/L (ref 97–108)
CO2 SERPL-SCNC: 24 MMOL/L (ref 21–32)
CREAT SERPL-MCNC: 0.66 MG/DL (ref 0.55–1.02)
DIFFERENTIAL METHOD BLD: ABNORMAL
EOSINOPHIL # BLD: 0.2 K/UL (ref 0–0.4)
EOSINOPHIL NFR BLD: 3 % (ref 0–7)
ERYTHROCYTE [DISTWIDTH] IN BLOOD BY AUTOMATED COUNT: 14.9 % (ref 11.5–14.5)
GLOBULIN SER CALC-MCNC: 4.4 G/DL (ref 2–4)
GLUCOSE BLD STRIP.AUTO-MCNC: 120 MG/DL (ref 65–117)
GLUCOSE BLD STRIP.AUTO-MCNC: 137 MG/DL (ref 65–117)
GLUCOSE SERPL-MCNC: 130 MG/DL (ref 65–100)
HCT VFR BLD AUTO: 33.2 % (ref 35–47)
HGB BLD-MCNC: 10.8 G/DL (ref 11.5–16)
IMM GRANULOCYTES # BLD AUTO: 0 K/UL (ref 0–0.04)
IMM GRANULOCYTES NFR BLD AUTO: 0 % (ref 0–0.5)
LYMPHOCYTES # BLD: 2.1 K/UL (ref 0.8–3.5)
LYMPHOCYTES NFR BLD: 27 % (ref 12–49)
MCH RBC QN AUTO: 30 PG (ref 26–34)
MCHC RBC AUTO-ENTMCNC: 32.5 G/DL (ref 30–36.5)
MCV RBC AUTO: 92.2 FL (ref 80–99)
MONOCYTES # BLD: 0.5 K/UL (ref 0–1)
MONOCYTES NFR BLD: 6 % (ref 5–13)
NEUTS SEG # BLD: 4.8 K/UL (ref 1.8–8)
NEUTS SEG NFR BLD: 63 % (ref 32–75)
NRBC # BLD: 0 K/UL (ref 0–0.01)
NRBC BLD-RTO: 0 PER 100 WBC
PLATELET # BLD AUTO: 101 K/UL (ref 150–400)
PMV BLD AUTO: 13 FL (ref 8.9–12.9)
POTASSIUM SERPL-SCNC: 3.7 MMOL/L (ref 3.5–5.1)
PROT SERPL-MCNC: 7.2 G/DL (ref 6.4–8.2)
RBC # BLD AUTO: 3.6 M/UL (ref 3.8–5.2)
SERVICE CMNT-IMP: ABNORMAL
SODIUM SERPL-SCNC: 141 MMOL/L (ref 136–145)
WBC # BLD AUTO: 7.7 K/UL (ref 3.6–11)

## 2024-11-14 PROCEDURE — 6360000002 HC RX W HCPCS: Performed by: INTERNAL MEDICINE

## 2024-11-14 PROCEDURE — 6370000000 HC RX 637 (ALT 250 FOR IP): Performed by: STUDENT IN AN ORGANIZED HEALTH CARE EDUCATION/TRAINING PROGRAM

## 2024-11-14 PROCEDURE — 99152 MOD SED SAME PHYS/QHP 5/>YRS: CPT

## 2024-11-14 PROCEDURE — 97116 GAIT TRAINING THERAPY: CPT

## 2024-11-14 PROCEDURE — 82962 GLUCOSE BLOOD TEST: CPT

## 2024-11-14 PROCEDURE — 80053 COMPREHEN METABOLIC PANEL: CPT

## 2024-11-14 PROCEDURE — 85025 COMPLETE CBC W/AUTO DIFF WBC: CPT

## 2024-11-14 PROCEDURE — 36415 COLL VENOUS BLD VENIPUNCTURE: CPT

## 2024-11-14 PROCEDURE — 2580000003 HC RX 258: Performed by: INTERNAL MEDICINE

## 2024-11-14 PROCEDURE — 93312 ECHO TRANSESOPHAGEAL: CPT

## 2024-11-14 PROCEDURE — 6370000000 HC RX 637 (ALT 250 FOR IP): Performed by: INTERNAL MEDICINE

## 2024-11-14 PROCEDURE — 2060000000 HC ICU INTERMEDIATE R&B

## 2024-11-14 RX ORDER — FENTANYL CITRATE 50 UG/ML
INJECTION, SOLUTION INTRAMUSCULAR; INTRAVENOUS PRN
Status: COMPLETED | OUTPATIENT
Start: 2024-11-14 | End: 2024-11-14

## 2024-11-14 RX ORDER — LIDOCAINE HYDROCHLORIDE 20 MG/ML
SOLUTION OROPHARYNGEAL PRN
Status: COMPLETED | OUTPATIENT
Start: 2024-11-14 | End: 2024-11-14

## 2024-11-14 RX ORDER — MIDAZOLAM HYDROCHLORIDE 1 MG/ML
INJECTION, SOLUTION INTRAMUSCULAR; INTRAVENOUS PRN
Status: COMPLETED | OUTPATIENT
Start: 2024-11-14 | End: 2024-11-14

## 2024-11-14 RX ADMIN — ONDANSETRON 4 MG: 4 TABLET, ORALLY DISINTEGRATING ORAL at 10:06

## 2024-11-14 RX ADMIN — MIDAZOLAM HYDROCHLORIDE 1 MG: 1 INJECTION, SOLUTION INTRAMUSCULAR; INTRAVENOUS at 15:46

## 2024-11-14 RX ADMIN — PANTOPRAZOLE SODIUM 40 MG: 40 TABLET, DELAYED RELEASE ORAL at 05:59

## 2024-11-14 RX ADMIN — CEFEPIME 2000 MG: 2 INJECTION, POWDER, FOR SOLUTION INTRAVENOUS at 16:56

## 2024-11-14 RX ADMIN — FENTANYL CITRATE 25 MCG: 50 INJECTION, SOLUTION INTRAMUSCULAR; INTRAVENOUS at 15:45

## 2024-11-14 RX ADMIN — CARVEDILOL 6.25 MG: 6.25 TABLET, FILM COATED ORAL at 17:59

## 2024-11-14 RX ADMIN — VANCOMYCIN 1000 MG: 1 INJECTION, SOLUTION INTRAVENOUS at 05:58

## 2024-11-14 RX ADMIN — GABAPENTIN 300 MG: 300 CAPSULE ORAL at 22:01

## 2024-11-14 RX ADMIN — BENZOCAINE, BUTAMBEN, AND TETRACAINE HYDROCHLORIDE 2 SPRAY: .028; .004; .004 AEROSOL, SPRAY TOPICAL at 15:41

## 2024-11-14 RX ADMIN — GABAPENTIN 300 MG: 300 CAPSULE ORAL at 09:26

## 2024-11-14 RX ADMIN — RIVAROXABAN 20 MG: 20 TABLET, FILM COATED ORAL at 17:59

## 2024-11-14 RX ADMIN — CARVEDILOL 6.25 MG: 6.25 TABLET, FILM COATED ORAL at 09:26

## 2024-11-14 RX ADMIN — LIDOCAINE HYDROCHLORIDE 10 ML: 20 SOLUTION ORAL at 15:41

## 2024-11-14 RX ADMIN — CEFEPIME 2000 MG: 2 INJECTION, POWDER, FOR SOLUTION INTRAVENOUS at 22:00

## 2024-11-14 RX ADMIN — PRAVASTATIN SODIUM 20 MG: 10 TABLET ORAL at 22:01

## 2024-11-14 RX ADMIN — INSULIN GLARGINE 30 UNITS: 100 INJECTION, SOLUTION SUBCUTANEOUS at 22:01

## 2024-11-14 RX ADMIN — CEFEPIME 2000 MG: 2 INJECTION, POWDER, FOR SOLUTION INTRAVENOUS at 07:06

## 2024-11-14 ASSESSMENT — PAIN SCALES - GENERAL
PAINLEVEL_OUTOF10: 0

## 2024-11-14 NOTE — CARE COORDINATION
Transition of Care Plan:    RUR: 16%  Prior Level of Functioning: independent   Disposition: home   ALEKS: 11/15  Follow up appointments: PCP, speciality   DME needed: none identified at this time   Transportation at discharge: family   IM/IMM Medicare/ letter given: to be provided   Caregiver Contact: gerson Manriquez [phone]  Discharge Caregiver contacted prior to discharge? To be contacted  Care Conference needed? Not at this time  Barriers to discharge: medical stability       CM made room visit with patient to offer HH services as recommended by therapy. Patient politely declined HH, reporting she does not need it and knows how to ask for it in the community if she changes her mind. CM will continue to follow to assist with any potential d/c needs.       Jelly Degroot, SAVITA, CM  [id number]

## 2024-11-15 LAB
BACTERIA SPEC CULT: ABNORMAL
BACTERIA SPEC CULT: ABNORMAL
GLUCOSE BLD STRIP.AUTO-MCNC: 122 MG/DL (ref 65–117)
GLUCOSE BLD STRIP.AUTO-MCNC: 175 MG/DL (ref 65–117)
GLUCOSE BLD STRIP.AUTO-MCNC: 240 MG/DL (ref 65–117)
SERVICE CMNT-IMP: ABNORMAL
VANCOMYCIN SERPL-MCNC: 8 UG/ML

## 2024-11-15 PROCEDURE — 36415 COLL VENOUS BLD VENIPUNCTURE: CPT

## 2024-11-15 PROCEDURE — 6360000002 HC RX W HCPCS: Performed by: INTERNAL MEDICINE

## 2024-11-15 PROCEDURE — 82962 GLUCOSE BLOOD TEST: CPT

## 2024-11-15 PROCEDURE — 99223 1ST HOSP IP/OBS HIGH 75: CPT | Performed by: INTERNAL MEDICINE

## 2024-11-15 PROCEDURE — 2060000000 HC ICU INTERMEDIATE R&B

## 2024-11-15 PROCEDURE — 2580000003 HC RX 258: Performed by: STUDENT IN AN ORGANIZED HEALTH CARE EDUCATION/TRAINING PROGRAM

## 2024-11-15 PROCEDURE — 6370000000 HC RX 637 (ALT 250 FOR IP): Performed by: INTERNAL MEDICINE

## 2024-11-15 PROCEDURE — 97116 GAIT TRAINING THERAPY: CPT | Performed by: PHYSICAL THERAPIST

## 2024-11-15 PROCEDURE — 80202 ASSAY OF VANCOMYCIN: CPT

## 2024-11-15 PROCEDURE — 6370000000 HC RX 637 (ALT 250 FOR IP): Performed by: STUDENT IN AN ORGANIZED HEALTH CARE EDUCATION/TRAINING PROGRAM

## 2024-11-15 PROCEDURE — 2580000003 HC RX 258: Performed by: INTERNAL MEDICINE

## 2024-11-15 PROCEDURE — 6360000002 HC RX W HCPCS: Performed by: STUDENT IN AN ORGANIZED HEALTH CARE EDUCATION/TRAINING PROGRAM

## 2024-11-15 RX ORDER — LACTOBACILLUS RHAMNOSUS GG 10B CELL
1 CAPSULE ORAL
Status: DISCONTINUED | OUTPATIENT
Start: 2024-11-16 | End: 2024-11-21 | Stop reason: HOSPADM

## 2024-11-15 RX ADMIN — PRAVASTATIN SODIUM 20 MG: 10 TABLET ORAL at 20:44

## 2024-11-15 RX ADMIN — INSULIN LISPRO 2 UNITS: 100 INJECTION, SOLUTION INTRAVENOUS; SUBCUTANEOUS at 21:36

## 2024-11-15 RX ADMIN — INSULIN GLARGINE 30 UNITS: 100 INJECTION, SOLUTION SUBCUTANEOUS at 21:36

## 2024-11-15 RX ADMIN — RIVAROXABAN 20 MG: 20 TABLET, FILM COATED ORAL at 17:14

## 2024-11-15 RX ADMIN — CARVEDILOL 6.25 MG: 6.25 TABLET, FILM COATED ORAL at 17:14

## 2024-11-15 RX ADMIN — GENTAMICIN SULFATE 50 MG: 40 INJECTION, SOLUTION INTRAMUSCULAR; INTRAVENOUS at 12:42

## 2024-11-15 RX ADMIN — CEFEPIME 2000 MG: 2 INJECTION, POWDER, FOR SOLUTION INTRAVENOUS at 06:32

## 2024-11-15 RX ADMIN — CARVEDILOL 6.25 MG: 6.25 TABLET, FILM COATED ORAL at 08:40

## 2024-11-15 RX ADMIN — VANCOMYCIN 1000 MG: 1 INJECTION, SOLUTION INTRAVENOUS at 06:31

## 2024-11-15 RX ADMIN — PANTOPRAZOLE SODIUM 40 MG: 40 TABLET, DELAYED RELEASE ORAL at 06:32

## 2024-11-15 RX ADMIN — GABAPENTIN 300 MG: 300 CAPSULE ORAL at 20:44

## 2024-11-15 RX ADMIN — VANCOMYCIN 1000 MG: 1 INJECTION, SOLUTION INTRAVENOUS at 17:16

## 2024-11-15 RX ADMIN — GABAPENTIN 300 MG: 300 CAPSULE ORAL at 08:40

## 2024-11-15 ASSESSMENT — PAIN SCALES - GENERAL
PAINLEVEL_OUTOF10: 0
PAINLEVEL_OUTOF10: 0

## 2024-11-16 LAB
DATE LAST DOSE: ABNORMAL
DOSE AMOUNT: ABNORMAL UNITS
DOSE DATE/TIME: ABNORMAL
ECHO BSA: 1.68 M2
GENTAMICIN TROUGH SERPL-MCNC: <0.2 UG/ML (ref 1–2)
GLUCOSE BLD STRIP.AUTO-MCNC: 109 MG/DL (ref 65–117)
GLUCOSE BLD STRIP.AUTO-MCNC: 149 MG/DL (ref 65–117)
GLUCOSE BLD STRIP.AUTO-MCNC: 171 MG/DL (ref 65–117)
SERVICE CMNT-IMP: ABNORMAL
SERVICE CMNT-IMP: ABNORMAL
SERVICE CMNT-IMP: NORMAL

## 2024-11-16 PROCEDURE — 2580000003 HC RX 258: Performed by: STUDENT IN AN ORGANIZED HEALTH CARE EDUCATION/TRAINING PROGRAM

## 2024-11-16 PROCEDURE — 6370000000 HC RX 637 (ALT 250 FOR IP): Performed by: STUDENT IN AN ORGANIZED HEALTH CARE EDUCATION/TRAINING PROGRAM

## 2024-11-16 PROCEDURE — 6360000002 HC RX W HCPCS: Performed by: INTERNAL MEDICINE

## 2024-11-16 PROCEDURE — 6370000000 HC RX 637 (ALT 250 FOR IP): Performed by: INTERNAL MEDICINE

## 2024-11-16 PROCEDURE — 2060000000 HC ICU INTERMEDIATE R&B

## 2024-11-16 PROCEDURE — 80170 ASSAY OF GENTAMICIN: CPT

## 2024-11-16 PROCEDURE — 82962 GLUCOSE BLOOD TEST: CPT

## 2024-11-16 PROCEDURE — 36415 COLL VENOUS BLD VENIPUNCTURE: CPT

## 2024-11-16 PROCEDURE — 6360000002 HC RX W HCPCS: Performed by: STUDENT IN AN ORGANIZED HEALTH CARE EDUCATION/TRAINING PROGRAM

## 2024-11-16 RX ORDER — LIDOCAINE HYDROCHLORIDE 10 MG/ML
50 INJECTION, SOLUTION EPIDURAL; INFILTRATION; INTRACAUDAL; PERINEURAL ONCE
Status: DISCONTINUED | OUTPATIENT
Start: 2024-11-16 | End: 2024-11-16

## 2024-11-16 RX ORDER — SODIUM CHLORIDE 0.9 % (FLUSH) 0.9 %
5-40 SYRINGE (ML) INJECTION PRN
Status: DISCONTINUED | OUTPATIENT
Start: 2024-11-16 | End: 2024-11-21 | Stop reason: HOSPADM

## 2024-11-16 RX ORDER — SODIUM CHLORIDE 0.9 % (FLUSH) 0.9 %
5-40 SYRINGE (ML) INJECTION EVERY 12 HOURS SCHEDULED
Status: DISCONTINUED | OUTPATIENT
Start: 2024-11-16 | End: 2024-11-21 | Stop reason: HOSPADM

## 2024-11-16 RX ORDER — SODIUM CHLORIDE 9 MG/ML
INJECTION, SOLUTION INTRAVENOUS PRN
Status: DISCONTINUED | OUTPATIENT
Start: 2024-11-16 | End: 2024-11-21 | Stop reason: HOSPADM

## 2024-11-16 RX ADMIN — CARVEDILOL 6.25 MG: 6.25 TABLET, FILM COATED ORAL at 17:14

## 2024-11-16 RX ADMIN — GENTAMICIN SULFATE 50 MG: 40 INJECTION, SOLUTION INTRAMUSCULAR; INTRAVENOUS at 12:08

## 2024-11-16 RX ADMIN — RIVAROXABAN 20 MG: 20 TABLET, FILM COATED ORAL at 17:14

## 2024-11-16 RX ADMIN — SODIUM CHLORIDE, PRESERVATIVE FREE 10 ML: 5 INJECTION INTRAVENOUS at 10:38

## 2024-11-16 RX ADMIN — INSULIN GLARGINE 30 UNITS: 100 INJECTION, SOLUTION SUBCUTANEOUS at 20:36

## 2024-11-16 RX ADMIN — PRAVASTATIN SODIUM 20 MG: 10 TABLET ORAL at 20:36

## 2024-11-16 RX ADMIN — VALSARTAN 40 MG: 80 TABLET, FILM COATED ORAL at 10:38

## 2024-11-16 RX ADMIN — CARVEDILOL 6.25 MG: 6.25 TABLET, FILM COATED ORAL at 09:03

## 2024-11-16 RX ADMIN — SODIUM CHLORIDE, PRESERVATIVE FREE 10 ML: 5 INJECTION INTRAVENOUS at 20:36

## 2024-11-16 RX ADMIN — GABAPENTIN 300 MG: 300 CAPSULE ORAL at 09:03

## 2024-11-16 RX ADMIN — VANCOMYCIN 1000 MG: 1 INJECTION, SOLUTION INTRAVENOUS at 17:15

## 2024-11-16 RX ADMIN — GENTAMICIN SULFATE 50 MG: 40 INJECTION, SOLUTION INTRAMUSCULAR; INTRAVENOUS at 20:42

## 2024-11-16 RX ADMIN — VALSARTAN 40 MG: 80 TABLET, FILM COATED ORAL at 20:36

## 2024-11-16 RX ADMIN — PANTOPRAZOLE SODIUM 40 MG: 40 TABLET, DELAYED RELEASE ORAL at 05:58

## 2024-11-16 RX ADMIN — GABAPENTIN 300 MG: 300 CAPSULE ORAL at 20:36

## 2024-11-16 RX ADMIN — Medication 1 CAPSULE: at 09:03

## 2024-11-16 ASSESSMENT — PAIN SCALES - GENERAL
PAINLEVEL_OUTOF10: 0
PAINLEVEL_OUTOF10: 0

## 2024-11-17 PROBLEM — R78.81 ENTEROCOCCAL BACTEREMIA: Status: ACTIVE | Noted: 2024-11-17

## 2024-11-17 PROBLEM — Z95.810 AICD PRESENT, DOUBLE CHAMBER: Status: ACTIVE | Noted: 2017-11-21

## 2024-11-17 PROBLEM — I33.0 ACUTE BACTERIAL ENDOCARDITIS: Status: ACTIVE | Noted: 2024-11-17

## 2024-11-17 PROBLEM — Z87.898 HISTORY OF DIARRHEA: Status: ACTIVE | Noted: 2024-11-17

## 2024-11-17 PROBLEM — Z88.0 HISTORY OF PENICILLIN ALLERGY: Status: ACTIVE | Noted: 2024-11-17

## 2024-11-17 PROBLEM — I05.9 ENDOCARDITIS OF MITRAL VALVE: Status: ACTIVE | Noted: 2024-11-17

## 2024-11-17 PROBLEM — N17.9 AKI (ACUTE KIDNEY INJURY) (HCC): Status: ACTIVE | Noted: 2024-11-17

## 2024-11-17 PROBLEM — Z87.891 SMOKING HISTORY: Status: ACTIVE | Noted: 2024-11-17

## 2024-11-17 PROBLEM — R78.81 RECURRENT BACTEREMIA: Status: ACTIVE | Noted: 2024-11-17

## 2024-11-17 PROBLEM — I63.9 CEREBROVASCULAR ACCIDENT (CVA) (HCC): Status: ACTIVE | Noted: 2024-11-17

## 2024-11-17 PROBLEM — B95.2 ENTEROCOCCAL BACTEREMIA: Status: ACTIVE | Noted: 2024-11-17

## 2024-11-17 PROBLEM — I42.8 NONISCHEMIC CARDIOMYOPATHY (HCC): Status: ACTIVE | Noted: 2024-11-17

## 2024-11-17 LAB
BACTERIA SPEC CULT: ABNORMAL
GLUCOSE BLD STRIP.AUTO-MCNC: 116 MG/DL (ref 65–117)
GLUCOSE BLD STRIP.AUTO-MCNC: 143 MG/DL (ref 65–117)
GLUCOSE BLD STRIP.AUTO-MCNC: 218 MG/DL (ref 65–117)
SERVICE CMNT-IMP: ABNORMAL
SERVICE CMNT-IMP: NORMAL

## 2024-11-17 PROCEDURE — 2060000000 HC ICU INTERMEDIATE R&B

## 2024-11-17 PROCEDURE — 6360000002 HC RX W HCPCS: Performed by: INTERNAL MEDICINE

## 2024-11-17 PROCEDURE — 6370000000 HC RX 637 (ALT 250 FOR IP): Performed by: INTERNAL MEDICINE

## 2024-11-17 PROCEDURE — 82962 GLUCOSE BLOOD TEST: CPT

## 2024-11-17 PROCEDURE — 6370000000 HC RX 637 (ALT 250 FOR IP): Performed by: STUDENT IN AN ORGANIZED HEALTH CARE EDUCATION/TRAINING PROGRAM

## 2024-11-17 PROCEDURE — 6360000002 HC RX W HCPCS: Performed by: STUDENT IN AN ORGANIZED HEALTH CARE EDUCATION/TRAINING PROGRAM

## 2024-11-17 PROCEDURE — 2580000003 HC RX 258: Performed by: STUDENT IN AN ORGANIZED HEALTH CARE EDUCATION/TRAINING PROGRAM

## 2024-11-17 RX ADMIN — VANCOMYCIN 1000 MG: 1 INJECTION, SOLUTION INTRAVENOUS at 17:39

## 2024-11-17 RX ADMIN — GENTAMICIN SULFATE 50 MG: 40 INJECTION, SOLUTION INTRAMUSCULAR; INTRAVENOUS at 03:20

## 2024-11-17 RX ADMIN — Medication 1 CAPSULE: at 09:02

## 2024-11-17 RX ADMIN — RIVAROXABAN 20 MG: 20 TABLET, FILM COATED ORAL at 17:36

## 2024-11-17 RX ADMIN — SODIUM CHLORIDE, PRESERVATIVE FREE 10 ML: 5 INJECTION INTRAVENOUS at 09:03

## 2024-11-17 RX ADMIN — PRAVASTATIN SODIUM 20 MG: 10 TABLET ORAL at 20:24

## 2024-11-17 RX ADMIN — CARVEDILOL 6.25 MG: 6.25 TABLET, FILM COATED ORAL at 17:36

## 2024-11-17 RX ADMIN — VALSARTAN 40 MG: 80 TABLET, FILM COATED ORAL at 09:02

## 2024-11-17 RX ADMIN — INSULIN GLARGINE 30 UNITS: 100 INJECTION, SOLUTION SUBCUTANEOUS at 20:23

## 2024-11-17 RX ADMIN — SODIUM CHLORIDE, PRESERVATIVE FREE 10 ML: 5 INJECTION INTRAVENOUS at 20:25

## 2024-11-17 RX ADMIN — ACETAMINOPHEN 650 MG: 325 TABLET ORAL at 03:17

## 2024-11-17 RX ADMIN — VANCOMYCIN 1000 MG: 1 INJECTION, SOLUTION INTRAVENOUS at 06:38

## 2024-11-17 RX ADMIN — INSULIN LISPRO 2 UNITS: 100 INJECTION, SOLUTION INTRAVENOUS; SUBCUTANEOUS at 20:23

## 2024-11-17 RX ADMIN — PANTOPRAZOLE SODIUM 40 MG: 40 TABLET, DELAYED RELEASE ORAL at 06:35

## 2024-11-17 RX ADMIN — VALSARTAN 40 MG: 80 TABLET, FILM COATED ORAL at 20:24

## 2024-11-17 RX ADMIN — GENTAMICIN SULFATE 50 MG: 40 INJECTION, SOLUTION INTRAMUSCULAR; INTRAVENOUS at 20:31

## 2024-11-17 RX ADMIN — GABAPENTIN 300 MG: 300 CAPSULE ORAL at 20:24

## 2024-11-17 RX ADMIN — GENTAMICIN SULFATE 50 MG: 40 INJECTION, SOLUTION INTRAMUSCULAR; INTRAVENOUS at 12:24

## 2024-11-17 RX ADMIN — CARVEDILOL 6.25 MG: 6.25 TABLET, FILM COATED ORAL at 09:02

## 2024-11-17 RX ADMIN — GABAPENTIN 300 MG: 300 CAPSULE ORAL at 09:02

## 2024-11-17 ASSESSMENT — PAIN SCALES - GENERAL
PAINLEVEL_OUTOF10: 0
PAINLEVEL_OUTOF10: 7

## 2024-11-17 ASSESSMENT — PAIN DESCRIPTION - LOCATION: LOCATION: FOOT

## 2024-11-17 ASSESSMENT — PAIN DESCRIPTION - ORIENTATION: ORIENTATION: LEFT

## 2024-11-17 ASSESSMENT — PAIN - FUNCTIONAL ASSESSMENT: PAIN_FUNCTIONAL_ASSESSMENT: ACTIVITIES ARE NOT PREVENTED

## 2024-11-18 LAB
ANION GAP SERPL CALC-SCNC: 6 MMOL/L (ref 2–12)
BUN SERPL-MCNC: 10 MG/DL (ref 6–20)
BUN/CREAT SERPL: 14 (ref 12–20)
CALCIUM SERPL-MCNC: 9.3 MG/DL (ref 8.5–10.1)
CHLORIDE SERPL-SCNC: 111 MMOL/L (ref 97–108)
CO2 SERPL-SCNC: 24 MMOL/L (ref 21–32)
CREAT SERPL-MCNC: 0.74 MG/DL (ref 0.55–1.02)
GLUCOSE BLD STRIP.AUTO-MCNC: 121 MG/DL (ref 65–117)
GLUCOSE BLD STRIP.AUTO-MCNC: 195 MG/DL (ref 65–117)
GLUCOSE BLD STRIP.AUTO-MCNC: 89 MG/DL (ref 65–117)
GLUCOSE SERPL-MCNC: 170 MG/DL (ref 65–100)
MAGNESIUM SERPL-MCNC: 1.8 MG/DL (ref 1.6–2.4)
PHOSPHATE SERPL-MCNC: 3.6 MG/DL (ref 2.6–4.7)
POTASSIUM SERPL-SCNC: 3.9 MMOL/L (ref 3.5–5.1)
SERVICE CMNT-IMP: ABNORMAL
SERVICE CMNT-IMP: ABNORMAL
SERVICE CMNT-IMP: NORMAL
SODIUM SERPL-SCNC: 141 MMOL/L (ref 136–145)

## 2024-11-18 PROCEDURE — 99233 SBSQ HOSP IP/OBS HIGH 50: CPT | Performed by: INTERNAL MEDICINE

## 2024-11-18 PROCEDURE — 36415 COLL VENOUS BLD VENIPUNCTURE: CPT

## 2024-11-18 PROCEDURE — 6360000002 HC RX W HCPCS: Performed by: INTERNAL MEDICINE

## 2024-11-18 PROCEDURE — 80048 BASIC METABOLIC PNL TOTAL CA: CPT

## 2024-11-18 PROCEDURE — 2060000000 HC ICU INTERMEDIATE R&B

## 2024-11-18 PROCEDURE — 2580000003 HC RX 258: Performed by: STUDENT IN AN ORGANIZED HEALTH CARE EDUCATION/TRAINING PROGRAM

## 2024-11-18 PROCEDURE — 6370000000 HC RX 637 (ALT 250 FOR IP): Performed by: STUDENT IN AN ORGANIZED HEALTH CARE EDUCATION/TRAINING PROGRAM

## 2024-11-18 PROCEDURE — 82962 GLUCOSE BLOOD TEST: CPT

## 2024-11-18 PROCEDURE — 97116 GAIT TRAINING THERAPY: CPT

## 2024-11-18 PROCEDURE — 84100 ASSAY OF PHOSPHORUS: CPT

## 2024-11-18 PROCEDURE — 6370000000 HC RX 637 (ALT 250 FOR IP): Performed by: INTERNAL MEDICINE

## 2024-11-18 PROCEDURE — 6360000002 HC RX W HCPCS: Performed by: STUDENT IN AN ORGANIZED HEALTH CARE EDUCATION/TRAINING PROGRAM

## 2024-11-18 PROCEDURE — 83735 ASSAY OF MAGNESIUM: CPT

## 2024-11-18 RX ADMIN — GENTAMICIN SULFATE 50 MG: 40 INJECTION, SOLUTION INTRAMUSCULAR; INTRAVENOUS at 12:46

## 2024-11-18 RX ADMIN — GABAPENTIN 300 MG: 300 CAPSULE ORAL at 09:05

## 2024-11-18 RX ADMIN — VANCOMYCIN 1000 MG: 1 INJECTION, SOLUTION INTRAVENOUS at 17:08

## 2024-11-18 RX ADMIN — CARVEDILOL 6.25 MG: 6.25 TABLET, FILM COATED ORAL at 17:02

## 2024-11-18 RX ADMIN — GABAPENTIN 300 MG: 300 CAPSULE ORAL at 20:47

## 2024-11-18 RX ADMIN — GENTAMICIN SULFATE 50 MG: 40 INJECTION, SOLUTION INTRAMUSCULAR; INTRAVENOUS at 20:52

## 2024-11-18 RX ADMIN — PRAVASTATIN SODIUM 20 MG: 10 TABLET ORAL at 20:47

## 2024-11-18 RX ADMIN — GENTAMICIN SULFATE 50 MG: 40 INJECTION, SOLUTION INTRAMUSCULAR; INTRAVENOUS at 03:37

## 2024-11-18 RX ADMIN — CARVEDILOL 6.25 MG: 6.25 TABLET, FILM COATED ORAL at 09:04

## 2024-11-18 RX ADMIN — Medication 1 CAPSULE: at 09:05

## 2024-11-18 RX ADMIN — VALSARTAN 40 MG: 80 TABLET, FILM COATED ORAL at 09:05

## 2024-11-18 RX ADMIN — VALSARTAN 40 MG: 80 TABLET, FILM COATED ORAL at 20:10

## 2024-11-18 RX ADMIN — INSULIN GLARGINE 30 UNITS: 100 INJECTION, SOLUTION SUBCUTANEOUS at 20:49

## 2024-11-18 RX ADMIN — INSULIN LISPRO 2 UNITS: 100 INJECTION, SOLUTION INTRAVENOUS; SUBCUTANEOUS at 17:04

## 2024-11-18 RX ADMIN — VANCOMYCIN 1000 MG: 1 INJECTION, SOLUTION INTRAVENOUS at 06:03

## 2024-11-18 RX ADMIN — PANTOPRAZOLE SODIUM 40 MG: 40 TABLET, DELAYED RELEASE ORAL at 06:02

## 2024-11-18 RX ADMIN — RIVAROXABAN 20 MG: 20 TABLET, FILM COATED ORAL at 17:02

## 2024-11-18 RX ADMIN — SODIUM CHLORIDE, PRESERVATIVE FREE 10 ML: 5 INJECTION INTRAVENOUS at 09:08

## 2024-11-18 RX ADMIN — SODIUM CHLORIDE, PRESERVATIVE FREE 10 ML: 5 INJECTION INTRAVENOUS at 20:49

## 2024-11-18 ASSESSMENT — PAIN SCALES - GENERAL
PAINLEVEL_OUTOF10: 0

## 2024-11-18 NOTE — CARE COORDINATION
Transition of Care Plan:     RUR: 16%  Prior Level of Functioning: independent   Disposition: home (patient declining HH services)  ALEKS: 11/20  Follow up appointments: PCP, speciality   DME needed: none identified at this time   Transportation at discharge: family   IM/IMM Medicare/ letter given: to be provided   Caregiver Contact: gerson Manriquez [phone]  Discharge Caregiver contacted prior to discharge? To be contacted  Care Conference needed? Not at this time  Barriers to discharge: medical stability     Patient discussed during IDRs. New ALEKS 11/20. Pending medical stability.       SAVITA Alcantar, CM  [phone]

## 2024-11-19 ENCOUNTER — ANESTHESIA (OUTPATIENT)
Facility: HOSPITAL | Age: 69
DRG: 260 | End: 2024-11-19
Payer: MEDICARE

## 2024-11-19 ENCOUNTER — ANESTHESIA EVENT (OUTPATIENT)
Facility: HOSPITAL | Age: 69
DRG: 260 | End: 2024-11-19
Payer: MEDICARE

## 2024-11-19 LAB
ABO + RH BLD: NORMAL
BLD PROD TYP BPU: NORMAL
BLOOD BANK DISPENSE STATUS: NORMAL
BLOOD GROUP ANTIBODIES SERPL: NORMAL
BPU ID: NORMAL
CROSSMATCH RESULT: NORMAL
DATE LAST DOSE: ABNORMAL
DOSE AMOUNT: ABNORMAL UNITS
DOSE DATE/TIME: ABNORMAL
ECHO BSA: 1.63 M2
GENTAMICIN TROUGH SERPL-MCNC: 0.4 UG/ML (ref 1–2)
GLUCOSE BLD STRIP.AUTO-MCNC: 120 MG/DL (ref 65–117)
GLUCOSE BLD STRIP.AUTO-MCNC: 123 MG/DL (ref 65–117)
GLUCOSE BLD STRIP.AUTO-MCNC: 138 MG/DL (ref 65–117)
GLUCOSE BLD STRIP.AUTO-MCNC: 149 MG/DL (ref 65–117)
GLUCOSE BLD STRIP.AUTO-MCNC: 218 MG/DL (ref 65–117)
HISTORY CHECK: NORMAL
SERVICE CMNT-IMP: ABNORMAL
SPECIMEN EXP DATE BLD: NORMAL
UNIT DIVISION: 0
VANCOMYCIN SERPL-MCNC: 14.2 UG/ML

## 2024-11-19 PROCEDURE — 2720000010 HC SURG SUPPLY STERILE: Performed by: INTERNAL MEDICINE

## 2024-11-19 PROCEDURE — 2580000003 HC RX 258: Performed by: NURSE ANESTHETIST, CERTIFIED REGISTERED

## 2024-11-19 PROCEDURE — 2580000003 HC RX 258: Performed by: INTERNAL MEDICINE

## 2024-11-19 PROCEDURE — 6360000002 HC RX W HCPCS: Performed by: STUDENT IN AN ORGANIZED HEALTH CARE EDUCATION/TRAINING PROGRAM

## 2024-11-19 PROCEDURE — 87040 BLOOD CULTURE FOR BACTERIA: CPT

## 2024-11-19 PROCEDURE — 86850 RBC ANTIBODY SCREEN: CPT

## 2024-11-19 PROCEDURE — 2500000003 HC RX 250 WO HCPCS: Performed by: NURSE ANESTHETIST, CERTIFIED REGISTERED

## 2024-11-19 PROCEDURE — A4217 STERILE WATER/SALINE, 500 ML: HCPCS | Performed by: INTERNAL MEDICINE

## 2024-11-19 PROCEDURE — 6370000000 HC RX 637 (ALT 250 FOR IP): Performed by: INTERNAL MEDICINE

## 2024-11-19 PROCEDURE — C2629 INTRO/SHEATH, LASER: HCPCS | Performed by: INTERNAL MEDICINE

## 2024-11-19 PROCEDURE — 86900 BLOOD TYPING SEROLOGIC ABO: CPT

## 2024-11-19 PROCEDURE — 6370000000 HC RX 637 (ALT 250 FOR IP): Performed by: STUDENT IN AN ORGANIZED HEALTH CARE EDUCATION/TRAINING PROGRAM

## 2024-11-19 PROCEDURE — 87205 SMEAR GRAM STAIN: CPT

## 2024-11-19 PROCEDURE — 6360000002 HC RX W HCPCS: Performed by: INTERNAL MEDICINE

## 2024-11-19 PROCEDURE — 33244 REMOVE ELCTRD TRANSVENOUSLY: CPT | Performed by: INTERNAL MEDICINE

## 2024-11-19 PROCEDURE — 7100000001 HC PACU RECOVERY - ADDTL 15 MIN: Performed by: INTERNAL MEDICINE

## 2024-11-19 PROCEDURE — 2580000003 HC RX 258: Performed by: STUDENT IN AN ORGANIZED HEALTH CARE EDUCATION/TRAINING PROGRAM

## 2024-11-19 PROCEDURE — C1773 RET DEV, INSERTABLE: HCPCS | Performed by: INTERNAL MEDICINE

## 2024-11-19 PROCEDURE — 86923 COMPATIBILITY TEST ELECTRIC: CPT

## 2024-11-19 PROCEDURE — 6360000002 HC RX W HCPCS: Performed by: NURSE ANESTHETIST, CERTIFIED REGISTERED

## 2024-11-19 PROCEDURE — C1889 IMPLANT/INSERT DEVICE, NOC: HCPCS | Performed by: INTERNAL MEDICINE

## 2024-11-19 PROCEDURE — 82962 GLUCOSE BLOOD TEST: CPT

## 2024-11-19 PROCEDURE — 80202 ASSAY OF VANCOMYCIN: CPT

## 2024-11-19 PROCEDURE — C1894 INTRO/SHEATH, NON-LASER: HCPCS | Performed by: INTERNAL MEDICINE

## 2024-11-19 PROCEDURE — C1893 INTRO/SHEATH, FIXED,NON-PEEL: HCPCS | Performed by: INTERNAL MEDICINE

## 2024-11-19 PROCEDURE — 87070 CULTURE OTHR SPECIMN AEROBIC: CPT

## 2024-11-19 PROCEDURE — 2709999900 HC NON-CHARGEABLE SUPPLY: Performed by: INTERNAL MEDICINE

## 2024-11-19 PROCEDURE — 3700000000 HC ANESTHESIA ATTENDED CARE: Performed by: INTERNAL MEDICINE

## 2024-11-19 PROCEDURE — 86901 BLOOD TYPING SEROLOGIC RH(D): CPT

## 2024-11-19 PROCEDURE — 36415 COLL VENOUS BLD VENIPUNCTURE: CPT

## 2024-11-19 PROCEDURE — 7100000000 HC PACU RECOVERY - FIRST 15 MIN: Performed by: INTERNAL MEDICINE

## 2024-11-19 PROCEDURE — 87186 SC STD MICRODIL/AGAR DIL: CPT

## 2024-11-19 PROCEDURE — C2628 CATHETER, OCCLUSION: HCPCS | Performed by: INTERNAL MEDICINE

## 2024-11-19 PROCEDURE — 3700000001 HC ADD 15 MINUTES (ANESTHESIA): Performed by: INTERNAL MEDICINE

## 2024-11-19 PROCEDURE — 80170 ASSAY OF GENTAMICIN: CPT

## 2024-11-19 PROCEDURE — 2060000000 HC ICU INTERMEDIATE R&B

## 2024-11-19 DEVICE — ENVELOPE CMRM6133 ABSORB LRG MR
Type: IMPLANTABLE DEVICE | Status: FUNCTIONAL
Brand: TYRX™

## 2024-11-19 RX ORDER — TRAMADOL HYDROCHLORIDE 50 MG/1
25 TABLET ORAL EVERY 6 HOURS PRN
Status: DISCONTINUED | OUTPATIENT
Start: 2024-11-19 | End: 2024-11-21 | Stop reason: HOSPADM

## 2024-11-19 RX ORDER — ROCURONIUM BROMIDE 10 MG/ML
INJECTION, SOLUTION INTRAVENOUS
Status: DISCONTINUED | OUTPATIENT
Start: 2024-11-19 | End: 2024-11-19 | Stop reason: SDUPTHER

## 2024-11-19 RX ORDER — SODIUM CHLORIDE 0.9 % (FLUSH) 0.9 %
5-40 SYRINGE (ML) INJECTION EVERY 12 HOURS SCHEDULED
Status: DISCONTINUED | OUTPATIENT
Start: 2024-11-19 | End: 2024-11-19 | Stop reason: HOSPADM

## 2024-11-19 RX ORDER — NALOXONE HYDROCHLORIDE 0.4 MG/ML
INJECTION, SOLUTION INTRAMUSCULAR; INTRAVENOUS; SUBCUTANEOUS PRN
Status: DISCONTINUED | OUTPATIENT
Start: 2024-11-19 | End: 2024-11-19 | Stop reason: HOSPADM

## 2024-11-19 RX ORDER — SODIUM CHLORIDE 9 MG/ML
INJECTION, SOLUTION INTRAVENOUS PRN
Status: COMPLETED | OUTPATIENT
Start: 2024-11-19 | End: 2024-11-19

## 2024-11-19 RX ORDER — ONDANSETRON 2 MG/ML
INJECTION INTRAMUSCULAR; INTRAVENOUS
Status: DISCONTINUED | OUTPATIENT
Start: 2024-11-19 | End: 2024-11-19 | Stop reason: SDUPTHER

## 2024-11-19 RX ORDER — ONDANSETRON 2 MG/ML
4 INJECTION INTRAMUSCULAR; INTRAVENOUS
Status: DISCONTINUED | OUTPATIENT
Start: 2024-11-19 | End: 2024-11-19 | Stop reason: HOSPADM

## 2024-11-19 RX ORDER — SODIUM CHLORIDE 0.9 % (FLUSH) 0.9 %
5-40 SYRINGE (ML) INJECTION PRN
Status: DISCONTINUED | OUTPATIENT
Start: 2024-11-19 | End: 2024-11-19 | Stop reason: HOSPADM

## 2024-11-19 RX ORDER — FENTANYL CITRATE 50 UG/ML
25 INJECTION, SOLUTION INTRAMUSCULAR; INTRAVENOUS EVERY 5 MIN PRN
Status: DISCONTINUED | OUTPATIENT
Start: 2024-11-19 | End: 2024-11-19 | Stop reason: HOSPADM

## 2024-11-19 RX ORDER — DEXTROSE MONOHYDRATE 100 MG/ML
INJECTION, SOLUTION INTRAVENOUS CONTINUOUS PRN
Status: DISCONTINUED | OUTPATIENT
Start: 2024-11-19 | End: 2024-11-19 | Stop reason: HOSPADM

## 2024-11-19 RX ORDER — IPRATROPIUM BROMIDE AND ALBUTEROL SULFATE 2.5; .5 MG/3ML; MG/3ML
1 SOLUTION RESPIRATORY (INHALATION)
Status: DISCONTINUED | OUTPATIENT
Start: 2024-11-19 | End: 2024-11-19 | Stop reason: HOSPADM

## 2024-11-19 RX ORDER — SODIUM CHLORIDE 9 MG/ML
INJECTION, SOLUTION INTRAVENOUS PRN
Status: DISCONTINUED | OUTPATIENT
Start: 2024-11-19 | End: 2024-11-19 | Stop reason: HOSPADM

## 2024-11-19 RX ORDER — SODIUM CHLORIDE 9 MG/ML
INJECTION, SOLUTION INTRAVENOUS
Status: DISCONTINUED | OUTPATIENT
Start: 2024-11-19 | End: 2024-11-19 | Stop reason: SDUPTHER

## 2024-11-19 RX ORDER — HYDROMORPHONE HYDROCHLORIDE 1 MG/ML
0.5 INJECTION, SOLUTION INTRAMUSCULAR; INTRAVENOUS; SUBCUTANEOUS EVERY 5 MIN PRN
Status: DISCONTINUED | OUTPATIENT
Start: 2024-11-19 | End: 2024-11-19 | Stop reason: HOSPADM

## 2024-11-19 RX ORDER — FENTANYL CITRATE 50 UG/ML
INJECTION, SOLUTION INTRAMUSCULAR; INTRAVENOUS
Status: DISCONTINUED | OUTPATIENT
Start: 2024-11-19 | End: 2024-11-19 | Stop reason: SDUPTHER

## 2024-11-19 RX ORDER — MORPHINE SULFATE 2 MG/ML
1 INJECTION, SOLUTION INTRAMUSCULAR; INTRAVENOUS EVERY 4 HOURS PRN
Status: DISCONTINUED | OUTPATIENT
Start: 2024-11-19 | End: 2024-11-21 | Stop reason: HOSPADM

## 2024-11-19 RX ORDER — LABETALOL HYDROCHLORIDE 5 MG/ML
INJECTION, SOLUTION INTRAVENOUS
Status: DISCONTINUED | OUTPATIENT
Start: 2024-11-19 | End: 2024-11-19 | Stop reason: SDUPTHER

## 2024-11-19 RX ORDER — PROCHLORPERAZINE EDISYLATE 5 MG/ML
5 INJECTION INTRAMUSCULAR; INTRAVENOUS
Status: DISCONTINUED | OUTPATIENT
Start: 2024-11-19 | End: 2024-11-19 | Stop reason: HOSPADM

## 2024-11-19 RX ORDER — GLUCAGON 1 MG/ML
1 KIT INJECTION PRN
Status: DISCONTINUED | OUTPATIENT
Start: 2024-11-19 | End: 2024-11-19 | Stop reason: HOSPADM

## 2024-11-19 RX ADMIN — Medication 1 CAPSULE: at 08:31

## 2024-11-19 RX ADMIN — FENTANYL CITRATE 50 MCG: 50 INJECTION, SOLUTION INTRAMUSCULAR; INTRAVENOUS at 11:35

## 2024-11-19 RX ADMIN — ROCURONIUM BROMIDE 30 MG: 10 INJECTION INTRAVENOUS at 13:13

## 2024-11-19 RX ADMIN — INSULIN LISPRO 2 UNITS: 100 INJECTION, SOLUTION INTRAVENOUS; SUBCUTANEOUS at 21:37

## 2024-11-19 RX ADMIN — PHENYLEPHRINE HYDROCHLORIDE 20 MCG/MIN: 10 INJECTION INTRAVENOUS at 12:13

## 2024-11-19 RX ADMIN — VANCOMYCIN 1000 MG: 1 INJECTION, SOLUTION INTRAVENOUS at 18:30

## 2024-11-19 RX ADMIN — SODIUM CHLORIDE: 9 INJECTION, SOLUTION INTRAVENOUS at 11:42

## 2024-11-19 RX ADMIN — SUGAMMADEX 200 MG: 100 INJECTION, SOLUTION INTRAVENOUS at 13:41

## 2024-11-19 RX ADMIN — PHENYLEPHRINE HYDROCHLORIDE 40 MCG: 10 INJECTION INTRAVENOUS at 12:26

## 2024-11-19 RX ADMIN — ONDANSETRON 4 MG: 2 INJECTION INTRAMUSCULAR; INTRAVENOUS at 16:29

## 2024-11-19 RX ADMIN — FENTANYL CITRATE 50 MCG: 50 INJECTION, SOLUTION INTRAMUSCULAR; INTRAVENOUS at 12:49

## 2024-11-19 RX ADMIN — INSULIN GLARGINE 30 UNITS: 100 INJECTION, SOLUTION SUBCUTANEOUS at 21:36

## 2024-11-19 RX ADMIN — NITROGLYCERIN 2 ML: 200 INJECTION, SOLUTION INTRAVENOUS at 13:42

## 2024-11-19 RX ADMIN — NITROGLYCERIN 2 ML: 200 INJECTION, SOLUTION INTRAVENOUS at 13:44

## 2024-11-19 RX ADMIN — ONDANSETRON HYDROCHLORIDE 4 MG: 2 INJECTION, SOLUTION INTRAMUSCULAR; INTRAVENOUS at 12:52

## 2024-11-19 RX ADMIN — VANCOMYCIN 1000 MG: 1 INJECTION, SOLUTION INTRAVENOUS at 05:21

## 2024-11-19 RX ADMIN — PRAVASTATIN SODIUM 20 MG: 10 TABLET ORAL at 21:36

## 2024-11-19 RX ADMIN — VALSARTAN 40 MG: 80 TABLET, FILM COATED ORAL at 21:36

## 2024-11-19 RX ADMIN — PROPOFOL 25 MG: 10 INJECTION, EMULSION INTRAVENOUS at 12:49

## 2024-11-19 RX ADMIN — VANCOMYCIN 1000 MG: 1 INJECTION, SOLUTION INTRAVENOUS at 11:57

## 2024-11-19 RX ADMIN — ROCURONIUM BROMIDE 50 MG: 10 INJECTION INTRAVENOUS at 11:54

## 2024-11-19 RX ADMIN — GABAPENTIN 300 MG: 300 CAPSULE ORAL at 08:30

## 2024-11-19 RX ADMIN — PANTOPRAZOLE SODIUM 40 MG: 40 TABLET, DELAYED RELEASE ORAL at 05:22

## 2024-11-19 RX ADMIN — GENTAMICIN SULFATE 50 MG: 40 INJECTION, SOLUTION INTRAMUSCULAR; INTRAVENOUS at 05:16

## 2024-11-19 RX ADMIN — CARVEDILOL 6.25 MG: 6.25 TABLET, FILM COATED ORAL at 08:31

## 2024-11-19 RX ADMIN — GENTAMICIN SULFATE 50 MG: 40 INJECTION, SOLUTION INTRAMUSCULAR; INTRAVENOUS at 21:30

## 2024-11-19 RX ADMIN — RIVAROXABAN 20 MG: 20 TABLET, FILM COATED ORAL at 18:22

## 2024-11-19 RX ADMIN — PHENYLEPHRINE HYDROCHLORIDE 40 MCG: 10 INJECTION INTRAVENOUS at 12:17

## 2024-11-19 RX ADMIN — LIDOCAINE HYDROCHLORIDE 80 MG: 20 INJECTION, SOLUTION EPIDURAL; INFILTRATION; INTRACAUDAL; PERINEURAL at 11:54

## 2024-11-19 RX ADMIN — GABAPENTIN 300 MG: 300 CAPSULE ORAL at 21:36

## 2024-11-19 RX ADMIN — SODIUM CHLORIDE, PRESERVATIVE FREE 10 ML: 5 INJECTION INTRAVENOUS at 08:30

## 2024-11-19 RX ADMIN — NITROGLYCERIN 2 ML: 200 INJECTION, SOLUTION INTRAVENOUS at 13:48

## 2024-11-19 RX ADMIN — PHENYLEPHRINE HYDROCHLORIDE 40 MCG/MIN: 10 INJECTION INTRAVENOUS at 12:06

## 2024-11-19 RX ADMIN — NITROGLYCERIN 2 ML: 200 INJECTION, SOLUTION INTRAVENOUS at 13:46

## 2024-11-19 RX ADMIN — CARVEDILOL 6.25 MG: 6.25 TABLET, FILM COATED ORAL at 18:22

## 2024-11-19 RX ADMIN — SODIUM CHLORIDE, PRESERVATIVE FREE 10 ML: 5 INJECTION INTRAVENOUS at 21:55

## 2024-11-19 RX ADMIN — FENTANYL CITRATE 50 MCG: 50 INJECTION, SOLUTION INTRAMUSCULAR; INTRAVENOUS at 11:30

## 2024-11-19 RX ADMIN — FENTANYL CITRATE 50 MCG: 50 INJECTION, SOLUTION INTRAMUSCULAR; INTRAVENOUS at 11:54

## 2024-11-19 RX ADMIN — VALSARTAN 40 MG: 80 TABLET, FILM COATED ORAL at 08:31

## 2024-11-19 RX ADMIN — PROPOFOL 150 MG: 10 INJECTION, EMULSION INTRAVENOUS at 11:54

## 2024-11-19 RX ADMIN — SODIUM CHLORIDE: 9 INJECTION, SOLUTION INTRAVENOUS at 11:54

## 2024-11-19 RX ADMIN — LABETALOL HYDROCHLORIDE 5 MG: 5 INJECTION, SOLUTION INTRAVENOUS at 13:49

## 2024-11-19 RX ADMIN — FENTANYL CITRATE 25 MCG: 50 INJECTION INTRAMUSCULAR; INTRAVENOUS at 14:36

## 2024-11-19 RX ADMIN — TRAMADOL HYDROCHLORIDE 25 MG: 50 TABLET ORAL at 16:57

## 2024-11-19 ASSESSMENT — PAIN - FUNCTIONAL ASSESSMENT
PAIN_FUNCTIONAL_ASSESSMENT: ACTIVITIES ARE NOT PREVENTED

## 2024-11-19 ASSESSMENT — PAIN SCALES - GENERAL
PAINLEVEL_OUTOF10: 6
PAINLEVEL_OUTOF10: 3
PAINLEVEL_OUTOF10: 0
PAINLEVEL_OUTOF10: 3
PAINLEVEL_OUTOF10: 7

## 2024-11-19 ASSESSMENT — PAIN DESCRIPTION - LOCATION
LOCATION: HEAD;CHEST
LOCATION: SHOULDER
LOCATION: CHEST
LOCATION: CHEST

## 2024-11-19 ASSESSMENT — PAIN DESCRIPTION - DESCRIPTORS
DESCRIPTORS: ACHING
DESCRIPTORS: SORE
DESCRIPTORS: SORE
DESCRIPTORS: ACHING

## 2024-11-19 ASSESSMENT — PAIN DESCRIPTION - ORIENTATION
ORIENTATION: LEFT

## 2024-11-19 NOTE — ANESTHESIA PROCEDURE NOTES
Procedure Performed: KEYLA       Start Time:        End Time:      Anesthesia Information  Performed Personally  Anesthesiologist:  Logan Mireles MD        Preanesthesia Checklist:  Patient identified, IV assessed, risks and benefits discussed, monitors and equipment assessed, procedure being performed at surgeon's request and anesthesia consent obtained.    General Procedure Information  Diagnostic Indications for Echo:  assessment of ascending aorta, assessment of surgical repair, hemodynamic monitoring and assessment of valve function  Location performed:  OR  Intubated  Bite block placed  Heart visualized  Probe Insertion:  Easy  Probe Type:  3D, mulitplane, epiaortic and biplane  Modalities:  2D, 3D, color flow mapping, continuous wave Doppler, pulse wave Doppler and M-mode    Echocardiographic and Doppler Measurements    Ventricles    Ventricle  Cavity Size  Cavity          Dimension  Hypertrophy  Thrombus  Global FXN  EF    RV  normal    No  No  normal      LV  normal    No  No  normal       Ventricular Findings Comments:  LVEF >55%    Ventricular Regional Function:  1- Basal Anteroseptal:  normal  2- Basal Anterior:  normal  3- Basal Anterolateral:  normal  4- Basal Inferolateral:  normal  5- Basal Inferior:  normal  6- Basal Inferoseptal:  normal  7- Mid Anteroseptal:  normal  8- Mid Anterior:  normal  9- Mid Anterolateral:  normal  10- Mid Inferolateral:  normal  11- Mid Inferior:  normal  12- Mid Inferoseptal:  normal  13- Apical Anterior:  normal  14- Apical Lateral:  normal  15- Apical Inferior:  normal  16- Apical Septal:  normal  17- [place]:  normal    Ventricular Wall Motion Comments:  No WMA    Valves     Valves  Annulus  Stenosis Measurements   Regurg  Leaflet   Morph  Leaflet   Motion Valve Comments    Aortic normal Stenosis none.            none   normal normal       Mitral normal none             none normal, vegetation normal Small 1-2 mm size nodule on the ant mitral leaflet in A2 region. No MR.

## 2024-11-19 NOTE — BRIEF OP NOTE
Brief Postoperative Note      Patient: Madisyn Manriquez  YOB: 1955  MRN: [id number]    Date of Procedure: 11/19/2024    Pre-Op Diagnosis Codes:      * Sepsis due to Enterococcus (HCC) [A41.81]     * Presence of automatic cardioverter/defibrillator (AICD) [Z95.810]    Post-Op Diagnosis: Post-Op Diagnosis Codes:     * Sepsis due to Enterococcus (HCC) [A41.81]     * Presence of automatic cardioverter/defibrillator (AICD) [Z95.810]       Procedure(s):  LASER LEAD EXTRACTION OF SINGLE CHAMBER ICD (CARDIAC ANESTHESIA FOR KEYLA)    Surgeon(s):  Vishal Hull MD    Assistant:  * No surgical staff found *    Anesthesia: Other    Estimated Blood Loss (mL): Minimal    Complications: None    Specimens:   * No specimens in log *    Implants:  Implant Name Type Inv. Item Serial No.  Lot No. LRB No. Used Action   [id number] - [id number]   [id number]   N/A 1 Explanted   [id number] - [id number]   [id number]   N/A 1 Explanted   ENVELOPE PACEMKR L W2.9XL3.3IN ABSRB ANTIBACT TYRX - [id number]  ENVELOPE PACEMKR L W2.9XL3.3IN ABSRB ANTIBACT TYRX  [employer] CARDIAC RTHYM [id number] N/A 1 Implanted         Drains:   [REMOVED] External Urinary Catheter (Removed)   Site Assessment Other (Comment) 10/15/24 0800   Placement Initiated 10/13/24 1613   Securement Method Other (Comment) 10/13/24 1613   Suction 40 mmgHg continuous 10/13/24 1613       Findings:  Infection Present At Time Of Surgery (PATOS) (choose all levels that have infection present):  The ICD lead and the pocket were grossly infected    Successful laser lead extraction of ICD lead.       Electronically signed by Vishal Hull MD on 11/19/2024 at 1:36 PM

## 2024-11-20 ENCOUNTER — TELEPHONE (OUTPATIENT)
Age: 69
End: 2024-11-20

## 2024-11-20 LAB
ANION GAP SERPL CALC-SCNC: 6 MMOL/L (ref 2–12)
BUN SERPL-MCNC: 12 MG/DL (ref 6–20)
BUN/CREAT SERPL: 26 (ref 12–20)
CALCIUM SERPL-MCNC: 7.1 MG/DL (ref 8.5–10.1)
CHLORIDE SERPL-SCNC: 120 MMOL/L (ref 97–108)
CO2 SERPL-SCNC: 17 MMOL/L (ref 21–32)
CREAT SERPL-MCNC: 0.46 MG/DL (ref 0.55–1.02)
ERYTHROCYTE [DISTWIDTH] IN BLOOD BY AUTOMATED COUNT: 15.5 % (ref 11.5–14.5)
GLUCOSE BLD STRIP.AUTO-MCNC: 102 MG/DL (ref 65–117)
GLUCOSE BLD STRIP.AUTO-MCNC: 198 MG/DL (ref 65–117)
GLUCOSE BLD STRIP.AUTO-MCNC: 256 MG/DL (ref 65–117)
GLUCOSE SERPL-MCNC: 79 MG/DL (ref 65–100)
HCT VFR BLD AUTO: 31.2 % (ref 35–47)
HGB BLD-MCNC: 9.9 G/DL (ref 11.5–16)
MAGNESIUM SERPL-MCNC: 1.4 MG/DL (ref 1.6–2.4)
MCH RBC QN AUTO: 30.1 PG (ref 26–34)
MCHC RBC AUTO-ENTMCNC: 31.7 G/DL (ref 30–36.5)
MCV RBC AUTO: 94.8 FL (ref 80–99)
NRBC # BLD: 0 K/UL (ref 0–0.01)
NRBC BLD-RTO: 0 PER 100 WBC
PHOSPHATE SERPL-MCNC: 3 MG/DL (ref 2.6–4.7)
PLATELET # BLD AUTO: 237 K/UL (ref 150–400)
PMV BLD AUTO: 10.3 FL (ref 8.9–12.9)
POTASSIUM SERPL-SCNC: 3.2 MMOL/L (ref 3.5–5.1)
RBC # BLD AUTO: 3.29 M/UL (ref 3.8–5.2)
SERVICE CMNT-IMP: ABNORMAL
SERVICE CMNT-IMP: ABNORMAL
SERVICE CMNT-IMP: NORMAL
SODIUM SERPL-SCNC: 143 MMOL/L (ref 136–145)
WBC # BLD AUTO: 9.8 K/UL (ref 3.6–11)

## 2024-11-20 PROCEDURE — 6370000000 HC RX 637 (ALT 250 FOR IP): Performed by: STUDENT IN AN ORGANIZED HEALTH CARE EDUCATION/TRAINING PROGRAM

## 2024-11-20 PROCEDURE — 6360000002 HC RX W HCPCS: Performed by: STUDENT IN AN ORGANIZED HEALTH CARE EDUCATION/TRAINING PROGRAM

## 2024-11-20 PROCEDURE — 2580000003 HC RX 258: Performed by: STUDENT IN AN ORGANIZED HEALTH CARE EDUCATION/TRAINING PROGRAM

## 2024-11-20 PROCEDURE — 2060000000 HC ICU INTERMEDIATE R&B

## 2024-11-20 PROCEDURE — 84100 ASSAY OF PHOSPHORUS: CPT

## 2024-11-20 PROCEDURE — 6360000002 HC RX W HCPCS: Performed by: INTERNAL MEDICINE

## 2024-11-20 PROCEDURE — 6370000000 HC RX 637 (ALT 250 FOR IP): Performed by: INTERNAL MEDICINE

## 2024-11-20 PROCEDURE — 36415 COLL VENOUS BLD VENIPUNCTURE: CPT

## 2024-11-20 PROCEDURE — 85027 COMPLETE CBC AUTOMATED: CPT

## 2024-11-20 PROCEDURE — 97116 GAIT TRAINING THERAPY: CPT

## 2024-11-20 PROCEDURE — 83735 ASSAY OF MAGNESIUM: CPT

## 2024-11-20 PROCEDURE — 82962 GLUCOSE BLOOD TEST: CPT

## 2024-11-20 PROCEDURE — 80048 BASIC METABOLIC PNL TOTAL CA: CPT

## 2024-11-20 RX ORDER — MAGNESIUM SULFATE IN WATER 40 MG/ML
2000 INJECTION, SOLUTION INTRAVENOUS ONCE
Status: COMPLETED | OUTPATIENT
Start: 2024-11-20 | End: 2024-11-20

## 2024-11-20 RX ADMIN — CARVEDILOL 6.25 MG: 6.25 TABLET, FILM COATED ORAL at 09:29

## 2024-11-20 RX ADMIN — PANTOPRAZOLE SODIUM 40 MG: 40 TABLET, DELAYED RELEASE ORAL at 06:11

## 2024-11-20 RX ADMIN — MAGNESIUM SULFATE HEPTAHYDRATE 2000 MG: 40 INJECTION, SOLUTION INTRAVENOUS at 09:34

## 2024-11-20 RX ADMIN — INSULIN LISPRO 2 UNITS: 100 INJECTION, SOLUTION INTRAVENOUS; SUBCUTANEOUS at 20:59

## 2024-11-20 RX ADMIN — GENTAMICIN SULFATE 50 MG: 40 INJECTION, SOLUTION INTRAMUSCULAR; INTRAVENOUS at 20:39

## 2024-11-20 RX ADMIN — RIVAROXABAN 20 MG: 20 TABLET, FILM COATED ORAL at 18:05

## 2024-11-20 RX ADMIN — POTASSIUM BICARBONATE 40 MEQ: 782 TABLET, EFFERVESCENT ORAL at 09:29

## 2024-11-20 RX ADMIN — VANCOMYCIN 1000 MG: 1 INJECTION, SOLUTION INTRAVENOUS at 06:11

## 2024-11-20 RX ADMIN — INSULIN LISPRO 4 UNITS: 100 INJECTION, SOLUTION INTRAVENOUS; SUBCUTANEOUS at 18:05

## 2024-11-20 RX ADMIN — VALSARTAN 40 MG: 80 TABLET, FILM COATED ORAL at 20:37

## 2024-11-20 RX ADMIN — VANCOMYCIN 1000 MG: 1 INJECTION, SOLUTION INTRAVENOUS at 18:04

## 2024-11-20 RX ADMIN — SODIUM CHLORIDE, PRESERVATIVE FREE 10 ML: 5 INJECTION INTRAVENOUS at 19:38

## 2024-11-20 RX ADMIN — VALSARTAN 40 MG: 80 TABLET, FILM COATED ORAL at 09:29

## 2024-11-20 RX ADMIN — GENTAMICIN SULFATE 50 MG: 40 INJECTION, SOLUTION INTRAMUSCULAR; INTRAVENOUS at 15:06

## 2024-11-20 RX ADMIN — INSULIN GLARGINE 30 UNITS: 100 INJECTION, SOLUTION SUBCUTANEOUS at 20:59

## 2024-11-20 RX ADMIN — PRAVASTATIN SODIUM 20 MG: 10 TABLET ORAL at 20:37

## 2024-11-20 RX ADMIN — GABAPENTIN 300 MG: 300 CAPSULE ORAL at 09:29

## 2024-11-20 RX ADMIN — CARVEDILOL 6.25 MG: 6.25 TABLET, FILM COATED ORAL at 18:05

## 2024-11-20 RX ADMIN — GABAPENTIN 300 MG: 300 CAPSULE ORAL at 20:37

## 2024-11-20 RX ADMIN — Medication 1 CAPSULE: at 09:29

## 2024-11-20 RX ADMIN — GENTAMICIN SULFATE 50 MG: 40 INJECTION, SOLUTION INTRAMUSCULAR; INTRAVENOUS at 04:29

## 2024-11-20 NOTE — CARE COORDINATION
Transition of Care Plan:    RUR: 16% \"medium risk\"  Prior Level of Functioning: Independent with ADL's  Disposition: Home with son and HH services (referrals pending)  ALEKS: 11/21  If SNF or IPR: Date FOC offered: N/A  Follow up appointments: PCP/Specialists as indicated  DME needed: None  Transportation at discharge: Pt son to transport   IM/IMM Medicare/ letter given: 2nd IM needed prior to d/c   Is patient a [place] and connected with VA? No  Caregiver Contact: Jose Francisco Manriquez (son), [phone]  Discharge Caregiver contacted prior to discharge? To be contacted   Care Conference needed? Not at this time   Barriers to discharge: IV ABX teaching, blood cx    1503 PM: Chart reviewed. CM met with pt at the bedside to discuss d/c plan. Pt aware that HH services are needed for IV ABX management. Pt is agreeable. CM sent HH referrals. CM sent IV ABX order to [facility] for teaching. CM to provide update once available.     KALEN Lanza  Care Management  [facility]   [phone]

## 2024-11-21 VITALS
HEART RATE: 89 BPM | HEIGHT: 66 IN | TEMPERATURE: 98.2 F | OXYGEN SATURATION: 97 % | RESPIRATION RATE: 16 BRPM | DIASTOLIC BLOOD PRESSURE: 62 MMHG | SYSTOLIC BLOOD PRESSURE: 122 MMHG | WEIGHT: 125.22 LBS | BODY MASS INDEX: 20.12 KG/M2

## 2024-11-21 LAB
ANION GAP SERPL CALC-SCNC: 5 MMOL/L (ref 2–12)
BUN SERPL-MCNC: 9 MG/DL (ref 6–20)
BUN/CREAT SERPL: 16 (ref 12–20)
CALCIUM SERPL-MCNC: 8.7 MG/DL (ref 8.5–10.1)
CHLORIDE SERPL-SCNC: 113 MMOL/L (ref 97–108)
CO2 SERPL-SCNC: 24 MMOL/L (ref 21–32)
CREAT SERPL-MCNC: 0.58 MG/DL (ref 0.55–1.02)
DATE LAST DOSE: ABNORMAL
DOSE AMOUNT: ABNORMAL UNITS
DOSE DATE/TIME: ABNORMAL
GENTAMICIN TROUGH SERPL-MCNC: <0.2 UG/ML (ref 1–2)
GLUCOSE BLD STRIP.AUTO-MCNC: 123 MG/DL (ref 65–117)
GLUCOSE BLD STRIP.AUTO-MCNC: 88 MG/DL (ref 65–117)
GLUCOSE SERPL-MCNC: 89 MG/DL (ref 65–100)
MAGNESIUM SERPL-MCNC: 1.9 MG/DL (ref 1.6–2.4)
POTASSIUM SERPL-SCNC: 3.5 MMOL/L (ref 3.5–5.1)
SERVICE CMNT-IMP: ABNORMAL
SERVICE CMNT-IMP: NORMAL
SODIUM SERPL-SCNC: 142 MMOL/L (ref 136–145)
VANCOMYCIN SERPL-MCNC: 15.9 UG/ML

## 2024-11-21 PROCEDURE — 76937 US GUIDE VASCULAR ACCESS: CPT

## 2024-11-21 PROCEDURE — 2580000003 HC RX 258: Performed by: STUDENT IN AN ORGANIZED HEALTH CARE EDUCATION/TRAINING PROGRAM

## 2024-11-21 PROCEDURE — 6370000000 HC RX 637 (ALT 250 FOR IP): Performed by: STUDENT IN AN ORGANIZED HEALTH CARE EDUCATION/TRAINING PROGRAM

## 2024-11-21 PROCEDURE — 36568 INSJ PICC <5 YR W/O IMAGING: CPT

## 2024-11-21 PROCEDURE — 80170 ASSAY OF GENTAMICIN: CPT

## 2024-11-21 PROCEDURE — 6360000002 HC RX W HCPCS: Performed by: STUDENT IN AN ORGANIZED HEALTH CARE EDUCATION/TRAINING PROGRAM

## 2024-11-21 PROCEDURE — 36415 COLL VENOUS BLD VENIPUNCTURE: CPT

## 2024-11-21 PROCEDURE — 80202 ASSAY OF VANCOMYCIN: CPT

## 2024-11-21 PROCEDURE — 6370000000 HC RX 637 (ALT 250 FOR IP): Performed by: INTERNAL MEDICINE

## 2024-11-21 PROCEDURE — 83735 ASSAY OF MAGNESIUM: CPT

## 2024-11-21 PROCEDURE — C1751 CATH, INF, PER/CENT/MIDLINE: HCPCS

## 2024-11-21 PROCEDURE — 6360000002 HC RX W HCPCS: Performed by: INTERNAL MEDICINE

## 2024-11-21 PROCEDURE — 80048 BASIC METABOLIC PNL TOTAL CA: CPT

## 2024-11-21 PROCEDURE — 82962 GLUCOSE BLOOD TEST: CPT

## 2024-11-21 PROCEDURE — C1894 INTRO/SHEATH, NON-LASER: HCPCS

## 2024-11-21 RX ORDER — LACTOBACILLUS RHAMNOSUS GG 10B CELL
1 CAPSULE ORAL
Qty: 40 CAPSULE | Refills: 0 | Status: SHIPPED | OUTPATIENT
Start: 2024-11-22 | End: 2025-01-01

## 2024-11-21 RX ADMIN — VALSARTAN 40 MG: 80 TABLET, FILM COATED ORAL at 09:33

## 2024-11-21 RX ADMIN — Medication 1 CAPSULE: at 09:33

## 2024-11-21 RX ADMIN — PANTOPRAZOLE SODIUM 40 MG: 40 TABLET, DELAYED RELEASE ORAL at 05:17

## 2024-11-21 RX ADMIN — GABAPENTIN 300 MG: 300 CAPSULE ORAL at 09:34

## 2024-11-21 RX ADMIN — GENTAMICIN SULFATE 50 MG: 40 INJECTION, SOLUTION INTRAMUSCULAR; INTRAVENOUS at 12:22

## 2024-11-21 RX ADMIN — VANCOMYCIN 1000 MG: 1 INJECTION, SOLUTION INTRAVENOUS at 06:46

## 2024-11-21 RX ADMIN — CARVEDILOL 6.25 MG: 6.25 TABLET, FILM COATED ORAL at 09:33

## 2024-11-21 RX ADMIN — SODIUM CHLORIDE, PRESERVATIVE FREE 10 ML: 5 INJECTION INTRAVENOUS at 09:39

## 2024-11-21 RX ADMIN — GENTAMICIN SULFATE 50 MG: 40 INJECTION, SOLUTION INTRAMUSCULAR; INTRAVENOUS at 04:09

## 2024-11-21 NOTE — ANESTHESIA POSTPROCEDURE EVALUATION
Department of Anesthesiology  Postprocedure Note    Patient: Madisyn Manriquez  MRN: [id number]  YOB: 1955  Date of evaluation: 11/21/2024    Procedure Summary       Date: 11/19/24 Room / Location: [facility] CATH LAB 1 / [facility] CARDIAC CATH LAB    Anesthesia Start: 1142 Anesthesia Stop: 1416    Procedure: LASER LEAD EXTRACTION OF SINGLE CHAMBER ICD (CARDIAC ANESTHESIA FOR KEYLA) Diagnosis:       Sepsis due to Enterococcus (HCC)      (Sepsis due to Enterococcus (HCC) [A41.81])      (Presence of automatic cardioverter/defibrillator (AICD) [Z95.810])    Providers: Vishal Hull MD Responsible Provider: Brigido Quintana MD    Anesthesia Type: General ASA Status: 4            Anesthesia Type: General    Alexis Phase I: Alexis Score: 10    Alexis Phase II:      Anesthesia Post Evaluation    Patient location during evaluation: PACU  Patient participation: complete - patient participated  Level of consciousness: responsive to verbal stimuli and sleepy but conscious  Pain score: 2  Airway patency: patent  Cardiovascular status: blood pressure returned to baseline  Respiratory status: acceptable  Hydration status: stable  Comments: +Post-Anesthesia Evaluation and Assessment    Patient: Madisyn Manriquez MRN: [id number]  SSN: [id number]   YOB: 1955  Age: 69 y.o.  Sex: female          Cardiovascular Function/Vital Signs    /62   Pulse 89   Temp 98.2 °F (36.8 °C) (Oral)   Resp 16   Ht 1.676 m (5' 6\")   Wt 56.8 kg (125 lb 3.5 oz)   SpO2 97%   BMI 20.21 kg/m²     Patient is status post Procedure(s):  LASER LEAD EXTRACTION OF SINGLE CHAMBER ICD (CARDIAC ANESTHESIA FOR KEYLA).    Nausea/Vomiting: Controlled.    Postoperative hydration reviewed and adequate.    Pain:      Managed.    Neurological Status:       At baseline.    Mental Status and Level of Consciousness: Arousable.    Pulmonary Status:       Adequate oxygenation and airway patent.    Complications related to anesthesia:

## 2024-11-21 NOTE — ANESTHESIA PRE PROCEDURE
negative vascular ROS.         Other Findings:       Anesthesia Plan      general     ASA 4       Induction: intravenous.  arterial line and KEYLA  MIPS: Postoperative opioids intended and Prophylactic antiemetics administered.  Anesthetic plan and risks discussed with patient.    Use of blood products discussed with patient whom consented to blood products.    Plan discussed with CRNA and surgical team.                Logan Mireles MD   11/21/2024

## 2024-11-21 NOTE — CARE COORDINATION
Pt clear from CM standpoint.    Transition of Care Plan:    RUR: 15% \"medium risk\"  Prior Level of Functioning: Independent with ADL's  Disposition: Home with son and [facility] services for IV ABX management  ALEKS: 11/21  If SNF or IPR: Date FOC offered: N/A  Follow up appointments: PCP/Specialists as indicated  DME needed: None  Transportation at discharge: Pt significant other to transport   IM/IMM Medicare/ letter given: 2nd IM given by CM on 11/21  Is patient a  and connected with VA? No  Caregiver Contact: Jose Francisco Manriquez (son), [phone]  Discharge Caregiver contacted prior to discharge? To be contacted   Care Conference needed? Not at this time   Barriers to discharge: None    1326 PM: CM received HH acceptance from [facility]. CM placed info on AVS and made pt aware. CM needs complete at this time.     1155 AM: [facility] team will provide IV ABX teaching at the bedside after PICC line placement; tentative to arrive after 1230PM. CM working with [facility] to confirm HH.    1108 AM: Chart reviewed. CM contacted [facility] to inquire about status of HH referral; Patricia with [facility] noted that they are processing the HH referral. CM sent message to [facility] about need for IV ABX teaching after PICC line is placed today. CM to continue to provide updates once available.      11/21/24 1327   Services At/After Discharge   Transition of Care Consult (CM Consult) Discharge Planning   Services At/After Discharge Home [facility]   [place] Resource Information Provided? No   Mode of Transport at Discharge Other (see comment)  (Pt family)   Hospital Transport Time of Discharge 1400   Confirm Follow Up Transport Family   Condition of Participation: Discharge Planning   The Plan for Transition of Care is related to the following treatment goals: Return home with IV ABX and HH   The Patient and/or Patient Representative was provided with a Choice of Provider? Patient   The Patient and/Or Patient

## 2024-11-21 NOTE — PLAN OF CARE
Problem: Chronic Conditions and Co-morbidities  Goal: Patient's chronic conditions and co-morbidity symptoms are monitored and maintained or improved  11/12/2024 2216 by Corinne Hidalgo RN  Outcome: Progressing  Flowsheets (Taken 11/12/2024 2000)  Care Plan - Patient's Chronic Conditions and Co-Morbidity Symptoms are Monitored and Maintained or Improved: Monitor and assess patient's chronic conditions and comorbid symptoms for stability, deterioration, or improvement  11/12/2024 1236 by Eduarda Love RN  Outcome: Progressing     Problem: Discharge Planning  Goal: Discharge to home or other facility with appropriate resources  11/12/2024 2216 by Corinne Hidalgo RN  Outcome: Progressing  Flowsheets (Taken 11/12/2024 2000)  Discharge to home or other facility with appropriate resources: Identify barriers to discharge with patient and caregiver  11/12/2024 1236 by Eduarda Love RN  Outcome: Progressing     Problem: Pain  Goal: Verbalizes/displays adequate comfort level or baseline comfort level  11/12/2024 2216 by Corinne Hidalgo RN  Outcome: Progressing  Flowsheets (Taken 11/12/2024 1938)  Verbalizes/displays adequate comfort level or baseline comfort level: Encourage patient to monitor pain and request assistance  11/12/2024 1236 by Eduarda Love RN  Outcome: Progressing     Problem: Safety - Adult  Goal: Free from fall injury  11/12/2024 2216 by Corinne Hidalgo RN  Outcome: Progressing  11/12/2024 1236 by Eduarda Love RN  Outcome: Progressing     Problem: ABCDS Injury Assessment  Goal: Absence of physical injury  11/12/2024 2216 by Corinne Hidalgo RN  Outcome: Progressing  11/12/2024 1236 by Eduarda Love RN  Outcome: Progressing     Problem: Physical Therapy - Adult  Goal: By Discharge: Performs mobility at highest level of function for planned discharge setting.  See evaluation for individualized goals.  Description: FUNCTIONAL STATUS PRIOR TO ADMISSION: Patient was 
  Problem: Chronic Conditions and Co-morbidities  Goal: Patient's chronic conditions and co-morbidity symptoms are monitored and maintained or improved  11/17/2024 1111 by Neo Berry RN  Outcome: Progressing  11/17/2024 0144 by Guillermo Ramos RN  Outcome: Progressing     Problem: Discharge Planning  Goal: Discharge to home or other facility with appropriate resources  11/17/2024 1111 by Neo Berry RN  Outcome: Progressing  Flowsheets (Taken 11/17/2024 0821)  Discharge to home or other facility with appropriate resources: Identify barriers to discharge with patient and caregiver  11/17/2024 0144 by Guillermo Ramos, RN  Outcome: Progressing     Problem: Pain  Goal: Verbalizes/displays adequate comfort level or baseline comfort level  Outcome: Progressing     Problem: Safety - Adult  Goal: Free from fall injury  11/17/2024 1111 by Neo Berry RN  Outcome: Progressing  11/17/2024 0144 by Guillermo Ramos, RN  Outcome: Progressing     Problem: ABCDS Injury Assessment  Goal: Absence of physical injury  Outcome: Progressing     Problem: Nutrition Deficit:  Goal: Optimize nutritional status  Outcome: Progressing     
  Problem: Chronic Conditions and Co-morbidities  Goal: Patient's chronic conditions and co-morbidity symptoms are monitored and maintained or improved  11/17/2024 2038 by Tasia Jung RN  Outcome: Progressing  11/17/2024 1111 by Neo Berry RN  Outcome: Progressing     Problem: Discharge Planning  Goal: Discharge to home or other facility with appropriate resources  11/17/2024 2038 by Tasia Jung RN  Outcome: Progressing  11/17/2024 1111 by Neo Berry RN  Outcome: Progressing  Flowsheets (Taken 11/17/2024 0821)  Discharge to home or other facility with appropriate resources: Identify barriers to discharge with patient and caregiver     Problem: Pain  Goal: Verbalizes/displays adequate comfort level or baseline comfort level  11/17/2024 2038 by Tasia Jung RN  Outcome: Progressing  11/17/2024 1111 by Neo Berry RN  Outcome: Progressing     Problem: Safety - Adult  Goal: Free from fall injury  11/17/2024 2038 by Tasia Jung RN  Outcome: Progressing  11/17/2024 1111 by Neo Berry RN  Outcome: Progressing     Problem: ABCDS Injury Assessment  Goal: Absence of physical injury  11/17/2024 2038 by Tasia Jung RN  Outcome: Progressing  11/17/2024 1111 by Neo Berry RN  Outcome: Progressing     Problem: Nutrition Deficit:  Goal: Optimize nutritional status  11/17/2024 2038 by Tasia Jung RN  Outcome: Progressing  11/17/2024 1111 by Neo Berry RN  Outcome: Progressing     
  Problem: Chronic Conditions and Co-morbidities  Goal: Patient's chronic conditions and co-morbidity symptoms are monitored and maintained or improved  11/19/2024 1049 by Nam Kelley RN  Outcome: Progressing  Flowsheets (Taken 11/19/2024 0745)  Care Plan - Patient's Chronic Conditions and Co-Morbidity Symptoms are Monitored and Maintained or Improved: Monitor and assess patient's chronic conditions and comorbid symptoms for stability, deterioration, or improvement  11/19/2024 0234 by Tori Espinoza RN  Outcome: Progressing  Flowsheets (Taken 11/18/2024 1910)  Care Plan - Patient's Chronic Conditions and Co-Morbidity Symptoms are Monitored and Maintained or Improved: Monitor and assess patient's chronic conditions and comorbid symptoms for stability, deterioration, or improvement     Problem: Discharge Planning  Goal: Discharge to home or other facility with appropriate resources  11/19/2024 1049 by Nam Kelley RN  Outcome: Progressing  Flowsheets (Taken 11/19/2024 0745)  Discharge to home or other facility with appropriate resources: Identify barriers to discharge with patient and caregiver  11/19/2024 0234 by Tori Espinoza RN  Outcome: Progressing  Flowsheets (Taken 11/18/2024 1910)  Discharge to home or other facility with appropriate resources: Identify barriers to discharge with patient and caregiver     Problem: Pain  Goal: Verbalizes/displays adequate comfort level or baseline comfort level  11/19/2024 1049 by Nam Kelley RN  Outcome: Progressing  Flowsheets (Taken 11/19/2024 0745)  Verbalizes/displays adequate comfort level or baseline comfort level: Encourage patient to monitor pain and request assistance  11/19/2024 0234 by Tori Espinoza RN  Outcome: Progressing  Flowsheets (Taken 11/18/2024 1450 by Nam Kelley RN)  Verbalizes/displays adequate comfort level or baseline comfort level: Encourage patient to monitor pain and request assistance     Problem: Safety - 
  Problem: Chronic Conditions and Co-morbidities  Goal: Patient's chronic conditions and co-morbidity symptoms are monitored and maintained or improved  Outcome: Progressing     Problem: Discharge Planning  Goal: Discharge to home or other facility with appropriate resources  Outcome: Progressing     Problem: Pain  Goal: Verbalizes/displays adequate comfort level or baseline comfort level  Outcome: Progressing     Problem: Safety - Adult  Goal: Free from fall injury  Outcome: Progressing     Problem: ABCDS Injury Assessment  Goal: Absence of physical injury  Outcome: Progressing     
  Problem: Chronic Conditions and Co-morbidities  Goal: Patient's chronic conditions and co-morbidity symptoms are monitored and maintained or improved  Outcome: Progressing     Problem: Discharge Planning  Goal: Discharge to home or other facility with appropriate resources  Outcome: Progressing     Problem: Pain  Goal: Verbalizes/displays adequate comfort level or baseline comfort level  Outcome: Progressing     Problem: Safety - Adult  Goal: Free from fall injury  Outcome: Progressing     Problem: ABCDS Injury Assessment  Goal: Absence of physical injury  Outcome: Progressing     
  Problem: Chronic Conditions and Co-morbidities  Goal: Patient's chronic conditions and co-morbidity symptoms are monitored and maintained or improved  Outcome: Progressing     Problem: Discharge Planning  Goal: Discharge to home or other facility with appropriate resources  Outcome: Progressing     Problem: Pain  Goal: Verbalizes/displays adequate comfort level or baseline comfort level  Outcome: Progressing     Problem: Safety - Adult  Goal: Free from fall injury  Outcome: Progressing     Problem: ABCDS Injury Assessment  Goal: Absence of physical injury  Outcome: Progressing     Problem: Nutrition Deficit:  Goal: Optimize nutritional status  Outcome: Progressing     Problem: Skin/Tissue Integrity  Goal: Absence of new skin breakdown  Description: 1.  Monitor for areas of redness and/or skin breakdown  2.  Assess vascular access sites hourly  3.  Every 4-6 hours minimum:  Change oxygen saturation probe site  4.  Every 4-6 hours:  If on nasal continuous positive airway pressure, respiratory therapy assess nares and determine need for appliance change or resting period.  Outcome: Progressing     
  Problem: Chronic Conditions and Co-morbidities  Goal: Patient's chronic conditions and co-morbidity symptoms are monitored and maintained or improved  Outcome: Progressing     Problem: Discharge Planning  Goal: Discharge to home or other facility with appropriate resources  Outcome: Progressing     Problem: Pain  Goal: Verbalizes/displays adequate comfort level or baseline comfort level  Outcome: Progressing     Problem: Safety - Adult  Goal: Free from fall injury  Outcome: Progressing     Problem: ABCDS Injury Assessment  Goal: Absence of physical injury  Outcome: Progressing     Problem: Physical Therapy - Adult  Goal: By Discharge: Performs mobility at highest level of function for planned discharge setting.  See evaluation for individualized goals.  Description: FUNCTIONAL STATUS PRIOR TO ADMISSION: Patient was independent and active without use of DME. and The patient and/or family endorse 1 falls within the last 3 months.    HOME SUPPORT PRIOR TO ADMISSION: The patient lived alone with family/friends to provide assistance.    Physical Therapy Goals  Initiated 11/12/2024  1.  Patient will move from supine to sit and sit to supine and roll side to side in bed with independence within 7 day(s).    2.  Patient will perform sit to stand with modified independence within 7 day(s).  3.  Patient will transfer from bed to chair and chair to bed with modified independence using the least restrictive device within 7 day(s).  4.  Patient will ambulate with modified independence for 100 feet with the least restrictive device within 7 day(s).   5.  Patient will ascend/descend 3 stairs with bilateral handrail(s) with modified independence within 7 day(s).    11/14/2024 1509 by Vivian Longoria, PT  Outcome: Progressing     
  Problem: Chronic Conditions and Co-morbidities  Goal: Patient's chronic conditions and co-morbidity symptoms are monitored and maintained or improved  Outcome: Progressing     Problem: Discharge Planning  Goal: Discharge to home or other facility with appropriate resources  Outcome: Progressing     Problem: Pain  Goal: Verbalizes/displays adequate comfort level or baseline comfort level  Outcome: Progressing  Flowsheets (Taken 11/13/2024 0408 by Corinne Hidalgo RN)  Verbalizes/displays adequate comfort level or baseline comfort level: Encourage patient to monitor pain and request assistance     Problem: Safety - Adult  Goal: Free from fall injury  Outcome: Progressing     Problem: ABCDS Injury Assessment  Goal: Absence of physical injury  Outcome: Progressing     
  Problem: Chronic Conditions and Co-morbidities  Goal: Patient's chronic conditions and co-morbidity symptoms are monitored and maintained or improved  Outcome: Progressing  Flowsheets (Taken 11/11/2024 1526)  Care Plan - Patient's Chronic Conditions and Co-Morbidity Symptoms are Monitored and Maintained or Improved: Monitor and assess patient's chronic conditions and comorbid symptoms for stability, deterioration, or improvement     Problem: Discharge Planning  Goal: Discharge to home or other facility with appropriate resources  Outcome: Progressing  Flowsheets (Taken 11/11/2024 1526)  Discharge to home or other facility with appropriate resources:   Identify barriers to discharge with patient and caregiver   Arrange for needed discharge resources and transportation as appropriate   Identify discharge learning needs (meds, wound care, etc)     Problem: Pain  Goal: Verbalizes/displays adequate comfort level or baseline comfort level  Outcome: Progressing     
  Problem: Chronic Conditions and Co-morbidities  Goal: Patient's chronic conditions and co-morbidity symptoms are monitored and maintained or improved  Outcome: Progressing  Flowsheets (Taken 11/18/2024 0718)  Care Plan - Patient's Chronic Conditions and Co-Morbidity Symptoms are Monitored and Maintained or Improved: Monitor and assess patient's chronic conditions and comorbid symptoms for stability, deterioration, or improvement     Problem: Discharge Planning  Goal: Discharge to home or other facility with appropriate resources  Outcome: Progressing  Flowsheets (Taken 11/18/2024 0718)  Discharge to home or other facility with appropriate resources: Identify barriers to discharge with patient and caregiver     Problem: Pain  Goal: Verbalizes/displays adequate comfort level or baseline comfort level  Outcome: Progressing  Flowsheets  Taken 11/18/2024 1135  Verbalizes/displays adequate comfort level or baseline comfort level: Encourage patient to monitor pain and request assistance  Taken 11/18/2024 0718  Verbalizes/displays adequate comfort level or baseline comfort level: Encourage patient to monitor pain and request assistance     Problem: Safety - Adult  Goal: Free from fall injury  Outcome: Progressing     Problem: ABCDS Injury Assessment  Goal: Absence of physical injury  Outcome: Progressing     Problem: Nutrition Deficit:  Goal: Optimize nutritional status  Outcome: Progressing     
  Problem: Chronic Conditions and Co-morbidities  Goal: Patient's chronic conditions and co-morbidity symptoms are monitored and maintained or improved  Outcome: Progressing  Flowsheets (Taken 11/18/2024 1910)  Care Plan - Patient's Chronic Conditions and Co-Morbidity Symptoms are Monitored and Maintained or Improved: Monitor and assess patient's chronic conditions and comorbid symptoms for stability, deterioration, or improvement     Problem: Discharge Planning  Goal: Discharge to home or other facility with appropriate resources  Outcome: Progressing  Flowsheets (Taken 11/18/2024 1910)  Discharge to home or other facility with appropriate resources: Identify barriers to discharge with patient and caregiver     Problem: Pain  Goal: Verbalizes/displays adequate comfort level or baseline comfort level  Outcome: Progressing  Flowsheets (Taken 11/18/2024 1450 by Nam Kelley RN)  Verbalizes/displays adequate comfort level or baseline comfort level: Encourage patient to monitor pain and request assistance     Problem: Safety - Adult  Goal: Free from fall injury  Outcome: Progressing  Flowsheets (Taken 11/18/2024 1910)  Free From Fall Injury:   Based on caregiver fall risk screen, instruct family/caregiver to ask for assistance with transferring infant if caregiver noted to have fall risk factors   Instruct family/caregiver on patient safety     Problem: ABCDS Injury Assessment  Goal: Absence of physical injury  Outcome: Progressing  Flowsheets (Taken 11/18/2024 1910)  Absence of Physical Injury: Implement safety measures based on patient assessment     Problem: Physical Therapy - Adult  Goal: By Discharge: Performs mobility at highest level of function for planned discharge setting.  See evaluation for individualized goals.  Description: FUNCTIONAL STATUS PRIOR TO ADMISSION: Patient was independent and active without use of DME. and The patient and/or family endorse 1 falls within the last 3 months.    HOME 
  Problem: Physical Therapy - Adult  Goal: By Discharge: Performs mobility at highest level of function for planned discharge setting.  See evaluation for individualized goals.  Description: FUNCTIONAL STATUS PRIOR TO ADMISSION: Patient was independent and active without use of DME. and The patient and/or family endorse 1 falls within the last 3 months.    HOME SUPPORT PRIOR TO ADMISSION: The patient lived alone with family/friends to provide assistance.    Physical Therapy Goals  Initiated 11/12/2024  1.  Patient will move from supine to sit and sit to supine and roll side to side in bed with independence within 7 day(s).    2.  Patient will perform sit to stand with modified independence within 7 day(s).  3.  Patient will transfer from bed to chair and chair to bed with modified independence using the least restrictive device within 7 day(s).  4.  Patient will ambulate with modified independence for 100 feet with the least restrictive device within 7 day(s).   5.  Patient will ascend/descend 3 stairs with bilateral handrail(s) with modified independence within 7 day(s).    Outcome: Progressing   PHYSICAL THERAPY EVALUATION    Patient: Madisyn Manriquez (69 y.o. female)  Date: 11/12/2024  Primary Diagnosis: Sepsis ([facility]) [A41.9]  Sepsis, unspecified organism ([facility]) [A41.9]       Precautions: Restrictions/Precautions:  (orthostatic hypotension)                      ASSESSMENT :   DEFICITS/IMPAIRMENTS:   The patient is limited by decreased activity tolerance , impaired balance s/p admission on 11/11 with weakness and chills and had a ground level fall at home. Pt found to be hypotensive and septic. Pt with recent diagnosis of PNA.    Based on the impairments listed above pt was received exiting the bathroom after standing to shower with PCT present for supervision. Pt tolerated short gait from bathroom to the bed and completed transfers with SBA-CGA without AD. Vitals recorded below and noted pt to be orthostatic 
  Problem: Physical Therapy - Adult  Goal: By Discharge: Performs mobility at highest level of function for planned discharge setting.  See evaluation for individualized goals.  Description: FUNCTIONAL STATUS PRIOR TO ADMISSION: Patient was independent and active without use of DME. and The patient and/or family endorse 1 falls within the last 3 months.    HOME SUPPORT PRIOR TO ADMISSION: The patient lived alone with family/friends to provide assistance.    Physical Therapy Goals  Initiated 11/12/2024  1.  Patient will move from supine to sit and sit to supine and roll side to side in bed with independence within 7 day(s).    2.  Patient will perform sit to stand with modified independence within 7 day(s).  3.  Patient will transfer from bed to chair and chair to bed with modified independence using the least restrictive device within 7 day(s).  4.  Patient will ambulate with modified independence for 100 feet with the least restrictive device within 7 day(s).   5.  Patient will ascend/descend 3 stairs with bilateral handrail(s) with modified independence within 7 day(s).    Outcome: Progressing   PHYSICAL THERAPY TREATMENT    Patient: Madisyn Manriquez (69 y.o. female)  Date: 11/13/2024  Diagnosis: Sepsis ([facility]) [A41.9]  Sepsis, unspecified organism ([facility]) [A41.9] Sepsis ([facility])      Precautions:  (orthostatic hypotension)                      ASSESSMENT:  Patient continues to benefit from skilled PT services and is progressing towards goals. Pt received in bed. She is able to transfer sit <>stand with S and to and from chair with SBA. She amb in the room with SBA no device with slow pace and narrow base and did initially reach for bed to steady self. She states she does this at home. She is adamant about not using a walker or rollator. BP stable today, pt without any sxs. She states her challenge is carrying things at home. Discussed benefits of rollator to transport objects safely and to preserve energy, but pt states 
  Problem: Physical Therapy - Adult  Goal: By Discharge: Performs mobility at highest level of function for planned discharge setting.  See evaluation for individualized goals.  Description: FUNCTIONAL STATUS PRIOR TO ADMISSION: Patient was independent and active without use of DME. and The patient and/or family endorse 1 falls within the last 3 months.    HOME SUPPORT PRIOR TO ADMISSION: The patient lived alone with family/friends to provide assistance.    Physical Therapy Goals  Initiated 11/12/2024  1.  Patient will move from supine to sit and sit to supine and roll side to side in bed with independence within 7 day(s).    2.  Patient will perform sit to stand with modified independence within 7 day(s).  3.  Patient will transfer from bed to chair and chair to bed with modified independence using the least restrictive device within 7 day(s).  4.  Patient will ambulate with modified independence for 100 feet with the least restrictive device within 7 day(s).   5.  Patient will ascend/descend 3 stairs with bilateral handrail(s) with modified independence within 7 day(s).    Outcome: Progressing   PHYSICAL THERAPY TREATMENT    Patient: Madisyn Manriquez (69 y.o. female)  Date: 11/14/2024  Diagnosis: Sepsis ([facility]) [A41.9]  Sepsis, unspecified organism ([facility]) [A41.9] Sepsis ([facility])      Precautions:  (orthostatic hypotension)                      ASSESSMENT:  Patient continues to benefit from skilled PT services and is slowly progressing towards goals. She continues to decline the use of an AD. She ambulates with close SBA and intermittently uses hand rail in the hallway or touches stable surfaces. No overt LOB or increased work of breathing is noted.          PLAN:  Patient continues to benefit from skilled intervention to address the above impairments.  Continue treatment per established plan of care.        Recommendation for discharge: (in order for the patient to meet his/her long term goals):   Intermittent physical 
  Problem: Physical Therapy - Adult  Goal: By Discharge: Performs mobility at highest level of function for planned discharge setting.  See evaluation for individualized goals.  Description: FUNCTIONAL STATUS PRIOR TO ADMISSION: Patient was independent and active without use of DME. and The patient and/or family endorse 1 falls within the last 3 months.    HOME SUPPORT PRIOR TO ADMISSION: The patient lived alone with family/friends to provide assistance.    Physical Therapy Goals  Initiated 11/12/2024  1.  Patient will move from supine to sit and sit to supine and roll side to side in bed with independence within 7 day(s).    2.  Patient will perform sit to stand with modified independence within 7 day(s).  3.  Patient will transfer from bed to chair and chair to bed with modified independence using the least restrictive device within 7 day(s).  4.  Patient will ambulate with modified independence for 100 feet with the least restrictive device within 7 day(s).   5.  Patient will ascend/descend 3 stairs with bilateral handrail(s) with modified independence within 7 day(s).    Outcome: Progressing   PHYSICAL THERAPY TREATMENT    Patient: Madisyn Manriquez (69 y.o. female)  Date: 11/15/2024  Diagnosis: Sepsis ([facility]) [A41.9]  Sepsis, unspecified organism ([facility]) [A41.9] Sepsis ([facility])  Procedure(s) (LRB):  LASER LEAD EXTRACTION OF SINGLE CHAMBER ICD (CARDIO ANES FOR KEYLA) (N/A)    Precautions:  (orthostatic hypotension)                      ASSESSMENT:  Patient continues to benefit from skilled PT services and is slowly progressing towards goals. Up in chair upon arrival; agreeable to ambulation.  Sit to stand with supervision.  Patient ambulated out in ruiz with CGA and no assistive device.  Patient holds to wall/rail in hallway and is slightly unsteady at times.  Patient would likely benefit from use of walker or cane but patient does not feel she can safely use either.  Returned to chair at end of session.  Reviewed SANTIAGO 
Patient ambulated to the bathroom and tolerated well. Patient voided 500 mL. Educated patient on fall prevention and interventions.     Problem: Chronic Conditions and Co-morbidities  Goal: Patient's chronic conditions and co-morbidity symptoms are monitored and maintained or improved  11/20/2024 0359 by Candie Scales  Outcome: Progressing  11/20/2024 0358 by Candie Scales  Outcome: Progressing  Flowsheets (Taken 11/19/2024 1945)  Care Plan - Patient's Chronic Conditions and Co-Morbidity Symptoms are Monitored and Maintained or Improved: Monitor and assess patient's chronic conditions and comorbid symptoms for stability, deterioration, or improvement     Problem: Discharge Planning  Goal: Discharge to home or other facility with appropriate resources  Outcome: Progressing  Flowsheets (Taken 11/19/2024 1945)  Discharge to home or other facility with appropriate resources: Identify barriers to discharge with patient and caregiver     Problem: Pain  Goal: Verbalizes/displays adequate comfort level or baseline comfort level  11/20/2024 0359 by Candie Scales  Outcome: Progressing  11/20/2024 0358 by Candie Scales  Outcome: Progressing     Problem: Safety - Adult  Goal: Free from fall injury  11/20/2024 0359 by Candie Scales  Outcome: Progressing  11/20/2024 0358 by Candie Scales  Outcome: Progressing     Problem: ABCDS Injury Assessment  Goal: Absence of physical injury  Outcome: Progressing     Problem: Skin/Tissue Integrity  Goal: Absence of new skin breakdown  Description: 1.  Monitor for areas of redness and/or skin breakdown  2.  Assess vascular access sites hourly  3.  Every 4-6 hours minimum:  Change oxygen saturation probe site  4.  Every 4-6 hours:  If on nasal continuous positive airway pressure, respiratory therapy assess nares and determine need for appliance change or resting period.  Outcome: Progressing     
Problem: Physical Therapy - Adult  Goal: By Discharge: Performs mobility at highest level of function for planned discharge setting.  See evaluation for individualized goals.  Description: FUNCTIONAL STATUS PRIOR TO ADMISSION: Patient was independent and active without use of DME. and The patient and/or family endorse 1 falls within the last 3 months.    HOME SUPPORT PRIOR TO ADMISSION: The patient lived alone with family/friends to provide assistance.    Physical Therapy Goals  Initiated 11/12/2024  1.  Patient will move from supine to sit and sit to supine and roll side to side in bed with independence within 7 day(s).    2.  Patient will perform sit to stand with modified independence within 7 day(s).  3.  Patient will transfer from bed to chair and chair to bed with modified independence using the least restrictive device within 7 day(s).  4.  Patient will ambulate with modified independence for 100 feet with the least restrictive device within 7 day(s).   5.  Patient will ascend/descend 3 stairs with bilateral handrail(s) with modified independence within 7 day(s).    Outcome: Progressing   PHYSICAL THERAPY TREATMENT    Patient: Madisyn Manriquez (69 y.o. female)  Date: 11/18/2024  Diagnosis: Sepsis (HCC) [A41.9]  Sepsis, unspecified organism (HCC) [A41.9] Sepsis (HCC)  Procedure(s) (LRB):  LASER LEAD EXTRACTION OF SINGLE CHAMBER ICD (CARDIAC ANESTHESIA FOR KEYLA) (N/A)    Precautions:  (orthostatic hypotension)                    ASSESSMENT:  Patient continues to benefit from skilled PT services and is progressing towards goals. Patient completes transfers without assist. Ambulation >500ft without AD (patient continuing to refuse) though constantly reaching for objects & hallway railings to steady self. No observed LOB. Will benefit from 1 more session to complete stair training and education for home safety.        PLAN:  Patient continues to benefit from skilled intervention to address the above impairments.  
Problem: Physical Therapy - Adult  Goal: By Discharge: Performs mobility at highest level of function for planned discharge setting.  See evaluation for individualized goals.  Description: FUNCTIONAL STATUS PRIOR TO ADMISSION: Patient was independent and active without use of DME. and The patient and/or family endorse 1 falls within the last 3 months.    HOME SUPPORT PRIOR TO ADMISSION: The patient lived alone with family/friends to provide assistance.    Physical Therapy Goals  Reassessed 11/20/24; goals met expect stair goal (patient reports no concern regarding completing stairs at home)  Initiated 11/12/2024  1.  Patient will move from supine to sit and sit to supine and roll side to side in bed with independence within 7 day(s).    2.  Patient will perform sit to stand with modified independence within 7 day(s).  3.  Patient will transfer from bed to chair and chair to bed with modified independence using the least restrictive device within 7 day(s).  4.  Patient will ambulate with modified independence for 100 feet with the least restrictive device within 7 day(s).   5.  Patient will ascend/descend 3 stairs with bilateral handrail(s) with modified independence within 7 day(s).    Outcome: Completed   PHYSICAL THERAPY TREATMENT/DISCHARGE    Patient: Madisyn Manriquez (69 y.o. female)  Date: 11/20/2024  Diagnosis: Sepsis (HCC) [A41.9]  Sepsis, unspecified organism (HCC) [A41.9] Sepsis (HCC)  Procedure(s) (LRB):  LASER LEAD EXTRACTION OF SINGLE CHAMBER ICD (CARDIAC ANESTHESIA FOR KEYLA) (N/A) 1 Day Post-Op  Precautions: None                    ASSESSMENT:  Patient has been followed by skilled PT services and has progressed towards goals following prolonged hospitalization for sepsis. Patient with ICD extraction completed yesterday. Patient completing all bed mobility and transfers without physical assist. Therapist inquired about patient utilizing device for ambulation as she continues to reach objects in 
Patient will ambulate with modified independence for 100 feet with the least restrictive device within 7 day(s).   5.  Patient will ascend/descend 3 stairs with bilateral handrail(s) with modified independence within 7 day(s).    11/12/2024 1213 by Melva Magana, PT  Outcome: Progressing

## 2024-11-21 NOTE — DISCHARGE SUMMARY
G6 Transmitter Misc  USE AS DIRECTED TO  MONITOR  BLOOD  SUGAR     gabapentin 600 MG tablet  Commonly known as: NEURONTIN     glipiZIDE 10 MG tablet  Commonly known as: GLUCOTROL     Lantus 100 UNIT/ML injection vial  Generic drug: insulin glargine  INJECT 30 UNITS SUBCUTANEOUSLY ONCE DAILY     pantoprazole 40 MG tablet  Commonly known as: PROTONIX  Take 1 tablet by mouth once daily     pravastatin 20 MG tablet  Commonly known as: PRAVACHOL     pyridoxine 100 MG tablet  Commonly known as: B-6     valsartan 40 MG tablet  Commonly known as: DIOVAN               Where to Get Your Medications        These medications were sent to [facility] [address] [place] [address] - [address] [facility] - P [phone] - F [phone]  [address] [facility] [address]      Phone: [phone]   lactobacillus capsule       Information about where to get these medications is not yet available    Ask your nurse or doctor about these medications  vancomycin infusion             DISPOSITION:    Home with Family:       Home with HH/PT/OT/RN: X   SNF/LTC:    BRIAN:    OTHER:            Code status: Full   Recommended diet: diabetic diet  Recommended activity: activity as tolerated  Wound care: None      Follow up with:   PCP : Landon Starks MD McGrath, Kevin J, MD   [address]  [place] [id number] [phone]    Follow up      [facility] That Comes To Your Home  [email or web address]  [place]  [phone]  Follow up  As needed - [facility] home. You may call them if you would like to set up an appt to be seen for a follow up while waiting to be seen by your PCP.          Total time in minutes spent coordinating this discharge (includes going over instructions, follow-up, prescriptions, and preparing report for sign off to her PCP) :  35 minutes

## 2024-11-21 NOTE — PROGRESS NOTES
EP/ ARRHYTHMIA Progress Note  Patient ID:  Patient: Madisyn Manriquez  MRN: [id number]  Age: 69 y.o.  : 1955    Date of  Admission: 2024  3:48 PM   PCP:  Landon Starks MD    Assessment:   Secondary prevention single chamber Medtronic Evera ICD implanted in .  The single coil shock lead is a 6935M-62 cm.  Nonischemic cardiomyopathy EF 15% in 2016 recovered to 65% by 2017.  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  Paroxysmal atrial fibrillation, on Xarelto.  DM type 2.  Prior stroke involving LICA with minimal residual deficit.  Status post LICA ligation.  Former smoker.  Full code.    Plan:     She'll need a TRANSESOPHAGEAL ECHOCARDIOGRAPHY, will look to do this study this week.  The ICD will likely have to come out by laser lead extraction in the future.  She'd have to be off Xarelto for this.  Don't hold it now since an extraction requires coordination and planning and may be 1-2 weeks away.  Awaiting confirmation of recurrent bacteremia with Enterococcus.    I'll find out more details.  We talked about the above and she understands.     update:   and  blood cultures are growing Enterococcus faecalis, confirming recurrent bacteremia after the first treatment course.  I will notify my colleague in the near future, Dr. Hull, since I think she will need a device extraction ultimately.  I wouldn't risk early reimplantation.  Will see when we can get her on for TRANSESOPHAGEAL ECHOCARDIOGRAPHY tomorrow.  NPO after MN.  Given the potential benefits, risks, and alternatives, she wants to proceed.    : Was asked by Dr. Mcelroy to arrange ICD lead extraction. Patient is status post single chamber ICD for primary prevention and has bacteremia with vegetation on the mitral valve. I have arranged for this to be done in OR with CT surgery back up tomorrow. I discussed at length with her the risks benefits and alternatives to this procedure. The risk 
                           EP/ ARRHYTHMIA Progress Note  Patient ID:  Patient: Madisyn Manriquez  MRN: [id number]  Age: 69 y.o.  : 1955    Date of  Admission: 2024  3:48 PM   PCP:  Landon Starks MD    Assessment:   Secondary prevention single chamber Medtronic Evera ICD implanted in .  The single coil shock lead is a 6935M-62 cm.  Nonischemic cardiomyopathy EF 15% in 2016 recovered to 65% by 2017.  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  Paroxysmal atrial fibrillation, on Xarelto.  DM type 2.  Prior stroke involving LICA with minimal residual deficit.  Status post LICA ligation.  Former smoker.  Full code.    Plan:     She'll need a TRANSESOPHAGEAL ECHOCARDIOGRAPHY, will look to do this study this week.  The ICD will likely have to come out by laser lead extraction in the future.  She'd have to be off Xarelto for this.  Don't hold it now since an extraction requires coordination and planning and may be 1-2 weeks away.  Awaiting confirmation of recurrent bacteremia with Enterococcus.    I'll find out more details.  We talked about the above and she understands.     update:   and  blood cultures are growing Enterococcus faecalis, confirming recurrent bacteremia after the first treatment course.  I will notify my colleague in the near future, Dr. Hull, since I think she will need a device extraction ultimately.  I wouldn't risk early reimplantation.  Will see when we can get her on for TRANSESOPHAGEAL ECHOCARDIOGRAPHY tomorrow.  NPO after MN.  Given the potential benefits, risks, and alternatives, she wants to proceed.      [x]       High complexity decision making was performed in this patient at high risk for decompensation with multiple organ involvement.    Madisyn Manriquez is a 69 y.o. female with a history of recent Enterococcal bacteremia here for recurrent bacteremia.  This is likely going to be the same organism.  She has an ICD that was remotely implanted.               
      Hospitalist Progress Note    NAME:   Madisyn Manriquez   : 1955   MRN: [id number]     Date/Time: 2024 2:33 PM  Patient PCP: Landon Starks MD    Estimated discharge date:  Barriers:       Assessment / Plan:    Sepsis due to gram-positive bacteremia  History of recent Enterococcus faecalis bacteremia s/p treatment with Zyvox  Gram-positive bacteremia  -Urine analysis is within normal limits.  CT chest abdomen and pelvis shows previously noted right lower lobe airspace disease which is decreased with trace bilateral Jacque nephric fluid likely medical renal disease but no acute process in the chest abdomen or pelvis  -Blood cultures from  are growing gram-positive cocci in both bottles  -Will check echocardiogram to rule out endocarditis.  Will repeat blood cultures.  -Continue vancomycin and cefepime for now  -Consider ID consultation given recurrent bacteremia.  -She does have AICD as well and is at high risk of infection given recurrent bacteremia  -Check CBC and BMP in a.m. tomorrow    Hypokalemia  Potassium is 3.2.  Replaced with KCl.  Recheck in a.m. tomorrow    BALJIT  -Creatinine peaked at 1.4 and is back to normal at 0.7     T2DM  -Continue Lantus 30 units nightly along with insulin sliding scale and blood sugar checks.  -Hemoglobin A1c 7.4     HTN  GERD  HLD  History of defibrillator  A-fib on Xarelto  Continue home medications: Carvedilol, gabapentin, pantoprazole, and pravastatin           Medical Decision Making:   I personally reviewed labs: CBC, BMP  I personally reviewed imaging: CT chest  I personally reviewed EKG: Telemetry  Toxic drug monitoring: Monitor vancomycin levels while on IV vancomycin  Discussed case with: Pharmacy regarding antibiotic recommendations        Code Status: Full code  DVT Prophylaxis: Xarelto  GI Prophylaxis:    Subjective:     Chief Complaint / Reason for Physician Visit  She reports feeling weak in general.  Does not report any abdominal pain.  She 
      Hospitalist Progress Note    NAME:   Madisyn Manriquez   : 1955   MRN: [id number]     Date/Time: 2024 1:22 PM  Patient PCP: Landon Starks MD    Estimated discharge date: 24  Barriers:   ICD extraction, iv anbx      Assessment / Plan: Mitra 69-year-old female with past medical history of HTN, DM2, A-fib on Xarelto, status post ICD, recent Enterococcus faecalis bacteremia s/p treatment with Zyvox, presented to the ED after a fall.  Was found to have sepsis secondary to gram-positive bacteremia, likely from an AICD.  was on cefepime and vancomycin, KEYLA shows vegetation, ID recommended Vanc & GENTAMICIN, cardiology planning for ICD extraction next week    Sepsis due to Enterococcus faecalis bacteremia, resolved  History of recent Enterococcus faecalis bacteremia s/p treatment with Zyvox  Enterococcus faecalis bacteremia  -Urine analysis is within normal limits.  CT chest abdomen and pelvis shows previously noted right lower lobe airspace disease which is decreased with trace bilateral Jacque nephric fluid likely medical renal disease but no acute process in the chest abdomen or pelvis  -Blood cultures from  are growing gram-positive cocci in both bottles  -Will check echocardiogram to rule out endocarditis.     --+ve  repeat blood cultures.  -was on vancomycin and cefepime , follow repeat cultures  -c/w gentamcin and  vanc  -KEYLA shows vegetation  -Check CBC and BMP in a.m. tomorrow  -Cardiology , extraction of the ICD, likely tuesday     T2DM  -Continue Lantus 30 units nightly along with insulin sliding scale and blood sugar checks.  -Hemoglobin A1c 7.4     HTN  GERD  HLD  History of defibrillator  A-fib on Xarelto  Continue home medications: Carvedilol, gabapentin, pantoprazole, and pravastatin    Hypokalemia, resolved   BALJIT, resolved    Medical Decision Making:   I personally reviewed labs:cbc, bmp     Discussed case with: patient    Code Status: full    DVT Prophylaxis: xarelto  GI 
      Hospitalist Progress Note    NAME:   Madisyn Manriquez   : 1955   MRN: [id number]     Date/Time: 11/15/2024 1:52 PM  Patient PCP: Landon Starks MD    Estimated discharge date: 24  Barriers:   cards recs      Assessment / Plan: Mitra 69-year-old female with past medical history of HTN, DM2, A-fib on Xarelto, status post ICD, recent Enterococcus faecalis bacteremia s/p treatment with Zyvox, presented to the ED after a fall.  Was found to have sepsis secondary to gram-positive bacteremia, likely from an AICD.  was on cefepime and vancomycin, KEYLA shows vegetation, ID recommended Vanc & GENTAMICIN, cardiology planning for ICD extraction next week    Sepsis due to Enterococcus faecalis bacteremia, resolved  History of recent Enterococcus faecalis bacteremia s/p treatment with Zyvox  Enterococcus faecalis bacteremia  -Urine analysis is within normal limits.  CT chest abdomen and pelvis shows previously noted right lower lobe airspace disease which is decreased with trace bilateral Jacque nephric fluid likely medical renal disease but no acute process in the chest abdomen or pelvis  -Blood cultures from  are growing gram-positive cocci in both bottles  -Will check echocardiogram to rule out endocarditis.  Will repeat blood cultures.  -was on vancomycin and cefepime for now, follow repeat cultures  -ID rec gentamcin and  vanc  -KEYLA shows vegetation  -Check CBC and BMP in a.m. tomorrow  -Cardiology is thinking about extraction of the ICD, likely next week     Hypokalemia, resolved  Potassium is 3.2.  Replaced with KCl.  Recheck in a.m. tomorrow     BALJIT, resolved  -Creatinine peaked at 1.4 and is back to normal at 0.7     T2DM  -Continue Lantus 30 units nightly along with insulin sliding scale and blood sugar checks.  -Hemoglobin A1c 7.4     HTN  GERD  HLD  History of defibrillator  A-fib on Xarelto  Continue home medications: Carvedilol, gabapentin, pantoprazole, and pravastatin      Medical Decision 
      Hospitalist Progress Note    NAME:   Madisyn Manriquez   : 1955   MRN: [id number]     Date/Time: 2024 11:29 AM  Patient PCP: Landon Starks MD    Estimated discharge date: 11/15/24  Barriers: KEYLA ID recs      Assessment / Plan: Mitra 69-year-old female with past medical history of HTN, DM2, A-fib on Xarelto, status post ICD, recent Enterococcus faecalis bacteremia s/p treatment with Zyvox, presented to the ED after a fall.  Was found to have sepsis secondary to gram-positive bacteremia, likely from an AICD.  Currently on cefepime and vancomycin, cardiology is thinking about doing a KEYLA, ID consulted.    Sepsis due to Enterococcus faecalis bacteremia, resolved  History of recent Enterococcus faecalis bacteremia s/p treatment with Zyvox  Enterococcus faecalis bacteremia  -Urine analysis is within normal limits.  CT chest abdomen and pelvis shows previously noted right lower lobe airspace disease which is decreased with trace bilateral Jacque nephric fluid likely medical renal disease but no acute process in the chest abdomen or pelvis  -Blood cultures from  are growing gram-positive cocci in both bottles  -Will check echocardiogram to rule out endocarditis.  Will repeat blood cultures.  -Continue vancomycin and cefepime for now, follow repeat cultures  -ID consultation given recurrent bacteremia.  -She does have AICD as well and is at high risk of infection given recurrent bacteremia  -Check CBC and BMP in a.m. tomorrow  -Cardiology is thinking about extraction of the ICD, pending KEYLA today     Hypokalemia, resolved  Potassium is 3.2.  Replaced with KCl.  Recheck in a.m. tomorrow     BALJIT, resolved  -Creatinine peaked at 1.4 and is back to normal at 0.7     T2DM  -Continue Lantus 30 units nightly along with insulin sliding scale and blood sugar checks.  -Hemoglobin A1c 7.4     HTN  GERD  HLD  History of defibrillator  A-fib on Xarelto  Continue home medications: Carvedilol, gabapentin, 
      Hospitalist Progress Note    NAME:   Madisyn Manriquez   : 1955   MRN: [id number]     Date/Time: 2024 4:03 PM  Patient PCP: Landon Starks MD    Estimated discharge date:   Barriers: Final blood culture results      Assessment / Plan:    Sepsis due to Recurrent Enterococcus faecalis bacteremia and & Mitral valve endocarditis, CIED infection   History of recent Enterococcus faecalis bacteremia s/p treatment with Zyvox  -Blood cultures + for E.faecalis 3/3-LAC  Repeat cultures + for E faecalis 4/4-LA/RFA  Blood culture + for E faecalis 2/2 LAC  KEYLA+ for <1cm mobile vegetation on atrial side of anterior leaflet  No vegetation seen on a ICD, infection could not be ruled out.  S/p laser lead extraction of single-chamber ICD   Findings of gross infection of ICD lead and pocket.    -Repeat blood cultures  after extraction pending  -Continue vancomycin and gentamicin IV  -Discussed with ID Dr. LARIOS: Patient will need long-term antibiotic until blood cultures clear.  -PICC line insertion today for long-term antibiotic  -Discussed with Dr. Hull: Patient okay to discharge with IV antibiotics.  KIN drain to be removed    Antimicrobial orders for discharge  -Vancomycin 1 g   IV every 12 hours end date   -Pull line at end of therapy.    -Weekly CBC, CMP, vancomycin trough-  -Fax reports to [phone], call with critical labs  at [phone]  -Call if progressive rise in creatinine is noted  -Encourage adequate fluids, daily probiotic/yogurt  -If line malfunction occurs and home health cannot reposition  please send patient to ED immediately  -ID follow-up - at 1230-virtual okay  Patient will require surveillance blood cultures  - If persistent side effects occur stop antibiotic and call-ID/PCP       Mitral valve endocarditis  KEYLA from left ventricular lead extraction shows right ventricle lead thrombus removed with no residual, no MR  S/p laser lead extraction of ICD lead with 
      Hospitalist Progress Note    NAME:   Madisyn Manriquez   : 1955   MRN: [id number]     Date/Time: 2024 8:12 PM  Patient PCP: Landon Starks MD    Estimated discharge date: 24  Barriers: Blood cultures, final ID recs, final cardiology recs      Assessment / Plan: Mitra 69-year-old female with past medical history of HTN, DM2, A-fib on Xarelto, status post ICD, recent Enterococcus faecalis bacteremia s/p treatment with Zyvox, presented to the ED after a fall.  Was found to have sepsis secondary to gram-positive bacteremia, likely from an infected AICD.  was on cefepime and vancomycin, KEYLA shows vegetation, ID recommended Vanc & GENTAMICIN, s/p  ICD extraction     Sepsis due to Enterococcus faecalis bacteremia, resolved  Enterococcus faecalis bacteremia 2/2 infected ICD  History of recent Enterococcus faecalis bacteremia s/p treatment with Zyvox  -Blood cultures are growing gram-positive cocci in both bottles  --KEYLA shows vegetation  - S/p extraction of the ICD 2024  -Repeated blood cultures after the extraction  -Pain management with tramadol and morphine after the ICD extraction  -c/w gentamcin and  vanc     T2DM  -Continue Lantus 30 units nightly along with insulin sliding scale and blood sugar checks.  -Hemoglobin A1c 7.4     HTN  GERD  HLD  History of defibrillator  A-fib on Xarelto  Continue home medications: Carvedilol, gabapentin, pantoprazole, and pravastatin    Hypokalemia, resolved   BALJIT, resolved    Medical Decision Making:   I personally reviewed labs:cbc, bmp     Discussed case with: patient    Code Status: full    DVT Prophylaxis: xarelto  GI Prophylaxis:ppi    Subjective:     Seen and evaluated the patient at bedside, patient was laying in bed sleepy after coming back from the OR.  Objective:     VITALS:   Last 24hrs VS reviewed since prior progress note. Most recent are:  Patient Vitals for the past 24 hrs:   BP Temp Temp src Pulse Resp SpO2 Height Weight 
      Hospitalist Progress Note    NAME:   Madisyn Manriquez   : 1955   MRN: [id number]     Date/Time: 2024 10:19 AM  Patient PCP: Landon Starks MD    Estimated discharge date: 24  Barriers:   ICD extraction, iv anbx      Assessment / Plan: Mitra 69-year-old female with past medical history of HTN, DM2, A-fib on Xarelto, status post ICD, recent Enterococcus faecalis bacteremia s/p treatment with Zyvox, presented to the ED after a fall.  Was found to have sepsis secondary to gram-positive bacteremia, likely from an AICD.  was on cefepime and vancomycin, KEYLA shows vegetation, ID recommended Vanc & GENTAMICIN, cardiology planning for ICD extraction next week    Sepsis due to Enterococcus faecalis bacteremia, resolved  History of recent Enterococcus faecalis bacteremia s/p treatment with Zyvox  Enterococcus faecalis bacteremia  -Urine analysis is within normal limits.  CT chest abdomen and pelvis shows previously noted right lower lobe airspace disease which is decreased with trace bilateral Jacque nephric fluid likely medical renal disease but no acute process in the chest abdomen or pelvis  -Blood cultures from  are growing gram-positive cocci in both bottles  -Will check echocardiogram to rule out endocarditis.     --+ve  repeat blood cultures.  -was on vancomycin and cefepime , follow repeat cultures  -c/w gentamcin and  vanc  -KEYLA shows vegetation  -Check CBC and BMP in a.m. tomorrow  -Cardiology is thinking about extraction of the ICD, likely tuesday     Hypokalemia, resolved  Potassium is 3.2.  Replaced with KCl.  Recheck in a.m. tomorrow     BALJIT, resolved  -Creatinine peaked at 1.4 and is back to normal at 0.7     T2DM  -Continue Lantus 30 units nightly along with insulin sliding scale and blood sugar checks.  -Hemoglobin A1c 7.4     HTN  GERD  HLD  History of defibrillator  A-fib on Xarelto  Continue home medications: Carvedilol, gabapentin, pantoprazole, and pravastatin      Medical 
  Physician Progress Note      PATIENT:               KRISS AU  CSN #:                  [id number]  :                       1955  ADMIT DATE:       2024 3:48 PM  DISCH DATE:  RESPONDING  PROVIDER #:        Jazmyne Morales MD          QUERY TEXT:    Pt admitted with Sepsis and noted to have AICD placed previously.? If   possible, please document in progress notes and discharge summary the   relationship if any between the AICD infection and the previous AICD   placement:  ?  The medical record reflects the following:  Risk Factors:  Transfer to [facility] from [facility] for sepsis, recent E Faecalis   infection, LA 2.2, WBC 19.9,  Clinical Indicators:  H&P : \"Patient was transferred with concern for foreign body/cardiac   defibrillator implant causing pain and sepsis of unknown origin post   gram-positive bacteremia\".  Sepsis unknown etiology  Recent E faecalis infection 10/13 - 10/15  Completed linazolid a few days ago.  On admission lactic acid 2.2, WBCs 19.9, platelets 125     MD PN: Enterococcus faecalis bacteremia 2/2 infected ICD  History of recent Enterococcus faecalis bacteremia s/p treatment with Zyvox  -Blood cultures are growing gram-positive cocci in both bottles  --KEYLA shows vegetation  - S/p extraction of the ICD 2024  -Repeated blood cultures after the extraction  -Pain management with tramadol and morphine after the ICD extraction  -c/w gentamcin and  vanc     Procedure(s):  LASER LEAD EXTRACTION OF SINGLE CHAMBER ICD (CARDIAC ANESTHESIA FOR KEYLA  Findings:  -        Infection Present At Time Of Surgery (PATOS) (choose all levels that   have infection present):  The ICD lead and the pocket were grossly infected    Successful laser lead extraction of ICD lead    Treatment: Tx to [facility] from [facility], admit to Med unit, cardiac monitor,   Vancomycin, cefepime, Map >65, Blood cultures,  Options provided:  -- AICD Infection is due to the procedure  -- AICD Infection is not due to the 
  Physician Progress Note      PATIENT:               KRISS AU  [facility] #:                  [id number]  :                       1955  ADMIT DATE:       2024 3:48 PM  DISCH DATE:  RESPONDING  PROVIDER #:        Christopher Freeman MD          QUERY TEXT:    Pt admitted and has malnutrition documented in RD PN 24. Please further   specify type of malnutrition with documentation in the medical record.    The medical record reflects the following:  Risk Factors:   RD PN:  Nutrition Diagnosis:  Severe malnutrition related to inadequate protein-energy intake as evidenced   by criteria as identified in malnutrition assessment    Clinical Indicators:  Malnutrition Assessment:  Malnutrition Status:  Severe malnutrition (24 0935)  Context:  Acute Illness  Findings of the 6 clinical characteristics of malnutrition:  Energy Intake:  75% or less of estimated energy requirements for 7 or more   days  Weight Loss:  Greater than 5% over 1 month  Body Fat Loss:  Mild body fat loss Triceps, Orbital  Muscle Mass Loss:  Moderate muscle mass loss Clavicles (pectoralis &   deltoids), Hand (interosseous)  Fluid Accumulation:  No fluid accumulation   Strength:  Not Performed  Current Body Weight: 55.7 kg (122 lb 12.7 oz)  Current BMI (kg/m2): 19.8    Treatment:  Nutrition Recommendations/Plan:  1. 4 carb choice diet  2. Add ensure high protein BID    ASPEN Criteria:    https://aspenjournals.onlinelibrary.messer.com/doi/full/10.1177/467021855986248  5  Options provided:  -- Severe Malnutrition  -- Severe Protein calorie malnutrition  -- Other - I will add my own diagnosis  -- Disagree - Not applicable / Not valid  -- Disagree - Clinically unable to determine / Unknown  -- Refer to Clinical Documentation Reviewer    PROVIDER RESPONSE TEXT:    This patient has severe malnutrition.    Query created by: Rilee, Cheryle on 2024 10:27 AM      Electronically signed by:  Christopher Freeman MD 2024 12:46 
  Physician Progress Note      PATIENT:               KRISS AU  [facility] #:                  [id number]  :                       1955  ADMIT DATE:       2024 3:48 PM  DISCH DATE:  RESPONDING  PROVIDER #:        Jez Diggs MD          QUERY TEXT:    Patient admitted, noted to have atrial fibrillation and is maintained on   Xarelto. If possible, please document in progress notes and discharge summary   if you are evaluating and/or treating any of the following:?  ?  The medical record reflects the following:  Risk Factors: To ED CC for fall, noted to be on Xarelto for Atrial   fibrillation.    Clinical Indicators:  H&P: A-fib on Xarelto, Continue home medications:    Treatment: Xarelto PO 15mg with dinner  Options provided:  -- Secondary hypercoagulable state in a patient with atrial fibrillation  -- Other - I will add my own diagnosis  -- Disagree - Not applicable / Not valid  -- Disagree - Clinically unable to determine / Unknown  -- Refer to Clinical Documentation Reviewer    PROVIDER RESPONSE TEXT:    This patient has secondary hypercoagulable state in a patient with atrial   fibrillation.    Query created by: Rilee, Cheryle on 2024 9:46 AM      Electronically signed by:  Jez Diggs MD 2024 7:08 PM          
  Pt sedated with 1 mg Versed and 25 mcg Fentanyl for KEYLA (monitored sedation from 1545 to 1557)    
0700: Bedside and Verbal shift change report given to Eduarda HILLMAN (oncoming nurse) by Adiel HILLMAN (offgoing nurse). Report included the following information Nurse Handoff Report, Intake/Output, MAR, Recent Results, and Cardiac Rhythm NSR-ST .     0950: Pt. Requesting to shower, RN explained it requires MD order. Caterina ACUNA messaged with request. RN has some concerns about pt. Mobility due to fall PTA, asked for PT/OT eval.     0952: Order for pt. To shower with assistance given, PT/OT to also see the pt.     End of Shift Note    Bedside shift change report given to Abimbola HILLMAN (oncoming nurse) by Eduarda Love RN (offgoing nurse).  Report included the following information SBAR, Intake/Output, Recent Results, and Cardiac Rhythm NSR-ST    Shift worked:  7a-7p     Shift summary and any significant changes:     Pt. Worked with PT/OT today and was able to shower with assistance. Blood cultures drawn, results pending. Per cards- infection source might be defibrillator, see note. Plan is to continue with abx. And potentially remove device.      Concerns for physician to address:  BP low      Zone phone for oncoming shift:   .       Activity:     Number times ambulated in hallways past shift: 0  Number of times OOB to chair past shift: 3    Cardiac:   Cardiac Monitoring: Yes           Access:  Current line(s): PIV     Genitourinary:   Urinary status: voiding    Respiratory:      Chronic home O2 use?: NO  Incentive spirometer at bedside: N/A       GI:     Current diet:  ADULT DIET; Regular  Passing flatus: YES  Tolerating current diet: YES       Pain Management:   Patient states pain is manageable on current regimen: YES    Skin:     Interventions: float heels, increase time out of bed, and PT/OT consult    Patient Safety:  Fall Score:    Interventions: bed/chair alarm, gripper socks, pt to call before getting OOB, and stay with me (per policy)       Length of Stay:  Expected LOS: 4  Actual LOS: 1      Eduarda Love 
0700: Bedside and Verbal shift change report given to Eduarda HILLMAN (oncoming nurse) by Vira HILLMAN (offgoing nurse). Report included the following information Nurse Handoff Report, Intake/Output, MAR, Recent Results, and Cardiac Rhythm NSR .     End of Shift Note    Bedside shift change report given to Fox HILLMAN (oncoming nurse) by Eduarda Love RN (offgoing nurse).  Report included the following information SBAR, Intake/Output, MAR, Recent Results, and Cardiac Rhythm NSR    Shift worked:  7a-7p     Shift summary and any significant changes:     Pt. Went for KEYLA this afternoon which should mild vegetation, see cardiology note. Cards to decide if ICD needs to be removed. Pt to continue on IV abx at this time.     Pt. Has had several dizzy and lightheaded spells today, continue to monitor BP.      Concerns for physician to address:  none     Zone phone for oncoming shift:   .       Activity:     Number times ambulated in hallways past shift: 1  Number of times OOB to chair past shift: 3    Cardiac:   Cardiac Monitoring: Yes           Access:  Current line(s): PIV     Genitourinary:   Urinary status: voiding    Respiratory:      Chronic home O2 use?: NO  Incentive spirometer at bedside: N/A       GI:     Current diet:  ADULT DIET; Regular  DIET ONE TIME MESSAGE;  Passing flatus: YES  Tolerating current diet: YES       Pain Management:   Patient states pain is manageable on current regimen: YES    Skin:     Interventions: turn team, float heels, increase time out of bed, and PT/OT consult    Patient Safety:  Fall Score:    Interventions: bed/chair alarm, assistive device (walker, cane. etc), gripper socks, pt to call before getting OOB, and stay with me (per policy)       Length of Stay:  Expected LOS: 4  Actual LOS: 3      Eduarda Love, RN                            
0715: Bedside and Verbal shift change report given to Nam (oncoming nurse) by Abimbola (offgoing nurse). Report included the following information Nurse Handoff Report, Index, ED Encounter Summary, ED SBAR, Adult Overview, Intake/Output, MAR, Recent Results, and Cardiac Rhythm NSR .      1400: 1 set BC sent to lab. 2 x unsuccessful attempts on 2nd set of BC. Charge notified.    1930: End of Shift Note    Bedside shift change report given to Vira (oncoming nurse) by Nam Kelley RN (offgoing nurse).  Report included the following information SBAR, Kardex, ED Summary, Intake/Output, MAR, Recent Results, and Cardiac Rhythm NSR    Shift worked:  4773-0263     Shift summary and any significant changes:     See above note     Concerns for physician to address:  N/A     Zone phone for oncoming shift:          Activity:     Number times ambulated in hallways past shift: 0  Number of times OOB to chair past shift: 5    Cardiac:   Cardiac Monitoring: Yes           Access:  Current line(s): PIV     Genitourinary:   Urinary status: voiding    Respiratory:      Chronic home O2 use?: NO  Incentive spirometer at bedside: NO       GI:     Current diet:  ADULT DIET; Regular  Passing flatus: YES  Tolerating current diet: YES       Pain Management:   Patient states pain is manageable on current regimen: YES    Skin:     Interventions: float heels and increase time out of bed    Patient Safety:  Fall Score:    Interventions: bed/chair alarm, assistive device (walker, cane. etc), gripper socks, pt to call before getting OOB, and stay with me (per policy)       Length of Stay:  Expected LOS: 4  Actual LOS: 2      Nam Kelley RN                            
0715: Bedside and Verbal shift change report given to Nam (oncoming nurse) by Tori (offgoing nurse). Report included the following information Nurse Handoff Report, Index, ED Encounter Summary, ED SBAR, Adult Overview, Intake/Output, MAR, Recent Results, and Cardiac Rhythm NSR .    
0720: Bedside and Verbal shift change report given to Nam (oncoming nurse) by Tasia (offgoing nurse). Report included the following information Nurse Handoff Report, Index, ED Encounter Summary, ED SBAR, Adult Overview, Intake/Output, MAR, Recent Results, and Cardiac Rhythm NSR .      1930: End of Shift Note    Bedside shift change report given to Tori (oncoming nurse) by Nam Kelley RN (offgoing nurse).  Report included the following information SBAR, Kardex, ED Summary, Intake/Output, MAR, Recent Results, and Cardiac Rhythm NSR    Shift worked:  0566-3498     Shift summary and any significant changes:     See above note     Concerns for physician to address:  N/A     Zone phone for oncoming shift:          Activity:     Number times ambulated in hallways past shift: 0  Number of times OOB to chair past shift: 3    Cardiac:   Cardiac Monitoring: Yes           Access:  Current line(s): PIV     Genitourinary:   Urinary status: voiding    Respiratory:      Chronic home O2 use?: NO  Incentive spirometer at bedside: NO       GI:     Current diet:  ADULT DIET; Regular; 4 carb choices (60 gm/meal); No Milk  ADULT ORAL NUTRITION SUPPLEMENT; Breakfast, Dinner; Low Calorie/High Protein Oral Supplement  Passing flatus: YES  Tolerating current diet: YES       Pain Management:   Patient states pain is manageable on current regimen: YES    Skin:     Interventions: float heels and increase time out of bed    Patient Safety:  Fall Score:    Interventions: bed/chair alarm, assistive device (walker, cane. etc), gripper socks, pt to call before getting OOB, and stay with me (per policy)       Length of Stay:  Expected LOS: 9  Actual LOS: 7      Nam Kelley RN                            
0730: Bedside report from RAFY Perea.   Patient is requesting to go home today. Barriers to discharge discussed with patient and she is able to verbalize understanding.     0830: Hospitalist contacted regarding patient's wish to be discharged today.     1000: IDR update- Barriers identified as patient will probably need long term abx therapy from home. Barriers discussed with patient. Patient stated she was supposed to be on IV abx from previous admission, but home health did not not service her area as [place] [facility] Case management is following.     1200: Patient very upset with current tx plan. She is refusing blood sugar checks and wants to speak to infections disease or \"someone who can make a decision\" about her plan of care. ID and hospitalist were messaged via perfect serve.     1400: Patient informed staff the ID had been in to see her and she was more on understanding of the treatment plan. Pharmacy notified that 1200 dose of abx was missing. Per pharmacy, the medication was loaded in the [facility]. [facility] shows medication is ordered, but not loaded. Notified pharmacy via secure message.     1545: Patient is requesting to have PICC placed as she will need it for discharge. Hospitalist notified via secure message. Order placed for line. PICC team notified of order. Patient is not eligible for line at this time d/t outstanding blood cultures. RN not aware of this policy, but hospitalist made aware patient will not be receiving PICC line until cultures are more than 24hrs.           
1530  Pt arrived to unit. Bilateral groin sites CDI, soft to palpation. Left chest site draining via KIN drain. Small amount of drainage noted on chest dressing.  Pt reporting 3/10 pain, but satisfied. VSS. Pt on bedrest until 1900.     1625  Pt reporting nausea. RN to administer zofran.     1645  Per patient nausea has subsided. Pt reporting 7/10 pain. Dr. Freeman to order pain medication.     1651  Arterial line removed per Jakela CCL.     End of Shift Note    Bedside shift change report given to Brit Lenz (oncoming nurse) by Deb Chairez RN (offgoing nurse).  Report included the following information SBAR    Shift worked:  0306-4134     Shift summary and any significant changes:     See above.      Concerns for physician to address:  Plan for discharge?      Zone phone for oncoming shift:   N/A       Activity:     Number times ambulated in hallways past shift: 0  Number of times OOB to chair past shift: 0    Cardiac:   Cardiac Monitoring: Yes           Access:  Current line(s): PIV     Genitourinary:   Urinary status: voiding    Respiratory:      Chronic home O2 use?: NO  Incentive spirometer at bedside: NO       GI:     Current diet:  ADULT DIET; Regular; 4 carb choices (60 gm/meal); No Milk  ADULT ORAL NUTRITION SUPPLEMENT; Breakfast, Dinner; Low Calorie/High Protein Oral Supplement  Passing flatus: YES  Tolerating current diet: YES       Pain Management:   Patient states pain is manageable on current regimen: YES    Skin:     Interventions: increase time out of bed    Patient Safety:  Fall Score:    Interventions: bed/chair alarm, assistive device (walker, cane. etc), gripper socks, pt to call before getting OOB, and stay with me (per policy)       Length of Stay:  Expected LOS: 10  Actual LOS: 8      Deb Chairez, RN                                    
1900 Bedside and Verbal shift change report given to Adiel RN (oncoming nurse) by Mendy RN (offgoing nurse). Report included the following information Nurse Handoff Report, MAR, Recent Results, and Cardiac Rhythm NSR/S Tachy .     2000 Pt refuse Blood sugar.    0700 Bedside shift change report given to RN (oncoming nurse) by Adiel Mancia RN (offgoing nurse).  Report included the following information SBAR, MAR, Recent Results, and Cardiac Rhythm NSR/S Tachy                            
2030-Pt's IV infiltrated/leaking; line removed    2035-Attempted x2 to reestablish access for IV abx. Attempts unsuccessful.    2044-Call placed to L&D requesting assistance with IV, no one available to assist.    2045-Call placed to ED charge, no answer    2049-Call placed to RRRN, stated she would be at bedside to attempt. Pt informed of intent to reestablish access and stated she \"wanted to give the body a break.\" RRRN informed of pt's intent to attempt at a later time.    0345-call placed again to RRRN to request assistance with IV placement, RRRN assisting with code stroke and not available.    0403-2nd floor RN attempted x2 to place IV, attempts unsuccessful.    0410-Call placed to ED charge RN to request U/S guided line, informed that someone should be available to assist. Informed charge RN of pt room number.    0520-No appearance from ED staff, call placed again to ED charge RN who stated that no one was available.    0522-RRRN called again. Instructed to call CCU charge RN for U/S assistance    0523-Call placed to CCU charge RN for assistance, informed that she would be at pt bedside to attempt line placement.    0552-CCU charge RN attempted x2 with U/S to place PIV. Both attempts unsuccessful. Day shift/PICC team to be notified.    
Bedside and Verbal shift change report given to Guillermo (oncoming nurse) by Neo (offgoing nurse). Report included the following information Nurse Handoff Report, ED SBAR, Adult Overview, MAR, and Cardiac Rhythm Sinus Rhythm .      End of Shift Note    Bedside shift change report given to *** (oncoming nurse) by Guillermo Ramos RN (offgoing nurse).  Report included the following information SBAR, ED Summary, MAR, Recent Results, and Cardiac Rhythm Sinus Rhythm    Shift worked:  3485-5063     Shift summary and any significant changes:     N/a     Concerns for physician to address:  N/a     Zone phone for oncoming shift:   ***       Activity:     Number times ambulated in hallways past shift: 0  Number of times OOB to chair past shift: 0    Cardiac:   Cardiac Monitoring: Yes           Access:  Current line(s): PIV     Genitourinary:   Urinary status: voiding    Respiratory:      Chronic home O2 use?: NO  Incentive spirometer at bedside: NO       GI:     Current diet:  ADULT DIET; Regular; 4 carb choices (60 gm/meal); No Milk  ADULT ORAL NUTRITION SUPPLEMENT; Breakfast, Dinner; Low Calorie/High Protein Oral Supplement  Passing flatus: YES  Tolerating current diet: YES       Pain Management:   Patient states pain is manageable on current regimen: YES    Skin:     Interventions: {jenifer interventions:73311}    Patient Safety:  Fall Score:    Interventions: bed/chair alarm and gripper socks       Length of Stay:  Expected LOS: 9  Actual LOS: 6      Guillermo Ramos, RN                           
Bedside and Verbal shift change report given to Neo (oncoming nurse) by Guillermo (offgoing nurse). Report included the following information Nurse Handoff Report, Adult Overview, MAR, Recent Results, and Cardiac Rhythm NSR .      1110: Patient refused BG check.     End of Shift Note    Bedside shift change report given to Tasia (oncoming nurse) by Neo Berry RN (offgoing nurse).  Report included the following information SBAR, MAR, Recent Results, and Cardiac Rhythm NSR    Shift worked:  9379-5890     Shift summary and any significant changes:     Uneventful     Concerns for physician to address:  N/A     [facility]   [phone]           Length of Stay:  Expected LOS: 9  Actual LOS: 6      Neo Berry RN                           
Bedside and Verbal shift change report given to Neo (oncoming nurse) by Vivian (offgoing nurse). Report included the following information Nurse Handoff Report, ED Encounter Summary, Adult Overview, MAR, Recent Results, and Cardiac Rhythm NSR .     0920: PIV placed by PICC team.     1100: Patient refused lunch time blood sugar check.     End of Shift Note    Bedside shift change report given to Tyshawn (oncoming nurse) by Neo Berry RN (offgoing nurse).  Report included the following information SBAR, MAR, Recent Results, and Cardiac Rhythm NSR    Shift worked:  8696-6386     Shift summary and any significant changes:     Uneventful     Concerns for physician to address:  N/A     Zone phone for oncoming shift:   [phone]         Length of Stay:  Expected LOS: 9  Actual LOS: 5      Neo Berry RN                              
Bedside and Verbal shift change report given to RAFY Constantino (oncoming nurse) by RAFY Browning (offgoing nurse). Report included the following information Nurse Handoff Report, Adult Overview, Intake/Output, MAR, Recent Results, Cardiac Rhythm NSR, Neuro Assessment, and Event Log.       End of Shift Note    Bedside shift change report given to Eduarda (oncoming nurse) by Vira Chung RN (offgoing nurse).  Report included the following information SBAR, Intake/Output, MAR, Recent Results, and Cardiac Rhythm NSR    Shift worked:  7p-7a     Shift summary and any significant changes:     No acute events overnight     Concerns for physician to address:        Zone phone for oncoming shift:             Length of Stay:  Expected LOS: 4  Actual LOS: 3      Vira Chung RN                            
Bedside and Verbal shift change report given to RAFY Dozier (oncoming nurse) by RAFY Weber (offgoing nurse). Report included the following information Nurse Handoff Report, Index, Adult Overview, Intake/Output, MAR, Recent Results, and Cardiac Rhythm NSR .     End of Shift Note    Bedside shift change report given to RAFY Browning (oncoming nurse) by Tasia Jung RN (offgoing nurse).  Report included the following information SBAR, Kardex, Intake/Output, MAR, Recent Results, and Cardiac Rhythm NSR    Shift worked:  7p-7a     Shift summary and any significant changes:          Concerns for physician to address:       Zone phone for oncoming shift:          Tasia Jung RN                            
Bedside shift change report given to RAFY Valle/ RAFY Li (oncoming nurse) by Eduarda Love RN  (offgoing nurse). Report included the following information Nurse Handoff Report, Index, ED Encounter Summary, ED SBAR, Adult Overview, Intake/Output, MAR, Recent Results, Med Rec Status, Cardiac Rhythm NSR, and Alarm Parameters.    
Bedside shift change report given to Tori Espinoza RN (oncoming nurse) by Nam Kelley RN  (offgoing nurse). Report included the following information Nurse Handoff Report, Index, ED Encounter Summary, ED SBAR, Surgery Report, Intake/Output, MAR, Recent Results, Med Rec Status, Cardiac Rhythm NSR, and Alarm Parameters.    
Cardiology will see as needed this weekend.  
Comprehensive Nutrition Assessment    Type and Reason for Visit:  Initial, Positive nutrition screen    Nutrition Recommendations/Plan:   4 carb choice diet  Add ensure high protein BID     Malnutrition Assessment:  Malnutrition Status:  Severe malnutrition (11/16/24 0935)    Context:  Acute Illness     Findings of the 6 clinical characteristics of malnutrition:  Energy Intake:  75% or less of estimated energy requirements for 7 or more days  Weight Loss:  Greater than 5% over 1 month     Body Fat Loss:  Mild body fat loss Triceps, Orbital   Muscle Mass Loss:  Moderate muscle mass loss Clavicles (pectoralis & deltoids), Hand (interosseous)  Fluid Accumulation:  No fluid accumulation     Strength:  Not Performed    Nutrition Assessment:    Patient medically noted for sepsis, bacteremia, and BALJIT. PMH HTN, DM, CVA, GERD, HLD, and AFIB. MST referral received. Patient with significant 9.6% weight loss x 1 month. She reports a decreased appetite and overall has lost ~38#. Muscle wasting and fat loss noted. She's tried Glucerna shakes previously and reports intolerance but wants to try ensure high protein. Menu provided to help with meal selections. She reports recent oral surgery and ill fitting dentures so certain foods are easier to consume. Family planning to bring [employer] for her today which she is excited about. Encouraged intake of meals/supplements.     Nutrition Related Findings:    [phone], A1c 7.4   Lantus, Humalog, Probiotic, Protonix, Pravastatin   Wound Type: None       Current Nutrition Intake & Therapies:    Average Meal Intake:  (varying PO)     ADULT DIET; Regular  DIET ONE TIME MESSAGE;    Anthropometric Measures:  Height: 167.6 cm (5' 6\")  Ideal Body Weight (IBW): 130 lbs (59 kg)    Usual Body weight: 160#  Current Body Weight: 55.7 kg (122 lb 12.7 oz),   IBW.    Current BMI (kg/m2): 19.8                             BMI Categories: Normal Weight (BMI 18.5-24.9)    Estimated Daily Nutrient 
Comprehensive Nutrition Assessment    Type and Reason for Visit:  Reassess    Nutrition Recommendations/Plan:   Continue current diet and supplements  Please document % meals and supplements consumed in flowsheet I/O's under intake   Please replete K and Mag     Malnutrition Assessment:  Malnutrition Status:  Severe malnutrition (11/16/24 0935)    Context:  Acute Illness     Findings of the 6 clinical characteristics of malnutrition:  Energy Intake:  75% or less of estimated energy requirements for 7 or more days  Weight Loss:  Greater than 5% over 1 month     Body Fat Loss:  Mild body fat loss Triceps, Orbital   Muscle Mass Loss:  Moderate muscle mass loss Clavicles (pectoralis & deltoids), Hand (interosseous)  Fluid Accumulation:  No fluid accumulation     Strength:  Not Performed    Nutrition Assessment:     Chart reviewed. Pt seen with son at bedside. Her appetite is doing fair and seems to be improving. She is drinking most of the supplements as ordered and family brings in alternative food for her when she is not interested in the hospital food. Discharge pending blood cultures. No new nutrition concerns.     Patient Vitals for the past 120 hrs:   PO Meals Eaten (%)   11/19/24 1845 1 - 25%     Wt Readings from Last 5 Encounters:   11/20/24 56.8 kg (125 lb 3.5 oz)   11/11/24 59 kg (130 lb)   10/15/24 61.3 kg (135 lb 1.6 oz)   07/29/24 67.3 kg (148 lb 5.9 oz)   07/28/24 63.5 kg (140 lb)   ]    Nutrition Related Findings:    Labs: K 3.2, Mag 1.4.   Meds: Gentamycin, Lantus, Humaog, Culturelle, Protonix, Vancomycin.   BM 11/19.   Wound Type: None       Current Nutrition Intake & Therapies:    Average Meal Intake: 26-50%  Average Supplements Intake: 51-75%  ADULT DIET; Regular; 4 carb choices (60 gm/meal); No Milk  ADULT ORAL NUTRITION SUPPLEMENT; Breakfast, Dinner; Low Calorie/High Protein Oral Supplement    Anthropometric Measures:  Height: 167.6 cm (5' 6\")  Ideal Body Weight (IBW): 130 lbs (59 kg)     
Cosign documentation for Guillermo Ramos, RN   
End of Shift Note    Bedside shift change report given to Cookie HILLMAN (oncoming nurse) by Liberty Hernandez RN (offgoing nurse).  Report included the following information SBAR and Cardiac Rhythm NSR    Shift worked:  7p to 7a       Shift summary and any significant changes:    Blood cultures drawn       0222: 2.58 second pause and into [facility]     Concerns for physician to address:  Above      Zone phone for oncoming shift:   [phone]       Activity:     Number times ambulated in hallways past shift: 0  Number of times OOB to chair past shift: 1    Cardiac:   Cardiac Monitoring: Yes           Access:  Current line(s): PIV     Genitourinary:   Urinary status: voiding    Respiratory:      Chronic home O2 use?: NO  Incentive spirometer at bedside: NO       GI:     Current diet:  ADULT DIET; Regular; 4 carb choices (60 gm/meal); No Milk  ADULT ORAL NUTRITION SUPPLEMENT; Breakfast, Dinner; Low Calorie/High Protein Oral Supplement  Passing flatus: YES  Tolerating current diet: NO       Pain Management:   Patient states pain is manageable on current regimen: YES    Skin:     Interventions: increase time out of bed    Patient Safety:  Fall Score:    Interventions: bed/chair alarm, assistive device (walker, cane. etc), gripper socks, pt to call before getting OOB, and stay with me (per policy)       Length of Stay:  Expected LOS: 10  Actual LOS: 9      Liberty Hernandez RN                            
End of Shift Note    Bedside shift change report given to Nam Kelley RN  (oncoming nurse) by Tori Espinoza RN (offgoing nurse).  Report included the following information SBAR, Kardex, ED Summary, Procedure Summary, Intake/Output, MAR, Accordion, Recent Results, Med Rec Status, and Cardiac Rhythm NSR    Shift worked:  2473-9395     Shift summary and any significant changes:     No significant change.  Pt. Remain asymptomatic and stable vital sings.     Concerns for physician to address:       Zone phone for oncoming shift:          Activity:     Number times ambulated in hallways past shift: 0  Number of times OOB to chair past shift: 1    Cardiac:   Cardiac Monitoring: Yes           Access:  Current line(s): PIV     Genitourinary:   Urinary status: voiding    Respiratory:      Chronic home O2 use?: NO  Incentive spirometer at bedside: N/A       GI:     Current diet:  ADULT DIET; Regular; 4 carb choices (60 gm/meal); No Milk  ADULT ORAL NUTRITION SUPPLEMENT; Breakfast, Dinner; Low Calorie/High Protein Oral Supplement  Passing flatus: YES  Tolerating current diet: YES       Pain Management:   Patient states pain is manageable on current regimen: YES    Skin:     Interventions: float heels and increase time out of bed    Patient Safety:  Fall Score:    Interventions: bed/chair alarm, assistive device (walker, cane. etc), gripper socks, pt to call before getting OOB, and stay with me (per policy)       Length of Stay:  Expected LOS: 9  Actual LOS: 8      Tori Espinoza RN                           
End of Shift Note    Bedside shift change report given to RAFY Cancino (oncoming nurse) by Tori Espinoza RN (offgoing nurse).  Report included the following information SBAR, Kardex, ED Summary, Procedure Summary, Intake/Output, MAR, Accordion, Recent Results, Med Rec Status, Cardiac Rhythm NSR, and Alarm Parameters     Shift worked:  2149-5060     Shift summary and any significant changes:     No significant change.  Pt. Remain asymptomatic and stable vital signs.     Concerns for physician to address:       Zone phone for oncoming shift:          Activity:     Number times ambulated in hallways past shift: 0  Number of times OOB to chair past shift: 1    Cardiac:   Cardiac Monitoring: Yes           Access:  Current line(s): PIV     Genitourinary:   Urinary status: voiding    Respiratory:      Chronic home O2 use?: NO  Incentive spirometer at bedside: N/A       GI:     Current diet:  ADULT DIET; Regular  DIET ONE TIME MESSAGE;  Passing flatus: YES  Tolerating current diet: YES       Pain Management:   Patient states pain is manageable on current regimen: YES    Skin:     Interventions: float heels, increase time out of bed, and PT/OT consult    Patient Safety:  Fall Score:    Interventions: bed/chair alarm, assistive device (walker, cane. etc), gripper socks, pt to call before getting OOB, and stay with me (per policy)       Length of Stay:  Expected LOS: 4  Actual LOS: 4      Tori Espinoza RN                           
I communicated with Dr. Hull.  ICD extraction anticipated on Tuesday of next week.  This is good timing.  Allows for sterilization with antibiotic and proper risk minimization.  Fortunately, she's been captured before significant valve destruction.    Tyshawn Mcelroy MD    
OCCUPATIONAL THERAPY EVALUATION/DISCHARGE  Patient: Madisyn Manriquez (69 y.o. female)  Date: 11/12/2024  Primary Diagnosis: Sepsis ([facility]) [A41.9]  Sepsis, unspecified organism (HCC) [A41.9]         Precautions:  (orthostatic hypotension)                  ASSESSMENT :  Based on the objective data below, the patient is AAOx4, hypotensive but stable during therapy today. The patient is overall supervision due to low BP, baseline visual perceptual deficits and being in a new environment. Per RN and patient she was able to shower today with supervision and no physical assistance. Pt is independent at baseline and stated she will have support at [place]. Encourage OOB activity and safe ADLs while in the hospital. Education provided for fall prevention during ADLs, orthostatic hypotension and energy conservation. The patient has no further acute care OT needs.        11/12/24 1420 11/12/24 1430 11/12/24 1441   Vital Signs   BP (!) 109/59 105/61 (!) 104/58   MAP (Calculated) 76 76 73   Patient Position Sitting Standing Sitting  (post walking)         Functional Outcome Measure:  The patient scored 21/24 on the AM PAC outcome measure which is indicative of min to no assist for self-care.      Further skilled acute occupational therapy is not indicated at this time.     PLAN :  Recommend with staff: OOB    Recommendation for discharge: (in order for the patient to meet his/her long term goals):   No skilled occupational therapy    Other factors to consider for discharge: high risk for falls    IF patient discharges home will need the following DME: patient owns DME required for discharge     SUBJECTIVE:   Patient stated, “I had someone live with me and they needed stuff so I have it to use for if I need it.”- why the patient has a shower chair    OBJECTIVE DATA SUMMARY:     Past Medical History:   Diagnosis Date    A-fib (HCC)     Aphasia due to recent cerebrovascular accident 3/1/2016    Back pain     Grief reaction with prolonged 
OK to DC from EP standpoint. Will sign off.   
PCP hospital follow-up transitional care appointment has been scheduled with Dr. Malina Middleton on 11/27/241330.[facility] information on AVS for patient resource.   Pending patient discharge.     
PICC order acknowledged. Waiting for blood cultures results prior to placing PICC line. RAFY Gary made aware.  Ele Walton RN, BSN, CRNI, VA-BC  Vascular Access Team    
Patient arrived to Non-Invasive Cardiology Lab for In Patient KEYLA Procedure. Staff introduced to patient. Patient identifiers verified with Name and Date of Birth. Procedure verified with patient. Consent forms reviewed and signed by patient or authorized representative and verified. Allergies verified. Patient informed of procedure and plan of care. Questions answered with review.     Patient on cardiac monitor, non-invasive blood pressure, SPO2 monitor. On RA. Patient is A&Ox3. Patient reports no complaints.    Patient on stretcher, in low position, with side rails up. Patient instructed to call for assistance as needed.        
Pharmacy Antimicrobial Kinetic Dosing    Indication for Antimicrobials: Recurrent bacteremia    Current Regimen of Each Antimicrobial:  Vancomycin pharmacy to dose; Start Date 24; Day 6  Gentamicin 60 mg (1 mg/kg) IV q8h; Start Date 11/15; Day 2    Previous Antimicrobial Therapy:  Cefepime 2gm x 1 [facility]  Vancomycin 1500mg x 1  [facility]    Goal Level: Vancomycin -600     Date Dose & Interval Measured (mcg/mL) Predicted AUC 24-48 Predicted AUC 24,ss    1000 mg q12h 11.1 473 495   11/15 1000 mg q12h 8 496 518            Goal Level: Gentamicin (synergy dosing): peak 3-5, trough < 0.1    Date Dose & Interval Measured (mcg/mL) Predicted AUC 24-48 Predicted AUC 24,ss    60 mg q8h <0.2                       Significant Cultures:    blood: Enterococcus faecalis   blood: Enterococcus faecalis   blood: NGTD    Labs:  Recent Labs     Units 24  0603   CREATININE MG/DL 0.66   BUN MG/DL 10   WBC K/uL 7.7     Temp (24hrs), Av.3 °F (36.8 °C), Min:98.1 °F (36.7 °C), Max:98.4 °F (36.9 °C)      Conditions for Dosing Consideration: None    Creatinine Clearance (mL/min): Estimated Creatinine Clearance: 72 mL/min (based on SCr of 0.66 mg/dL).       Impression/Plan:   Vegetation for on KEYLA  Vancomycin level of 8 is low because of missed dose 1114 PM. Extrapolated AUC is therapeutic so continue vanc 1000 mg q12h  Gentamicin 1 mg/kg q8h added for synergy  Started gentamicin 60 mg q8h  Gent trough <0.2 - continue with the current dose   Antimicrobial stop date TBD     Pharmacy will follow daily and adjust medications as appropriate for renal function and/or serum levels.    Thank you,  Silas Mejia, [facility]                
Pharmacy Antimicrobial Kinetic Dosing    Indication for Antimicrobials: Recurrent bacteremia    Current Regimen of Each Antimicrobial:  Vancomycin pharmacy to dose; Start Date 24; Day 9  Gentamicin 50 mg (1 mg/kg) IV q8h; Start Date 11/15; Day 5    Previous Antimicrobial Therapy:  Cefepime 2gm x 1 [facility]  Vancomycin 1500mg x 1  [facility]    Goal Level: Vancomycin -600     Date Dose & Interval Measured (mcg/mL) Predicted AUC 24-48 Predicted AUC 24,ss    1000 mg q12h 11.1 473 495   11/15 1000 mg q12h 8 496 518    1000 mg q12h 14.2 581 591     Goal Level: Gentamicin (synergy dosing): peak 3-5, trough < 0.1    Date Dose & Interval Measured (mcg/mL)    50 mg q8h <0.2    50 mg q8h 0.4 (not a true trough)            Significant Cultures:    blood: Enterococcus faecalis   blood: Enterococcus faecalis   blood: NGTD    Labs:  No results for input(s): \"CREATININE\", \"BUN\", \"PROCAL\", \"WBC\", \"BANDS\" in the last 72 hours.    Temp (24hrs), Av.1 °F (36.7 °C), Min:97.5 °F (36.4 °C), Max:98.5 °F (36.9 °C)      Conditions for Dosing Consideration: None    Creatinine Clearance (mL/min): Estimated Creatinine Clearance: 73 mL/min (based on SCr of 0.66 mg/dL).       Impression/Plan:   Vegetation on KEYLA  ICD will be removed   Vancomycin level of 1.2 is therapeutic. Continue vanc 1000 mg q12h  Gent level was drawn early and resulted at 0.4. Likely still ok but will repeat trough   Antimicrobial stop date TBD     Pharmacy will follow daily and adjust medications as appropriate for renal function and/or serum levels.    Thank you,  Brigido Grey [facility]                      
Pharmacy Antimicrobial Kinetic Dosing    Indication for Antimicrobials: Recurrent bacteremia, endocarditis    Current Regimen of Each Antimicrobial:  Vancomycin pharmacy to dose; Start Date 24; Day 5  Gentamicin 60 mg (1 mg/kg) IV q8h; Start Date 11/15; Day 1    Previous Antimicrobial Therapy:  Cefepime 2gm x 1 [facility]  Vancomycin 1500mg x 1  [facility]    Goal Level: Vancomycin -600     Date Dose & Interval Measured (mcg/mL) Predicted AUC 24-48 Predicted AUC 24,ss    1000 mg q12h 11.1 473 495   11/15 1000 mg q12h 8 496 518            Goal Level: Gentamicin (synergy dosing): peak 3-5, trough < 0.1    Date Dose & Interval Measured (mcg/mL) Predicted AUC 24-48 Predicted AUC 24,ss    60 mg q8h                        Significant Cultures:    blood: Enterococcus faecalis   blood: Enterococcus faecalis   blood: NGTD    Labs:  Recent Labs     Units 24  0603 24  0529   CREATININE MG/DL 0.66 0.62   BUN MG/DL 10 12   WBC K/uL 7.7 7.1     Temp (24hrs), Av.9 °F (36.6 °C), Min:97.8 °F (36.6 °C), Max:98.3 °F (36.8 °C)      Conditions for Dosing Consideration: None    Creatinine Clearance (mL/min): Estimated Creatinine Clearance: 72 mL/min (based on SCr of 0.66 mg/dL).       Impression/Plan:   Vegetation for on KEYLA  Vancomycin level of 8 is low because of missed dose  PM. Extrapolated AUC is therapeutic so continue vanc 1000 mg q12h  Cefepime stopped  Gentamicin 1 mg/kg q8h added for synergy  Started gentamicin 60 mg q8h  Gent level before 4th dose  @ 1130  Antimicrobial stop date TBD     Pharmacy will follow daily and adjust medications as appropriate for renal function and/or serum levels.    Thank you,  Brigido Grey [facility]            
Pharmacy Antimicrobial Kinetic Dosing    Indication for Antimicrobials: sepsis/PNA (CAP)     Current Regimen of Each Antimicrobial:  Vancomycin pharmacy consult; Start Date 24; Day #   Cefepime 2gm q 12; Start ; Day #    Previous Antimicrobial Therapy:  Cefepime 2gm x 1 [facility]  Vancomycin 1500mg x 1  [facility]    Goal Level: Vancomycin -600    Date Dose & Interval Measured (mcg/mL) Predicted AUC 24-48 Predicted AUC 24,ss                          Significant Cultures:    blood, paired - pending     Labs:  Recent Labs     Units 24  0515   CREATININE MG/DL 1.41*   BUN MG/DL 17   WBC K/uL 19.9*     Temp (24hrs), Av.5 °F (36.9 °C), Min:97.5 °F (36.4 °C), Max:99 °F (37.2 °C)      Conditions for Dosing Consideration: None    Creatinine Clearance (mL/min): Estimated Creatinine Clearance: 36 mL/min (A) (based on SCr of 1.41 mg/dL (H)).       Impression/Plan:   Vancomycin 1500mg LD given this morning at [facility]  BALJIT - Scr 1.41, baseline ~0.7  Start 1000mg q 24   Predicted NVG75-45 = 386, Predicted AUC24,ss = 514  Cefepime as above - renally dosed   Antimicrobial stop date 7 days     Pharmacy will follow daily and adjust medications as appropriate for renal function and/or serum levels.    Thank you,  Nadia Talley [facility]    
Pharmacy Antimicrobial Kinetic Dosing    Indication for Antimicrobials: sepsis/PNA (CAP)     Current Regimen of Each Antimicrobial:  Vancomycin pharmacy consult; Start Date 24; Day 2  Cefepime 2 g IV q8h; Start ; Day 2    Previous Antimicrobial Therapy:  Cefepime 2gm x 1 [facility]  Vancomycin 1500mg x 1  [facility]    Goal Level: Vancomycin -600    Date Dose & Interval Measured (mcg/mL) Predicted AUC 24-48 Predicted AUC 24,ss                          Significant Cultures:    blood: GPCs (Enterococcus faecalis on [employer])    Labs:  Recent Labs     Units 24  0601 24  0515   CREATININE MG/DL 0.68 1.41*   BUN MG/DL 11 17   PROCAL ng/mL  --  70.55   WBC K/uL 12.4* 19.9*     Temp (24hrs), Av.8 °F (36.6 °C), Min:97.5 °F (36.4 °C), Max:98.7 °F (37.1 °C)      Conditions for Dosing Consideration: None    Creatinine Clearance (mL/min): Estimated Creatinine Clearance: 73 mL/min (based on SCr of 0.68 mg/dL).       Impression/Plan:   Renal function has improved significantly from yesterday  Will empirically increase vancomycin dose to 1000 mg q12h for projected AUC of 582  Vanc levl  with AM labs  Adjusted cefepime to 2g q8h for renal function  Antimicrobial stop date TBD     Pharmacy will follow daily and adjust medications as appropriate for renal function and/or serum levels.    Thank you,  Brigido Grey [facility]      
Pharmacy Antimicrobial Kinetic Dosing    Indication for Antimicrobials: sepsis/PNA (CAP)     Current Regimen of Each Antimicrobial:  Vancomycin pharmacy consult; Start Date 24; Day 3  Cefepime 2 g IV q8h; Start ; Day 3    Previous Antimicrobial Therapy:  Cefepime 2gm x 1 [facility]  Vancomycin 1500mg x 1  [facility]    Goal Level: Vancomycin -600    Date Dose & Interval Measured (mcg/mL) Predicted AUC 24-48 Predicted AUC 24,ss    1000 mg q12h 11.1 473 495                   Significant Cultures:    blood: Enterococcus faecalis   blood: NGTD    Labs:  Recent Labs     Units 24  0529 24  0601 24  0515   CREATININE MG/DL 0.62 0.68 1.41*   BUN MG/DL 12  17   PROCAL ng/mL  --   --  70.55   WBC K/uL 7.1 12.4* 19.9*     Temp (24hrs), Av.5 °F (36.9 °C), Min:98.2 °F (36.8 °C), Max:98.7 °F (37.1 °C)      Conditions for Dosing Consideration: None    Creatinine Clearance (mL/min): Estimated Creatinine Clearance: 79 mL/min (based on SCr of 0.62 mg/dL).       Impression/Plan:   Vancomycin level of 11.1 is therapeutic. Continue vanc 1000 mg q12h  Can likely stop cefepime  Recommend to consult ID  Antimicrobial stop date TBD     Pharmacy will follow daily and adjust medications as appropriate for renal function and/or serum levels.    Thank you,  Brigido Grey [facility]        
Physical Therapy Note    PT tx deferred. Patient is returning to bed with RN secondary to symptoms of feeling lightheaded. Will return for PT tx as able and appropriate.   
Physical Therapy:  Patient is currently JENNIE for procedure, unavailable for PT session. Will defer and continue to follow.    Mallory Wright PT, DPT  
Predictive Model Details          20 (Normal)  Factor Value    Calculated 11/21/2024 10:32 69% Age 69 years old    Deterioration Index Model 8% WBC count 9.8 K/uL     6% Systolic 130     5% Sodium 142 mmol/L     4% Potassium 3.5 mmol/L     4% Pulse 91     4% Hematocrit abnormal (31.2 %)     1% Pulse oximetry 97 %     0% Respiratory rate 16     0% Temperature 98.2 °F (36.8 °C)      0700 Bedside shift change report given to Corrina Rn (oncoming nurse) by Jenny Rn (offgoing nurse). Report included the following information Nurse Handoff Report.        Shift worked: Day     Shift summary and any significant changes:    Pt to go for PICC line later today then may discharge    PICC line placed    PICC education completed     Discharge orders in. Waiting on CM to finalize HH referral.     Pt is discharging at 1500   Concerns for physician to address:       Zone phone for oncoming shift:          Activity:  Level of Assistance: Independent after set-up  Number times ambulated in hallways past shift: 0  Number of times OOB to chair past shift: 0    Cardiac:   Cardiac Monitoring: Yes      Cardiac Rhythm: Sinus rhythm    Access:  Current line(s): PIV     Genitourinary:   Urinary Status: Voiding, Bathroom privileges    Respiratory:   O2 Device: None (Room air)  Chronic home O2 use?: NO  Incentive spirometer at bedside: NO    GI:  Last BM (including prior to admit): 11/19/24  Current diet:  ADULT DIET; Regular; 4 carb choices (60 gm/meal); No Milk  ADULT ORAL NUTRITION SUPPLEMENT; Breakfast, Dinner; Low Calorie/High Protein Oral Supplement  Passing flatus: YES    Pain Management:   Patient states pain is manageable on current regimen: YES    Skin:  Kash Scale Score: 21  Interventions: Wound Offloading (Prevention Methods): Bed, pressure reduction mattress, Pillows, Repositioning, Turning, Elevate heels    Patient Safety:  Fall Risk: Nursing Judgement-Fall Risk High(Add Comments): No  Fall Risk Interventions  Nursing 
Started second IV, 20G left forearm.     Vanc and cefepine given.     Vanc level 8.0.    Goal: antibiotic regimen.   
TRANSESOPHAGEAL ECHOCARDIOGRAPHY done revealing normal LV and RV systolic function.  LVEF 55-60%.  There is mild MR and a <1 cm mobile vegetation on the atrial side of the anterior mitral leaflet.  The single ICD shock lead is present and no vegetation seen on the visible portion of this lead, cannot exclude though.    Full note to follow.  I'll notify Dr. Hull of her admission.    Tyshawn Mcelroy MD    
TRANSFER - OUT REPORT:    Verbal report given to Eduarda (name) on Madisyn Manriquez being transferred to CMSU(unit) for routine progression of patient care       Report consisted of patient's Situation, Background, Assessment and   Recommendations(SBAR).     Information from the following report(s) MAR and Recent Results was reviewed with the receiving nurse.    Opportunity for questions and clarification was provided.      Patient transported with:   Registered Nurse  Tech    
pravastatin      Medical Decision Making:   I personally reviewed labs:cbc, bmp     Discussed case with: patient    Code Status: full    DVT Prophylaxis: xarelto  GI Prophylaxis:ppi    Subjective:     Seen and evaluated the patient at bedside, patient was laying in bed alert and oriented x 3 denies any symptoms of fevers chills chest pain, abdominal pain UTI symptoms.        Objective:     VITALS:   Last 24hrs VS reviewed since prior progress note. Most recent are:  Patient Vitals for the past 24 hrs:   BP Temp Temp src Pulse Resp SpO2 Weight   11/16/24 0730 (!) 125/58 -- -- 70 18 95 % --   11/16/24 0701 -- -- -- -- -- -- 55.7 kg (122 lb 12.7 oz)   11/16/24 0403 (!) 118/52 98.3 °F (36.8 °C) Oral 76 19 95 % --   11/16/24 0002 123/60 98.4 °F (36.9 °C) Oral 80 18 94 % --   11/15/24 1946 (!) 103/92 98.3 °F (36.8 °C) Oral 83 16 95 % --   11/15/24 1521 131/62 98.1 °F (36.7 °C) Oral 78 17 -- --   11/15/24 1230 132/71 97.8 °F (36.6 °C) Oral 78 16 96 % --       No intake or output data in the 24 hours ending 11/16/24 1025       I had a face to face encounter and independently examined this patient on 11/16/2024, as outlined below:  PHYSICAL EXAM:  General: Alert, cooperative  EENT:  EOMI. Anicteric sclerae.  Resp:  CTA bilaterally, no wheezing or rales.  No accessory muscle use  CV:  Regular  rhythm,  No edema  GI:  Soft, Non distended, Non tender.  +Bowel sounds  Neurologic:  Alert and oriented X 3, normal speech,   Psych:   Good insight. Not anxious nor agitated  Skin:  No rashes.  No jaundice    Reviewed most current lab test results and cultures  YES  Reviewed most current radiology test results   YES  Review and summation of old records today    NO  Reviewed patient's current orders and MAR    YES  PMH/SH reviewed - no change compared to H&P    Procedures: see electronic medical records for all procedures/Xrays and details which were not copied into this note but were reviewed prior to creation of Plan.      LABS:  I 
  Allergen Reactions    Penicillins Anaphylaxis    Diazepam      Other reaction(s): Unknown (comments)    Sulfa Antibiotics Nausea Only          Current Facility-Administered Medications   Medication Dose Route Frequency    ceFEPIme (MAXIPIME) 2,000 mg in sodium chloride 0.9 % 100 mL IVPB (mini-bag)  2,000 mg IntraVENous Q8H    rivaroxaban (XARELTO) tablet 20 mg  20 mg Oral Dinner    vancomycin (VANCOCIN) 1000 mg in 200 mL IVPB  1,000 mg IntraVENous Q12H    ondansetron (ZOFRAN-ODT) disintegrating tablet 4 mg  4 mg Oral Q8H PRN    Or    ondansetron (ZOFRAN) injection 4 mg  4 mg IntraVENous Q6H PRN    polyethylene glycol (GLYCOLAX) packet 17 g  17 g Oral Daily PRN    acetaminophen (TYLENOL) tablet 650 mg  650 mg Oral Q6H PRN    Or    acetaminophen (TYLENOL) suppository 650 mg  650 mg Rectal Q6H PRN    carvedilol (COREG) tablet 6.25 mg  6.25 mg Oral BID WC    gabapentin (NEURONTIN) capsule 300 mg  300 mg Oral BID    insulin glargine (LANTUS) injection vial 30 Units  30 Units SubCUTAneous Nightly    pantoprazole (PROTONIX) tablet 40 mg  40 mg Oral QAM AC    pravastatin (PRAVACHOL) tablet 20 mg  20 mg Oral Nightly    [Held by provider] valsartan (DIOVAN) tablet 40 mg  40 mg Oral BID    glucose chewable tablet 16 g  4 tablet Oral PRN    dextrose bolus 10% 125 mL  125 mL IntraVENous PRN    Or    dextrose bolus 10% 250 mL  250 mL IntraVENous PRN    glucagon injection 1 mg  1 mg SubCUTAneous PRN    dextrose 10 % infusion   IntraVENous Continuous PRN    insulin lispro (HUMALOG,ADMELOG) injection vial 0-8 Units  0-8 Units SubCUTAneous 4x Daily AC & HS       Review of Symptoms:  See HPI as well.  General: +fever, chills, sweats, weakness, negative for weight loss   Eyes: negative for blurred vision, eye pain, loss of vision, diplopia   Ear Nose and Throat: negative for rhinorrhea, pharyngitis, otalgia, tinnitus, speech or swallowing difficulties   Respiratory: +cough, negative for SOB, sputum production, wheezing, PIMENTEL, 
include:  Recent Labs     11/11/24  0515 11/12/24  0601 11/13/24  0529   WBC 19.9* 12.4* 7.1   HGB 11.8 10.4* 10.2*   HCT 34.9* 30.5* 30.6*   * 70* 105*     Recent Labs     11/11/24  0515 11/12/24  0601 11/13/24  0529    140 142   K 3.9 3.3* 3.8    112* 115*   CO2 27 24 21   GLUCOSE 294* 104* 95   BUN 17 11 12   CREATININE 1.41* 0.68 0.62   CALCIUM 8.5 8.6 8.7   BILITOT 0.7 0.5 0.6   AST 18 17 11*   ALT 15 15 14       Signed: Christopher Freeman MD          
Objective:      Physical Exam:  Temp (24hrs), Av.9 °F (36.6 °C), Min:97.1 °F (36.2 °C), Max:98.5 °F (36.9 °C)    Patient Vitals for the past 8 hrs:   Pulse   24 94   24 0346 77   24 0222 73    Patient Vitals for the past 8 hrs:   Resp   24 17    Patient Vitals for the past 8 hrs:   BP   24 118/60   24 0346 (!) 109/52        Intake/Output Summary (Last 24 hours) at 2024 1008  Last data filed at 2024 0811  Gross per 24 hour   Intake 1088.17 ml   Output 1100 ml   Net -11.83 ml         Nondiaphoretic, not in acute distress, slender female.  No scleral icterus, mucous membranes moist, conjuctivae pink, no xanthelasma.  Unlabored, clear to auscultation bilaterally, symmetric air movement.  L chest  ICD site OK.  Regular rate and rhythm, no murmur, pericardial rub, knock, or gallop.  No JVD or peripheral edema.  Palpable radial pulses bilaterally.  Abdomen, soft, nontender, nondistended.  Extremities without cyanosis or clubbing.  Muscle tone and bulk normal.  Skin warm and dry.  No rashes or ulcers.  Neuro grossly nonfocal.  No tremor.  Awake and appropriate.    CARDIOGRAPHICS and STUDIES, I reviewed:    Telemetry:  Sinus rhythm currently.    ECG:  Reviewed.      Labs:  No results for input(s): \"CPK\", \"CKMB\" in the last 72 hours.    Invalid input(s): \"CPKMB\", \"CKNDX\", \"TROIQ\"  Lab Results   Component Value Date/Time    CHOL 117 2024 06:20 AM    HDL 36 2024 06:20 AM    LDL 52.6 2024 06:20 AM    LDL 80 2023 12:00 AM     No results for input(s): \"INR\", \"APTT\" in the last 72 hours.    Invalid input(s): \"PTP\"   Recent Labs     24  1442 24  0438    143   K 3.9 3.2*   * 120*   CO2 24 17*   BUN 10 12   PHOS 3.6 3.0   WBC  --  9.8   HGB  --  9.9*   HCT  --  31.2*   PLT  --  237     No results for input(s): \"TP\", \"GLOB\", \"GGT\" in the last 72 hours.    Invalid input(s): \"SGOT\", \"GPT\", \"AP\", \"TBIL\", \"ALB\", \"AML\", \"AMYP\", 
        NO ORGANISMS SEEN        Culture PENDING    Culture, Wound [[id number]] Collected: 11/19/24 1251    Order Status: Completed Specimen: Pacemaker Pocket Updated: 11/20/24 0000     Special Requests NO SPECIAL REQUESTS        Gram Stain NO WBC'S SEEN         NO ORGANISMS SEEN        Culture PENDING    Culture, Blood 1 [[id number]]  (Abnormal) Collected: 11/13/24 1452    Order Status: Completed Specimen: Blood Updated: 11/17/24 0603     Special Requests --        NO SPECIAL REQUESTS  LEFT  Antecubital       Culture       Enterococcus faecalis GROWING IN BOTH BOTTLES DRAWN SITE = LAC REFER TO [id number] FOR SENSITIVITIES          Culture, Blood 2 [[id number]] Collected: 11/13/24 1245    Order Status: Canceled Specimen: Blood     Culture, Blood 1 [[id number]]  (Abnormal) Collected: 11/12/24 1529    Order Status: Completed Specimen: Blood Updated: 11/15/24 1155     Special Requests --        LEFT  ARM       Culture       Enterococcus faecalis GROWING IN BOTH BOTTLES DRAWN SITE= L ARM REFER TO [id number] FOR SENSITIVITIES            (NOTE) [facility] CALLED TO RAFY JUÁREZ, AT 1053 ON 11/13/24.RF    Culture, Blood, PCR ID Panel [[id number]]  (Abnormal) Collected: 11/12/24 1529    Order Status: Completed Specimen: Blood Updated: 11/13/24 1305     Accession Number [id number]     Enterococcus faecalis by PCR Detected        Enterococcus faecium by PCR Not detected        Listeria monocytogenes by PCR Not detected        STAPHYLOCOCCUS Not detected        Staphylococcus Aureus Not detected        Staphylococcus epidermidis by PCR Not detected        Staphylococcus lugdunensis by PCR Not detected        STREPTOCOCCUS Not detected        Streptococcus agalactiae (Group B) Not detected        Strep pneumoniae Not detected        Strep pyogenes,(Grp. A) Not detected        Acinetobacter calcoac baumannii complex by PCR Not detected        Bacteroides fragilis by PCR Not detected        Enterobacteriaceae by PCR Not detected    
thickening. COLON: No dilatation or wall thickening. Moderate diverticulosis without evidence of diverticulitis. APPENDIX: Unremarkable. PERITONEUM: No ascites or pneumoperitoneum. RETROPERITONEUM: No lymphadenopathy or aortic aneurysm. REPRODUCTIVE ORGANS: Hysterectomy. URINARY BLADDER: No mass or calculus. BONES: No destructive bone lesion. ADDITIONAL COMMENTS: N/A     1. Previously noted right lower lobe airspace disease is significantly decreased. 2. Trace bilateral perinephric fluid, compatible with sequela of medical renal disease. 3. No other of acute findings in the chest, abdomen, or pelvis. Electronically signed by SARAH HERNANDEZ    XR CHEST PORTABLE    Result Date: 11/11/2024  INDICATION: Sepsis  COMPARISON: October 13, 2024 FINDINGS: AP portable view of the chest demonstrates left subclavian defibrillator. Heart size is normal. There is no edema, effusion, consolidation, or pneumothorax. The osseous structures are unremarkable.     No acute process. Electronically signed by Bradley Riggins MD FACP

## 2024-11-21 NOTE — DISCHARGE INSTRUCTIONS
related issues please call at .    If you experience any of the following symptoms then please call your primary care physician or return to the emergency room if you cannot get hold of your doctor:  Fever, chills, nausea, vomiting, diarrhea, change in mentation, falling, bleeding, shortness of breath, ***    Follow Up:   @PCP@  you are to call and set up an appointment to see them in 7-10 days.      Information obtained by :  I understand that if any problems occur once I am at home I am to contact my physician.    I understand and acknowledge receipt of the instructions indicated above.                                                                                                                                           Physician's or R.N.'s Signature                                                                  Date/Time                                                                                                                                              Patient or Representative Signature                                                          Date/Time

## 2024-11-21 NOTE — CONSULTS
ID  Consult received  Will see patient.  
Infectious Disease Consult    Date of Consultation:  November 15, 2024  Reason for Consultation: Recurrent bacteremia  Referring Physician: Dr Freeman  Date of Admission:11/11/2024      Impression    Recurrent Enterococcus faecalis bacteremia  & Mitral valve endocarditis, AIC history of diarrhea D present  Blood cultures 11/11+ for E.faecalis 3/3-LAC  Repeat cultures 11/12+ for E faecalis 4/4-LA/RFA  KEYLA+ for <1cm mobile vegetation on atrial side of anterior leaflet  No vegetation seen on a ICD, infection cannot be ruled out    Recent admission at [facility] 10/13-10/15  Treated for RLL pneumonia, E faecalis bacteremia, metabolic encephalopathy  Blood cultures 10/13+ for E faecalis 2/2  Negative repeat cultures 10/14, echo negative.  Treated with vancomycin and cefepime, DC on po linezolid.    No history of dysuria  UA unremarkable both admissions  11/11 ,10/13.    H/o diarrhea, loose stools prior to admission at [facility]  Pt attributes it to Glucerna.  CT abdomen/pelvis+ for diverticulosis  no diverticulitis.,    Diabetes type 2  A1c 7.4    BALJIT  Resolved    Atrial fibrillation  Nonischemic cardiomyopathy  ICD implanted 2016.    H/o stroke    Past history of smoking    Penicillin allergy  Anaphylaxis    Plan    Change to vancomycin and gentamicin IV  Adequate fluids, daily probiotic  Plan is for device extraction next week  Anticipate discharge on extended course of antibiotic    ID service to follow & provide final recommendations    Abx  Cefepime-11/10  Vancomycin 11/10    Extensive review of chart notes, labs, imaging, cultures done  Additionally review of done: Recent reports-Labs, cultures, imaging  D/w -hospitalist, RN    Madisyn MCCRARY Mitra is a 69 y.o. female with history of A-fib on Xarelto, CVA, type 2 diabetes, and recent admission for right lower lobe pneumonia with sepsis and bacteremia (Enterococcus faecalis) who presents to the ED by EMS with generalized weakness and chills since this afternoon.  Patient reports that 
PICC Education: Explained reason and rationale for PICC placement along with providing education in order to make an informed consent including nature, risks, benefits, potential complications, care and maintenance of PICC line. The opportunity for questions or concerns was given. Patient  gave written consent for PICC procedure to be done at the bedside. Patient  verbalizes understanding at this time.      Procedure:  Time out completed.  Pre procedure assessment done.  Maximum sterile barrier precautions observed throughout procedure.  Lidocaine 1% 3.0 ml sc given prior to cannulation  Cannulated Basilic vein using ultrasound guidance and modified seldinger technique.  Inserted SINGLE lumen 4 fr PICC in  RIGHT arm using, PubNative Tip Location System and 3CG   Patient has sinus rhythm.  Tip Positioning System indicating tall P wave and no negative deflection before P wave which would indicate the PICC tip is properly placed in the distal SVC or the CAJ.  PICC tip was confirmed by 2 PICC nurses and 3CG printout was placed on patient’s chart.  Blood return verified and flushed with 20ml NS in each port.  Sterile CHG impregnated dressing applied with Stat loc per protocol.  Curios caps applied to each port.  Reason for access (Long term abx Vanc until 12/31/24).  Complications related to insertion (None).  Patient tolerated procedure well with minimal blood loss.   PICC procedure performed by: Megan Wilkes RN, BSN, [facility]/ Vascular Access Nurse  Assisted by: Orestes Morgan RN, BSN, [facility] / Vascular Access Nurse    RIGHT  arm circumference: 25 cm.   Catheter internal length:  35 cm  Catheter external length: 1 cm  TOTAL Length:36 cm  PICC catheter occupies 9% of vein    Brand of catheter:  BARD  Lot #[id number]   REF# [id number]    EXP 10/31/2025.    Primary nurse  notified PICC line may be used and to hang new infusion tubing prior to use.        Megan Wilkes RN, BSN, [facility]  Vascular Access Team   
PICC order acknowledged. Patient not a candidate for PICC line at this time since all blood cultures to date are positive. Need blood cultures to be negative for at least 48 hours prior to PICC placement as well as final ID recommendations.    Ele Walton RN, BSN, CRNI, [facility]-BC  Vascular Access Team     
Mother     Diabetes Father     Hypertension Father     Heart Attack Father     Heart Disease Father      Prior to Admission medications    Medication Sig Start Date End Date Taking? Authorizing Provider   pyridoxine (B-6) 100 MG tablet Take 1 tablet by mouth daily   Yes ProviderTai MD   XARELTO 20 MG TABS tablet Take 1 tablet by mouth once daily with breakfast  Patient taking differently: Take 1 tablet by mouth nightly 9/19/23  Yes Asael Daniel Jr., MD   pantoprazole (PROTONIX) 40 MG tablet Take 1 tablet by mouth once daily 8/15/23  Yes Vivian Felix, APRN - NP   LANTUS 100 UNIT/ML injection vial INJECT 30 UNITS SUBCUTANEOUSLY ONCE DAILY 6/22/23  Yes Asael Daniel Jr., MD   carvedilol (COREG) 6.25 MG tablet 1 twice daily 11/8/21  Yes Automatic Reconciliation, Ar   gabapentin (NEURONTIN) 600 MG tablet Half in the morning half in the afternoon and 1 at bedtime 2/14/23  Yes Automatic Reconciliation, Ar   glipiZIDE (GLUCOTROL) 10 MG tablet TAKE 1 TABLET BY MOUTH TWICE DAILY FOR DIABETES 2/22/23  Yes Automatic Reconciliation, Ar   pravastatin (PRAVACHOL) 20 MG tablet Take 1 tablet by mouth nightly 3/8/23  Yes Automatic Reconciliation, Ar   valsartan (DIOVAN) 40 MG tablet Take 1 tablet by mouth 2 times daily 11/9/21  Yes Automatic Reconciliation, Ar   b complex vitamins capsule Take 1 capsule by mouth daily    Provider, MD Tai   Continuous Blood Gluc Transmit (DEXCOM G6 TRANSMITTER) MISC USE AS DIRECTED TO  MONITOR  BLOOD  SUGAR 5/30/23   Asael Daniel Jr., MD   cyclobenzaprine (FLEXERIL) 5 MG tablet 1 3 times daily for back muscle spasm 2/14/23   Automatic Reconciliation, Ar       Allergies   Allergen Reactions    Penicillins Anaphylaxis    Diazepam      Other reaction(s): Unknown (comments)    Sulfa Antibiotics Nausea Only     Review of Systems:  General: negative for - chills, fatigue, fever, weight gain, or weight loss  Psychological: negative for - anxiety, depression, or sleep 
rash, ulceration, mole change, new lesion   Endocrine: negative for hot flashes or polydipsia   Neurological: negative for headache, dizziness, confusion, focal weakness, paresthesia, memory loss, gait disturbance   Psychological: negative for anxiety, depression, agitation       Objective:      Physical Exam:  Temp (24hrs), Av.9 °F (36.6 °C), Min:97.5 °F (36.4 °C), Max:98.7 °F (37.1 °C)    Patient Vitals for the past 8 hrs:   Pulse   24 1202 89   24 1200 95   24 1156 93   24 1152 91    Patient Vitals for the past 8 hrs:   Resp   24 1202 14    Patient Vitals for the past 8 hrs:   BP   24 1441 (!) 104/58   24 1430 105/61   24 1420 (!) 109/59   24 1202 (!) 94/48   24 1200 (!) 70/49   24 1156 (!) 81/44   24 1152 109/71        Intake/Output Summary (Last 24 hours) at 2024 1804  Last data filed at 2024 1230  Gross per 24 hour   Intake 472 ml   Output --   Net 472 ml       Nondiaphoretic, not in acute distress, slender female.  No scleral icterus, mucous membranes moist, conjuctivae pink, no xanthelasma.  Unlabored, clear to auscultation bilaterally, symmetric air movement.  L chest  ICD site OK.  Regular rate and rhythm, no murmur, pericardial rub, knock, or gallop.  No JVD or peripheral edema.  Palpable radial pulses bilaterally.  Abdomen, soft, nontender, nondistended.  Extremities without cyanosis or clubbing.  Muscle tone and bulk normal.  Skin warm and dry.  No rashes or ulcers.  Neuro grossly nonfocal.  No tremor.  Awake and appropriate.    CARDIOGRAPHICS and STUDIES, I reviewed:    Telemetry:  Sinus rhythm currently.    ECG:  Reviewed.      Labs:  No results for input(s): \"CPK\", \"CKMB\" in the last 72 hours.    Invalid input(s): \"CPKMB\", \"CKNDX\", \"TROIQ\"  Lab Results   Component Value Date/Time    CHOL 117 2024 06:20 AM    HDL 36 2024 06:20 AM    LDL 52.6 2024 06:20 AM    LDL 80 2023 12:00 AM     No

## 2024-11-23 PROBLEM — T82.7XXA INFECTION AND INFLAMMATORY REACTION DUE TO CARDIAC DEVICE, IMPLANT, AND GRAFT (HCC): Status: ACTIVE | Noted: 2024-11-23

## 2024-11-23 LAB
BACTERIA SPEC CULT: NORMAL
GRAM STN SPEC: NORMAL
GRAM STN SPEC: NORMAL
SERVICE CMNT-IMP: NORMAL

## 2024-11-24 LAB
BACTERIA SPEC CULT: ABNORMAL
BACTERIA SPEC CULT: NORMAL
GRAM STN SPEC: ABNORMAL
GRAM STN SPEC: ABNORMAL
GRAM STN SPEC: NORMAL
GRAM STN SPEC: NORMAL
SERVICE CMNT-IMP: ABNORMAL
SERVICE CMNT-IMP: NORMAL

## 2024-11-25 ENCOUNTER — TELEPHONE (OUTPATIENT)
Age: 69
End: 2024-11-25

## 2024-11-25 NOTE — TELEPHONE ENCOUNTER
to find out what happened with her prescription. Spoke to [facility] pharmacy and they let me know that they did get the prescription but patient must buy it over the counter because it is not covered by insurance.

## 2024-11-26 ENCOUNTER — TELEPHONE (OUTPATIENT)
Age: 69
End: 2024-11-26

## 2024-11-26 NOTE — TELEPHONE ENCOUNTER
Received labs from fax machine with critical value Vancomycin Trough 43.7 and creatinine 2.05. Called and spoke to Jaya at [facility] who is to notify [facility] and patient to hold antibiotic. Stat CMP and Vanc trough to be drawn tomorrow  as ordered verbally by Dr. Riggins. [facility] did not call our office to report these labs.

## 2024-11-27 ENCOUNTER — TELEPHONE (OUTPATIENT)
Age: 69
End: 2024-11-27

## 2024-11-27 ENCOUNTER — HOSPITAL ENCOUNTER (INPATIENT)
Facility: HOSPITAL | Age: 69
LOS: 2 days | Discharge: HOME | DRG: 917 | End: 2024-11-29
Attending: EMERGENCY MEDICINE | Admitting: HOSPITALIST
Payer: MEDICARE

## 2024-11-27 DIAGNOSIS — R89.2 DRUG TOXICITY: ICD-10-CM

## 2024-11-27 DIAGNOSIS — N17.9 AKI (ACUTE KIDNEY INJURY) (HCC): Primary | ICD-10-CM

## 2024-11-27 PROBLEM — T36.8X4A: Status: ACTIVE | Noted: 2024-11-27

## 2024-11-27 LAB
ALBUMIN SERPL-MCNC: 2.9 G/DL (ref 3.5–5)
ALBUMIN/GLOB SERPL: 0.6 (ref 1.1–2.2)
ALP SERPL-CCNC: 104 U/L (ref 45–117)
ALT SERPL-CCNC: 9 U/L (ref 12–78)
ANION GAP SERPL CALC-SCNC: 9 MMOL/L (ref 2–12)
APPEARANCE UR: CLEAR
AST SERPL-CCNC: 13 U/L (ref 15–37)
BACTERIA URNS QL MICRO: NEGATIVE /HPF
BASOPHILS # BLD: 0.1 K/UL (ref 0–0.1)
BASOPHILS NFR BLD: 1 % (ref 0–1)
BILIRUB SERPL-MCNC: 0.3 MG/DL (ref 0.2–1)
BILIRUB UR QL: NEGATIVE
BUN SERPL-MCNC: 29 MG/DL (ref 6–20)
BUN/CREAT SERPL: 12 (ref 12–20)
CALCIUM SERPL-MCNC: 9.1 MG/DL (ref 8.5–10.1)
CHLORIDE SERPL-SCNC: 105 MMOL/L (ref 97–108)
CO2 SERPL-SCNC: 26 MMOL/L (ref 21–32)
COLOR UR: NORMAL
CREAT SERPL-MCNC: 2.37 MG/DL (ref 0.55–1.02)
DIFFERENTIAL METHOD BLD: ABNORMAL
EOSINOPHIL # BLD: 0.3 K/UL (ref 0–0.4)
EOSINOPHIL NFR BLD: 4 % (ref 0–7)
EPITH CASTS URNS QL MICRO: NORMAL /LPF
ERYTHROCYTE [DISTWIDTH] IN BLOOD BY AUTOMATED COUNT: 14.7 % (ref 11.5–14.5)
GLOBULIN SER CALC-MCNC: 4.7 G/DL (ref 2–4)
GLUCOSE BLD STRIP.AUTO-MCNC: 238 MG/DL (ref 65–117)
GLUCOSE SERPL-MCNC: 197 MG/DL (ref 65–100)
GLUCOSE UR STRIP.AUTO-MCNC: NEGATIVE MG/DL
HCT VFR BLD AUTO: 28.2 % (ref 35–47)
HGB BLD-MCNC: 9.2 G/DL (ref 11.5–16)
HGB UR QL STRIP: NEGATIVE
IMM GRANULOCYTES # BLD AUTO: 0 K/UL (ref 0–0.04)
IMM GRANULOCYTES NFR BLD AUTO: 0 % (ref 0–0.5)
KETONES UR QL STRIP.AUTO: NEGATIVE MG/DL
LEUKOCYTE ESTERASE UR QL STRIP.AUTO: NEGATIVE
LYMPHOCYTES # BLD: 2.2 K/UL (ref 0.8–3.5)
LYMPHOCYTES NFR BLD: 23 % (ref 12–49)
MAGNESIUM SERPL-MCNC: 1.9 MG/DL (ref 1.6–2.4)
MCH RBC QN AUTO: 29.7 PG (ref 26–34)
MCHC RBC AUTO-ENTMCNC: 32.6 G/DL (ref 30–36.5)
MCV RBC AUTO: 91 FL (ref 80–99)
MONOCYTES # BLD: 0.7 K/UL (ref 0–1)
MONOCYTES NFR BLD: 7 % (ref 5–13)
NEUTS SEG # BLD: 6.4 K/UL (ref 1.8–8)
NEUTS SEG NFR BLD: 65 % (ref 32–75)
NITRITE UR QL STRIP.AUTO: NEGATIVE
NRBC # BLD: 0 K/UL (ref 0–0.01)
NRBC BLD-RTO: 0 PER 100 WBC
PH UR STRIP: 5.5 (ref 5–8)
PLATELET # BLD AUTO: 223 K/UL (ref 150–400)
POTASSIUM SERPL-SCNC: 4 MMOL/L (ref 3.5–5.1)
PROT SERPL-MCNC: 7.6 G/DL (ref 6.4–8.2)
PROT UR STRIP-MCNC: NEGATIVE MG/DL
RBC # BLD AUTO: 3.1 M/UL (ref 3.8–5.2)
RBC #/AREA URNS HPF: NORMAL /HPF (ref 0–5)
SERVICE CMNT-IMP: ABNORMAL
SODIUM SERPL-SCNC: 140 MMOL/L (ref 136–145)
SP GR UR REFRACTOMETRY: 1.01 (ref 1–1.03)
URINE CULTURE IF INDICATED: NORMAL
UROBILINOGEN UR QL STRIP.AUTO: 0.2 EU/DL (ref 0.2–1)
VANCOMYCIN SERPL-MCNC: 31.9 UG/ML
WBC # BLD AUTO: 9.8 K/UL (ref 3.6–11)
WBC URNS QL MICRO: NORMAL /HPF (ref 0–4)

## 2024-11-27 PROCEDURE — 36415 COLL VENOUS BLD VENIPUNCTURE: CPT

## 2024-11-27 PROCEDURE — 87040 BLOOD CULTURE FOR BACTERIA: CPT

## 2024-11-27 PROCEDURE — 96360 HYDRATION IV INFUSION INIT: CPT

## 2024-11-27 PROCEDURE — 80053 COMPREHEN METABOLIC PANEL: CPT

## 2024-11-27 PROCEDURE — 80202 ASSAY OF VANCOMYCIN: CPT

## 2024-11-27 PROCEDURE — 6370000000 HC RX 637 (ALT 250 FOR IP): Performed by: HOSPITALIST

## 2024-11-27 PROCEDURE — 2580000003 HC RX 258: Performed by: HOSPITALIST

## 2024-11-27 PROCEDURE — 85025 COMPLETE CBC W/AUTO DIFF WBC: CPT

## 2024-11-27 PROCEDURE — 99285 EMERGENCY DEPT VISIT HI MDM: CPT

## 2024-11-27 PROCEDURE — 81001 URINALYSIS AUTO W/SCOPE: CPT

## 2024-11-27 PROCEDURE — 83735 ASSAY OF MAGNESIUM: CPT

## 2024-11-27 PROCEDURE — 82962 GLUCOSE BLOOD TEST: CPT

## 2024-11-27 PROCEDURE — 93005 ELECTROCARDIOGRAM TRACING: CPT | Performed by: HOSPITALIST

## 2024-11-27 PROCEDURE — 1100000000 HC RM PRIVATE

## 2024-11-27 PROCEDURE — 86140 C-REACTIVE PROTEIN: CPT

## 2024-11-27 RX ORDER — VITAMIN B COMPLEX
1 CAPSULE ORAL DAILY
Status: DISCONTINUED | OUTPATIENT
Start: 2024-11-27 | End: 2024-11-27

## 2024-11-27 RX ORDER — ACETAMINOPHEN 650 MG/1
650 SUPPOSITORY RECTAL EVERY 6 HOURS PRN
Status: DISCONTINUED | OUTPATIENT
Start: 2024-11-27 | End: 2024-11-29 | Stop reason: HOSPADM

## 2024-11-27 RX ORDER — INSULIN GLARGINE 100 [IU]/ML
20 INJECTION, SOLUTION SUBCUTANEOUS NIGHTLY
Status: DISCONTINUED | OUTPATIENT
Start: 2024-11-27 | End: 2024-11-28

## 2024-11-27 RX ORDER — SODIUM CHLORIDE 9 MG/ML
INJECTION, SOLUTION INTRAVENOUS PRN
Status: DISCONTINUED | OUTPATIENT
Start: 2024-11-27 | End: 2024-11-29 | Stop reason: HOSPADM

## 2024-11-27 RX ORDER — ACETAMINOPHEN 325 MG/1
650 TABLET ORAL EVERY 6 HOURS PRN
Status: DISCONTINUED | OUTPATIENT
Start: 2024-11-27 | End: 2024-11-29 | Stop reason: HOSPADM

## 2024-11-27 RX ORDER — GABAPENTIN 600 MG/1
300 TABLET ORAL 2 TIMES DAILY
Status: DISCONTINUED | OUTPATIENT
Start: 2024-11-27 | End: 2024-11-27

## 2024-11-27 RX ORDER — GLUCAGON 1 MG/ML
1 KIT INJECTION PRN
Status: DISCONTINUED | OUTPATIENT
Start: 2024-11-27 | End: 2024-11-29 | Stop reason: HOSPADM

## 2024-11-27 RX ORDER — LACTOBACILLUS RHAMNOSUS GG 10B CELL
1 CAPSULE ORAL
Status: DISCONTINUED | OUTPATIENT
Start: 2024-11-28 | End: 2024-11-29 | Stop reason: HOSPADM

## 2024-11-27 RX ORDER — SODIUM CHLORIDE 0.9 % (FLUSH) 0.9 %
5-40 SYRINGE (ML) INJECTION EVERY 12 HOURS SCHEDULED
Status: DISCONTINUED | OUTPATIENT
Start: 2024-11-27 | End: 2024-11-29 | Stop reason: HOSPADM

## 2024-11-27 RX ORDER — GABAPENTIN 100 MG/1
100 CAPSULE ORAL 3 TIMES DAILY
Status: DISCONTINUED | OUTPATIENT
Start: 2024-11-27 | End: 2024-11-27

## 2024-11-27 RX ORDER — POLYETHYLENE GLYCOL 3350 17 G/17G
17 POWDER, FOR SOLUTION ORAL DAILY PRN
Status: DISCONTINUED | OUTPATIENT
Start: 2024-11-27 | End: 2024-11-29 | Stop reason: HOSPADM

## 2024-11-27 RX ORDER — PRAVASTATIN SODIUM 20 MG
20 TABLET ORAL NIGHTLY
Status: DISCONTINUED | OUTPATIENT
Start: 2024-11-27 | End: 2024-11-29 | Stop reason: HOSPADM

## 2024-11-27 RX ORDER — ONDANSETRON 2 MG/ML
4 INJECTION INTRAMUSCULAR; INTRAVENOUS EVERY 6 HOURS PRN
Status: DISCONTINUED | OUTPATIENT
Start: 2024-11-27 | End: 2024-11-29 | Stop reason: HOSPADM

## 2024-11-27 RX ORDER — SODIUM CHLORIDE, SODIUM LACTATE, POTASSIUM CHLORIDE, CALCIUM CHLORIDE 600; 310; 30; 20 MG/100ML; MG/100ML; MG/100ML; MG/100ML
INJECTION, SOLUTION INTRAVENOUS CONTINUOUS
Status: DISPENSED | OUTPATIENT
Start: 2024-11-27 | End: 2024-11-28

## 2024-11-27 RX ORDER — GABAPENTIN 300 MG/1
300 CAPSULE ORAL 2 TIMES DAILY
Status: DISCONTINUED | OUTPATIENT
Start: 2024-11-27 | End: 2024-11-29 | Stop reason: HOSPADM

## 2024-11-27 RX ORDER — INSULIN LISPRO 100 [IU]/ML
0-8 INJECTION, SOLUTION INTRAVENOUS; SUBCUTANEOUS
Status: DISCONTINUED | OUTPATIENT
Start: 2024-11-27 | End: 2024-11-29 | Stop reason: HOSPADM

## 2024-11-27 RX ORDER — VANCOMYCIN 1 G/200ML
1000 INJECTION, SOLUTION INTRAVENOUS EVERY 12 HOURS
Status: DISCONTINUED | OUTPATIENT
Start: 2024-11-27 | End: 2024-11-29 | Stop reason: HOSPADM

## 2024-11-27 RX ORDER — B-COMPLEX WITH VITAMIN C
1 TABLET ORAL DAILY
Status: DISCONTINUED | OUTPATIENT
Start: 2024-11-27 | End: 2024-11-29 | Stop reason: HOSPADM

## 2024-11-27 RX ORDER — SODIUM CHLORIDE 0.9 % (FLUSH) 0.9 %
5-40 SYRINGE (ML) INJECTION PRN
Status: DISCONTINUED | OUTPATIENT
Start: 2024-11-27 | End: 2024-11-29 | Stop reason: HOSPADM

## 2024-11-27 RX ORDER — PANTOPRAZOLE SODIUM 40 MG/1
40 TABLET, DELAYED RELEASE ORAL DAILY
Status: DISCONTINUED | OUTPATIENT
Start: 2024-11-27 | End: 2024-11-29 | Stop reason: HOSPADM

## 2024-11-27 RX ORDER — VALSARTAN 40 MG/1
40 TABLET ORAL 2 TIMES DAILY
Status: DISCONTINUED | OUTPATIENT
Start: 2024-11-27 | End: 2024-11-29 | Stop reason: HOSPADM

## 2024-11-27 RX ORDER — DEXTROSE MONOHYDRATE 100 MG/ML
INJECTION, SOLUTION INTRAVENOUS CONTINUOUS PRN
Status: DISCONTINUED | OUTPATIENT
Start: 2024-11-27 | End: 2024-11-29 | Stop reason: HOSPADM

## 2024-11-27 RX ORDER — ONDANSETRON 4 MG/1
4 TABLET, ORALLY DISINTEGRATING ORAL EVERY 8 HOURS PRN
Status: DISCONTINUED | OUTPATIENT
Start: 2024-11-27 | End: 2024-11-29 | Stop reason: HOSPADM

## 2024-11-27 RX ADMIN — RIVAROXABAN 15 MG: 15 TABLET, FILM COATED ORAL at 18:54

## 2024-11-27 RX ADMIN — GABAPENTIN 300 MG: 300 CAPSULE ORAL at 20:43

## 2024-11-27 RX ADMIN — SODIUM CHLORIDE, POTASSIUM CHLORIDE, SODIUM LACTATE AND CALCIUM CHLORIDE: 600; 310; 30; 20 INJECTION, SOLUTION INTRAVENOUS at 16:04

## 2024-11-27 RX ADMIN — PANTOPRAZOLE SODIUM 40 MG: 40 TABLET, DELAYED RELEASE ORAL at 18:54

## 2024-11-27 RX ADMIN — INSULIN LISPRO 2 UNITS: 100 INJECTION, SOLUTION INTRAVENOUS; SUBCUTANEOUS at 20:43

## 2024-11-27 RX ADMIN — INSULIN GLARGINE 20 UNITS: 100 INJECTION, SOLUTION SUBCUTANEOUS at 20:43

## 2024-11-27 RX ADMIN — Medication 1 TABLET: at 18:54

## 2024-11-27 ASSESSMENT — PAIN SCALES - GENERAL: PAINLEVEL_OUTOF10: 0

## 2024-11-27 ASSESSMENT — PAIN - FUNCTIONAL ASSESSMENT: PAIN_FUNCTIONAL_ASSESSMENT: 0-10

## 2024-11-27 NOTE — ED NOTES
Admission SBAR Note  Situation/Background: Patient presented to ED this afternoon after PCP advised she come in for an elevated vancomycin level and creatinine level. Patient receives IV vancomycin through her PICC line at home and the level today came back at over 40. Patient has extensive medical history, significant are history of  CVA, hypertension, and type 2 diabetes. Patient alert & oriented x4, ambulatory and tolerating fluids.    Patient is being transferred to [facility] ([facility] Room# 130    Patient's Chief Complaint was abnormal levels and is admitted for BALJIT.    CODE STATUS: Full  CSSRS: 0 - No Risk    ISOLATION/PRECAUTIONS: No  ISOLATION TYPE:     Is this a behavioral health patient? No  Has wanding been completed   Are belongings secure?     Called outstanding consults:     STAT labs collected: Yes    Repeat Lactic Acid DUE?   TIME DUE:     All STAT orders are complete: Yes    The following personal items will be sent with the patient during transfer to the floor:     All valuables: glasses,  with patient at bedside , purse,  with patient at bedside , and clothing,  with patient at bedside       ASSESSMENT:    NEURO:   NIH SCORE:    RIZWANA SWALLOW SCREEN COMPLETE:   ORIENTATION LEVEL: ORIENTATION LEVEL: Person, Place, Time, and Situation  Cognition:  appropriate decision making, appropriate for age attention/concentration, appropriate safety awareness, and following commands  follows two step commands/direction  Speech: shows no evidence of impairment    Is patient impulsive? No  Is patient oriented? Yes  Do they follow commands? Yes  Is the patient ambulatory? Yes    FALL RISK?   Interventions: Implemented/recommended use of non-skid footwear and Implemented/recommended use of fall risk identification flag to all team members    RESPIRATORY:   Is patient on oxygen? No  Oxygen therapy:   O2 rate:     CARDIAC:   Is cardiac monitoring  ordered? No    Last Rhythm: Rhythm including paccardio: Normal Sinus Rhythm 82  Patient to transfer with tele box on? No  Infusions: Meds; iv fluids: lactated Ringer's  LINE ACCESS: PICC line, right basilic ,  , Iv rate: 125 ml/hr       /GI:   Continent Bowel/Bladder? Yes  Urinary Output:   Chronic or Acute:   If Chronic, is it 3 days old, was it changed prior to specimen collection?   Was UA with reflex sent to lab? No  If no, collect and send prior to transport to inpatient area.    INTEGUMENTARY:  IS THE PATIENT UNDRESSED? Yes  ARE THERE WOUNDS PRESENT? Yes  ARE THE WOUNDS DOCUMENTED?     RESTRAINTS IN USE: No    IS THE DOCUMENTATION COMPLETE? Yes  Is there a current order?  No  When does it ?        Vital Signs:  MEWS Score: 1/ Level of Consciousness: Alert (0)    Vitals:    24 1423 24 1509 24 1645   BP: (!) 123/57  (!) 142/61   Pulse: 88 86 82   Resp: 18 25 17   Temp: 97.6 °F (36.4 °C)     TempSrc: Oral     SpO2: 100% 97%    Weight: 59 kg (130 lb)     Height: 1.676 m (5' 6\")            Pain 1: 0  Pain Scale 1:     REVIEW:    IP UNIT CALLED NOTE IS READY: Yes  IF THERE ARE QUESTIONS, CALL ER AT PHONE #

## 2024-11-27 NOTE — TELEPHONE ENCOUNTER
El from [facility] called    Lab Dilip called home health and  Daisy In [facility] called, them    Vancomycin trough was 43.8    This was redrawn today    FYI    If any questions please call [phone]    The patient is still holding doses from yesterday

## 2024-11-27 NOTE — ED TRIAGE NOTES
Pt arrived with complaint of abnormal labs.  Pt sent by Dr. Riggins due to elevated Vancomycin level 43.8 and creatinine 2.2.  pt being treated for bacterial endocarditis from her pacemaker,  pacemaker was removed on 11/19/24.  Pt also requesting dressing change at site of pacemaker removal.  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

## 2024-11-27 NOTE — H&P
Hospitalist History & Physical Notes.           [facility].              Name : Madisyn Manriquez      MRN number : [id number]     YOB: 1955     Subjective :   Chief Complaint : Elevated vancomycin levels, acute kidney injury during treatment of endocarditis    Source of information : Patient, ER provider.  Reviewed previous records    History of present illness:   Madisyn Manriquez is  69 y.o. female with below mentioned past medical history recently diagnosed with endocarditis associated with pacemaker.  She is evaluated, positive for Enterococcus faecalis cultures that is sensitive to antibiotics vancomycin, ampicillin.  She was recently treated with linezolid that she stopped recently.  During recent admission at [facility] she is with positive cultures so she is started on vancomycin, discharged home on the 21st to follow-up outpatient vancomycin treatment until December 31 by [facility].    Today [facility] michael the labs, they are noted to have elevated vancomycin level along with creatinine suggestive of acute kidney injury.      Patient denies any complaints denies any nausea or vomiting.  She is sent to the emergency room, admitted for evaluation.    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  We will keep her on the before meals and at bedtime blood sugar check and continue Lantus at a reduced dose of 20 units and adjust as condition dictates.    Essential hypertension: We will hold off the valsartan due to her nephrotoxicity at this time.  I will watch the blood pressure before starting on any other medications    Chronic atrial fibrillation: On anticoagulation with Xarelto 20 mg daily, but due to renal functions we are decreasing it to 15 mg daily.    Admitted to medical floor.    Medications Home :    Reviewed    Code status : Full code    VTE prophylaxis : On Xarelto at home.    Advance Medical directive : Health care decision maker information is on file.         Discussion/MDM:   I have discussed patient's presentation/findings and clinical course to date with ED provider.     Given the patient's current clinical presentation, I have a high level of concern for decompensation if discharged from the emergency department due to worsening of her renal functions and as patient has multiple medical comorbidities with increased risk of morbidity and mortality  that warrants admission to hospital.     I have reviewed patient's presenting subjective and objective findings, as well as all laboratory studies, imaging studies, and vital signs to date as well as treatment rendered and patient's response to those treatments.  In addition, prior medical, surgical and relevant social and family histories were reviewed.      CC : Landon Starks MD  Signed By: John Paul Longoria MD     November 27, 2024      This dictation was done by dragon, computer voice recognition software.  Often unanticipated grammatical, syntax, Hubbell phones and other interpretive errors are inadvertently transcribed.  Please excuse errors that have escaped final proofreading.

## 2024-11-27 NOTE — ED PROVIDER NOTES
[facility] EMERGENCY DEP  EMERGENCY DEPARTMENT ENCOUNTER       Pt Name: Madisyn Manriquez  MRN: [id number]  Birthdate 1955  Date of evaluation: 11/27/2024  Provider: Malina Schultz MD   PCP: Landon Starks MD  Note Started: 5:20 PM EST 11/27/24     CHIEF COMPLAINT       Chief Complaint   Patient presents with    abnormal labs        HISTORY OF PRESENT ILLNESS: 1 or more elements      History From: Patient, History limited by: none     Madisyn Manriquez is a 69 y.o. female presents the emergency department for elevated vancomycin level.       Please See MDM for Additional Details of the HPI/PMH  Nursing Notes were all reviewed and agreed with or any disagreements were addressed in the HPI.     REVIEW OF SYSTEMS        Positives and Pertinent negatives as per HPI.    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    Hepatitis     History of seasonal allergies     Hypertension     Kidney infection     MI (myocardial infarction) (HCC)     MRSA (methicillin resistant Staphylococcus aureus)     Recurrent boils     Stroke (HCC)     Type II or unspecified type diabetes mellitus without mention of complication, not stated as uncontrolled        Past Surgical History:  Past Surgical History:   Procedure Laterality Date    BLADDER REPAIR      CHOLECYSTECTOMY      EP DEVICE PROCEDURE N/A 11/19/2024    LASER LEAD EXTRACTION OF SINGLE CHAMBER ICD (CARDIAC ANESTHESIA FOR KEYLA) performed by Vishal Hull MD at [facility] CARDIAC CATH LAB    HYSTERECTOMY (CERVIX STATUS UNKNOWN)      2000    ORTHOPEDIC SURGERY      pin in left foot    OTHER SURGICAL HISTORY      randy abscess    OTHER SURGICAL HISTORY Left 11/2019    Carotid artery ligation- Dr Calero    OTHER SURGICAL HISTORY      defiberilator left corotid       Family History:  Family History   Problem Relation  in time range)   sodium chloride flush 0.9 % injection 5-40 mL (has no administration in time range)   0.9 % sodium chloride infusion (has no administration in time range)   ondansetron (ZOFRAN-ODT) disintegrating tablet 4 mg (has no administration in time range)     Or   ondansetron (ZOFRAN) injection 4 mg (has no administration in time range)   polyethylene glycol (GLYCOLAX) packet 17 g (has no administration in time range)   acetaminophen (TYLENOL) tablet 650 mg (has no administration in time range)     Or   acetaminophen (TYLENOL) suppository 650 mg (has no administration in time range)   insulin lispro (HUMALOG,ADMELOG) injection vial 0-8 Units (has no administration in time range)   glucose chewable tablet 16 g (has no administration in time range)   dextrose bolus 10% 125 mL (has no administration in time range)     Or   dextrose bolus 10% 250 mL (has no administration in time range)   glucagon injection 1 mg (has no administration in time range)   dextrose 10 % infusion (has no administration in time range)   vitamin B complex tablet 1 tablet (has no administration in time range)   gabapentin (NEURONTIN) capsule 300 mg (has no administration in time range)   vancomycin (VANCOCIN) 1000 mg in 200 mL IVPB ( IntraVENous Automatically Held 11/30/24 1718)       Medical Decision Making  Amount and/or Complexity of Data Reviewed  Labs: ordered.    Risk  Decision regarding hospitalization.      69-year-old female presents the emergency department complaining of vancomycin toxicity.  Patient has a history of endocarditis secondary to implanted device.  Patient was discharged on 11/21 with vancomycin 1000 mg to be given twice daily.  Patient had a level taken yesterday that was noted to be elevated.  Had a repeat level today that was similarly elevated and was sent to the emergency department.  Patient reports that she was feeling well until today when she started to feel fatigued/less energy.  No fever.    Chart

## 2024-11-28 LAB
ALBUMIN SERPL-MCNC: 2.7 G/DL (ref 3.5–5)
ANION GAP SERPL CALC-SCNC: 10 MMOL/L (ref 2–12)
BASOPHILS # BLD: 0.1 K/UL (ref 0–0.1)
BASOPHILS NFR BLD: 1 % (ref 0–1)
BUN SERPL-MCNC: 24 MG/DL (ref 6–20)
BUN/CREAT SERPL: 11 (ref 12–20)
CALCIUM SERPL-MCNC: 9.2 MG/DL (ref 8.5–10.1)
CHLORIDE SERPL-SCNC: 109 MMOL/L (ref 97–108)
CO2 SERPL-SCNC: 27 MMOL/L (ref 21–32)
CREAT SERPL-MCNC: 2.26 MG/DL (ref 0.55–1.02)
CRP SERPL-MCNC: 2.3 MG/DL (ref 0–0.3)
DIFFERENTIAL METHOD BLD: ABNORMAL
EOSINOPHIL # BLD: 0.3 K/UL (ref 0–0.4)
EOSINOPHIL NFR BLD: 3 % (ref 0–7)
ERYTHROCYTE [DISTWIDTH] IN BLOOD BY AUTOMATED COUNT: 14.6 % (ref 11.5–14.5)
FERRITIN SERPL-MCNC: 52 NG/ML (ref 8–252)
FOLATE SERPL-MCNC: 20.9 NG/ML (ref 5–21)
GLUCOSE BLD STRIP.AUTO-MCNC: 102 MG/DL (ref 65–117)
GLUCOSE BLD STRIP.AUTO-MCNC: 174 MG/DL (ref 65–117)
GLUCOSE BLD STRIP.AUTO-MCNC: 318 MG/DL (ref 65–117)
GLUCOSE BLD STRIP.AUTO-MCNC: 92 MG/DL (ref 65–117)
GLUCOSE SERPL-MCNC: 80 MG/DL (ref 65–100)
HCT VFR BLD AUTO: 30.2 % (ref 35–47)
HGB BLD-MCNC: 9.9 G/DL (ref 11.5–16)
IMM GRANULOCYTES # BLD AUTO: 0 K/UL (ref 0–0.04)
IMM GRANULOCYTES NFR BLD AUTO: 0 % (ref 0–0.5)
IRON SATN MFR SERPL: 18 % (ref 20–50)
IRON SERPL-MCNC: 32 UG/DL (ref 50–170)
LYMPHOCYTES # BLD: 2.6 K/UL (ref 0.8–3.5)
LYMPHOCYTES NFR BLD: 27 % (ref 12–49)
MCH RBC QN AUTO: 29.8 PG (ref 26–34)
MCHC RBC AUTO-ENTMCNC: 32.8 G/DL (ref 30–36.5)
MCV RBC AUTO: 91 FL (ref 80–99)
MONOCYTES # BLD: 0.7 K/UL (ref 0–1)
MONOCYTES NFR BLD: 7 % (ref 5–13)
NEUTS SEG # BLD: 6 K/UL (ref 1.8–8)
NEUTS SEG NFR BLD: 62 % (ref 32–75)
NRBC # BLD: 0 K/UL (ref 0–0.01)
NRBC BLD-RTO: 0 PER 100 WBC
PHOSPHATE SERPL-MCNC: 4.5 MG/DL (ref 2.6–4.7)
PLATELET # BLD AUTO: 300 K/UL (ref 150–400)
PMV BLD AUTO: 11.3 FL (ref 8.9–12.9)
POTASSIUM SERPL-SCNC: 4 MMOL/L (ref 3.5–5.1)
RBC # BLD AUTO: 3.32 M/UL (ref 3.8–5.2)
SERVICE CMNT-IMP: ABNORMAL
SERVICE CMNT-IMP: ABNORMAL
SERVICE CMNT-IMP: NORMAL
SERVICE CMNT-IMP: NORMAL
SODIUM SERPL-SCNC: 146 MMOL/L (ref 136–145)
TIBC SERPL-MCNC: 173 UG/DL (ref 250–450)
VANCOMYCIN SERPL-MCNC: 18.8 UG/ML
VIT B12 SERPL-MCNC: 422 PG/ML (ref 193–986)
WBC # BLD AUTO: 9.7 K/UL (ref 3.6–11)

## 2024-11-28 PROCEDURE — 82607 VITAMIN B-12: CPT

## 2024-11-28 PROCEDURE — 82962 GLUCOSE BLOOD TEST: CPT

## 2024-11-28 PROCEDURE — 82746 ASSAY OF FOLIC ACID SERUM: CPT

## 2024-11-28 PROCEDURE — 80202 ASSAY OF VANCOMYCIN: CPT

## 2024-11-28 PROCEDURE — 82728 ASSAY OF FERRITIN: CPT

## 2024-11-28 PROCEDURE — 83550 IRON BINDING TEST: CPT

## 2024-11-28 PROCEDURE — 1100000000 HC RM PRIVATE

## 2024-11-28 PROCEDURE — 85025 COMPLETE CBC W/AUTO DIFF WBC: CPT

## 2024-11-28 PROCEDURE — 2580000003 HC RX 258: Performed by: HOSPITALIST

## 2024-11-28 PROCEDURE — 80069 RENAL FUNCTION PANEL: CPT

## 2024-11-28 PROCEDURE — 36415 COLL VENOUS BLD VENIPUNCTURE: CPT

## 2024-11-28 PROCEDURE — 83540 ASSAY OF IRON: CPT

## 2024-11-28 PROCEDURE — 6370000000 HC RX 637 (ALT 250 FOR IP): Performed by: HOSPITALIST

## 2024-11-28 RX ORDER — FERROUS GLUCONATE 324(37.5)
324 TABLET ORAL DAILY
Status: DISCONTINUED | OUTPATIENT
Start: 2024-11-28 | End: 2024-11-29 | Stop reason: HOSPADM

## 2024-11-28 RX ORDER — CARVEDILOL 6.25 MG/1
6.25 TABLET ORAL 2 TIMES DAILY WITH MEALS
COMMUNITY

## 2024-11-28 RX ORDER — SODIUM CHLORIDE, SODIUM LACTATE, POTASSIUM CHLORIDE, CALCIUM CHLORIDE 600; 310; 30; 20 MG/100ML; MG/100ML; MG/100ML; MG/100ML
INJECTION, SOLUTION INTRAVENOUS CONTINUOUS
Status: DISPENSED | OUTPATIENT
Start: 2024-11-28 | End: 2024-11-29

## 2024-11-28 RX ORDER — CARVEDILOL 6.25 MG/1
6.25 TABLET ORAL 2 TIMES DAILY WITH MEALS
Status: DISCONTINUED | OUTPATIENT
Start: 2024-11-28 | End: 2024-11-29 | Stop reason: HOSPADM

## 2024-11-28 RX ORDER — INSULIN GLARGINE 100 [IU]/ML
10 INJECTION, SOLUTION SUBCUTANEOUS NIGHTLY
Status: DISCONTINUED | OUTPATIENT
Start: 2024-11-28 | End: 2024-11-29 | Stop reason: HOSPADM

## 2024-11-28 RX ADMIN — RIVAROXABAN 15 MG: 15 TABLET, FILM COATED ORAL at 18:01

## 2024-11-28 RX ADMIN — Medication 1 TABLET: at 08:43

## 2024-11-28 RX ADMIN — INSULIN GLARGINE 10 UNITS: 100 INJECTION, SOLUTION SUBCUTANEOUS at 20:47

## 2024-11-28 RX ADMIN — PANTOPRAZOLE SODIUM 40 MG: 40 TABLET, DELAYED RELEASE ORAL at 08:43

## 2024-11-28 RX ADMIN — GABAPENTIN 300 MG: 300 CAPSULE ORAL at 20:46

## 2024-11-28 RX ADMIN — CARVEDILOL 6.25 MG: 6.25 TABLET, FILM COATED ORAL at 18:01

## 2024-11-28 RX ADMIN — Medication 1 CAPSULE: at 08:43

## 2024-11-28 RX ADMIN — CARVEDILOL 6.25 MG: 6.25 TABLET, FILM COATED ORAL at 10:46

## 2024-11-28 RX ADMIN — INSULIN LISPRO 6 UNITS: 100 INJECTION, SOLUTION INTRAVENOUS; SUBCUTANEOUS at 20:47

## 2024-11-28 RX ADMIN — Medication 324 MG: at 20:46

## 2024-11-28 RX ADMIN — SODIUM CHLORIDE, POTASSIUM CHLORIDE, SODIUM LACTATE AND CALCIUM CHLORIDE: 600; 310; 30; 20 INJECTION, SOLUTION INTRAVENOUS at 08:42

## 2024-11-28 RX ADMIN — GABAPENTIN 300 MG: 300 CAPSULE ORAL at 08:43

## 2024-11-28 RX ADMIN — SODIUM CHLORIDE, POTASSIUM CHLORIDE, SODIUM LACTATE AND CALCIUM CHLORIDE: 600; 310; 30; 20 INJECTION, SOLUTION INTRAVENOUS at 00:04

## 2024-11-28 NOTE — CARE COORDINATION
Assessment based off chart review as patient declined to be interviewed at this time. Patient is a 69 yr old female admitted due to acute kidney injury, acute bacterial endocarditis and vancomycin overdose. Per chart patient is independent with ADL's and was discharged from [facility] 11/21 on home IV ABX with [facility]. CM will attempt to meet with patient for complete assessment 11/29. Contacted nurse station regarding need for delivery of 1st IMM letter.

## 2024-11-28 NOTE — PROGRESS NOTES
Pharmacy Tech Clarification of Prior to Admission Medication Regimen     The patient was interviewed regarding clarification of the prior to admission medication regimen. Patient present in room and obtained permission from patient to discuss drug regimen.    Information Obtained From: Patient    Allergies updated/ Reaction: Penicillins, Diazepam, and Sulfa Antibiotics    Organizer (pill box, bottles, etc): Pill Organizer    Additions: Carvedilol    Medications that need DELETION: Lactobacillus and Vancomycin- vancomycin being held until levels and renal function improve.    Changes: Gabapentin: Patient takes 1 tablet TID.  Instructed patient to take xarelto with evening meal.    Pertinent Pharmacy Findings:  Updated patient’s preferred outpatient pharmacy to: [facility] in [place], [place]      Patient was questioned regarding use of any other inhalers, topical products, over the counter medications, herbal medications, vitamin products or ophthalmic/nasal/otic medication use.        Patient was inquired about side effects and was asked about pharmacist consultation if needed or desired (Y/N): Y      PTA medication list was corrected to the following:     Prior to Admission Medications   Prescriptions Last Dose Informant Patient Reported? Taking?   Continuous Blood Gluc Transmit (DEXCOM G6 TRANSMITTER) MISC   Yes Yes   Sig: USE AS DIRECTED TO  MONITOR  BLOOD  SUGAR   LANTUS 100 UNIT/ML injection vial 11/27/2024 at 2100  Yes Yes   Sig: INJECT 30 UNITS SUBCUTANEOUSLY ONCE DAILY in the morning   XARELTO 20 MG TABS tablet 11/27/2024 at 2100  Yes Yes   Sig: Take 1 tablet by mouth once daily with breakfast   Patient taking differently: Take 1 tablet by mouth nightly- recommended to take with evening meal   b complex vitamins capsule 11/28/2024 at 0900  Yes Yes   Sig: Take 1 capsule by mouth daily   carvedilol (COREG) 6.25 MG tablet 11/27/2024  Yes Yes   Sig: Take 1 tablet by mouth 2 times daily (with meals)   gabapentin

## 2024-11-28 NOTE — PROGRESS NOTES
Hospitalist Progress Note               Daily Progress Note: 11/28/2024      Hospital Day: 2     Chief complaint:  Elevated Vancomycin Levels.     Subjective:   Hospital course to date: 69-year-old female recently diagnosed with endocarditis with Enterococcus faecalis on vancomycin treatment was discharged home for outpatient to continue antibiotics until December 31.  Recommended weekly laboratory data, went home health went today for the lab work vancomycin levels were 43 with elevated creatinine from baseline.  Patient with acute kidney injury and elevated vancomycin levels admitted for further management    11/28/2024  : Repeat vancomycin levels in the emergency room showing 31.9.  Patient is started on IV fluids, held vancomycin.  Also held valsartan.  Patient denies any complaints.  No nausea or vomiting.  Eating very well and feeling very comfortable.  Repeat laboratory data suggest improving renal functions, and vancomycin levels also decreased to 18.8.  But still BUN/creatinine are slightly high.      Medications reviewed  Current Facility-Administered Medications   Medication Dose Route Frequency    lactated ringers infusion   IntraVENous Continuous    insulin glargine (LANTUS) injection vial 10 Units  10 Units SubCUTAneous Nightly    carvedilol (COREG) tablet 6.25 mg  6.25 mg Oral BID WC    ferrous gluconate 324 (37.5 Fe) MG tablet 324 mg  324 mg Oral Daily    lactobacillus (CULTURELLE) capsule 1 capsule  1 capsule Oral Daily with breakfast    pantoprazole (PROTONIX) tablet 40 mg  40 mg Oral Daily    [Held by provider] valsartan (DIOVAN) tablet 40 mg  40 mg Oral BID    [Held by provider] pravastatin (PRAVACHOL) tablet 20 mg  20 mg Oral Nightly    rivaroxaban (XARELTO) tablet 15 mg  15 mg Oral Dinner    sodium chloride flush 0.9 % injection 5-40 mL  5-40 mL IntraVENous 2 times per day    sodium chloride flush 0.9 % injection 5-40 mL  5-40 mL IntraVENous PRN    0.9 % sodium chloride infusion    Aggressive IV diuresis requiring serial monitoring for renal impairment and electrolyte derangements  [] Critical electrolyte abnormalities requiring IV replacement and close serial monitoring  [] SQ Insulin SS- monitoring serial FSBS for Hypoglycemic adverse drug reaction  [] Other -   [] Change in code status:    [] Decision to escalate care:    [] Major surgery/procedure with associated risk factors:    ----------------------------------------------------------------------  C. Data (any 2)  [] Discussed current management and discharge planning options with Case Management.  [x] Discussed management of the case with:  patient.   [] Telemetry personally reviewed and interpreted as documented above    [] Imaging personally reviewed and interpreted, includes:    [] Data Review (any 3)  [] All available Consultant notes from yesterday/today were reviewed  [x] All current labs were reviewed and interpreted for clinical significance   [] Appropriate follow-up labs were ordered  [] Collateral history obtained from:               Assessment/Plan :    Acute kidney injury with previous normal renal functions: Monitoring renal functions closely, trending down but still elevated, need very close monitoring    Vancomycin elevated levels in therapeutic use: Trending down improving levels, we will wait until tomorrow morning to have renal functions much improved before restarting on vancomycin.    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  Will monitor closely    Essential hypertension: Held valsartan due to renal functions.  She is on carvedilol at home that is resumed    Diabetes mellitus type 2: She is only on basal insulin glargine, was on 30 units at home, that is decreased to 20 units yesterday.  Today fasting blood sugar was in the 80s, I will decrease to 10 units

## 2024-11-28 NOTE — PLAN OF CARE
Problem: Chronic Conditions and Co-morbidities  Goal: Patient's chronic conditions and co-morbidity symptoms are monitored and maintained or improved  11/28/2024 0836 by Hien Martinez LPN  Outcome: Progressing  11/27/2024 2126 by Sravan Camp RN  Outcome: Progressing     Problem: Pain  Goal: Verbalizes/displays adequate comfort level or baseline comfort level  11/28/2024 0836 by Hien Martinez LPN  Outcome: Progressing  11/27/2024 2126 by Sravan Camp RN  Outcome: Progressing     Problem: Safety - Adult  Goal: Free from fall injury  Outcome: Progressing     Problem: ABCDS Injury Assessment  Goal: Absence of physical injury  Outcome: Progressing

## 2024-11-28 NOTE — PROGRESS NOTES
1st Important Message from Medicare letter explained to patient with an opportunity for questions provided. Signed document placed on bedside chart, copy given to patient.

## 2024-11-29 VITALS
OXYGEN SATURATION: 96 % | DIASTOLIC BLOOD PRESSURE: 73 MMHG | WEIGHT: 130 LBS | HEART RATE: 91 BPM | RESPIRATION RATE: 18 BRPM | SYSTOLIC BLOOD PRESSURE: 143 MMHG | BODY MASS INDEX: 20.89 KG/M2 | TEMPERATURE: 98.2 F | HEIGHT: 66 IN

## 2024-11-29 LAB
ALBUMIN SERPL-MCNC: 2.6 G/DL (ref 3.5–5)
ANION GAP SERPL CALC-SCNC: 8 MMOL/L (ref 2–12)
APPEARANCE UR: CLEAR
BACTERIA URNS QL MICRO: NEGATIVE /HPF
BILIRUB UR QL: NEGATIVE
BUN SERPL-MCNC: 19 MG/DL (ref 6–20)
BUN/CREAT SERPL: 8 (ref 12–20)
CALCIUM SERPL-MCNC: 8.7 MG/DL (ref 8.5–10.1)
CHLORIDE SERPL-SCNC: 109 MMOL/L (ref 97–108)
CO2 SERPL-SCNC: 28 MMOL/L (ref 21–32)
COLOR UR: ABNORMAL
CREAT SERPL-MCNC: 2.36 MG/DL (ref 0.55–1.02)
EKG ATRIAL RATE: 89 BPM
EKG DIAGNOSIS: NORMAL
EKG P AXIS: 31 DEGREES
EKG P-R INTERVAL: 190 MS
EKG Q-T INTERVAL: 368 MS
EKG QRS DURATION: 82 MS
EKG QTC CALCULATION (BAZETT): 447 MS
EKG R AXIS: 20 DEGREES
EKG T AXIS: 34 DEGREES
EKG VENTRICULAR RATE: 89 BPM
EPITH CASTS URNS QL MICRO: ABNORMAL /LPF
GLUCOSE BLD STRIP.AUTO-MCNC: 100 MG/DL (ref 65–117)
GLUCOSE BLD STRIP.AUTO-MCNC: 196 MG/DL (ref 65–117)
GLUCOSE SERPL-MCNC: 81 MG/DL (ref 65–100)
GLUCOSE UR STRIP.AUTO-MCNC: NEGATIVE MG/DL
HGB UR QL STRIP: ABNORMAL
KETONES UR QL STRIP.AUTO: NEGATIVE MG/DL
LEUKOCYTE ESTERASE UR QL STRIP.AUTO: NEGATIVE
NITRITE UR QL STRIP.AUTO: NEGATIVE
PH UR STRIP: 6 (ref 5–8)
PHOSPHATE SERPL-MCNC: 4.6 MG/DL (ref 2.6–4.7)
POTASSIUM SERPL-SCNC: 3.9 MMOL/L (ref 3.5–5.1)
PROT UR STRIP-MCNC: NEGATIVE MG/DL
RBC #/AREA URNS HPF: ABNORMAL /HPF (ref 0–5)
SERVICE CMNT-IMP: ABNORMAL
SERVICE CMNT-IMP: NORMAL
SODIUM SERPL-SCNC: 145 MMOL/L (ref 136–145)
SP GR UR REFRACTOMETRY: 1.01 (ref 1–1.03)
URINE CULTURE IF INDICATED: ABNORMAL
UROBILINOGEN UR QL STRIP.AUTO: 0.2 EU/DL (ref 0.2–1)
VANCOMYCIN SERPL-MCNC: 19.7 UG/ML
WBC URNS QL MICRO: ABNORMAL /HPF (ref 0–4)

## 2024-11-29 PROCEDURE — 80069 RENAL FUNCTION PANEL: CPT

## 2024-11-29 PROCEDURE — 36415 COLL VENOUS BLD VENIPUNCTURE: CPT

## 2024-11-29 PROCEDURE — 2580000003 HC RX 258: Performed by: HOSPITALIST

## 2024-11-29 PROCEDURE — 94760 N-INVAS EAR/PLS OXIMETRY 1: CPT

## 2024-11-29 PROCEDURE — 80202 ASSAY OF VANCOMYCIN: CPT

## 2024-11-29 PROCEDURE — 6370000000 HC RX 637 (ALT 250 FOR IP): Performed by: HOSPITALIST

## 2024-11-29 PROCEDURE — 81001 URINALYSIS AUTO W/SCOPE: CPT

## 2024-11-29 PROCEDURE — 82962 GLUCOSE BLOOD TEST: CPT

## 2024-11-29 RX ORDER — FERROUS GLUCONATE 324(37.5)
324 TABLET ORAL DAILY
Qty: 100 TABLET | Refills: 1 | Status: SHIPPED | OUTPATIENT
Start: 2024-11-29

## 2024-11-29 RX ORDER — LACTOBACILLUS RHAMNOSUS GG 10B CELL
1 CAPSULE ORAL
Qty: 30 CAPSULE | Refills: 3 | Status: SHIPPED | OUTPATIENT
Start: 2024-11-30

## 2024-11-29 RX ADMIN — SODIUM CHLORIDE, PRESERVATIVE FREE 10 ML: 5 INJECTION INTRAVENOUS at 08:23

## 2024-11-29 RX ADMIN — Medication 1 TABLET: at 08:15

## 2024-11-29 RX ADMIN — CARVEDILOL 6.25 MG: 6.25 TABLET, FILM COATED ORAL at 08:15

## 2024-11-29 RX ADMIN — Medication 1 CAPSULE: at 08:15

## 2024-11-29 RX ADMIN — INSULIN LISPRO 2 UNITS: 100 INJECTION, SOLUTION INTRAVENOUS; SUBCUTANEOUS at 12:27

## 2024-11-29 RX ADMIN — GABAPENTIN 300 MG: 300 CAPSULE ORAL at 08:15

## 2024-11-29 RX ADMIN — PANTOPRAZOLE SODIUM 40 MG: 40 TABLET, DELAYED RELEASE ORAL at 08:15

## 2024-11-29 NOTE — CARE COORDINATION
Care Management Initial Assessment       RUR:22%  Readmission? Yes -  1st IM letter given? Yes - 11/28 2nd IMM delivered 11/29 1st  letter given: No  - N/A      Met with patient today to verify demographics and PCP as [facility]. Patient has been recommended for discharge. IV ABX on hold until patient's labs improve. Patient will need labs drawn on Monday. Attempted to schedule an appointment but the PCP office was closed. Patient stated her PCP is  and she will be able to be seen by PCP on Monday. No other needs identified as patient is fully independent and up ab jeff.        11/28/24 3637   Service Assessment   Patient Orientation Alert and Oriented   Cognition Alert   History Provided By Medical Record   Primary Caregiver Self   Accompanied By/Relationship N/A   Support Systems Children;Spouse/Significant Other   Patient's Healthcare Decision Maker is: Named in Scanned ACP Document   PCP Verified by CM Yes   Last Visit to PCP Within last 3 months   Prior Functional Level Independent in ADLs/IADLs   Current Functional Level Independent in ADLs/IADLs   Can patient return to prior living arrangement Yes   Ability to make needs known: Good   Family able to assist with home care needs: Yes   Would you like for me to discuss the discharge plan with any other family members/significant others, and if so, who? Yes

## 2024-11-29 NOTE — PROGRESS NOTES
Discharge instructions reviewed with patient and son  Personal belongings returned: yes  Home meds returned: n/a  To front entrance via [facility]  Discharged home  @ [phone]

## 2024-11-29 NOTE — DISCHARGE INSTRUCTIONS
Need to monitor renal function and vancomycin levels, Follow up with your infectious disease physician and Family Physician for recommendations. Do not take vancomycin until you were told to.     Make a note of change in medications.   Take Gabapentin 300 mg Twice daily only.  Xarelto dose is decreased to 15 mg daily.     Lantus, Pravachol and Valsartan are held due to renal functions, need to follow up with your PCP for restarting and dosing.

## 2024-11-29 NOTE — PLAN OF CARE
Problem: Chronic Conditions and Co-morbidities  Goal: Patient's chronic conditions and co-morbidity symptoms are monitored and maintained or improved  11/29/2024 0818 by Hien Martinez LPN  Outcome: Progressing  11/29/2024 0245 by Sravan Camp, RN  Outcome: Progressing     Problem: Pain  Goal: Verbalizes/displays adequate comfort level or baseline comfort level  11/29/2024 0818 by Hien Martinez LPN  Outcome: Progressing  11/29/2024 0245 by Sravan Camp, RN  Outcome: Progressing     Problem: Safety - Adult  Goal: Free from fall injury  11/29/2024 0818 by Hien Martinez LPN  Outcome: Progressing  11/29/2024 0245 by Sravan Camp, RN  Outcome: Progressing     Problem: ABCDS Injury Assessment  Goal: Absence of physical injury  Outcome: Progressing

## 2024-11-29 NOTE — DISCHARGE SUMMARY
[facility].                                     Hospitalist Discharge Summary     Patient ID:    Madisyn Manriquez  [id number]  69 y.o.  1955    Admit date: 11/27/2024    Discharge date : 11/29/2024      Final Diagnoses:   Acute kidney injury  Vancomycin overdosing  Bacterial endocarditis with Enterococcus faecalis  Essential hypertension  Diabetes mellitus type 2  Atrial fibrillation on anticoagulation    Reason for Hospitalization  :    69-year-old female recently diagnosed with endocarditis started on vancomycin that is sensitive for Enterococcus faecalis organisms that was grown was discharged home on 21 November.  She is recommended for weekly laboratory data, continued on vancomycin recommended until December 31.  When labs were done by the [facility] on the day of admission vancomycin levels were severely elevated at 43.5.  Also new onset elevated BUN/creatinine suggestive of acute kidney injury.  Patient is admitted for close monitoring.    Hospital Course :   Admitted started on IV fluids encourage oral fluid intake.  She is doing fairly well with improved activity.    Vancomycin level started trending down with levels are still 18.8 and today is 19.7.  As still having therapeutic range and renal function still not improved.  Patient is needed very close monitoring, as she is doing fairly good with no other issues and very compliant with the treatment and agreeable to follow-up outpatient she is discharged home.  Recommended not to have any vancomycin until infectious disease recommended.  Recommended daily labs of renal functions and vancomycin level,  provided lab slip to come to the hospital get it done.  Patient is agreeable for this and will follow-up, case management already checked the patient's PCP can see her in 2 days on Monday that is December 1st.      Discharge Medications  :      Medication List        START taking these medications      ferrous gluconate 324 (37.5

## 2024-11-30 ENCOUNTER — HOSPITAL ENCOUNTER (OUTPATIENT)
Facility: HOSPITAL | Age: 69
Discharge: HOME | End: 2024-12-03

## 2024-12-02 ENCOUNTER — TELEPHONE (OUTPATIENT)
Age: 69
End: 2024-12-02

## 2024-12-02 NOTE — TELEPHONE ENCOUNTER
ID  Discussed with patient's PCP at [facility].  Dr.Monge Garza.  I was informed that patient has been discharged from [facility] without any antibiotic.  Most recent vancomycin level on 11/29 was 19.7, which is in the therapeutic range.  BUN/creatinine 19/2.36, GFR 22.  Patient to follow-up with her Dr. Pola Garza.  Advised that ID recommendation would be to start on daptomycin as there has not been significant renal recovery.  Patient would also need to stop statin,  Obtain stat CK..  Will send orders to home health company.  Antimicrobial orders for discharge  -Daptomycin 8 mg by KG IV   every 48 hours end date 12/31  -Changed to daily dosing when GFR>30  -Pull line at end of therapy.    -Weekly CBC, CMP, CK,  -Fax reports to [phone], call with critical labs at [phone]  -Call if progressive rise in creatinine is noted  -Encourage adequate fluids, daily probiotic/yogurt  -If line malfunction occurs and home health cannot reposition  please send patient to ED immediately  -ID follow-up -12/12 at 1230-virtual  Patient will require surveillance blood cultures after completing antibiotic treatment  - If persistent side effects occur stop antibiotic and call-ID/PCP

## 2024-12-02 NOTE — TELEPHONE ENCOUNTER
Please let home health know that patient needs to stop her vancomycin as renal function is still abnormal  Stat CK  Start daptomycin IV  Patient to stop statin- D/w her PCP  New antibiotic orders as follows        Antimicrobial orders for discharge  -Daptomycin 8 mg by KG IV   every 48 hours end date 12/31  -Changed to daily dosing when GFR>30  -Pull line at end of therapy.    -Weekly CBC, CMP, CK,  -Fax reports to [phone], call with critical labs at [phone]  -Call if progressive rise in creatinine is noted  -Encourage adequate fluids, daily probiotic/yogurt  -If line malfunction occurs and home health cannot reposition  please send patient to ED immediately  -ID follow-up -12/12 at 1230-virtual  Patient will require surveillance blood cultures after completing antibiotic treatment  - If persistent side effects occur stop antibiotic and call-ID/PCP

## 2024-12-02 NOTE — TELEPHONE ENCOUNTER
Nurse from from Dr. Malina Fajardo's office called about patient who has a hospital follow-up with her today. She would like to speak to you prior to patient's appointment in her office today. Numbers to reach her are [phone] or [phone]

## 2024-12-04 ENCOUNTER — HOSPITAL ENCOUNTER (OUTPATIENT)
Facility: HOSPITAL | Age: 69
Setting detail: INFUSION SERIES
End: 2024-12-04

## 2024-12-04 ENCOUNTER — TELEPHONE (OUTPATIENT)
Age: 69
End: 2024-12-04

## 2024-12-04 RX ORDER — SODIUM CHLORIDE 9 MG/ML
5-250 INJECTION, SOLUTION INTRAVENOUS PRN
OUTPATIENT
Start: 2024-12-04

## 2024-12-04 RX ORDER — HEPARIN 100 UNIT/ML
500 SYRINGE INTRAVENOUS PRN
OUTPATIENT
Start: 2024-12-04

## 2024-12-04 NOTE — TELEPHONE ENCOUNTER
Please see orders from Monday and  Instructions to notify home health and infusion company.   How come they do not know anything about  Patient antibiotic orders?  Antimicrobial orders for discharge  -Daptomycin 8 mg /KG IV   every 48 hours end date 12/31  -Change to daily dosing when GFR>30  -Pull line at end of therapy.    -Weekly CBC, CMP, CK,  -Fax reports to [phone], call with critical labs at [phone]  -Call if progressive rise in creatinine is noted  -Encourage adequate fluids, daily probiotic/yogurt  -If line malfunction occurs and home health cannot reposition  please send patient to ED immediately  -ID follow-up -12/12 at 1230-virtual  Patient will require surveillance blood cultures after completing antibiotic treatment  - If persistent side effects occur stop antibiotic and call-ID/PCP

## 2024-12-04 NOTE — TELEPHONE ENCOUNTER
Spoke to [facility]. First marin is to be done @2:30 pm tomorrow they are to call and notify patient. Also notified [facility] and [facility].

## 2024-12-04 NOTE — TELEPHONE ENCOUNTER
First dose needs to be done at infusion center because patient has never had this medication before per [employer]. Will complete infusion form.

## 2024-12-04 NOTE — TELEPHONE ENCOUNTER
Vivian with [facility]  [phone] called and stated that patient does not have any antibiotics and [employer] did not have orders for her. Orders were faxed to [employer] and [facility] on 12/2/2024 with confirmation. She would like to know if it is necessary to draw labs on patient today since she is not on antibiotic. Labs were drawn at MD's office on the 2nd.  Donato from [employer] states they do have the orders but they did not know about patient being admitted to the hospital and being discharged. They will begin working on orders today.

## 2024-12-05 ENCOUNTER — HOSPITAL ENCOUNTER (OUTPATIENT)
Facility: HOSPITAL | Age: 69
Setting detail: INFUSION SERIES
Discharge: HOME | End: 2024-12-05
Payer: MEDICARE

## 2024-12-05 VITALS
DIASTOLIC BLOOD PRESSURE: 78 MMHG | HEIGHT: 66 IN | RESPIRATION RATE: 18 BRPM | BODY MASS INDEX: 20.57 KG/M2 | SYSTOLIC BLOOD PRESSURE: 152 MMHG | WEIGHT: 128 LBS | OXYGEN SATURATION: 99 % | TEMPERATURE: 97.5 F | HEART RATE: 89 BPM

## 2024-12-05 DIAGNOSIS — I05.9 ENDOCARDITIS OF MITRAL VALVE: Primary | ICD-10-CM

## 2024-12-05 LAB
ANION GAP SERPL CALC-SCNC: 10 MMOL/L (ref 2–12)
BASOPHILS # BLD: 0.1 K/UL (ref 0–0.1)
BASOPHILS NFR BLD: 1 % (ref 0–1)
BUN SERPL-MCNC: 29 MG/DL (ref 6–20)
BUN/CREAT SERPL: 14 (ref 12–20)
CALCIUM SERPL-MCNC: 9.1 MG/DL (ref 8.5–10.1)
CHLORIDE SERPL-SCNC: 103 MMOL/L (ref 97–108)
CK SERPL-CCNC: 27 U/L (ref 26–192)
CO2 SERPL-SCNC: 26 MMOL/L (ref 21–32)
CREAT SERPL-MCNC: 2.07 MG/DL (ref 0.55–1.02)
DIFFERENTIAL METHOD BLD: ABNORMAL
EOSINOPHIL # BLD: 0.6 K/UL (ref 0–0.4)
EOSINOPHIL NFR BLD: 5 % (ref 0–7)
ERYTHROCYTE [DISTWIDTH] IN BLOOD BY AUTOMATED COUNT: 14.5 % (ref 11.5–14.5)
GLUCOSE SERPL-MCNC: 212 MG/DL (ref 65–100)
HCT VFR BLD AUTO: 30 % (ref 35–47)
HGB BLD-MCNC: 9.9 G/DL (ref 11.5–16)
IMM GRANULOCYTES # BLD AUTO: 0 K/UL (ref 0–0.04)
IMM GRANULOCYTES NFR BLD AUTO: 0 % (ref 0–0.5)
LYMPHOCYTES # BLD: 2.8 K/UL (ref 0.8–3.5)
LYMPHOCYTES NFR BLD: 27 % (ref 12–49)
MCH RBC QN AUTO: 29.8 PG (ref 26–34)
MCHC RBC AUTO-ENTMCNC: 33 G/DL (ref 30–36.5)
MCV RBC AUTO: 90.4 FL (ref 80–99)
MONOCYTES # BLD: 0.6 K/UL (ref 0–1)
MONOCYTES NFR BLD: 5 % (ref 5–13)
NEUTS SEG # BLD: 6.4 K/UL (ref 1.8–8)
NEUTS SEG NFR BLD: 62 % (ref 32–75)
NRBC # BLD: 0 K/UL (ref 0–0.01)
NRBC BLD-RTO: 0 PER 100 WBC
PLATELET # BLD AUTO: 264 K/UL (ref 150–400)
PMV BLD AUTO: 12.7 FL (ref 8.9–12.9)
POTASSIUM SERPL-SCNC: 4.2 MMOL/L (ref 3.5–5.1)
RBC # BLD AUTO: 3.32 M/UL (ref 3.8–5.2)
SODIUM SERPL-SCNC: 139 MMOL/L (ref 136–145)
WBC # BLD AUTO: 10.4 K/UL (ref 3.6–11)

## 2024-12-05 PROCEDURE — 6360000002 HC RX W HCPCS: Performed by: INTERNAL MEDICINE

## 2024-12-05 PROCEDURE — 96374 THER/PROPH/DIAG INJ IV PUSH: CPT

## 2024-12-05 PROCEDURE — 2580000003 HC RX 258: Performed by: INTERNAL MEDICINE

## 2024-12-05 PROCEDURE — 85025 COMPLETE CBC W/AUTO DIFF WBC: CPT

## 2024-12-05 PROCEDURE — 82550 ASSAY OF CK (CPK): CPT

## 2024-12-05 PROCEDURE — 80048 BASIC METABOLIC PNL TOTAL CA: CPT

## 2024-12-05 PROCEDURE — 36415 COLL VENOUS BLD VENIPUNCTURE: CPT

## 2024-12-05 PROCEDURE — 2580000003 HC RX 258

## 2024-12-05 RX ORDER — ALBUTEROL SULFATE 90 UG/1
4 INHALANT RESPIRATORY (INHALATION) PRN
OUTPATIENT
Start: 2024-12-05

## 2024-12-05 RX ORDER — SODIUM CHLORIDE 9 MG/ML
5-250 INJECTION, SOLUTION INTRAVENOUS PRN
OUTPATIENT
Start: 2024-12-05

## 2024-12-05 RX ORDER — SODIUM CHLORIDE 9 MG/ML
INJECTION, SOLUTION INTRAVENOUS CONTINUOUS
Status: DISCONTINUED | OUTPATIENT
Start: 2024-12-05 | End: 2024-12-06 | Stop reason: HOSPADM

## 2024-12-05 RX ORDER — HYDROCORTISONE SODIUM SUCCINATE 100 MG/2ML
100 INJECTION INTRAMUSCULAR; INTRAVENOUS
Status: DISCONTINUED | OUTPATIENT
Start: 2024-12-05 | End: 2024-12-06 | Stop reason: HOSPADM

## 2024-12-05 RX ORDER — EPINEPHRINE 1 MG/ML
0.3 INJECTION, SOLUTION, CONCENTRATE INTRAVENOUS PRN
OUTPATIENT
Start: 2024-12-05

## 2024-12-05 RX ORDER — ALBUTEROL SULFATE 90 UG/1
4 INHALANT RESPIRATORY (INHALATION) PRN
Status: DISCONTINUED | OUTPATIENT
Start: 2024-12-05 | End: 2024-12-06 | Stop reason: HOSPADM

## 2024-12-05 RX ORDER — HYDROCORTISONE SODIUM SUCCINATE 100 MG/2ML
100 INJECTION INTRAMUSCULAR; INTRAVENOUS
OUTPATIENT
Start: 2024-12-05

## 2024-12-05 RX ORDER — HEPARIN 100 UNIT/ML
500 SYRINGE INTRAVENOUS PRN
OUTPATIENT
Start: 2024-12-05

## 2024-12-05 RX ORDER — SODIUM CHLORIDE 9 MG/ML
INJECTION, SOLUTION INTRAVENOUS CONTINUOUS
OUTPATIENT
Start: 2024-12-05

## 2024-12-05 RX ORDER — ACETAMINOPHEN 325 MG/1
650 TABLET ORAL
Status: DISCONTINUED | OUTPATIENT
Start: 2024-12-05 | End: 2024-12-06 | Stop reason: HOSPADM

## 2024-12-05 RX ORDER — ACETAMINOPHEN 325 MG/1
650 TABLET ORAL
OUTPATIENT
Start: 2024-12-05

## 2024-12-05 RX ORDER — DIPHENHYDRAMINE HYDROCHLORIDE 50 MG/ML
50 INJECTION INTRAMUSCULAR; INTRAVENOUS
OUTPATIENT
Start: 2024-12-05

## 2024-12-05 RX ORDER — SODIUM CHLORIDE 0.9 % (FLUSH) 0.9 %
5-40 SYRINGE (ML) INJECTION PRN
OUTPATIENT
Start: 2024-12-05

## 2024-12-05 RX ORDER — SODIUM CHLORIDE 0.9 % (FLUSH) 0.9 %
5-40 SYRINGE (ML) INJECTION PRN
Status: DISCONTINUED | OUTPATIENT
Start: 2024-12-05 | End: 2024-12-06 | Stop reason: HOSPADM

## 2024-12-05 RX ORDER — SODIUM CHLORIDE 9 MG/ML
INJECTION, SOLUTION INTRAMUSCULAR; INTRAVENOUS; SUBCUTANEOUS
Status: COMPLETED
Start: 2024-12-05 | End: 2024-12-05

## 2024-12-05 RX ORDER — DIPHENHYDRAMINE HYDROCHLORIDE 50 MG/ML
50 INJECTION INTRAMUSCULAR; INTRAVENOUS
Status: DISCONTINUED | OUTPATIENT
Start: 2024-12-05 | End: 2024-12-06 | Stop reason: HOSPADM

## 2024-12-05 RX ORDER — EPINEPHRINE 1 MG/ML
0.3 INJECTION, SOLUTION, CONCENTRATE INTRAVENOUS PRN
Status: DISCONTINUED | OUTPATIENT
Start: 2024-12-05 | End: 2024-12-06 | Stop reason: HOSPADM

## 2024-12-05 RX ORDER — ONDANSETRON 2 MG/ML
8 INJECTION INTRAMUSCULAR; INTRAVENOUS
Status: DISCONTINUED | OUTPATIENT
Start: 2024-12-05 | End: 2024-12-06 | Stop reason: HOSPADM

## 2024-12-05 RX ORDER — ONDANSETRON 2 MG/ML
8 INJECTION INTRAMUSCULAR; INTRAVENOUS
OUTPATIENT
Start: 2024-12-05

## 2024-12-05 RX ADMIN — SODIUM CHLORIDE 20 ML: 9 INJECTION, SOLUTION INTRAMUSCULAR; INTRAVENOUS; SUBCUTANEOUS at 15:35

## 2024-12-05 RX ADMIN — DAPTOMYCIN 450 MG: 500 INJECTION, POWDER, LYOPHILIZED, FOR SOLUTION INTRAVENOUS at 15:25

## 2024-12-05 NOTE — PROGRESS NOTES
[facility] Progress Note    Date: 2024    Name: Madisyn Manriquez    MRN: [id number]         : 1955    1428: Ms. Manriquez ambulates into [facility] without difficulty, for daptomycin as treatment for endocarditis of mitral valve. Patient is alert and oriented. Name and  verified. Patient denies pain or discomfort.    Patient was assessed and education was provided.     Ms. Manriquez's vitals were reviewed.  Vitals:    24 1428   BP: (!) 152/78   Pulse: 89   Resp: 18   Temp: 97.5 °F (36.4 °C)   SpO2: 99%     1445: Labs drawn from PICC per orders.     Lab results were obtained and reviewed.  Recent Results (from the past 12 hour(s))   CBC With Auto Differential    Collection Time: 24  2:45 PM   Result Value Ref Range    WBC 10.4 3.6 - 11.0 K/uL    RBC 3.32 (L) 3.80 - 5.20 M/uL    Hemoglobin 9.9 (L) 11.5 - 16.0 g/dL    Hematocrit 30.0 (L) 35.0 - 47.0 %    MCV 90.4 80.0 - 99.0 FL    MCH 29.8 26.0 - 34.0 PG    MCHC 33.0 30.0 - 36.5 g/dL    RDW 14.5 11.5 - 14.5 %    Platelets 264 150 - 400 K/uL    MPV 12.7 8.9 - 12.9 FL    Nucleated RBCs 0.0 0  WBC    nRBC 0.00 0.00 - 0.01 K/uL    Neutrophils % 62 32 - 75 %    Lymphocytes % 27 12 - 49 %    Monocytes % 5 5 - 13 %    Eosinophils % 5 0 - 7 %    Basophils % 1 0 - 1 %    Immature Granulocytes % 0 0.0 - 0.5 %    Neutrophils Absolute 6.4 1.8 - 8.0 K/UL    Lymphocytes Absolute 2.8 0.8 - 3.5 K/UL    Monocytes Absolute 0.6 0.0 - 1.0 K/UL    Eosinophils Absolute 0.6 (H) 0.0 - 0.4 K/UL    Basophils Absolute 0.1 0.0 - 0.1 K/UL    Immature Granulocytes Absolute 0.0 0.00 - 0.04 K/UL    Differential Type AUTOMATED     CK    Collection Time: 12/05/24  2:45 PM   Result Value Ref Range    Total CK 27 26 - 192 U/L   Basic Metabolic Panel    Collection Time: 24  2:45 PM   Result Value Ref Range    Sodium 139 136 - 145 mmol/L    Potassium 4.2 3.5 - 5.1 mmol/L    Chloride 103 97 - 108 mmol/L    CO2 26 21 - 32 mmol/L    Anion Gap 10 2 - 12 mmol/L    Glucose 212 (H) 65  - 100 mg/dL    BUN 29 (H) 6 - 20 MG/DL    Creatinine 2.07 (H) 0.55 - 1.02 MG/DL    BUN/Creatinine Ratio 14 12 - 20      Est, Glom Filt Rate 26 (L) >60 ml/min/1.73m2    Calcium 9.1 8.5 - 10.1 MG/DL     1525: Daptomycin 450 mg was administered slow IVP over 5 minutes    1535: 20 ml normal saline administered    PICC line clamped and new curos cap in place     Patient monitored for 30 minutes post infusion. No adverse reaction noted.     Ms. Manriquez tolerated infusion well, and had no complaints.    Armband was removed and placed in the shred bin.    [facility] at 1408. She is to return as needed per provider order.    Chika Gardner RN  December 5, 2024

## 2024-12-11 ENCOUNTER — TRANSCRIBE ORDERS (OUTPATIENT)
Facility: HOSPITAL | Age: 69
End: 2024-12-11

## 2024-12-11 ENCOUNTER — HOSPITAL ENCOUNTER (OUTPATIENT)
Facility: HOSPITAL | Age: 69
Discharge: HOME | End: 2024-12-14
Payer: MEDICARE

## 2024-12-11 DIAGNOSIS — M54.6 PAIN IN THORACIC SPINE: ICD-10-CM

## 2024-12-11 DIAGNOSIS — M54.6 PAIN IN THORACIC SPINE: Primary | ICD-10-CM

## 2024-12-11 PROCEDURE — 72070 X-RAY EXAM THORAC SPINE 2VWS: CPT

## 2024-12-12 ENCOUNTER — TELEPHONE (OUTPATIENT)
Age: 69
End: 2024-12-12

## 2024-12-12 ENCOUNTER — TELEMEDICINE (OUTPATIENT)
Age: 69
End: 2024-12-12
Payer: MEDICARE

## 2024-12-12 DIAGNOSIS — N17.9 AKI (ACUTE KIDNEY INJURY) (HCC): ICD-10-CM

## 2024-12-12 DIAGNOSIS — I48.11 LONGSTANDING PERSISTENT ATRIAL FIBRILLATION (HCC): ICD-10-CM

## 2024-12-12 DIAGNOSIS — Z87.891 SMOKING HISTORY: ICD-10-CM

## 2024-12-12 DIAGNOSIS — R78.81 RECURRENT BACTEREMIA: Primary | ICD-10-CM

## 2024-12-12 DIAGNOSIS — R78.81 ENTEROCOCCAL BACTEREMIA: ICD-10-CM

## 2024-12-12 DIAGNOSIS — I42.9 CARDIOMYOPATHY, UNSPECIFIED TYPE (HCC): ICD-10-CM

## 2024-12-12 DIAGNOSIS — Z88.0 HISTORY OF PENICILLIN ALLERGY: ICD-10-CM

## 2024-12-12 DIAGNOSIS — E11.8 CONTROLLED DIABETES MELLITUS TYPE 2 WITH COMPLICATIONS, UNSPECIFIED WHETHER LONG TERM INSULIN USE (HCC): ICD-10-CM

## 2024-12-12 DIAGNOSIS — I05.9 ENDOCARDITIS OF MITRAL VALVE: ICD-10-CM

## 2024-12-12 DIAGNOSIS — T82.7XXD CARDIAC DEVICE, IMPLANT, OR GRAFT INFECTION OR INFLAMMATION, SUBSEQUENT ENCOUNTER: ICD-10-CM

## 2024-12-12 DIAGNOSIS — B95.2 ENTEROCOCCAL BACTEREMIA: ICD-10-CM

## 2024-12-12 PROCEDURE — 1160F RVW MEDS BY RX/DR IN RCRD: CPT | Performed by: INTERNAL MEDICINE

## 2024-12-12 PROCEDURE — 1159F MED LIST DOCD IN RCRD: CPT | Performed by: INTERNAL MEDICINE

## 2024-12-12 PROCEDURE — 1123F ACP DISCUSS/DSCN MKR DOCD: CPT | Performed by: INTERNAL MEDICINE

## 2024-12-12 PROCEDURE — 3051F HG A1C>EQUAL 7.0%<8.0%: CPT | Performed by: INTERNAL MEDICINE

## 2024-12-12 PROCEDURE — 99214 OFFICE O/P EST MOD 30 MIN: CPT | Performed by: INTERNAL MEDICINE

## 2024-12-12 RX ORDER — INSULIN GLARGINE 100 [IU]/ML
INJECTION, SOLUTION SUBCUTANEOUS NIGHTLY
COMMUNITY

## 2024-12-12 ASSESSMENT — PATIENT HEALTH QUESTIONNAIRE - PHQ9
1. LITTLE INTEREST OR PLEASURE IN DOING THINGS: NOT AT ALL
SUM OF ALL RESPONSES TO PHQ QUESTIONS 1-9: 0
SUM OF ALL RESPONSES TO PHQ9 QUESTIONS 1 & 2: 0
2. FEELING DOWN, DEPRESSED OR HOPELESS: NOT AT ALL

## 2024-12-12 NOTE — TELEPHONE ENCOUNTER
Notified [facility] and [facility] [employer] of orders below via telephone call.

## 2024-12-12 NOTE — TELEPHONE ENCOUNTER
Please mail lab slip and referral slip to patient  Patient was instructed to do blood cultures 1/7 oh after.  Patient was also instructed to restart statin about 7 days after completion of antibiotic,  Which would be around 1/7.

## 2024-12-12 NOTE — TELEPHONE ENCOUNTER
Please notify home health that patient finishes antibiotic 12/31.  PICC to be removed thereafter  Renal function is improving.  Once creatinine clearance is clearly above 30 patient will need to be switched to once  daily dosing.

## 2024-12-12 NOTE — PROGRESS NOTES
Infectious Disease clinic            Impression     Recurrent Enterococcus faecalis bacteremia  & Mitral valve endocarditis, CIED infection  Blood cultures 11/11+ for E.faecalis 3/3-LAC  Repeat cultures 11/12+ for E faecalis 4/4-LA/RFA  Blood culture 11/13+ for E faecalis 2/2 LAC  KEYLA+ for <1cm mobile vegetation on atrial side of anterior leaflet  No vegetation seen on a ICD, infection could not be ruled out.  S/p laser lead extraction of single-chamber ICD 11/19  Findings of gross infection of ICD lead and pocket.  Cultures-pending  Repeat blood cultures 11/19 after extraction pending.  ID and I  Recent admission at [facility] 10/13-10/15  Treated for RLL pneumonia, E faecalis bacteremia, metabolic encephalopathy  Blood cultures 10/13+ for E faecalis 2/2  Negative repeat cultures 10/14, echo negative.  Treated with vancomycin and cefepime, DC on po linezolid.     No history of dysuria  UA unremarkable both admissions  11/11 ,10/13.     H/o diarrhea, loose stools prior to admission at [facility]  Pt attributes it to Glucerna.  CT abdomen/pelvis+ for diverticulosis  no diverticulitis.,     Diabetes type 2  A1c 7.4     BALJIT  Resolved     Atrial fibrillation  Nonischemic cardiomyopathy  ICD implanted 2016.     H/o stroke     Past history of smoking     Penicillin allergy  Anaphylaxis     Plan  Plan was for vancomycin x 6 weeks end date 12/31  Patient sent out on vancomycin IV  Patient subsequently changed to daptomycin IV 12/2        Possible adverse effects of long term antibiotics are inclusive of but not limited to following  BM suppression, neutropenia , cytopenias , aplastic anemia hemorrhage liver & renal dysfunction/ liver , renal failure  , GI dysfunction- N, V  Diarrhea,C.difficile disease, rash , allergy , anaphylaxis.toxic epidermal necrolysis  Neuro toxicity , seizure disorder  Side effects tend to be more pronounced in the elderly     Antimicrobial orders for discharge-orders sent 12/2  -Daptomycin 8 mg /KG IV   every 48 
[facility].    “Have you seen or consulted any other health care providers outside of [facility] since your last visit?”    Primary care, Changed meds

## 2024-12-13 ENCOUNTER — TELEPHONE (OUTPATIENT)
Age: 69
End: 2024-12-13

## 2024-12-13 NOTE — TELEPHONE ENCOUNTER
Jaspreet from [employer] called to verify orders. Gave orders below.    Antimicrobial orders for discharge-orders sent 12/2  -Daptomycin 8 mg /KG IV   every 48 hours end date 12/31  -Change to daily dosing when GFR>30  -Pull line at end of therapy.    -Weekly CBC, CMP, CK,  -Fax reports to [phone], call with critical labs at [phone]  -Call if progressive rise in creatinine is noted  -Encourage adequate fluids, daily probiotic/yogurt  -If line malfunction occurs and home health cannot reposition  please send patient to ED immediately  -ID follow-up -12/12 at 1230-virtual  Patient will require surveillance blood cultures after completing antibiotic treatment  - If persistent side effects occur stop antibiotic and call-ID/PCP

## 2025-01-08 ENCOUNTER — HOSPITAL ENCOUNTER (OUTPATIENT)
Facility: HOSPITAL | Age: 70
Discharge: HOME | End: 2025-01-11
Payer: MEDICARE

## 2025-01-08 PROCEDURE — 87040 BLOOD CULTURE FOR BACTERIA: CPT

## 2025-01-08 PROCEDURE — 36415 COLL VENOUS BLD VENIPUNCTURE: CPT

## 2025-06-13 ENCOUNTER — TELEPHONE (OUTPATIENT)
Age: 70
End: 2025-06-13

## 2025-07-22 ENCOUNTER — TRANSCRIBE ORDERS (OUTPATIENT)
Facility: HOSPITAL | Age: 70
End: 2025-07-22

## 2025-07-22 DIAGNOSIS — T82.7XXS PACEMAKER INFECTION, SEQUELA: Primary | ICD-10-CM

## 2025-08-07 ENCOUNTER — HOSPITAL ENCOUNTER (OUTPATIENT)
Facility: HOSPITAL | Age: 70
Discharge: HOME | End: 2025-08-10
Attending: INTERNAL MEDICINE
Payer: MEDICARE

## 2025-08-07 DIAGNOSIS — T82.7XXS PACEMAKER INFECTION, SEQUELA: ICD-10-CM

## 2025-08-07 LAB
ECHO AO ASC DIAM: 3 CM
ECHO AO ROOT DIAM: 2.7 CM
ECHO AO SINUS VALSALVA DIAM: 2.7 CM
ECHO AO ST JNCT DIAM: 2.5 CM
ECHO AV ANNULUS DIAM: 2.2 CM
ECHO AV PEAK GRADIENT: 4 MMHG
ECHO AV PEAK VELOCITY: 1 M/S
ECHO EST RA PRESSURE: 3 MMHG
ECHO LA DIAMETER: 3.9 CM
ECHO LA TO AORTIC ROOT RATIO: 1.44
ECHO LA VOL A-L A2C: 47 ML (ref 22–52)
ECHO LA VOL A-L A4C: 47 ML (ref 22–52)
ECHO LA VOL MOD A2C: 45 ML (ref 22–52)
ECHO LA VOL MOD A4C: 46 ML (ref 22–52)
ECHO LA VOLUME AREA LENGTH: 47 ML
ECHO LV E' LATERAL VELOCITY: 6.67 CM/S
ECHO LV E' SEPTAL VELOCITY: 4.79 CM/S
ECHO LV EDV A2C: 113 ML
ECHO LV EDV A4C: 91 ML
ECHO LV EDV BP: 105 ML (ref 56–104)
ECHO LV EF PHYSICIAN: 55 %
ECHO LV EJECTION FRACTION A2C: 39 %
ECHO LV EJECTION FRACTION A4C: 26 %
ECHO LV ESV A2C: 69 ML
ECHO LV ESV A4C: 67 ML
ECHO LV ESV BP: 69 ML (ref 19–49)
ECHO LV FRACTIONAL SHORTENING: 21 % (ref 28–44)
ECHO LV INTERNAL DIMENSION DIASTOLIC: 4.2 CM (ref 3.9–5.3)
ECHO LV INTERNAL DIMENSION SYSTOLIC: 3.3 CM
ECHO LV IVSD: 0.9 CM (ref 0.6–0.9)
ECHO LV MASS 2D: 137.2 G (ref 67–162)
ECHO LV POSTERIOR WALL DIASTOLIC: 1.1 CM (ref 0.6–0.9)
ECHO LV RELATIVE WALL THICKNESS RATIO: 0.52
ECHO LVOT AREA: 2.5 CM2
ECHO LVOT DIAM: 1.8 CM
ECHO MV A VELOCITY: 0.93 M/S
ECHO MV AREA PHT: 3.6 CM2
ECHO MV E DECELERATION TIME (DT): 170 MS
ECHO MV E VELOCITY: 0.77 M/S
ECHO MV E/A RATIO: 0.83
ECHO MV E/E' LATERAL: 11.54
ECHO MV E/E' RATIO (AVERAGED): 13.81
ECHO MV E/E' SEPTAL: 16.08
ECHO MV MAX VELOCITY: 1 M/S
ECHO MV MEAN GRADIENT: 2 MMHG
ECHO MV MEAN VELOCITY: 0.7 M/S
ECHO MV PEAK GRADIENT: 4 MMHG
ECHO MV PRESSURE HALF TIME (PHT): 61.6 MS
ECHO MV VTI: 29 CM
ECHO PULMONARY ARTERY SYSTOLIC PRESSURE (PASP): 24 MMHG
ECHO PV MAX VELOCITY: 0.6 M/S
ECHO PV PEAK GRADIENT: 1 MMHG
ECHO RA VOLUME: 17 ML
ECHO RA VOLUME: 18 ML
ECHO RIGHT VENTRICULAR SYSTOLIC PRESSURE (RVSP): 21 MMHG
ECHO RV BASAL DIMENSION: 3.2 CM
ECHO RV FREE WALL PEAK S': 13.2 CM/S
ECHO RV TAPSE: 2 CM (ref 1.7–?)
ECHO TV REGURGITANT MAX VELOCITY: 2.14 M/S
ECHO TV REGURGITANT PEAK GRADIENT: 18 MMHG

## 2025-08-07 PROCEDURE — 93306 TTE W/DOPPLER COMPLETE: CPT | Performed by: INTERNAL MEDICINE

## 2025-08-07 PROCEDURE — 93306 TTE W/DOPPLER COMPLETE: CPT

## (undated) DEVICE — PLASMABLADE PS200-040 4.0: Brand: PLASMABLADE™

## (undated) DEVICE — Device: Brand: VISISHEATH DILATOR SHEATH

## (undated) DEVICE — Device: Brand: LLD E LEAD LOCKING DEVICE

## (undated) DEVICE — Device: Brand: GLIDELIGHT LASER SHEATH

## (undated) DEVICE — TOWEL,OR,DSP,ST,BLUE,STD,2/PK,40PK/CS: Brand: MEDLINE

## (undated) DEVICE — HEART CATH-MRMC: Brand: MEDLINE INDUSTRIES, INC.

## (undated) DEVICE — PINNACLE INTRODUCER SHEATH: Brand: PINNACLE

## (undated) DEVICE — KIT CATH 5FR L91CM GWIRE INTRO SHTH SET SYR STPCOCK BRDG

## (undated) DEVICE — PACK,LAPAROTOMY,2 REINFORCED GOWNS: Brand: MEDLINE

## (undated) DEVICE — STERNUM BLADE, OFFSET (32.0 X 0.8 X 6.3MM)

## (undated) DEVICE — Device

## (undated) DEVICE — GLOVE SURG SZ7 SYN PF BIOGEL PI ULTRATOUCH STRL

## (undated) DEVICE — APPLICATOR MEDICATED 26 CC SOLUTION HI LT ORNG CHLORAPREP

## (undated) DEVICE — 3M™ IOBAN™ 2 ANTIMICROBIAL INCISE DRAPE 6650EZ: Brand: IOBAN™ 2

## (undated) DEVICE — DEVICE ES L3M EDGE COAT HEX LOK BLDE ELECTRD HOLSTER FORC

## (undated) DEVICE — TOWEL,OR,DSP,ST,BLUE,STD,4/PK,20PK/CS: Brand: MEDLINE

## (undated) DEVICE — ZIP 8I SURGICAL SKIN CLOSURE DEVICE: Brand: ZIP 8I SURGICAL SKIN CLOSURE DEVICE

## (undated) DEVICE — SUTURE VICRYL SZ 2-0 L36IN ABSRB UD L36MM CT-1 1/2 CIR J945H

## (undated) DEVICE — ELECTRODE PT RET AD L9FT HI MOIST COND ADH HYDRGEL CORDED

## (undated) DEVICE — PACEMAKER PACK: Brand: MEDLINE INDUSTRIES, INC.

## (undated) DEVICE — INTENDED FOR TISSUE SEPARATION, AND OTHER PROCEDURES THAT REQUIRE A SHARP SURGICAL BLADE TO PUNCTURE OR CUT.: Brand: BARD-PARKER ®  SAFETY SCALPED

## (undated) DEVICE — GOWN,SIRUS,NONRNF,XLN/2XL,18/CS: Brand: MEDLINE

## (undated) DEVICE — LEAD CUTTER: Brand: LEAD CUTTER

## (undated) DEVICE — SPONGE LAP W18XL18IN WHT COT 4 PLY FLD STRUNG RADPQ DISP ST 2 PER PACK

## (undated) DEVICE — SUTURE PERMAHAND SZ 0 L30IN NONABSORBABLE BLK L26MM SH 1/2 K834H

## (undated) DEVICE — BRIDGE OCCLUSION CATHETER - 80 MM LENGTH BALLOON: Brand: BRIDGE™ OCCLUSION BALLOON

## (undated) DEVICE — GOWN,SIRUS,FABRNF,XL,20/CS: Brand: MEDLINE

## (undated) DEVICE — SUTURE V-LOC 180 SZ 2-0 L12IN ABSRB VLT GS-21 L37MM 1/2 CIR VLOCM0315

## (undated) DEVICE — GOWN,SIRUS,NONRNF,SETINSLV,XL,20/CS: Brand: MEDLINE

## (undated) DEVICE — SPONGE GZ W4XL4IN COT RADPQ HIGHLY ABSRB STERILE

## (undated) DEVICE — TOWEL SURG W17XL27IN STD BLU COT NONFENESTRATED PREWASHED

## (undated) DEVICE — SUTURE ABSORBABLE WND CLOSURE 4-0 P-12 12 IN UD V-LOC